# Patient Record
Sex: MALE | Race: WHITE | NOT HISPANIC OR LATINO | Employment: OTHER | URBAN - METROPOLITAN AREA
[De-identification: names, ages, dates, MRNs, and addresses within clinical notes are randomized per-mention and may not be internally consistent; named-entity substitution may affect disease eponyms.]

---

## 2017-03-31 ENCOUNTER — HOSPITAL ENCOUNTER (OUTPATIENT)
Dept: FAMILY MEDICINE CLINIC | Facility: CLINIC | Age: 62
Setting detail: SPECIMEN
Discharge: HOME | End: 2017-03-31
Attending: PHYSICIAN ASSISTANT | Admitting: PHYSICIAN ASSISTANT

## 2017-03-31 LAB
ALBUMIN SERPL-MCNC: 4.2 G/DL (ref 3.5–4.8)
ALBUMIN/GLOB SERPL: 1.5 {RATIO} (ref 1–1.7)
ALP SERPL-CCNC: 72 IU/L (ref 32–91)
ALT SERPL-CCNC: 47 IU/L (ref 17–63)
ANION GAP SERPL CALC-SCNC: 17.7 MMOL/L (ref 10–20)
AST SERPL-CCNC: 27 IU/L (ref 15–41)
BASOPHILS # BLD AUTO: 0.1 10*3/UL (ref 0–0.2)
BASOPHILS NFR BLD AUTO: 1 % (ref 0–2)
BILIRUB SERPL-MCNC: 0.7 MG/DL (ref 0.3–1.2)
BUN SERPL-MCNC: 13 MG/DL (ref 8–20)
BUN/CREAT SERPL: 13 (ref 6.2–20.3)
CALCIUM SERPL-MCNC: 9.3 MG/DL (ref 8.9–10.3)
CHLORIDE SERPL-SCNC: 104 MMOL/L (ref 101–111)
CHOLEST SERPL-MCNC: 197 MG/DL
CHOLEST/HDLC SERPL: 5.6 {RATIO}
CONV CO2: 26 MMOL/L (ref 22–32)
CONV LDL CHOLESTEROL DIRECT: 113 MG/DL (ref 0–100)
CONV TOTAL PROTEIN: 7 G/DL (ref 6.1–7.9)
CREAT UR-MCNC: 1 MG/DL (ref 0.7–1.2)
DIFFERENTIAL METHOD BLD: (no result)
EOSINOPHIL # BLD AUTO: 0.2 10*3/UL (ref 0–0.3)
EOSINOPHIL # BLD AUTO: 2 % (ref 0–3)
ERYTHROCYTE [DISTWIDTH] IN BLOOD BY AUTOMATED COUNT: 14.9 % (ref 11.5–14.5)
GLOBULIN UR ELPH-MCNC: 2.8 G/DL (ref 2.5–3.8)
GLUCOSE SERPL-MCNC: 70 MG/DL (ref 65–99)
HCT VFR BLD AUTO: 47.3 % (ref 40–54)
HDLC SERPL-MCNC: 35 MG/DL
HGB BLD-MCNC: 16.6 G/DL (ref 14–18)
LDLC/HDLC SERPL: 3.2 {RATIO}
LIPID INTERPRETATION: ABNORMAL
LYMPHOCYTES # BLD AUTO: 2.2 10*3/UL (ref 0.8–4.8)
LYMPHOCYTES NFR BLD AUTO: 25 % (ref 18–42)
MCH RBC QN AUTO: 29.8 PG (ref 26–32)
MCHC RBC AUTO-ENTMCNC: 35.2 G/DL (ref 32–36)
MCV RBC AUTO: 84.6 FL (ref 80–94)
MONOCYTES # BLD AUTO: 0.7 10*3/UL (ref 0.1–1.3)
MONOCYTES NFR BLD AUTO: 8 % (ref 2–11)
NEUTROPHILS # BLD AUTO: 5.5 10*3/UL (ref 2.3–8.6)
NEUTROPHILS NFR BLD AUTO: 64 % (ref 50–75)
NRBC BLD AUTO-RTO: 0 /100{WBCS}
NRBC/RBC NFR BLD MANUAL: 0 10*3/UL
PLATELET # BLD AUTO: 174 10*3/UL (ref 150–450)
PMV BLD AUTO: 7.8 FL (ref 7.4–10.4)
POTASSIUM SERPL-SCNC: 3.7 MMOL/L (ref 3.6–5.1)
PSA SERPL-MCNC: 0.59 NG/ML (ref 0–4)
RBC # BLD AUTO: 5.59 10*6/UL (ref 4.6–6)
SODIUM SERPL-SCNC: 144 MMOL/L (ref 136–144)
TRIGL SERPL-MCNC: 344 MG/DL
TSH SERPL-ACNC: 3.1 UIU/ML (ref 0.34–5.6)
VLDLC SERPL CALC-MCNC: 48.7 MG/DL
WBC # BLD AUTO: 8.6 10*3/UL (ref 4.5–11.5)

## 2018-01-17 ENCOUNTER — HOSPITAL ENCOUNTER (OUTPATIENT)
Dept: FAMILY MEDICINE CLINIC | Facility: CLINIC | Age: 63
Setting detail: SPECIMEN
Discharge: HOME | End: 2018-01-17
Attending: FAMILY MEDICINE | Admitting: FAMILY MEDICINE

## 2018-01-17 LAB
ANION GAP SERPL CALC-SCNC: 13.6 MMOL/L (ref 10–20)
BUN SERPL-MCNC: 17 MG/DL (ref 8–20)
BUN/CREAT SERPL: 14.2 (ref 6.2–20.3)
CALCIUM SERPL-MCNC: 9.6 MG/DL (ref 8.9–10.3)
CHLORIDE SERPL-SCNC: 100 MMOL/L (ref 101–111)
CHOLEST SERPL-MCNC: 184 MG/DL
CHOLEST/HDLC SERPL: 5.8 {RATIO}
CONV CO2: 28 MMOL/L (ref 22–32)
CONV LDL CHOLESTEROL DIRECT: 94 MG/DL (ref 0–100)
CREAT UR-MCNC: 1.2 MG/DL (ref 0.7–1.2)
GLUCOSE SERPL-MCNC: 91 MG/DL (ref 65–99)
HDLC SERPL-MCNC: 32 MG/DL
LDLC/HDLC SERPL: 2.9 {RATIO}
LIPID INTERPRETATION: ABNORMAL
POTASSIUM SERPL-SCNC: 3.6 MMOL/L (ref 3.6–5.1)
SODIUM SERPL-SCNC: 138 MMOL/L (ref 136–144)
TRIGL SERPL-MCNC: 362 MG/DL
VLDLC SERPL CALC-MCNC: 57.7 MG/DL

## 2019-03-21 ENCOUNTER — OFFICE (AMBULATORY)
Dept: URBAN - METROPOLITAN AREA PATHOLOGY 4 | Facility: PATHOLOGY | Age: 64
End: 2019-03-21
Payer: COMMERCIAL

## 2019-03-21 ENCOUNTER — ON CAMPUS - OUTPATIENT (AMBULATORY)
Dept: URBAN - METROPOLITAN AREA HOSPITAL 2 | Facility: HOSPITAL | Age: 64
End: 2019-03-21
Payer: COMMERCIAL

## 2019-03-21 VITALS
SYSTOLIC BLOOD PRESSURE: 164 MMHG | WEIGHT: 231.6 LBS | HEART RATE: 77 BPM | DIASTOLIC BLOOD PRESSURE: 88 MMHG | DIASTOLIC BLOOD PRESSURE: 100 MMHG | HEART RATE: 83 BPM | HEART RATE: 67 BPM | HEART RATE: 75 BPM | SYSTOLIC BLOOD PRESSURE: 141 MMHG | DIASTOLIC BLOOD PRESSURE: 97 MMHG | DIASTOLIC BLOOD PRESSURE: 95 MMHG | OXYGEN SATURATION: 99 % | SYSTOLIC BLOOD PRESSURE: 145 MMHG | RESPIRATION RATE: 16 BRPM | SYSTOLIC BLOOD PRESSURE: 154 MMHG | DIASTOLIC BLOOD PRESSURE: 93 MMHG | OXYGEN SATURATION: 94 % | DIASTOLIC BLOOD PRESSURE: 89 MMHG | HEART RATE: 74 BPM | OXYGEN SATURATION: 98 % | SYSTOLIC BLOOD PRESSURE: 143 MMHG | OXYGEN SATURATION: 96 % | RESPIRATION RATE: 18 BRPM | HEART RATE: 82 BPM | SYSTOLIC BLOOD PRESSURE: 153 MMHG | TEMPERATURE: 97.9 F | OXYGEN SATURATION: 97 % | SYSTOLIC BLOOD PRESSURE: 159 MMHG | HEART RATE: 79 BPM | HEIGHT: 71 IN | SYSTOLIC BLOOD PRESSURE: 140 MMHG | SYSTOLIC BLOOD PRESSURE: 139 MMHG | DIASTOLIC BLOOD PRESSURE: 80 MMHG | OXYGEN SATURATION: 100 %

## 2019-03-21 DIAGNOSIS — D12.2 BENIGN NEOPLASM OF ASCENDING COLON: ICD-10-CM

## 2019-03-21 DIAGNOSIS — D12.4 BENIGN NEOPLASM OF DESCENDING COLON: ICD-10-CM

## 2019-03-21 DIAGNOSIS — K57.30 DIVERTICULOSIS OF LARGE INTESTINE WITHOUT PERFORATION OR ABS: ICD-10-CM

## 2019-03-21 DIAGNOSIS — D12.5 BENIGN NEOPLASM OF SIGMOID COLON: ICD-10-CM

## 2019-03-21 DIAGNOSIS — R19.5 OTHER FECAL ABNORMALITIES: ICD-10-CM

## 2019-03-21 DIAGNOSIS — Z15.09 GENETIC SUSCEPTIBILITY TO OTHER MALIGNANT NEOPLASM: ICD-10-CM

## 2019-03-21 LAB
GI HISTOLOGY: A. UNSPECIFIED: (no result)
GI HISTOLOGY: B. UNSPECIFIED: (no result)
GI HISTOLOGY: C. UNSPECIFIED: (no result)
GI HISTOLOGY: PDF REPORT: (no result)

## 2019-03-21 PROCEDURE — 45380 COLONOSCOPY AND BIOPSY: CPT | Mod: 59 | Performed by: INTERNAL MEDICINE

## 2019-03-21 PROCEDURE — 45385 COLONOSCOPY W/LESION REMOVAL: CPT | Performed by: INTERNAL MEDICINE

## 2019-03-21 PROCEDURE — 88305 TISSUE EXAM BY PATHOLOGIST: CPT | Performed by: INTERNAL MEDICINE

## 2019-05-24 ENCOUNTER — CONVERSION ENCOUNTER (OUTPATIENT)
Dept: FAMILY MEDICINE CLINIC | Facility: CLINIC | Age: 64
End: 2019-05-24

## 2019-06-04 VITALS
HEIGHT: 72 IN | WEIGHT: 227.7 LBS | RESPIRATION RATE: 16 BRPM | OXYGEN SATURATION: 98 % | HEART RATE: 102 BPM | DIASTOLIC BLOOD PRESSURE: 96 MMHG | SYSTOLIC BLOOD PRESSURE: 137 MMHG | BODY MASS INDEX: 30.84 KG/M2

## 2019-06-06 NOTE — PROGRESS NOTES
Visit Type:  Acute Visit  Referring Provider:  Jarrell Alaniz MD  Primary Provider:  Corbin URBAN MD      History of Present Illness:  Patient is here by:         self.   Context/Complaints:      Patient has developed cough and sore throat since last monday. no fever.   Severity/Character:        while worse at night  Onset/Timing:               which   Duration:                       since Friday  Associated symptoms:  Patient Sore throat , hoarseness. productive cough,  Denies sinus congestion, drainage. Says only other symptom is mouth is sore.  modifying factors : taking nyquil         Past Medical History:     Reviewed history from 2018 and no changes required:        Hypertension        Hyperlipidemia    Past Surgical History:     Reviewed history from 2014 and no changes required:        none    Family History Summary:      Reviewed history Last on 2019 and no changes required:2019  Father - Has Family History of Heart Disease - Entered On: 2018    General Comments - FH:  Mother: alive & healthy  Father: PNA, AIDS-D from bypass surgery, CAD ( age 55)  Children: 2 - alive & well  Siblings: 1 sister, 1 brother - both alive & well; 1 brother  from choking    Social History:     Reviewed history from 2019 and no changes required:        Patient has never smoked.        Alcohol Use: N        Drug Use: N                Alcohol Use: N                 2 daughters        occupation: Metal sales-       Risk Factors:     Smoked Tobacco Use:  Never smoker  Smokeless Tobacco Use:  Never  Drug use:  no  HIV high-risk behavior:  no  Caffeine use:  4+ drinks per day  Alcohol use:  no  Seatbelt use:  100 %    Family History Risk Factors:     Family History of MI in females < 65 years old:  no     Family History of MI in males < 55 years old:  no    Previous Tobacco Use: Signed On - 2019  Smoked Tobacco Use:  Never smoker  Smokeless  Tobacco Use:  Never  Drug use:  no    Previous Alcohol Use: Signed On - 2019  Alcohol use:  no  Seatbelt use:  100 %    Colonoscopy History:     Date of Last Colonoscopy:  2019      Review of Systems     General       Denies fever, chills and sweats.    Resp       Complains of cough.      Vital Signs:    Patient Profile:    63 Years Old Male  Height:     72 inches  Weight:     227.7 pounds  BMI:        30.88     O2 Sat:     98 %  Temp:       99.4 degrees F oral  Pulse rate: 102 / minute  Resp:       16 per minute  BP Sittin / 96  (left arm)    Cuff size:  regular  Patient has a risk of falls? No  Patient in pain?    No    Problems: Active problems were reviewed with the patient during this visit.  Medications: Medications were reviewed with the patient during this visit.  Allergies: Allergies were reviewed with the patient during this visit.        Vitals Entered By: Kellen Camara (May 24, 2019 11:40 AM)      Vital Signs:    Patient Profile:    63 Years Old Male  Height:     72 inches  Weight:     227.7 pounds  (Measured Weight:   lbs.  oz.)  BMI:        30.88  Temp:       99.4 degrees F oral  Pulse rate: 102 / minute  Resp:       16 per minute  O2 Sat:     98 %    BP sittin / 96  Cuff size:    regular   Medications:  Medications were reviewed with the patient during this visit.    Allergies:   * LISINOPRIL (Critical)    Allergies were reviewed with the patient during this visit.      Physical Exam    General:      well developed, well nourished, in no acute distress.    Head:      normocephalic and atraumatic.    Eyes:      PERRL/EOM intact, conjunctiva and sclera clear with out nystagmus.    Ears:      TM's intact and clear with normal canals with grossly normal hearing.    Nose:      no deformity, discharge, inflammation, or lesions.    Neck:      no masses, thyromegaly, or abnormal cervical nodes.    Lungs:      clear bilaterally to auscultation.    Heart:      non-displaced PMI, chest  non-tender; regular rate and rhythm, S1, S2 without murmurs, rubs, or gallops  Abdomen:       normal bowel sounds; no hepatosplenomegaly no ventral,umbilical hernias or masses noted.    Msk:      no deformity or scoliosis noted of thoracic or lumbar spine.    Pulses:      pulses normal in all 4 extremities.    Extremities:      no pedal edema.    Neurologic:      no focal deficit  Skin:      intact without lesions or rashes.    Cervical Nodes:      no significant adenopathy.    Psych:      alert and cooperative; normal mood and affect; normal attention span and concentration.      Diabetes Management Exam:      Foot Exam (with socks and/or shoes not present):        Pulses:           pulses normal in all 4 extremities.        Blood Pressure:  Today's BP: 137/96 mm Hg    Labwork:   Most Recent Lab Results:   LDL: 94 mg/dL 01/17/2018          Impression & Recommendations:    Problem # 1:  Cough (ICD-786.2) ([id number])  prednisone and flonase prescribed. use otc allergy medication. He is to follow up if he worsens.     Medications Added to Medication List This Visit:  1)  Flonase Allergy Relief 50 Mcg/act Nasal Suspension (Fluticasone propionate) .... 2 sprays each nostril once day  2)  Prednisone 10 Mg Oral Tablet (Prednisone) .... Take 4 by mouth a day for 2 days then 3 a day for 2 days then2 a day for 2 days then 1 a day.      Patient Instructions:  1)  take the steroids in am with food.   2)  use the flonase.   3)  use mucinex  4)  drink fluids  5)  tyelnol or ibuprofen for aches and pains.           Prescriptions:  FLONASE ALLERGY RELIEF 50 MCG/ACT NASAL SUSPENSION (FLUTICASONE PROPIONATE) 2 sprays each nostril once day  #1[Milliliter] x 3      Entered and Authorized by:  Indu Burkett NP      Electronically signed by:   Indu Burkett NP on 05/24/2019      Method used:    Electronically to               [facility]* (retail)              [address] [place]  [address]               Ph: [phone]              Fax: [phone]      Note to Pharmacy: Route: NASAL;       RxID:   [id number]  PREDNISONE 10 MG ORAL TABLET (PREDNISONE) Take 4 by mouth a day for 2 days then 3 a day for 2 days then2 a day for 2 days then 1 a day.  #20[Tablet] x 0      Entered and Authorized by:  Indu Burkett NP      Electronically signed by:   Indu Burkett NP on 05/24/2019      Method used:    Electronically to               [facility]* (retail)              [address] [place] [address] [place]  [address]              Ph: [phone]              Fax: [phone]      Note to Pharmacy: Route: ORAL;       RxID:   [id number]                Medication Administration    Orders Added:  1)  Ofc Vst, Est Level III [64169]        Electronically signed by Indu Burkett NP on 05/24/2019 at 12:38 PM  ________________________________________________________________________       Disclaimer: Converted Note message may not contain all data elements that existed in the legacy source system. Please see [employer] Legacy System for the original note details.

## 2019-07-15 ENCOUNTER — OFFICE VISIT (OUTPATIENT)
Dept: FAMILY MEDICINE CLINIC | Facility: CLINIC | Age: 64
End: 2019-07-15

## 2019-07-15 VITALS
SYSTOLIC BLOOD PRESSURE: 153 MMHG | RESPIRATION RATE: 16 BRPM | OXYGEN SATURATION: 99 % | WEIGHT: 231.2 LBS | HEIGHT: 72 IN | BODY MASS INDEX: 31.31 KG/M2 | HEART RATE: 83 BPM | TEMPERATURE: 98.3 F | DIASTOLIC BLOOD PRESSURE: 93 MMHG

## 2019-07-15 DIAGNOSIS — I10 ESSENTIAL HYPERTENSION: Primary | ICD-10-CM

## 2019-07-15 DIAGNOSIS — J30.2 SEASONAL ALLERGIC RHINITIS, UNSPECIFIED TRIGGER: ICD-10-CM

## 2019-07-15 DIAGNOSIS — E78.5 HYPERLIPIDEMIA, UNSPECIFIED HYPERLIPIDEMIA TYPE: ICD-10-CM

## 2019-07-15 PROBLEM — J30.9 ALLERGIC RHINITIS: Status: ACTIVE | Noted: 2019-07-15

## 2019-07-15 PROBLEM — E66.9 OBESITY: Status: ACTIVE | Noted: 2017-03-31

## 2019-07-15 PROBLEM — Z00.00 PREVENTATIVE HEALTH CARE: Status: ACTIVE | Noted: 2017-03-31

## 2019-07-15 PROCEDURE — 99213 OFFICE O/P EST LOW 20 MIN: CPT | Performed by: FAMILY MEDICINE

## 2019-07-15 RX ORDER — IRBESARTAN 150 MG/1
TABLET ORAL
COMMUNITY
Start: 2019-04-24 | End: 2019-07-15

## 2019-07-15 RX ORDER — HYDROCHLOROTHIAZIDE 25 MG/1
TABLET ORAL
COMMUNITY
Start: 2019-04-10 | End: 2019-10-18 | Stop reason: SDUPTHER

## 2019-07-15 RX ORDER — IRBESARTAN 150 MG/1
300 TABLET ORAL DAILY
Qty: 90 TABLET | Refills: 1 | Status: SHIPPED | OUTPATIENT
Start: 2019-07-15 | End: 2019-09-04 | Stop reason: SDUPTHER

## 2019-07-15 NOTE — PROGRESS NOTES
"Subjective   Lonnie Salinas is a 63 y.o. male.     Chief Complaint   Patient presents with   • Hypertension     6 MTH F/U   • Hyperlipidemia       HPI  Complaint: Hypertension hyperlipidemia    The patient is a 63-year-old white male comes in for follow-up and maintenance of his current problems which include    #1 hypertension-deteriorated-patient on hypothyroid 25 mg daily and Avapro 100 mg daily.  Blood pressures not controlled.  He is a symptom medic.  I recommend increasing his Avapro 300 mg daily    #2 hyperlipidemia-stable patient currently on a diet.    #3 allergic rhinitis-stable patient currently is on Flonase 2 sprays each nostril once a day.  Denies sinus congestion drainage cough shortness breath or wheezing        The following portions of the patient's history were reviewed and updated as appropriate: allergies, current medications, past family history, past medical history, past social history, past surgical history and problem list.    Review of Systems    Objective     /93   Pulse 83   Temp 98.3 °F (36.8 °C) (Oral)   Resp 16   Ht 182.9 cm (72\")   Wt 105 kg (231 lb 3.2 oz)   SpO2 99%   BMI 31.36 kg/m²     Physical Exam   Constitutional: He is oriented to person, place, and time. He appears well-developed and well-nourished.   HENT:   Head: Normocephalic and atraumatic.   Eyes: Conjunctivae and EOM are normal. Pupils are equal, round, and reactive to light.   Neck: Normal range of motion. Neck supple.   Cardiovascular: Normal rate, regular rhythm, normal heart sounds and intact distal pulses.   Pulmonary/Chest: Effort normal and breath sounds normal.   Abdominal: Soft. Bowel sounds are normal.   Musculoskeletal: Normal range of motion.   Neurological: He is alert and oriented to person, place, and time.   Skin: Skin is warm and dry.   Psychiatric: He has a normal mood and affect. His behavior is normal.   Nursing note and vitals reviewed.        Assessment/Plan   Lonnie was seen " today for hypertension and hyperlipidemia.    Diagnoses and all orders for this visit:    Essential hypertension  -     Comprehensive Metabolic Panel; Future  -     CBC & Differential; Future    Hyperlipidemia, unspecified hyperlipidemia type  -     Lipid Panel; Future    Seasonal allergic rhinitis, unspecified trigger    Other orders  -     irbesartan (AVAPRO) 150 MG tablet; 2 tablets Daily.      Patient Instructions   Increase the dose of the Irbesartan to 300 mgm a day.    Have the follow up labs done and call for results.    Up in the office in 3 months          Jarrell Alaniz Jr., MD    07/15/19

## 2019-07-15 NOTE — PATIENT INSTRUCTIONS
Increase the dose of the Irbesartan to 300 mgm a day.    Have the follow up labs done and call for results.    Up in the office in 3 months

## 2019-09-04 RX ORDER — IRBESARTAN 150 MG/1
TABLET ORAL
Qty: 90 TABLET | Refills: 0 | Status: SHIPPED | OUTPATIENT
Start: 2019-09-04 | End: 2019-09-09 | Stop reason: CLARIF

## 2019-09-09 ENCOUNTER — TELEPHONE (OUTPATIENT)
Dept: FAMILY MEDICINE CLINIC | Facility: CLINIC | Age: 64
End: 2019-09-09

## 2019-09-09 RX ORDER — IRBESARTAN 300 MG/1
300 TABLET ORAL NIGHTLY
Qty: 90 TABLET | Refills: 1 | Status: SHIPPED | OUTPATIENT
Start: 2019-09-09 | End: 2020-03-05

## 2019-09-09 NOTE — TELEPHONE ENCOUNTER
Pt called requesting a refill on irbesartan for bid, current Rx was for 1 daily but pt states you had him increase it

## 2019-10-18 RX ORDER — HYDROCHLOROTHIAZIDE 25 MG/1
TABLET ORAL
Qty: 90 TABLET | Refills: 0 | Status: SHIPPED | OUTPATIENT
Start: 2019-10-18 | End: 2020-01-19

## 2019-10-22 ENCOUNTER — OFFICE VISIT (OUTPATIENT)
Dept: FAMILY MEDICINE CLINIC | Facility: CLINIC | Age: 64
End: 2019-10-22

## 2019-10-22 VITALS
HEART RATE: 67 BPM | SYSTOLIC BLOOD PRESSURE: 137 MMHG | DIASTOLIC BLOOD PRESSURE: 83 MMHG | RESPIRATION RATE: 16 BRPM | OXYGEN SATURATION: 99 % | BODY MASS INDEX: 31.29 KG/M2 | HEIGHT: 72 IN | WEIGHT: 231 LBS | TEMPERATURE: 97.7 F

## 2019-10-22 DIAGNOSIS — I10 ESSENTIAL HYPERTENSION: Primary | ICD-10-CM

## 2019-10-22 DIAGNOSIS — E78.5 HYPERLIPIDEMIA, UNSPECIFIED HYPERLIPIDEMIA TYPE: ICD-10-CM

## 2019-10-22 PROCEDURE — 99213 OFFICE O/P EST LOW 20 MIN: CPT | Performed by: FAMILY MEDICINE

## 2019-10-22 RX ORDER — ATORVASTATIN CALCIUM 40 MG/1
40 TABLET, FILM COATED ORAL DAILY
Qty: 90 TABLET | Refills: 1 | Status: SHIPPED | OUTPATIENT
Start: 2019-10-22 | End: 2020-04-20

## 2019-10-22 NOTE — PROGRESS NOTES
"Subjective   Lonnie Salinas is a 64 y.o. male.     Chief Complaint   Patient presents with   • Hypertension     3 MTH F/U   • Hyperlipidemia       HPI  Complaint: Hypertension hyperlipidemia    Patient is a 64-year-old white male comes in for follow-up and maintenance of his current problems which include    1.  Hypertension-stable-he is on Hadeco thiazide 25 mg daily and Avapro 3 mg daily.  He denies any lightheadedness or chest pain.  The dosage of his Avapro was increased last time.  Blood pressure is much improved.      2. hyperlipidemia-stable-patient is a 15% 10-year cardiovascular risk.  Patient is not currently taking a statin drug.  Patient was advised that the current recommendation would be for him to take a statin drug.  He states he wants to start Lipitor        The following portions of the patient's history were reviewed and updated as appropriate: allergies, current medications, past family history, past medical history, past social history, past surgical history and problem list.    Review of Systems    Objective     /83   Pulse 67   Temp 97.7 °F (36.5 °C) (Oral)   Resp 16   Ht 182.9 cm (72\")   Wt 105 kg (231 lb)   SpO2 99%   BMI 31.33 kg/m²     Physical Exam   Constitutional: He is oriented to person, place, and time. He appears well-developed and well-nourished.   HENT:   Head: Normocephalic and atraumatic.   Eyes: Conjunctivae and EOM are normal. Pupils are equal, round, and reactive to light.   Neck: Normal range of motion. Neck supple.   Cardiovascular: Normal rate, regular rhythm, normal heart sounds and intact distal pulses.   Pulmonary/Chest: Effort normal and breath sounds normal.   Abdominal: Soft. Bowel sounds are normal.   Musculoskeletal: Normal range of motion.   Neurological: He is alert and oriented to person, place, and time.   Skin: Skin is warm and dry.   Psychiatric: He has a normal mood and affect. His behavior is normal.   Nursing note and vitals " reviewed.        Assessment/Plan   Lonnie was seen today for hypertension and hyperlipidemia.    Diagnoses and all orders for this visit:    Essential hypertension  -     CBC & Differential; Future  -     Comprehensive Metabolic Panel; Future    Hyperlipidemia, unspecified hyperlipidemia type  -     Lipid Panel; Future    Other orders  -     atorvastatin (LIPITOR) 40 MG tablet; Take 1 tablet by mouth Daily.      Patient Instructions   Continue your current medications and treatment.    Start taking Atorvastatin once a day.    Have follow up labs in 6-12 weeks.    Follow up in the office in 6 months.      Jarrell Alaniz Jr., MD    10/22/19  Answers for HPI/ROS submitted by the patient on 10/15/2019   Hypertension  Chronicity: recurrent  Onset: more than 1 year ago  Progression since onset: waxing and waning  Condition status: resistant  anxiety: No  headaches: No  malaise/fatigue: No  orthopnea: No  PND: No  Agents associated with hypertension: no associated agents  CAD risks: obesity  Compliance problems: no compliance problems

## 2019-10-22 NOTE — PATIENT INSTRUCTIONS
Continue your current medications and treatment.    Start taking Atorvastatin once a day.    Have follow up labs in 6-12 weeks.    Follow up in the office in 6 months.

## 2020-01-19 RX ORDER — HYDROCHLOROTHIAZIDE 25 MG/1
TABLET ORAL
Qty: 90 TABLET | Refills: 0 | Status: SHIPPED | OUTPATIENT
Start: 2020-01-19 | End: 2020-04-20

## 2020-02-17 ENCOUNTER — OFFICE VISIT (OUTPATIENT)
Dept: FAMILY MEDICINE CLINIC | Facility: CLINIC | Age: 65
End: 2020-02-17

## 2020-02-17 VITALS
SYSTOLIC BLOOD PRESSURE: 134 MMHG | BODY MASS INDEX: 31.15 KG/M2 | HEART RATE: 85 BPM | DIASTOLIC BLOOD PRESSURE: 84 MMHG | OXYGEN SATURATION: 96 % | RESPIRATION RATE: 18 BRPM | HEIGHT: 72 IN | WEIGHT: 230 LBS | TEMPERATURE: 97.3 F

## 2020-02-17 DIAGNOSIS — D69.6 ACQUIRED THROMBOCYTOPENIA (HCC): ICD-10-CM

## 2020-02-17 DIAGNOSIS — I10 ESSENTIAL HYPERTENSION: Primary | ICD-10-CM

## 2020-02-17 DIAGNOSIS — E78.5 HYPERLIPIDEMIA, UNSPECIFIED HYPERLIPIDEMIA TYPE: ICD-10-CM

## 2020-02-17 DIAGNOSIS — J30.2 SEASONAL ALLERGIC RHINITIS, UNSPECIFIED TRIGGER: ICD-10-CM

## 2020-02-17 PROCEDURE — 99213 OFFICE O/P EST LOW 20 MIN: CPT | Performed by: FAMILY MEDICINE

## 2020-02-17 NOTE — PROGRESS NOTES
"Subjective   Lonnie Salinas is a 64 y.o. male.     Chief Complaint   Patient presents with   • Hypertension     3 MTH F/U   • Hyperlipidemia       HPI  Chief complaint: Hypertension hyperlipidemia    The patient is a 64-year-old white male comes in for follow-up and maintenance of his current problems which include    1.  Pretension-stable-patient is on hydrochlorothiazide 25 mg daily and Avapro 300 mg daily.  He denied headache lightheadedness dizziness or chest pain.    2.  Hyperlipidemia-stable-patient on Lipitor 40 mg daily.  He denies myalgias no arthralgias.  Denied nausea or anorexia.    3.  Rhinitis-stable-patient uses antihistamines on an as-needed basis.    The following portions of the patient's history were reviewed and updated as appropriate: allergies, current medications, past family history, past medical history, past social history, past surgical history and problem list.    Review of Systems    Objective     /84 (BP Location: Right arm, Patient Position: Sitting, Cuff Size: Large Adult)   Pulse 85   Temp 97.3 °F (36.3 °C) (Oral)   Resp 18   Ht 182.9 cm (72\")   Wt 104 kg (230 lb)   SpO2 96%   BMI 31.19 kg/m²     Physical Exam   Constitutional: He is oriented to person, place, and time. He appears well-developed and well-nourished.   HENT:   Head: Normocephalic and atraumatic.   Eyes: Pupils are equal, round, and reactive to light. Conjunctivae and EOM are normal.   Neck: Normal range of motion. Neck supple.   Cardiovascular: Normal rate, regular rhythm, normal heart sounds and intact distal pulses.   Pulmonary/Chest: Effort normal and breath sounds normal.   Abdominal: Soft. Bowel sounds are normal.   Musculoskeletal: Normal range of motion.   Neurological: He is alert and oriented to person, place, and time.   Skin: Skin is warm and dry.   Psychiatric: He has a normal mood and affect. His behavior is normal.   Nursing note and vitals reviewed.        Assessment/Plan   Lonnie was " seen today for hypertension and hyperlipidemia.    Diagnoses and all orders for this visit:    Essential hypertension  -     CBC & Differential; Future  -     Comprehensive Metabolic Panel; Future    Hyperlipidemia, unspecified hyperlipidemia type  -     Lipid Panel; Future    Seasonal allergic rhinitis, unspecified trigger    Acquired thrombocytopenia (CMS/HCC)      Patient Instructions   Continue your current medications and treatment.    Have the follow up labs done and call for results.    Follow up in the office in 6 months.          Jarrell Alaniz Jr., MD    02/17/20

## 2020-02-17 NOTE — PATIENT INSTRUCTIONS
Continue your current medications and treatment.    Have the follow up labs done and call for results.    Follow up in the office in 6 months.

## 2020-02-22 ENCOUNTER — LAB (OUTPATIENT)
Dept: LAB | Facility: HOSPITAL | Age: 65
End: 2020-02-22

## 2020-02-22 DIAGNOSIS — I10 ESSENTIAL HYPERTENSION: ICD-10-CM

## 2020-02-22 DIAGNOSIS — E78.5 HYPERLIPIDEMIA, UNSPECIFIED HYPERLIPIDEMIA TYPE: ICD-10-CM

## 2020-02-22 LAB
ALBUMIN SERPL-MCNC: 4.2 G/DL (ref 3.5–5.2)
ALBUMIN/GLOB SERPL: 1.4 G/DL
ALP SERPL-CCNC: 77 U/L (ref 39–117)
ALT SERPL W P-5'-P-CCNC: 32 U/L (ref 1–41)
ANION GAP SERPL CALCULATED.3IONS-SCNC: 13.7 MMOL/L (ref 5–15)
AST SERPL-CCNC: 22 U/L (ref 1–40)
BASOPHILS # BLD AUTO: 0.06 10*3/MM3 (ref 0–0.2)
BASOPHILS NFR BLD AUTO: 0.8 % (ref 0–1.5)
BILIRUB SERPL-MCNC: 0.6 MG/DL (ref 0.2–1.2)
BUN BLD-MCNC: 17 MG/DL (ref 8–23)
BUN/CREAT SERPL: 14.5 (ref 7–25)
CALCIUM SPEC-SCNC: 10.1 MG/DL (ref 8.6–10.5)
CHLORIDE SERPL-SCNC: 99 MMOL/L (ref 98–107)
CHOLEST SERPL-MCNC: 116 MG/DL (ref 0–200)
CO2 SERPL-SCNC: 26.3 MMOL/L (ref 22–29)
CREAT BLD-MCNC: 1.17 MG/DL (ref 0.76–1.27)
DEPRECATED RDW RBC AUTO: 44.1 FL (ref 37–54)
EOSINOPHIL # BLD AUTO: 0.11 10*3/MM3 (ref 0–0.4)
EOSINOPHIL NFR BLD AUTO: 1.4 % (ref 0.3–6.2)
ERYTHROCYTE [DISTWIDTH] IN BLOOD BY AUTOMATED COUNT: 13.8 % (ref 12.3–15.4)
GFR SERPL CREATININE-BSD FRML MDRD: 63 ML/MIN/1.73
GLOBULIN UR ELPH-MCNC: 2.9 GM/DL
GLUCOSE BLD-MCNC: 108 MG/DL (ref 65–99)
HCT VFR BLD AUTO: 49 % (ref 37.5–51)
HDLC SERPL-MCNC: 30 MG/DL (ref 40–60)
HGB BLD-MCNC: 16.1 G/DL (ref 13–17.7)
IMM GRANULOCYTES # BLD AUTO: 0.03 10*3/MM3 (ref 0–0.05)
IMM GRANULOCYTES NFR BLD AUTO: 0.4 % (ref 0–0.5)
LDLC SERPL CALC-MCNC: 45 MG/DL (ref 0–100)
LDLC/HDLC SERPL: 1.49 {RATIO}
LYMPHOCYTES # BLD AUTO: 1.68 10*3/MM3 (ref 0.7–3.1)
LYMPHOCYTES NFR BLD AUTO: 22.1 % (ref 19.6–45.3)
MCH RBC QN AUTO: 28.8 PG (ref 26.6–33)
MCHC RBC AUTO-ENTMCNC: 32.9 G/DL (ref 31.5–35.7)
MCV RBC AUTO: 87.5 FL (ref 79–97)
MONOCYTES # BLD AUTO: 0.61 10*3/MM3 (ref 0.1–0.9)
MONOCYTES NFR BLD AUTO: 8 % (ref 5–12)
NEUTROPHILS # BLD AUTO: 5.12 10*3/MM3 (ref 1.7–7)
NEUTROPHILS NFR BLD AUTO: 67.3 % (ref 42.7–76)
NRBC BLD AUTO-RTO: 0 /100 WBC (ref 0–0.2)
PLATELET # BLD AUTO: 197 10*3/MM3 (ref 140–450)
PMV BLD AUTO: 9.8 FL (ref 6–12)
POTASSIUM BLD-SCNC: 4.5 MMOL/L (ref 3.5–5.2)
PROT SERPL-MCNC: 7.1 G/DL (ref 6–8.5)
RBC # BLD AUTO: 5.6 10*6/MM3 (ref 4.14–5.8)
SODIUM BLD-SCNC: 139 MMOL/L (ref 136–145)
TRIGL SERPL-MCNC: 206 MG/DL (ref 0–150)
VLDLC SERPL-MCNC: 41.2 MG/DL (ref 5–40)
WBC NRBC COR # BLD: 7.61 10*3/MM3 (ref 3.4–10.8)

## 2020-02-22 PROCEDURE — 85025 COMPLETE CBC W/AUTO DIFF WBC: CPT

## 2020-02-22 PROCEDURE — 80061 LIPID PANEL: CPT

## 2020-02-22 PROCEDURE — 80053 COMPREHEN METABOLIC PANEL: CPT

## 2020-02-22 PROCEDURE — 36415 COLL VENOUS BLD VENIPUNCTURE: CPT

## 2020-03-05 RX ORDER — IRBESARTAN 300 MG/1
TABLET ORAL
Qty: 90 TABLET | Refills: 0 | Status: SHIPPED | OUTPATIENT
Start: 2020-03-05 | End: 2020-06-04

## 2020-04-20 RX ORDER — ATORVASTATIN CALCIUM 40 MG/1
TABLET, FILM COATED ORAL
Qty: 90 TABLET | Refills: 0 | Status: SHIPPED | OUTPATIENT
Start: 2020-04-20 | End: 2020-07-22

## 2020-04-20 RX ORDER — HYDROCHLOROTHIAZIDE 25 MG/1
TABLET ORAL
Qty: 90 TABLET | Refills: 0 | Status: SHIPPED | OUTPATIENT
Start: 2020-04-20 | End: 2020-07-14 | Stop reason: HOSPADM

## 2020-06-04 RX ORDER — IRBESARTAN 300 MG/1
TABLET ORAL
Qty: 90 TABLET | Refills: 0 | Status: SHIPPED | OUTPATIENT
Start: 2020-06-04 | End: 2020-07-14 | Stop reason: HOSPADM

## 2020-07-08 ENCOUNTER — HOSPITAL ENCOUNTER (INPATIENT)
Facility: HOSPITAL | Age: 65
LOS: 6 days | Discharge: HOME | End: 2020-07-14
Attending: INTERNAL MEDICINE | Admitting: INTERNAL MEDICINE

## 2020-07-08 ENCOUNTER — APPOINTMENT (OUTPATIENT)
Dept: CARDIOLOGY | Facility: HOSPITAL | Age: 65
End: 2020-07-08

## 2020-07-08 ENCOUNTER — APPOINTMENT (OUTPATIENT)
Dept: CT IMAGING | Facility: HOSPITAL | Age: 65
End: 2020-07-08

## 2020-07-08 ENCOUNTER — APPOINTMENT (OUTPATIENT)
Dept: GENERAL RADIOLOGY | Facility: HOSPITAL | Age: 65
End: 2020-07-08

## 2020-07-08 ENCOUNTER — APPOINTMENT (OUTPATIENT)
Dept: ULTRASOUND IMAGING | Facility: HOSPITAL | Age: 65
End: 2020-07-08

## 2020-07-08 ENCOUNTER — TELEPHONE (OUTPATIENT)
Dept: FAMILY MEDICINE CLINIC | Facility: CLINIC | Age: 65
End: 2020-07-08

## 2020-07-08 DIAGNOSIS — R07.9 CHEST PAIN, UNSPECIFIED TYPE: ICD-10-CM

## 2020-07-08 DIAGNOSIS — R94.31 ABNORMAL EKG: ICD-10-CM

## 2020-07-08 DIAGNOSIS — R06.09 EXERTIONAL DYSPNEA: ICD-10-CM

## 2020-07-08 DIAGNOSIS — I21.4 NSTEMI, INITIAL EPISODE OF CARE (HCC): ICD-10-CM

## 2020-07-08 DIAGNOSIS — I21.4 NSTEMI (NON-ST ELEVATED MYOCARDIAL INFARCTION) (HCC): ICD-10-CM

## 2020-07-08 DIAGNOSIS — Z95.1 S/P CABG (CORONARY ARTERY BYPASS GRAFT): Primary | ICD-10-CM

## 2020-07-08 DIAGNOSIS — I25.110 CORONARY ARTERY DISEASE INVOLVING NATIVE CORONARY ARTERY OF NATIVE HEART WITH UNSTABLE ANGINA PECTORIS (HCC): ICD-10-CM

## 2020-07-08 LAB
ABO GROUP BLD: NORMAL
ACT BLD: 158 SECONDS (ref 89–137)
ALBUMIN SERPL-MCNC: 4.4 G/DL (ref 3.5–5.2)
ALBUMIN/GLOB SERPL: 1.6 G/DL
ALP SERPL-CCNC: 78 U/L (ref 39–117)
ALT SERPL W P-5'-P-CCNC: 59 U/L (ref 1–41)
ANION GAP SERPL CALCULATED.3IONS-SCNC: 13 MMOL/L (ref 5–15)
APTT PPP: 24.7 SECONDS (ref 24–31)
APTT PPP: 53.9 SECONDS (ref 61–76.5)
ARTERIAL PATENCY WRIST A: ABNORMAL
AST SERPL-CCNC: 125 U/L (ref 1–40)
ATMOSPHERIC PRESS: ABNORMAL MM[HG]
BASE EXCESS BLDA CALC-SCNC: -0.8 MMOL/L (ref 0–3)
BASOPHILS # BLD AUTO: 0.1 10*3/MM3 (ref 0–0.2)
BASOPHILS NFR BLD AUTO: 0.8 % (ref 0–1.5)
BDY SITE: ABNORMAL
BH CV ECHO MEAS - ACS: 2.3 CM
BH CV ECHO MEAS - AO MAX PG (FULL): -0.78 MMHG
BH CV ECHO MEAS - AO MAX PG: 3.5 MMHG
BH CV ECHO MEAS - AO MEAN PG (FULL): 0.06 MMHG
BH CV ECHO MEAS - AO MEAN PG: 2.1 MMHG
BH CV ECHO MEAS - AO ROOT AREA (BSA CORRECTED): 1.6
BH CV ECHO MEAS - AO ROOT AREA: 9.6 CM^2
BH CV ECHO MEAS - AO ROOT DIAM: 3.5 CM
BH CV ECHO MEAS - AO V2 MAX: 93 CM/SEC
BH CV ECHO MEAS - AO V2 MEAN: 69.8 CM/SEC
BH CV ECHO MEAS - AO V2 VTI: 12.2 CM
BH CV ECHO MEAS - ASC AORTA: 3 CM
BH CV ECHO MEAS - AVA(I,A): 4.9 CM^2
BH CV ECHO MEAS - AVA(I,D): 4.9 CM^2
BH CV ECHO MEAS - AVA(V,A): 5.2 CM^2
BH CV ECHO MEAS - AVA(V,D): 5.2 CM^2
BH CV ECHO MEAS - BSA(HAYCOCK): 2.2 M^2
BH CV ECHO MEAS - BSA: 2.2 M^2
BH CV ECHO MEAS - BZI_BMI: 32.5 KILOGRAMS/M^2
BH CV ECHO MEAS - BZI_METRIC_HEIGHT: 175.3 CM
BH CV ECHO MEAS - BZI_METRIC_WEIGHT: 99.8 KG
BH CV ECHO MEAS - EDV(CUBED): 146.3 ML
BH CV ECHO MEAS - EDV(MOD-SP4): 62.2 ML
BH CV ECHO MEAS - EDV(TEICH): 133.5 ML
BH CV ECHO MEAS - EF(CUBED): 36.5 %
BH CV ECHO MEAS - EF(MOD-BP): 50 %
BH CV ECHO MEAS - EF(MOD-SP4): 49.8 %
BH CV ECHO MEAS - EF(TEICH): 29.7 %
BH CV ECHO MEAS - ESV(CUBED): 92.9 ML
BH CV ECHO MEAS - ESV(MOD-SP4): 31.2 ML
BH CV ECHO MEAS - ESV(TEICH): 93.9 ML
BH CV ECHO MEAS - FS: 14 %
BH CV ECHO MEAS - IVS/LVPW: 0.79
BH CV ECHO MEAS - IVSD: 1 CM
BH CV ECHO MEAS - LA DIMENSION(2D): 4 CM
BH CV ECHO MEAS - LV DIASTOLIC VOL/BSA (35-75): 28.9 ML/M^2
BH CV ECHO MEAS - LV MASS(C)D: 234.4 GRAMS
BH CV ECHO MEAS - LV MASS(C)DI: 109 GRAMS/M^2
BH CV ECHO MEAS - LV MAX PG: 4.2 MMHG
BH CV ECHO MEAS - LV MEAN PG: 2.1 MMHG
BH CV ECHO MEAS - LV SYSTOLIC VOL/BSA (12-30): 14.5 ML/M^2
BH CV ECHO MEAS - LV V1 MAX: 102.9 CM/SEC
BH CV ECHO MEAS - LV V1 MEAN: 65.3 CM/SEC
BH CV ECHO MEAS - LV V1 VTI: 12.7 CM
BH CV ECHO MEAS - LVIDD: 5.3 CM
BH CV ECHO MEAS - LVIDS: 4.5 CM
BH CV ECHO MEAS - LVOT AREA: 4.7 CM^2
BH CV ECHO MEAS - LVOT DIAM: 2.5 CM
BH CV ECHO MEAS - LVPWD: 1.3 CM
BH CV ECHO MEAS - MV A MAX VEL: 77.3 CM/SEC
BH CV ECHO MEAS - MV DEC SLOPE: 256.1 CM/SEC^2
BH CV ECHO MEAS - MV DEC TIME: 0.14 SEC
BH CV ECHO MEAS - MV E MAX VEL: 35.1 CM/SEC
BH CV ECHO MEAS - MV E/A: 0.45
BH CV ECHO MEAS - MV MAX PG: 2.8 MMHG
BH CV ECHO MEAS - MV MEAN PG: 1.4 MMHG
BH CV ECHO MEAS - MV V2 MAX: 83 CM/SEC
BH CV ECHO MEAS - MV V2 MEAN: 56.8 CM/SEC
BH CV ECHO MEAS - MV V2 VTI: 13.9 CM
BH CV ECHO MEAS - MVA(VTI): 4.3 CM^2
BH CV ECHO MEAS - PA ACC TIME: 0.07 SEC
BH CV ECHO MEAS - PA MAX PG (FULL): 0.28 MMHG
BH CV ECHO MEAS - PA MAX PG: 1.9 MMHG
BH CV ECHO MEAS - PA MEAN PG (FULL): 0.4 MMHG
BH CV ECHO MEAS - PA MEAN PG: 1.2 MMHG
BH CV ECHO MEAS - PA PR(ACCEL): 45.8 MMHG
BH CV ECHO MEAS - PA V2 MAX: 69.7 CM/SEC
BH CV ECHO MEAS - PA V2 MEAN: 51.7 CM/SEC
BH CV ECHO MEAS - PA V2 VTI: 9.3 CM
BH CV ECHO MEAS - PVA(I,A): 5.7 CM^2
BH CV ECHO MEAS - PVA(I,D): 5.7 CM^2
BH CV ECHO MEAS - PVA(V,A): 4.5 CM^2
BH CV ECHO MEAS - PVA(V,D): 4.5 CM^2
BH CV ECHO MEAS - QP/QS: 0.88
BH CV ECHO MEAS - RAP SYSTOLE: 3 MMHG
BH CV ECHO MEAS - RV MAX PG: 1.7 MMHG
BH CV ECHO MEAS - RV MEAN PG: 0.79 MMHG
BH CV ECHO MEAS - RV V1 MAX: 64.6 CM/SEC
BH CV ECHO MEAS - RV V1 MEAN: 41.5 CM/SEC
BH CV ECHO MEAS - RV V1 VTI: 10.9 CM
BH CV ECHO MEAS - RVDD: 2 CM
BH CV ECHO MEAS - RVOT AREA: 4.8 CM^2
BH CV ECHO MEAS - RVOT DIAM: 2.5 CM
BH CV ECHO MEAS - RVSP: 17.5 MMHG
BH CV ECHO MEAS - SI(AO): 54.6 ML/M^2
BH CV ECHO MEAS - SI(CUBED): 24.8 ML/M^2
BH CV ECHO MEAS - SI(LVOT): 27.8 ML/M^2
BH CV ECHO MEAS - SI(MOD-SP4): 14.4 ML/M^2
BH CV ECHO MEAS - SI(TEICH): 18.4 ML/M^2
BH CV ECHO MEAS - SV(AO): 117.5 ML
BH CV ECHO MEAS - SV(CUBED): 53.4 ML
BH CV ECHO MEAS - SV(LVOT): 59.8 ML
BH CV ECHO MEAS - SV(MOD-SP4): 31 ML
BH CV ECHO MEAS - SV(RVOT): 52.8 ML
BH CV ECHO MEAS - SV(TEICH): 39.7 ML
BH CV ECHO MEAS - TR MAX VEL: 190.5 CM/SEC
BH CV XLRA MEAS - DIST GSV THIGH DIST LEFT: 0.34 CM
BH CV XLRA MEAS - DIST GSV THIGH DIST RIGHT: 0.37 CM
BH CV XLRA MEAS - GSV ANKLE DIST LEFT: 0.25 CM
BH CV XLRA MEAS - GSV ANKLE DIST RIGHT: 0.21 CM
BH CV XLRA MEAS - MID GSV CALF LEFT: 0.13 CM
BH CV XLRA MEAS - MID GSV CALF RIGHT: 0.26 CM
BH CV XLRA MEAS - MID GSV THIGH  LEFT: 0.34 CM
BH CV XLRA MEAS - MID GSV THIGH  RIGHT: 0.35 CM
BH CV XLRA MEAS - PROX GSV CALF DIST LEFT: 0.28 CM
BH CV XLRA MEAS - PROX GSV CALF DIST RIGHT: 0.23 CM
BH CV XLRA MEAS - PROX GSV THIGH  LEFT: 0.36 CM
BH CV XLRA MEAS - PROX GSV THIGH  RIGHT: 0.21 CM
BH CV XLRA MEAS LEFT CCA RATIO VEL: 91.9 CM/SEC
BH CV XLRA MEAS LEFT DIST CCA PSV: -87.8 CM/SEC
BH CV XLRA MEAS LEFT DIST ICA EDV: -14.8 CM/SEC
BH CV XLRA MEAS LEFT DIST ICA PSV: -62.2 CM/SEC
BH CV XLRA MEAS LEFT ICA RATIO VEL: -75.5 CM/SEC
BH CV XLRA MEAS LEFT ICA/CCA RATIO: -0.82
BH CV XLRA MEAS LEFT PROX CCA PSV: 91.9 CM/SEC
BH CV XLRA MEAS LEFT PROX ECA PSV: -80.9 CM/SEC
BH CV XLRA MEAS LEFT PROX ICA EDV: -22 CM/SEC
BH CV XLRA MEAS LEFT PROX ICA PSV: -75.5 CM/SEC
BH CV XLRA MEAS LEFT PROX SCLA PSV: 116 CM/SEC
BH CV XLRA MEAS LEFT VERTEBRAL A PSV: -60.4 CM/SEC
BH CV XLRA MEAS RIGHT CCA RATIO VEL: 75.5 CM/SEC
BH CV XLRA MEAS RIGHT DIST CCA PSV: 75.5 CM/SEC
BH CV XLRA MEAS RIGHT DIST ICA EDV: -12.5 CM/SEC
BH CV XLRA MEAS RIGHT DIST ICA PSV: -54.4 CM/SEC
BH CV XLRA MEAS RIGHT ICA RATIO VEL: -82.3 CM/SEC
BH CV XLRA MEAS RIGHT ICA/CCA RATIO: -1.1
BH CV XLRA MEAS RIGHT PROX CCA PSV: 72.6 CM/SEC
BH CV XLRA MEAS RIGHT PROX ECA PSV: -82.3 CM/SEC
BH CV XLRA MEAS RIGHT PROX ICA EDV: -29.1 CM/SEC
BH CV XLRA MEAS RIGHT PROX ICA PSV: -82.3 CM/SEC
BH CV XLRA MEAS RIGHT PROX SCLA PSV: 112 CM/SEC
BH CV XLRA MEAS RIGHT VERTEBRAL A PSV: -53.3 CM/SEC
BILIRUB SERPL-MCNC: 0.8 MG/DL (ref 0–1.2)
BILIRUB UR QL STRIP: NEGATIVE
BLD GP AB SCN SERPL QL: NEGATIVE
BUN SERPL-MCNC: 17 MG/DL (ref 8–23)
BUN SERPL-MCNC: ABNORMAL MG/DL
BUN/CREAT SERPL: ABNORMAL
CALCIUM SPEC-SCNC: 9.8 MG/DL (ref 8.6–10.5)
CHLORIDE SERPL-SCNC: 98 MMOL/L (ref 98–107)
CHOLEST SERPL-MCNC: 90 MG/DL (ref 0–200)
CK SERPL-CCNC: 839 U/L (ref 20–200)
CLARITY UR: CLEAR
CLOSE TME COLL+ADP + EPINEP PNL BLD: 95 % (ref 86–100)
CO2 BLDA-SCNC: 24 MMOL/L (ref 22–29)
CO2 SERPL-SCNC: 26 MMOL/L (ref 22–29)
COLOR UR: YELLOW
CREAT SERPL-MCNC: 1.04 MG/DL (ref 0.76–1.27)
D DIMER PPP FEU-MCNC: 0.68 MCGFEU/ML (ref 0.17–0.59)
DEPRECATED RDW RBC AUTO: 45.9 FL (ref 37–54)
EOSINOPHIL # BLD AUTO: 0 10*3/MM3 (ref 0–0.4)
EOSINOPHIL NFR BLD AUTO: 0.1 % (ref 0.3–6.2)
EOSINOPHIL SPEC QL MICRO: 0 % EOS/100 CELLS (ref 0–0)
ERYTHROCYTE [DISTWIDTH] IN BLOOD BY AUTOMATED COUNT: 15.2 % (ref 12.3–15.4)
GFR SERPL CREATININE-BSD FRML MDRD: 72 ML/MIN/1.73
GLOBULIN UR ELPH-MCNC: 2.7 GM/DL
GLUCOSE BLDC GLUCOMTR-MCNC: 161 MG/DL (ref 70–105)
GLUCOSE SERPL-MCNC: 172 MG/DL (ref 65–99)
GLUCOSE UR STRIP-MCNC: NEGATIVE MG/DL
HBA1C MFR BLD: 7 % (ref 3.5–5.6)
HCO3 BLDA-SCNC: 22.9 MMOL/L (ref 21–28)
HCT VFR BLD AUTO: 46.2 % (ref 37.5–51)
HDLC SERPL-MCNC: 35 MG/DL (ref 40–60)
HEMODILUTION: NO
HGB BLD-MCNC: 15.6 G/DL (ref 13–17.7)
HGB UR QL STRIP.AUTO: NEGATIVE
HOLD SPECIMEN: NORMAL
HOLD SPECIMEN: NORMAL
INHALED O2 CONCENTRATION: 21 %
INR PPP: 1 (ref 0.9–1.1)
KETONES UR QL STRIP: NEGATIVE
LDLC SERPL CALC-MCNC: 31 MG/DL (ref 0–100)
LDLC/HDLC SERPL: 0.9 {RATIO}
LEUKOCYTE ESTERASE UR QL STRIP.AUTO: NEGATIVE
LYMPHOCYTES # BLD AUTO: 1.3 10*3/MM3 (ref 0.7–3.1)
LYMPHOCYTES NFR BLD AUTO: 10.2 % (ref 19.6–45.3)
MAGNESIUM SERPL-MCNC: 2 MG/DL (ref 1.6–2.4)
MCH RBC QN AUTO: 29 PG (ref 26.6–33)
MCHC RBC AUTO-ENTMCNC: 33.7 G/DL (ref 31.5–35.7)
MCV RBC AUTO: 86 FL (ref 79–97)
MODALITY: ABNORMAL
MONOCYTES # BLD AUTO: 0.9 10*3/MM3 (ref 0.1–0.9)
MONOCYTES NFR BLD AUTO: 7.3 % (ref 5–12)
MRSA DNA SPEC QL NAA+PROBE: NORMAL
NEUTROPHILS NFR BLD AUTO: 10 10*3/MM3 (ref 1.7–7)
NEUTROPHILS NFR BLD AUTO: 81.6 % (ref 42.7–76)
NITRITE UR QL STRIP: NEGATIVE
NRBC BLD AUTO-RTO: 0 /100 WBC (ref 0–0.2)
NT-PROBNP SERPL-MCNC: 1236 PG/ML (ref 0–900)
PCO2 BLDA: 34.3 MM HG (ref 35–48)
PH BLDA: 7.43 PH UNITS (ref 7.35–7.45)
PH UR STRIP.AUTO: 5.5 [PH] (ref 5–8)
PLATELET # BLD AUTO: 201 10*3/MM3 (ref 140–450)
PMV BLD AUTO: 7.4 FL (ref 6–12)
PO2 BLDA: 77.6 MM HG (ref 83–108)
POTASSIUM SERPL-SCNC: 3.4 MMOL/L (ref 3.5–5.2)
POTASSIUM SERPL-SCNC: 4 MMOL/L (ref 3.5–5.2)
PROT SERPL-MCNC: 7.1 G/DL (ref 6–8.5)
PROT UR QL STRIP: NEGATIVE
PROTHROMBIN TIME: 10.9 SECONDS (ref 9.6–11.7)
RBC # BLD AUTO: 5.37 10*6/MM3 (ref 4.14–5.8)
RH BLD: POSITIVE
SAO2 % BLDCOA: 95.9 % (ref 94–98)
SARS-COV-2 RNA PNL SPEC NAA+PROBE: NOT DETECTED
SODIUM SERPL-SCNC: 137 MMOL/L (ref 136–145)
SODIUM UR-SCNC: 94 MMOL/L
SP GR UR STRIP: 1.06 (ref 1–1.03)
T&S EXPIRATION DATE: NORMAL
TRIGL SERPL-MCNC: 118 MG/DL (ref 0–150)
TROPONIN T SERPL-MCNC: 1.07 NG/ML (ref 0–0.03)
TROPONIN T SERPL-MCNC: 1.28 NG/ML (ref 0–0.03)
TROPONIN T SERPL-MCNC: 1.32 NG/ML (ref 0–0.03)
URATE SERPL-MCNC: 5.4 MG/DL (ref 3.4–7)
UROBILINOGEN UR QL STRIP: ABNORMAL
VLDLC SERPL-MCNC: 23.6 MG/DL
WBC # BLD AUTO: 12.3 10*3/MM3 (ref 3.4–10.8)
WHOLE BLOOD HOLD SPECIMEN: NORMAL

## 2020-07-08 PROCEDURE — 86901 BLOOD TYPING SEROLOGIC RH(D): CPT | Performed by: PHYSICIAN ASSISTANT

## 2020-07-08 PROCEDURE — 93306 TTE W/DOPPLER COMPLETE: CPT | Performed by: INTERNAL MEDICINE

## 2020-07-08 PROCEDURE — 83880 ASSAY OF NATRIURETIC PEPTIDE: CPT | Performed by: NURSE PRACTITIONER

## 2020-07-08 PROCEDURE — 93970 EXTREMITY STUDY: CPT

## 2020-07-08 PROCEDURE — 99254 IP/OBS CNSLTJ NEW/EST MOD 60: CPT | Performed by: NURSE PRACTITIONER

## 2020-07-08 PROCEDURE — 86923 COMPATIBILITY TEST ELECTRIC: CPT

## 2020-07-08 PROCEDURE — 93306 TTE W/DOPPLER COMPLETE: CPT

## 2020-07-08 PROCEDURE — 93005 ELECTROCARDIOGRAM TRACING: CPT

## 2020-07-08 PROCEDURE — B2151ZZ FLUOROSCOPY OF LEFT HEART USING LOW OSMOLAR CONTRAST: ICD-10-PCS | Performed by: INTERNAL MEDICINE

## 2020-07-08 PROCEDURE — 99153 MOD SED SAME PHYS/QHP EA: CPT | Performed by: INTERNAL MEDICINE

## 2020-07-08 PROCEDURE — 84484 ASSAY OF TROPONIN QUANT: CPT | Performed by: NURSE PRACTITIONER

## 2020-07-08 PROCEDURE — 83735 ASSAY OF MAGNESIUM: CPT | Performed by: NURSE PRACTITIONER

## 2020-07-08 PROCEDURE — 86900 BLOOD TYPING SEROLOGIC ABO: CPT | Performed by: PHYSICIAN ASSISTANT

## 2020-07-08 PROCEDURE — 36600 WITHDRAWAL OF ARTERIAL BLOOD: CPT

## 2020-07-08 PROCEDURE — 25010000002 HEPARIN (PORCINE) PER 1000 UNITS: Performed by: INTERNAL MEDICINE

## 2020-07-08 PROCEDURE — C1769 GUIDE WIRE: HCPCS | Performed by: INTERNAL MEDICINE

## 2020-07-08 PROCEDURE — 93880 EXTRACRANIAL BILAT STUDY: CPT

## 2020-07-08 PROCEDURE — 33967 INSERT I-AORT PERCUT DEVICE: CPT | Performed by: INTERNAL MEDICINE

## 2020-07-08 PROCEDURE — 93005 ELECTROCARDIOGRAM TRACING: CPT | Performed by: INTERNAL MEDICINE

## 2020-07-08 PROCEDURE — 81003 URINALYSIS AUTO W/O SCOPE: CPT | Performed by: PHYSICIAN ASSISTANT

## 2020-07-08 PROCEDURE — 93458 L HRT ARTERY/VENTRICLE ANGIO: CPT | Performed by: INTERNAL MEDICINE

## 2020-07-08 PROCEDURE — B2111ZZ FLUOROSCOPY OF MULTIPLE CORONARY ARTERIES USING LOW OSMOLAR CONTRAST: ICD-10-PCS | Performed by: INTERNAL MEDICINE

## 2020-07-08 PROCEDURE — 82803 BLOOD GASES ANY COMBINATION: CPT

## 2020-07-08 PROCEDURE — 86900 BLOOD TYPING SEROLOGIC ABO: CPT

## 2020-07-08 PROCEDURE — 76775 US EXAM ABDO BACK WALL LIM: CPT

## 2020-07-08 PROCEDURE — 85379 FIBRIN DEGRADATION QUANT: CPT | Performed by: NURSE PRACTITIONER

## 2020-07-08 PROCEDURE — 85576 BLOOD PLATELET AGGREGATION: CPT | Performed by: PHYSICIAN ASSISTANT

## 2020-07-08 PROCEDURE — 99284 EMERGENCY DEPT VISIT MOD MDM: CPT

## 2020-07-08 PROCEDURE — 82962 GLUCOSE BLOOD TEST: CPT

## 2020-07-08 PROCEDURE — 85730 THROMBOPLASTIN TIME PARTIAL: CPT | Performed by: INTERNAL MEDICINE

## 2020-07-08 PROCEDURE — 87641 MR-STAPH DNA AMP PROBE: CPT | Performed by: PHYSICIAN ASSISTANT

## 2020-07-08 PROCEDURE — 85610 PROTHROMBIN TIME: CPT | Performed by: NURSE PRACTITIONER

## 2020-07-08 PROCEDURE — L1830 KO IMMOB CANVAS LONG PRE OTS: HCPCS | Performed by: INTERNAL MEDICINE

## 2020-07-08 PROCEDURE — 25010000002 HEPARIN (PORCINE) PER 1000 UNITS: Performed by: NURSE PRACTITIONER

## 2020-07-08 PROCEDURE — 99223 1ST HOSP IP/OBS HIGH 75: CPT | Performed by: INTERNAL MEDICINE

## 2020-07-08 PROCEDURE — 87635 SARS-COV-2 COVID-19 AMP PRB: CPT | Performed by: NURSE PRACTITIONER

## 2020-07-08 PROCEDURE — 5A02210 ASSISTANCE WITH CARDIAC OUTPUT USING BALLOON PUMP, CONTINUOUS: ICD-10-PCS | Performed by: INTERNAL MEDICINE

## 2020-07-08 PROCEDURE — C1894 INTRO/SHEATH, NON-LASER: HCPCS | Performed by: INTERNAL MEDICINE

## 2020-07-08 PROCEDURE — 25010000002 SULFUR HEXAFLUORIDE MICROSPH 60.7-25 MG RECONSTITUTED SUSPENSION: Performed by: INTERNAL MEDICINE

## 2020-07-08 PROCEDURE — 86901 BLOOD TYPING SEROLOGIC RH(D): CPT

## 2020-07-08 PROCEDURE — 83036 HEMOGLOBIN GLYCOSYLATED A1C: CPT | Performed by: PHYSICIAN ASSISTANT

## 2020-07-08 PROCEDURE — 84132 ASSAY OF SERUM POTASSIUM: CPT | Performed by: INTERNAL MEDICINE

## 2020-07-08 PROCEDURE — 85025 COMPLETE CBC W/AUTO DIFF WBC: CPT | Performed by: NURSE PRACTITIONER

## 2020-07-08 PROCEDURE — 99152 MOD SED SAME PHYS/QHP 5/>YRS: CPT | Performed by: INTERNAL MEDICINE

## 2020-07-08 PROCEDURE — 84300 ASSAY OF URINE SODIUM: CPT | Performed by: INTERNAL MEDICINE

## 2020-07-08 PROCEDURE — 25010000002 MIDAZOLAM PER 1 MG: Performed by: INTERNAL MEDICINE

## 2020-07-08 PROCEDURE — 25010000002 FENTANYL CITRATE (PF) 100 MCG/2ML SOLUTION: Performed by: INTERNAL MEDICINE

## 2020-07-08 PROCEDURE — 82550 ASSAY OF CK (CPK): CPT | Performed by: INTERNAL MEDICINE

## 2020-07-08 PROCEDURE — 4A023N7 MEASUREMENT OF CARDIAC SAMPLING AND PRESSURE, LEFT HEART, PERCUTANEOUS APPROACH: ICD-10-PCS | Performed by: INTERNAL MEDICINE

## 2020-07-08 PROCEDURE — 85347 COAGULATION TIME ACTIVATED: CPT

## 2020-07-08 PROCEDURE — 0 IOPAMIDOL PER 1 ML: Performed by: INTERNAL MEDICINE

## 2020-07-08 PROCEDURE — 85730 THROMBOPLASTIN TIME PARTIAL: CPT | Performed by: NURSE PRACTITIONER

## 2020-07-08 PROCEDURE — 86850 RBC ANTIBODY SCREEN: CPT | Performed by: PHYSICIAN ASSISTANT

## 2020-07-08 PROCEDURE — 87205 SMEAR GRAM STAIN: CPT | Performed by: INTERNAL MEDICINE

## 2020-07-08 PROCEDURE — 80061 LIPID PANEL: CPT | Performed by: PHYSICIAN ASSISTANT

## 2020-07-08 PROCEDURE — 71045 X-RAY EXAM CHEST 1 VIEW: CPT

## 2020-07-08 PROCEDURE — 25010000002 PHENYLEPHRINE 10 MG/ML SOLUTION: Performed by: INTERNAL MEDICINE

## 2020-07-08 PROCEDURE — 25010000002 CEFAZOLIN PER 500 MG: Performed by: PHYSICIAN ASSISTANT

## 2020-07-08 PROCEDURE — 84550 ASSAY OF BLOOD/URIC ACID: CPT | Performed by: INTERNAL MEDICINE

## 2020-07-08 PROCEDURE — 80053 COMPREHEN METABOLIC PANEL: CPT | Performed by: NURSE PRACTITIONER

## 2020-07-08 RX ORDER — ASPIRIN 81 MG/1
81 TABLET ORAL DAILY
Status: DISCONTINUED | OUTPATIENT
Start: 2020-07-09 | End: 2020-07-09 | Stop reason: HOSPADM

## 2020-07-08 RX ORDER — SODIUM CHLORIDE 9 MG/ML
1 INJECTION, SOLUTION INTRAVENOUS CONTINUOUS
Status: DISCONTINUED | OUTPATIENT
Start: 2020-07-08 | End: 2020-07-09 | Stop reason: HOSPADM

## 2020-07-08 RX ORDER — SODIUM CHLORIDE 0.9 % (FLUSH) 0.9 %
10 SYRINGE (ML) INJECTION AS NEEDED
Status: DISCONTINUED | OUTPATIENT
Start: 2020-07-08 | End: 2020-07-09 | Stop reason: HOSPADM

## 2020-07-08 RX ORDER — ATORVASTATIN CALCIUM 40 MG/1
40 TABLET, FILM COATED ORAL NIGHTLY
Status: DISCONTINUED | OUTPATIENT
Start: 2020-07-08 | End: 2020-07-08

## 2020-07-08 RX ORDER — TEMAZEPAM 15 MG/1
15 CAPSULE ORAL NIGHTLY PRN
Status: DISCONTINUED | OUTPATIENT
Start: 2020-07-08 | End: 2020-07-09 | Stop reason: HOSPADM

## 2020-07-08 RX ORDER — SODIUM CHLORIDE 9 MG/ML
30 INJECTION, SOLUTION INTRAVENOUS CONTINUOUS PRN
Status: DISCONTINUED | OUTPATIENT
Start: 2020-07-08 | End: 2020-07-09 | Stop reason: HOSPADM

## 2020-07-08 RX ORDER — ACETAMINOPHEN 325 MG/1
650 TABLET ORAL EVERY 4 HOURS PRN
Status: DISCONTINUED | OUTPATIENT
Start: 2020-07-08 | End: 2020-07-09 | Stop reason: HOSPADM

## 2020-07-08 RX ORDER — SODIUM CHLORIDE 9 MG/ML
50 INJECTION, SOLUTION INTRAVENOUS CONTINUOUS
Status: DISCONTINUED | OUTPATIENT
Start: 2020-07-08 | End: 2020-07-09 | Stop reason: HOSPADM

## 2020-07-08 RX ORDER — SODIUM CHLORIDE 0.9 % (FLUSH) 0.9 %
10 SYRINGE (ML) INJECTION EVERY 12 HOURS SCHEDULED
Status: DISCONTINUED | OUTPATIENT
Start: 2020-07-08 | End: 2020-07-09 | Stop reason: HOSPADM

## 2020-07-08 RX ORDER — SODIUM CHLORIDE 0.9 % (FLUSH) 0.9 %
30 SYRINGE (ML) INJECTION ONCE AS NEEDED
Status: DISCONTINUED | OUTPATIENT
Start: 2020-07-08 | End: 2020-07-09 | Stop reason: HOSPADM

## 2020-07-08 RX ORDER — LIDOCAINE HYDROCHLORIDE 20 MG/ML
INJECTION, SOLUTION INFILTRATION; PERINEURAL AS NEEDED
Status: DISCONTINUED | OUTPATIENT
Start: 2020-07-08 | End: 2020-07-08 | Stop reason: HOSPADM

## 2020-07-08 RX ORDER — POTASSIUM CHLORIDE 20 MEQ/1
40 TABLET, EXTENDED RELEASE ORAL ONCE
Status: COMPLETED | OUTPATIENT
Start: 2020-07-08 | End: 2020-07-08

## 2020-07-08 RX ORDER — ALPRAZOLAM 0.25 MG/1
0.25 TABLET ORAL EVERY 8 HOURS PRN
Status: DISCONTINUED | OUTPATIENT
Start: 2020-07-08 | End: 2020-07-09 | Stop reason: HOSPADM

## 2020-07-08 RX ORDER — NITROGLYCERIN 0.4 MG/1
0.4 TABLET SUBLINGUAL
Status: DISCONTINUED | OUTPATIENT
Start: 2020-07-08 | End: 2020-07-09 | Stop reason: HOSPADM

## 2020-07-08 RX ORDER — ASPIRIN 81 MG/1
324 TABLET, CHEWABLE ORAL ONCE
Status: COMPLETED | OUTPATIENT
Start: 2020-07-08 | End: 2020-07-08

## 2020-07-08 RX ORDER — PHENYLEPHRINE HYDROCHLORIDE 10 MG/ML
INJECTION INTRAVENOUS AS NEEDED
Status: DISCONTINUED | OUTPATIENT
Start: 2020-07-08 | End: 2020-07-08 | Stop reason: HOSPADM

## 2020-07-08 RX ORDER — HEPARIN SODIUM 10000 [USP'U]/100ML
5 INJECTION, SOLUTION INTRAVENOUS
Status: DISCONTINUED | OUTPATIENT
Start: 2020-07-08 | End: 2020-07-09

## 2020-07-08 RX ORDER — CHLORHEXIDINE GLUCONATE 0.12 MG/ML
15 RINSE ORAL EVERY 12 HOURS SCHEDULED
Status: DISCONTINUED | OUTPATIENT
Start: 2020-07-08 | End: 2020-07-09 | Stop reason: HOSPADM

## 2020-07-08 RX ORDER — HEPARIN SODIUM 1000 [USP'U]/ML
INJECTION, SOLUTION INTRAVENOUS; SUBCUTANEOUS AS NEEDED
Status: DISCONTINUED | OUTPATIENT
Start: 2020-07-08 | End: 2020-07-08 | Stop reason: HOSPADM

## 2020-07-08 RX ORDER — MIDAZOLAM HYDROCHLORIDE 1 MG/ML
INJECTION INTRAMUSCULAR; INTRAVENOUS AS NEEDED
Status: DISCONTINUED | OUTPATIENT
Start: 2020-07-08 | End: 2020-07-08 | Stop reason: HOSPADM

## 2020-07-08 RX ORDER — FENTANYL CITRATE 50 UG/ML
INJECTION, SOLUTION INTRAMUSCULAR; INTRAVENOUS AS NEEDED
Status: DISCONTINUED | OUTPATIENT
Start: 2020-07-08 | End: 2020-07-08 | Stop reason: HOSPADM

## 2020-07-08 RX ORDER — NITROGLYCERIN 20 MG/100ML
5-200 INJECTION INTRAVENOUS
Status: DISCONTINUED | OUTPATIENT
Start: 2020-07-08 | End: 2020-07-09 | Stop reason: HOSPADM

## 2020-07-08 RX ADMIN — HEPARIN SODIUM AND DEXTROSE 9.71 UNITS/KG/HR: 10000; 5 INJECTION INTRAVENOUS at 11:15

## 2020-07-08 RX ADMIN — NITROGLYCERIN 5 MCG/MIN: 20 INJECTION INTRAVENOUS at 10:30

## 2020-07-08 RX ADMIN — SULFUR HEXAFLUORIDE 3 ML: KIT at 15:33

## 2020-07-08 RX ADMIN — SODIUM CHLORIDE 50 ML/HR: 900 INJECTION, SOLUTION INTRAVENOUS at 17:23

## 2020-07-08 RX ADMIN — ASPIRIN 81 MG 324 MG: 81 TABLET ORAL at 10:22

## 2020-07-08 RX ADMIN — Medication 10 ML: at 14:26

## 2020-07-08 RX ADMIN — CEFAZOLIN SODIUM 2 G: 10 INJECTION, POWDER, FOR SOLUTION INTRAVENOUS at 17:23

## 2020-07-08 RX ADMIN — SODIUM CHLORIDE 500 ML: 0.9 INJECTION, SOLUTION INTRAVENOUS at 10:53

## 2020-07-08 RX ADMIN — MUPIROCIN 1 APPLICATION: 20 OINTMENT TOPICAL at 17:26

## 2020-07-08 RX ADMIN — Medication 10 ML: at 21:00

## 2020-07-08 RX ADMIN — POTASSIUM CHLORIDE 40 MEQ: 1500 TABLET, EXTENDED RELEASE ORAL at 17:26

## 2020-07-08 NOTE — H&P
[facility] H&P  Jose Juan Fatima MD, PhD primary count of provider  Date of service 7-8-2020      Subjective:     Encounter Date:07/08/2020      Patient ID: Lonnie Salinas is a 64 y.o. male.      Chief Complaint: chest pain, NSTEMI    HPI:  Lonnie Salinas is a 64 y.o. male without significant cardiac history who presents with chest pain and elevated troponin.  Pmh includes HTN.  He sees Dr. Alaniz for PCP.  He does not follow with a cardiologist.  Patient reports he developed chest discomfort described as left sided aching yesterday while at work.  He reports some left arm discomfort as well.  He states pain was relatively constant but would ease up some throughout the day.  He reports more relief when he returned home from work and sat down.  He states pain returned this am when he woke up.  He denies SOA.  He does mention he has been nauseated for roughly 1 week.  He reports he is active, he generally does not experience chest pain or SOA.  He denies leg swelling or leg pain.  His troponin 1.07.  He has abnormal ECG with ST depression V2 V3 inferior Q waves.  He is currently on nitro gtt and heparin gtt.  He reports he has an allergy to lisinopril that consisted of cough and angioedema.  He has family history of sudden cardiac death with acute MI in his father at age 55.    Patient will be transferred to the Cath Lab for invasive evaluation urgently    Review of systems unremarkable x14 point review of systems done above    Historical data copied report from previous encounters and EMR is unchanged    Past Medical History:   Diagnosis Date   • Allergic    • Hyperlipemia    • Hypertension          History reviewed. No pertinent surgical history.      Social History     Socioeconomic History   • Marital status:      Spouse name: Not on file   • Number of children: Not on file   • Years of education: Not on file   • Highest education level: Not on file   Tobacco Use   • Smoking status:  "Never Smoker   • Smokeless tobacco: Never Used   Substance and Sexual Activity   • Alcohol use: No     Frequency: Never   • Drug use: No   • Sexual activity: Defer     Family History:  + CAD in father  Smoking  Otherwise non contributory    Allergies   Allergen Reactions   • Lisinopril Angioedema       Current Medications:   Scheduled Meds:  [START ON 7/9/2020] aspirin 81 mg Oral Daily   atorvastatin 40 mg Oral Nightly   sodium chloride 10 mL Intravenous Q12H     Continuous Infusions:  heparin 9.71 Units/kg/hr Last Rate: 9.71 Units/kg/hr (07/08/20 1115)   nitroglycerin 5-200 mcg/min Last Rate: 5 mcg/min (07/08/20 1030)       Review of Systems   Constitution: Negative.   Cardiovascular: Positive for chest pain. Negative for dyspnea on exertion and palpitations.   Respiratory: Negative for cough.    Musculoskeletal: Negative.    Gastrointestinal: Positive for nausea.   Genitourinary: Negative.    Neurological: Negative.      Reviewed       Objective:         /70   Pulse 84   Temp 99.9 °F (37.7 °C) (Rectal)   Resp 14   Ht 182.9 cm (72\")   Wt 102 kg (225 lb 15.5 oz)   SpO2 96%   BMI 30.65 kg/m²       Physical Exam:  General Appearance:    Alert, cooperative, in no acute distress stable               Neck:   supple,  no JVD no HJR no carotid bruits   Lungs:     Clear to auscultation,respirations regular, even and                  unlabored    Heart:    Regular rhythm and normal rate, normal S1 and S2, no            murmur, no gallop, no rub, no click unchanged   Abdomen:     Normal bowel sounds, no masses, no organomegaly, soft        non-tender, non-distended, no guarding, no rebound                tenderness unchanged   Extremities:   Moves all extremities well, no edema, no cyanosis, no             redness unchanged   Pulses:   Pulses palpable and equal bilaterally symmetrical   Skin:   No bleeding, bruising or rash warm and dry   Neurologic:   Awake, alert, oriented x3 no focal neurologic deficits " grossly             ASCVD RIsk Score::  The ASCVD Risk score (Karen LOZANO Jr., et al., 2013) failed to calculate for the following reasons:    The patient has a prior MI or stroke diagnosis      Lab Review:     Results from last 7 days   Lab Units 07/08/20  1014   SODIUM mmol/L 137   POTASSIUM mmol/L 3.4*   CHLORIDE mmol/L 98   CO2 mmol/L 26.0   BUN  17   CREATININE mg/dL 1.04   GLUCOSE mg/dL 172*   CALCIUM mg/dL 9.8   AST (SGOT) U/L 125*   ALT (SGPT) U/L 59*     Results from last 7 days   Lab Units 07/08/20  1014   TROPONIN T ng/mL 1.070*     Results from last 7 days   Lab Units 07/08/20  1014   WBC 10*3/mm3 12.30*   HEMOGLOBIN g/dL 15.6   HEMATOCRIT % 46.2   PLATELETS 10*3/mm3 201     Results from last 7 days   Lab Units 07/08/20  1014   INR  1.00   APTT seconds 24.7     Results from last 7 days   Lab Units 07/08/20  1014   MAGNESIUM mg/dL 2.0           Invalid input(s): LDLCALC  Results from last 7 days   Lab Units 07/08/20  1014   PROBNP pg/mL 1,236.0*           Recent Radiology:  Imaging Results (Most Recent)     Procedure Component Value Units Date/Time    XR Chest 1 View [[id number]] Collected:  07/08/20 1102     Updated:  07/08/20 1105    Narrative:       EXAMINATION: XR CHEST 1 VW-     DATE OF EXAM: 7/8/2020 10:43 AM     INDICATION: Chest pain for one day     COMPARISON: Chest radiograph dated 05/18/2014     TECHNIQUE: Portable AP view of the chest was obtained.     FINDINGS:  The cardiomediastinal silhouette is within normal limits. Pulmonary  vascularity is unremarkable. There is no focal airspace consolidation,  pleural effusion, or pneumothorax. There are no acute osseous findings.       Impression:       No acute cardiopulmonary abnormality.     Electronically Signed By-Brady Watson On:7/8/2020 11:03 AM  This report was finalized on [id number] by  Brady Watson, .          EKG:            Assessment:         Active Hospital Problems    Diagnosis POA   • **NSTEMI, initial episode of care (CMS/[facility])  [I21.4] Unknown     Priority: High     Added automatically from request for surgery [id number]     • NSTEMI (non-ST elevated myocardial infarction) (CMS/[facility]) [I21.4] Yes     1. NSTEMI  - troponin 1.0, trend Q6hr  - ECG with ST depression in V2 V3  - heparin, ASA, nitro gtt, statin therapy- monitor LFTs    2. H/o HTN  - currently b/p 100 systolic  - hold home b/p meds    3. Transaminitis  - statin therapy- monitor      4. Hypokalemia    Plan:    Plan for cardiac catheterization for ischemic evaluation  Further recommendations to follow  Heparin drip  Nitro drip  Aspirin      Jose Juan Fatima MD, PhD       07/08/20  11:37       Addendum  Left heart catheterization showed multivessel coronary artery disease with  of RCA, subtotal LAD disease, diffuse disease of the proximal ramus and proximal OM with better indication for coronary bypass grafting.  Status post intra-aortic balloon pump insertion diastolic augmentation given KHAI I flow in the LAD, urgent CV surgery consult for evaluation for bypass grafting.  Preserved LV systolic function 50 to 55% with inferior wall basal akinesis and region of RCA  with left-to-right collateralization of the PLV and distal PDA..   yes

## 2020-07-08 NOTE — TELEPHONE ENCOUNTER
Patient's wife called stating the patient has been having chest pains since yesterday and worsened through the night, radiating into his left arm. Advised wife to take patient to the ED to be evaluated. She agreed.

## 2020-07-08 NOTE — ED PROVIDER NOTES
Subjective   Chief complaint: Chest pain      Context: Patient is a 64-year-old male who comes in today complaining of left-sided dull chest pain radiating into his left arm beginning yesterday.  Reports he awoke with the pain yesterday. Denies history of cardiac issues.  Patient reports throughout the day yesterday he did have exertional shortness of breath and increased pain.  Patient denies fever cough rash recent illness or exposure.  Patient did have some nausea yesterday but denies at this time.  Patient no recent trauma surgery or immobilization prior history of DVT or PE hormone use. No swelling in his legs or feet. No hx of IVDA.     Duration: 1 day    Timing: Constant    Severity: Moderate    Associated symptoms: worse with exertion          PCP: [place]              Review of Systems   Constitutional: Negative for fever.   HENT: Negative.    Respiratory: Negative for cough.    Cardiovascular: Positive for chest pain. Negative for leg swelling.   Gastrointestinal: Positive for nausea. Negative for abdominal pain.   Genitourinary: Negative.    Skin: Negative.    Allergic/Immunologic: Negative for immunocompromised state.   Hematological: Does not bruise/bleed easily.       Past Medical History:   Diagnosis Date   • Allergic    • Hyperlipemia    • Hypertension        Allergies   Allergen Reactions   • Lisinopril Angioedema       History reviewed. No pertinent surgical history.    Family History   Problem Relation Age of Onset   • Heart disease Father        Social History     Socioeconomic History   • Marital status:      Spouse name: Not on file   • Number of children: Not on file   • Years of education: Not on file   • Highest education level: Not on file   Tobacco Use   • Smoking status: Never Smoker   • Smokeless tobacco: Never Used   Substance and Sexual Activity   • Alcohol use: No     Frequency: Never   • Drug use: No   • Sexual activity: Defer           Objective   Physical Exam     Vital signs  and triage nurse note reviewed.   Constitutional: Awake, alert; well-developed and well-nourished. No acute distress is noted.   HEENT: Normocephalic, atraumatic; pupils are PERRL with intact EOM; oropharynx is pink and moist without exudate or erythema.   Neck: Supple, full range of motion without pain; no cervical lymphadenopathy.   Cardiovascular: Regular rate and rhythm, normal S1-S2.  No murmur rub   Pulmonary: Respiratory effort regular nonlabored, breath sounds clear to auscultation all fields.   Abdomen: Soft, nontender nondistended with normoactive bowel sounds; no rebound or guarding.   Musculoskeletal: Independent range of motion of all extremities with no palpable tenderness or edema.   Neuro: Alert oriented x3, speech is clear and appropriate, GCS 15   Skin:  Fleshtone warm, dry, intact; no erythematous or petechial rash or lesion       ECG 12 Lead    Date/Time: 7/8/2020 10:08 AM  Performed by: Gauri Gibson APRN  Authorized by: Gauri Gibson APRN   Interpreted by physician ([email or web address])  Comments: q waves, depression                 ED Course  ED Course as of Jul 08 1114   Wed Jul 08, 2020   1049 D-dimer was noted to be elevated and CT PE protocol and small fluid bolus was ordered.    [JW]   1111 I discussed with cardiology NP Gwendolyn-she spoke with Dr. Fatima and will come see patient and take him to the Cath Lab.  CT PE was discontinued as patient will be going to cath.  She asked that heparin drip be ordered, order was placed and heparin was started.    [JW]   1112 Discussed with the patient who is agreeable to plan of care.    [JW]      ED Course User Index  [JW] Gauri Gibson APRN           Labs Reviewed   COMPREHENSIVE METABOLIC PANEL - Abnormal; Notable for the following components:       Result Value    Glucose 172 (*)     Potassium 3.4 (*)     ALT (SGPT) 59 (*)     AST (SGOT) 125 (*)     All other components within normal limits    Narrative:     GFR Normal >60  Chronic Kidney Disease  <60  Kidney Failure <15     BNP (IN-HOUSE) - Abnormal; Notable for the following components:    proBNP 1,236.0 (*)     All other components within normal limits    Narrative:     Among patients with dyspnea, NT-proBNP is highly sensitive for the detection of acute congestive heart failure. In addition NT-proBNP of <300 pg/ml effectively rules out acute congestive heart failure with 99% negative predictive value.    Results may be falsely decreased if patient taking Biotin.     TROPONIN (IN-HOUSE) - Abnormal; Notable for the following components:    Troponin T 1.070 (*)     All other components within normal limits    Narrative:     Troponin T Reference Range:  <= 0.03 ng/mL-   Negative for AMI  >0.03 ng/mL-     Abnormal for myocardial necrosis.  Clinicians would have to utilize clinical acumen, EKG, Troponin and serial changes to determine if it is an Acute Myocardial Infarction or myocardial injury due to an underlying chronic condition.       Results may be falsely decreased if patient taking Biotin.     CBC WITH AUTO DIFFERENTIAL - Abnormal; Notable for the following components:    WBC 12.30 (*)     Neutrophil % 81.6 (*)     Lymphocyte % 10.2 (*)     Eosinophil % 0.1 (*)     Neutrophils, Absolute 10.00 (*)     All other components within normal limits   D-DIMER, QUANTITATIVE - Abnormal; Notable for the following components:    D-Dimer, Quantitative 0.68 (*)     All other components within normal limits    Narrative:     Reference Range  --------------------------------------------------------------------     < 0.50   Negative Predictive Value  0.50-0.59   Indeterminate    >= 0.60   Probable VTE             A very low percentage of patients with DVT may yield D-Dimer results   below the cut-off of 0.50 MCGFEU/mL.  This is known to be more   prevalent in patients with distal DVT.             Results of this test should always be interpreted in conjunction with   the patient's medical history, clinical presentation  and other   findings.  Clinical diagnosis should not be based on the result of   [place]OV[place] D-Dimer alone.   COVID-19 [facility] BIO IN-HOUSE, NASAL SWAB NO TRANSPORT MEDIA - Normal    Narrative:     Fact sheet for providers: https://www.fda.gov/media/126407/download     Fact sheet for patients: https://www.fda.gov/media/802042/download   PROTIME-INR - Normal   APTT - Normal   MAGNESIUM - Normal   BUN - Normal   RAINBOW DRAW    Narrative:     The following orders were created for panel order [place] Draw.  Procedure                               Abnormality         Status                     ---------                               -----------         ------                     Light Blue Top[[id number]]                                   In process                 Green Top (Gel)[[id number]]                                  Final result               Lavender Top[[id number]]                                     In process                 Gold Top - SST[[id number]]                                   In process                   Please view results for these tests on the individual orders.   CBC AND DIFFERENTIAL    Narrative:     The following orders were created for panel order CBC & Differential.  Procedure                               Abnormality         Status                     ---------                               -----------         ------                     CBC Auto Differential[[id number]]        Abnormal            Final result                 Please view results for these tests on the individual orders.   GREEN TOP   LIGHT BLUE TOP   LAVENDER TOP   GOLD TOP - SST     Medications   sodium chloride 0.9 % flush 10 mL (has no administration in time range)   nitroglycerin (TRIDIL) 200 mcg/ml infusion (5 mcg/min Intravenous New Bag 7/8/20 1030)   sodium chloride 0.9 % bolus 500 mL (500 mL Intravenous New Bag 7/8/20 1053)   heparin bolus from bag 5,000 Units (has no administration in time range)   heparin 50266 units/250 ml  (100 units/ml) in D5W (has no administration in time range)   heparin bolus from bag 2,500 Units (has no administration in time range)   heparin bolus from bag 5,000 Units (has no administration in time range)   aspirin chewable tablet 324 mg (324 mg Oral Given 7/8/20 1022)     Xr Chest 1 View    Result Date: 7/8/2020  No acute cardiopulmonary abnormality.  Electronically Signed By-Brady Watson On:7/8/2020 11:03 AM This report was finalized on [id number] by  Brady Watson .                                    MDM  Number of Diagnoses or Management Options  Abnormal EKG:   Chest pain, unspecified type:   Exertional dyspnea:   NSTEMI (non-ST elevated myocardial infarction) (CMS/HCC):   Diagnosis management comments: Chart review: No prior stress or heart cath to review      Comorbidities:  has a past medical history of Allergic, Hyperlipemia, and Hypertension.  Differentials: Immediate non-STEMI electrode abnormalities CHF PE not all inclusive of differentials considered  Discussion with provider: Gwendolyn Fatima  Radiology interpretation:  X-rays reviewed by me and interpreted by radiologist,   Xr Chest 1 View    Result Date: 7/8/2020  No acute cardiopulmonary abnormality.  Electronically Signed BySasha Watson On:7/8/2020 11:03 AM This report was finalized on [id number] by  Brady Watson, .    Lab interpretation:  Labs viewed by me significant for, troponin I 0.07, BNP 1236, dimer 0.68.  COVID negative    Appropriate PPE worn during exam.  Given aspirin and nitroglycerin.  On reexam pain has subsided and is well controlled.  Discussed with cardiology and patient was started on a heparin drip.  They will evaluate patient and take him to the Cath Lab.    i discussed findings with patient who voices understanding of admission    Patient Progress  Patient progress: stable      Final diagnoses:   NSTEMI (non-ST elevated myocardial infarction) (CMS/HCC)   Abnormal EKG   Chest pain, unspecified type    Exertional dyspnea            Gauri Gibson, APRN  07/08/20 1114

## 2020-07-08 NOTE — ED NOTES
Patient reports chest tightness last night that radiated down his left arm. Had some nausea but no vomiting.Worse with activity. Today he is feeling much better. Reports chest tightness but rates it as 1 out of 10     Vicky Don RN  07/08/20 1030

## 2020-07-08 NOTE — CONSULTS
Patient Care Team:  Jarrell Alaniz Jr., MD as PCP - General  Lauren Singh APRN as Nurse Practitioner (Nurse Practitioner)  Referring Provider:  Dr. Fatima  Reason for consultation:  MV CAD/NSTEMI    Chief complaint:  Chest pain discomfort    Subjective     History of Present Illness:  63 y/o  gentleman presented with left sided chest pain that radiated to his left arm.  Associated symptoms included:  Fatigue, SOA, and nausea.  He describes the pain as mostly constant and he stayed at work.  Exacerbating factor was climbing stairs.  He did find some relief once he got home and sat down.  He decided to come to ED this morning and ruled in for NSTEMI--troponin 1.  PMHx significant for HTN and dyslipidemia.  His father developed CAD in his 40s and  in his 50s from CAD.  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  Dr. Lake was asked to see pt for surgical revascularization.    Review of Systems   Constitutional: Positive for activity change and fatigue. Negative for appetite change, chills, diaphoresis, fever and unexpected weight change.   HENT: Negative for congestion, dental problem, hearing loss, mouth sores, nosebleeds, postnasal drip, rhinorrhea, sinus pressure, sinus pain, sneezing, sore throat, tinnitus, trouble swallowing and voice change.    Eyes: Negative for visual disturbance.   Respiratory: Positive for chest tightness and shortness of breath. Negative for apnea, cough, choking, wheezing and stridor.    Cardiovascular: Positive for chest pain. Negative for palpitations and leg swelling.   Gastrointestinal: Negative for abdominal pain, constipation, diarrhea, nausea and vomiting.   Endocrine: Negative for cold intolerance and heat intolerance.   Genitourinary: Negative for dysuria, flank pain, frequency and hematuria.   Musculoskeletal: Negative for arthralgias, back pain, gait problem, joint swelling, myalgias, neck pain and neck stiffness.    "  Skin: Negative for color change, pallor, rash and wound.   Allergic/Immunologic: Negative for environmental allergies, food allergies and immunocompromised state.   Neurological: Negative for dizziness, tremors, seizures, syncope, speech difficulty, weakness, light-headedness, numbness and headaches.   Hematological: Negative for adenopathy. Does not bruise/bleed easily.   Psychiatric/Behavioral: Negative for agitation, behavioral problems, confusion, decreased concentration, dysphoric mood, hallucinations, self-injury, sleep disturbance and suicidal ideas. The patient is not nervous/anxious and is not hyperactive.    All other systems reviewed and are negative.       Past Medical History:   Diagnosis Date   • Allergic    • Hyperlipemia    • Hypertension      History reviewed. No pertinent surgical history.  Family History   Problem Relation Age of Onset   • Heart disease Father      Social History     Tobacco Use   • Smoking status: Never Smoker   • Smokeless tobacco: Never Used   Substance Use Topics   • Alcohol use: No     Frequency: Never   • Drug use: No     Medications Prior to Admission   Medication Sig Dispense Refill Last Dose   • aspirin 81 MG tablet Take 81 mg by mouth Daily.      • atorvastatin (LIPITOR) 40 MG tablet TAKE ONE TABLET BY MOUTH DAILY 90 tablet 0    • hydroCHLOROthiazide (HYDRODIURIL) 25 MG tablet TAKE ONE TABLET BY MOUTH DAILY 90 tablet 0    • irbesartan (AVAPRO) 300 MG tablet TAKE ONE TABLET BY MOUTH EVERY NIGHT AT BEDTIME 90 tablet 0        [START ON 7/9/2020] aspirin 81 mg Oral Daily   atorvastatin 40 mg Oral Nightly   sodium chloride 10 mL Intravenous Q12H     Allergies:  Lisinopril    Objective      Vital Signs  Temp:  [99.9 °F (37.7 °C)-100.2 °F (37.9 °C)] 99.9 °F (37.7 °C)  Heart Rate:  [] 84  Resp:  [14-16] 14  BP: (101-129)/(64-73) 101/70    Flowsheet Rows      First Filed Value   Admission Height  182.9 cm (72\") Documented at 07/08/2020 0944   Admission Weight  102 kg (225 " "lb 15.5 oz) Documented at 07/08/2020 0944        182.9 cm (72\")    Physical Exam   Constitutional: He is oriented to person, place, and time. Vital signs are normal. He appears well-developed and well-nourished. He is active and cooperative.   Pt seen in [facility]   HENT:   Head: Normocephalic and atraumatic.   Nose: Nose normal.   Mouth/Throat: Uvula is midline, oropharynx is clear and moist and mucous membranes are normal.   Eyes: Pupils are equal, round, and reactive to light. Conjunctivae, EOM and lids are normal. No scleral icterus.   Neck: Trachea normal and normal range of motion. Neck supple. Normal carotid pulses, no hepatojugular reflux and no JVD present. Carotid bruit is not present. No tracheal deviation present. No thyroid mass and no thyromegaly present.   Cardiovascular: Regular rhythm and intact distal pulses.  No extrasystoles are present. Tachycardia present. PMI is not displaced. Exam reveals no gallop and no friction rub.   Murmur heard.   Systolic murmur is present with a grade of 2/6.  Pulses:       Carotid pulses are 2+ on the right side, and 2+ on the left side.       Radial pulses are 2+ on the right side, and 2+ on the left side.        Femoral pulses are 2+ on the right side, and 2+ on the left side.       Popliteal pulses are 2+ on the right side, and 2+ on the left side.        Dorsalis pedis pulses are 2+ on the right side, and 2+ on the left side.        Posterior tibial pulses are 2+ on the right side, and 2+ on the left side.   IABP in place   Pulmonary/Chest: Effort normal and breath sounds normal. No stridor. No respiratory distress. He has no wheezes. He has no rales. He exhibits no tenderness, no bony tenderness, no edema, no deformity and no swelling.   On room air   Abdominal: Soft. Normal appearance, normal aorta and bowel sounds are normal. He exhibits no distension and no abdominal bruit. There is no hepatosplenomegaly. There is no tenderness.   Obese   Musculoskeletal: Normal " range of motion. He exhibits no edema, tenderness or deformity.   Reports no use of assistive devices   Lymphadenopathy:     He has no cervical adenopathy.        Right: No supraclavicular adenopathy present.        Left: No supraclavicular adenopathy present.   Neurological: He is alert and oriented to person, place, and time. He has normal strength. He displays normal reflexes. No cranial nerve deficit or sensory deficit. He exhibits normal muscle tone. He displays a negative Romberg sign. Coordination normal. GCS eye subscore is 4. GCS verbal subscore is 5. GCS motor subscore is 6.   Skin: Skin is warm, dry and intact. Capillary refill takes less than 2 seconds. No rash noted. No cyanosis or erythema. Nails show no clubbing.   Sternotomy healing well.  SVHS well healed.   Psychiatric: He has a normal mood and affect. His speech is normal and behavior is normal. Judgment and thought content normal. His mood appears not anxious. His affect is not angry, not blunt, not labile and not inappropriate. Cognition and memory are normal. Cognition and memory are not impaired. He does not express impulsivity or inappropriate judgment. He exhibits normal recent memory and normal remote memory.   Nursing note and vitals reviewed.      Results Review:   Lab Results (last 24 hours)     Procedure Component Value Units Date/Time    [facility] Draw [[id number]] Collected:  07/08/20 1014    Specimen:  Blood Updated:  07/08/20 1115    Narrative:       The following orders were created for panel order [facility] Draw.  Procedure                               Abnormality         Status                     ---------                               -----------         ------                     Light Blue Top[[id number]]                                   Final result               Green Top (Gel)[[id number]]                                  Final result               Lavender Top[[id number]]                                     Final result                Gold Top - SST[[id number]]                                   Final result                 Please view results for these tests on the individual orders.    Light Blue Top [[id number]] Collected:  07/08/20 1014    Specimen:  Blood Updated:  07/08/20 1115     Extra Tube hold for add-on     Comment: Auto resulted       Lavender Top [[id number]] Collected:  07/08/20 1014    Specimen:  Blood Updated:  07/08/20 1115     Extra Tube hold for add-on     Comment: Auto resulted       Gold Top - SST [[id number]] Collected:  07/08/20 1014    Specimen:  Blood Updated:  07/08/20 1115     Extra Tube Hold for add-ons.     Comment: Auto resulted.       Green Top (Gel) [[id number]] Collected:  07/08/20 1014    Specimen:  Blood Updated:  07/08/20 1057     Extra Tube --    BUN [[id number]]  (Normal) Collected:  07/08/20 1014    Specimen:  Blood Updated:  07/08/20 1057     BUN 17 mg/dL     Troponin [[id number]]  (Abnormal) Collected:  07/08/20 1014    Specimen:  Blood Updated:  07/08/20 1056     Troponin T 1.070 ng/mL     Narrative:       Troponin T Reference Range:  <= 0.03 ng/mL-   Negative for AMI  >0.03 ng/mL-     Abnormal for myocardial necrosis.  Clinicians would have to utilize clinical acumen, EKG, Troponin and serial changes to determine if it is an Acute Myocardial Infarction or myocardial injury due to an underlying chronic condition.       Results may be falsely decreased if patient taking Biotin.      COVID-19 [facility]-HOUSE, Nasal Swab No Transport Media - Swab, Nasal Cavity [[id number]]  (Normal) Collected:  07/08/20 1014    Specimen:  Swab from Nasal Cavity Updated:  07/08/20 1056     COVID19 Not Detected    Narrative:       Fact sheet for providers: https://www.fda.gov/media/226765/download     Fact sheet for patients: https://www.fda.gov/media/457053/download    Comprehensive Metabolic Panel [[id number]]  (Abnormal) Collected:  07/08/20 1014    Specimen:  Blood Updated:  07/08/20 1053     Glucose 172 mg/dL      BUN --      Comment: Testing performed by alternate method        Creatinine 1.04 mg/dL      Sodium 137 mmol/L      Potassium 3.4 mmol/L      Chloride 98 mmol/L      CO2 26.0 mmol/L      Calcium 9.8 mg/dL      Total Protein 7.1 g/dL      Albumin 4.40 g/dL      ALT (SGPT) 59 U/L      AST (SGOT) 125 U/L      Alkaline Phosphatase 78 U/L      Total Bilirubin 0.8 mg/dL      eGFR Non African Amer 72 mL/min/1.73      Globulin 2.7 gm/dL      A/G Ratio 1.6 g/dL      BUN/Creatinine Ratio --     Comment: Testing not performed        Anion Gap 13.0 mmol/L     Narrative:       GFR Normal >60  Chronic Kidney Disease <60  Kidney Failure <15      Magnesium [[id number]]  (Normal) Collected:  07/08/20 1014    Specimen:  Blood Updated:  07/08/20 1053     Magnesium 2.0 mg/dL     CBC & Differential [[id number]] Collected:  07/08/20 1014    Specimen:  Blood Updated:  07/08/20 1052    Narrative:       The following orders were created for panel order CBC & Differential.  Procedure                               Abnormality         Status                     ---------                               -----------         ------                     CBC Auto Differential[[id number]]        Abnormal            Final result                 Please view results for these tests on the individual orders.    CBC Auto Differential [[id number]]  (Abnormal) Collected:  07/08/20 1014    Specimen:  Blood Updated:  07/08/20 1052     WBC 12.30 10*3/mm3      RBC 5.37 10*6/mm3      Hemoglobin 15.6 g/dL      Hematocrit 46.2 %      MCV 86.0 fL      MCH 29.0 pg      MCHC 33.7 g/dL      RDW 15.2 %      RDW-SD 45.9 fl      MPV 7.4 fL      Platelets 201 10*3/mm3      Neutrophil % 81.6 %      Lymphocyte % 10.2 %      Monocyte % 7.3 %      Eosinophil % 0.1 %      Basophil % 0.8 %      Neutrophils, Absolute 10.00 10*3/mm3      Lymphocytes, Absolute 1.30 10*3/mm3      Monocytes, Absolute 0.90 10*3/mm3      Eosinophils, Absolute 0.00 10*3/mm3      Basophils, Absolute 0.10 10*3/mm3       nRBC 0.0 /100 WBC     BNP [[id number]]  (Abnormal) Collected:  07/08/20 1014    Specimen:  Blood Updated:  07/08/20 1050     proBNP 1,236.0 pg/mL     Narrative:       Among patients with dyspnea, NT-proBNP is highly sensitive for the detection of acute congestive heart failure. In addition NT-proBNP of <300 pg/ml effectively rules out acute congestive heart failure with 99% negative predictive value.    Results may be falsely decreased if patient taking Biotin.      Protime-INR [[id number]]  (Normal) Collected:  07/08/20 1014    Specimen:  Blood Updated:  07/08/20 1046     Protime 10.9 Seconds      INR 1.00    aPTT [[id number]]  (Normal) Collected:  07/08/20 1014    Specimen:  Blood Updated:  07/08/20 1046     PTT 24.7 seconds     D-dimer, Quantitative [[id number]]  (Abnormal) Collected:  07/08/20 1014    Specimen:  Blood Updated:  07/08/20 1046     D-Dimer, Quantitative 0.68 MCGFEU/mL     Narrative:       Reference Range  --------------------------------------------------------------------     < 0.50   Negative Predictive Value  0.50-0.59   Indeterminate    >= 0.60   Probable VTE             A very low percentage of patients with DVT may yield D-Dimer results   below the cut-off of 0.50 MCGFEU/mL.  This is known to be more   prevalent in patients with distal DVT.             Results of this test should always be interpreted in conjunction with   the patient's medical history, clinical presentation and other   findings.  Clinical diagnosis should not be based on the result of   IN[place]OVANCE D-Dimer alone.              Assessment/Plan       NSTEMI, initial episode of care (CMS/[facility])    NSTEMI (non-ST elevated myocardial infarction) (CMS/[facility])    Coronary artery disease involving native coronary artery of native heart with unstable angina pectoris (CMS/[facility])      Assessment & Plan    MV CAD, EF 50-55%--IABP placed, surgical workup in progress  NSTEMI--trop 1 on admisson  HTN--stable on ARB and HCTZ at home  Dyslipidemia--statin    Hx tick-borne disease requiring hospitalization--resolved  Family hx premature CAD--father    65 y/o ruled in for NSTEMI and found to have MV CAD.  COVID screen negative.  Dr. Lake has reviewed films and d/w Dr. Fatima.  Recs for IABP placement.  Plans for CABG tomorrow.  Preop orders initiated.      Thank you for allowing us to participate in the care of this patient.          Evelyne Gonzalez, SAADIA  07/08/20  12:33    All studies and images reviewed independently by me. Case discussed in detail with Dr. Fatima.    64 year old man with NSTEMI, severe three vessel CAD. Unstable angina all of Tuesday. IABP placed following LHC. Workup in progress for surgical revascularization soon.     Thank you for this kind consultation.

## 2020-07-08 NOTE — CONSULTS
NEPHROLOGY CONSULTATION-----[facility]    [facility]  [phone]  Xavier Kruse MD    Patient Care Team:  Jarrell Alaniz Jr., MD as PCP - Lauren Han APRN as Nurse Practitioner (Nurse Practitioner)  Christelle Kruse MD as Consulting Physician (Nephrology)    CC/REASON FOR CONSULTATION: ELEVATED SERUM CREATININE/HYPOKALEMIA  PHYSICIAN REQUESTING CONSULTATION:     History of Present Illness     HPI    Patient is a 64 y.o. WM whom I was asked to see in consultation for evaluation and management of renal failure/elevated serum creatinine and hypokalemia. Patient was admitted after presenting with c/o chest pain that started yesterday and worsened. Patient is s/p emergent cardiac cath with IABP placement. Patient denies prior knowledge of functional kidney disease, proteinuria, or hematuria. No recent IV dye exposure outside of cardiac cath. Reports occasional NSAID use in the form of OTC Ibuprofen prior to admission. No known h/o hepatitis, TB, rheumatic fever, jaundice, SLE, bleeding/bruising disorders.  No urinary sx. No edema or fluid retention.  +Compliance with home meds. Was on diuretics in the form of HCTZ  prior to admission. Was on ACE-I/ARB in the form of Ibersartan prior to admission. No herbal med use.      Review of Systems   Constitutional: Positive for activity change and fatigue. Negative for appetite change, chills, diaphoresis, fever and unexpected weight change.   HENT: Negative for congestion, dental problem, drooling, ear discharge, ear pain, facial swelling, hearing loss, mouth sores, nosebleeds, postnasal drip, rhinorrhea, sinus pressure, sinus pain, sneezing, sore throat, tinnitus, trouble swallowing and voice change.    Eyes: Negative for photophobia, pain, discharge, redness, itching and visual disturbance.   Respiratory: Positive for chest tightness. Negative for apnea, cough, choking, shortness of breath,  wheezing and stridor.    Cardiovascular: Negative for chest pain, palpitations and leg swelling.   Gastrointestinal: Negative for abdominal distention, abdominal pain, anal bleeding, blood in stool, constipation, diarrhea, nausea, rectal pain and vomiting.   Endocrine: Negative for cold intolerance, heat intolerance, polydipsia, polyphagia and polyuria.   Genitourinary: Negative for decreased urine volume, difficulty urinating, dysuria, enuresis, flank pain, frequency, genital sores, hematuria and urgency.   Musculoskeletal: Positive for arthralgias. Negative for back pain, gait problem, joint swelling, myalgias, neck pain and neck stiffness.   Skin: Negative for color change, pallor, rash and wound.   Allergic/Immunologic: Negative for environmental allergies, food allergies and immunocompromised state.   Neurological: Negative for dizziness, tremors, seizures, syncope, facial asymmetry, speech difficulty, weakness, light-headedness, numbness and headaches.   Hematological: Negative for adenopathy. Does not bruise/bleed easily.   Psychiatric/Behavioral: Negative for agitation, behavioral problems, confusion, decreased concentration, dysphoric mood, hallucinations, self-injury, sleep disturbance and suicidal ideas. The patient is not nervous/anxious and is not hyperactive.           Past Medical History:   Diagnosis Date   • Allergic    • Dyslipidemia    • History of tick-borne disease    • Hyperlipemia    • Hypertension        History reviewed. No pertinent surgical history.    Family History   Problem Relation Age of Onset   • Heart disease Father        Social History     Tobacco Use   • Smoking status: Never Smoker   • Smokeless tobacco: Never Used   Substance Use Topics   • Alcohol use: No     Frequency: Never   • Drug use: No       Home Meds:   Medications Prior to Admission   Medication Sig Dispense Refill Last Dose   • aspirin 81 MG tablet Take 81 mg by mouth Daily.      • atorvastatin (LIPITOR) 40 MG tablet  TAKE ONE TABLET BY MOUTH DAILY 90 tablet 0    • hydroCHLOROthiazide (HYDRODIURIL) 25 MG tablet TAKE ONE TABLET BY MOUTH DAILY 90 tablet 0    • irbesartan (AVAPRO) 300 MG tablet TAKE ONE TABLET BY MOUTH EVERY NIGHT AT BEDTIME 90 tablet 0        Scheduled Meds:    [START ON 7/9/2020] aspirin 81 mg Oral Daily   atorvastatin 40 mg Oral Nightly   ceFAZolin 2 g Intravenous Once   chlorhexidine 15 mL Mouth/Throat Q12H   [START ON 7/9/2020] metoprolol tartrate 12.5 mg Oral Once   mupirocin 1 application Each Nare Q12H   sodium chloride 10 mL Intravenous Q12H       Continuous Infusions:    heparin 9.71 Units/kg/hr Last Rate: 9.71 Units/kg/hr (07/08/20 1115)   insulin 0-50 Units/hr    nitroglycerin 5-200 mcg/min Last Rate: 5 mcg/min (07/08/20 1030)   sodium chloride 1 mL/kg/hr    sodium chloride 30 mL/hr        PRN Meds:  •  acetaminophen  •  ALPRAZolam  •  heparin  •  heparin  •  insulin  •  nitroglycerin  •  [COMPLETED] Insert peripheral IV **AND** sodium chloride  •  sodium chloride  •  sodium chloride  •  sodium chloride  •  temazepam    Allergies:  Lisinopril    OBJECTIVE    Vital Signs  Temp:  [99.6 °F (37.6 °C)-100.2 °F (37.9 °C)] 99.6 °F (37.6 °C)  Heart Rate:  [] 103  Resp:  [14-16] 16  BP: ()/(44-77) 100/77    I/O this shift:  In: 740 [P.O.:240; IV Piggyback:500]  Out: -   No intake/output data recorded.    Physical Exam: ON INTRAORTIC BALLON PUMP  General Appearance: alert, appears stated age and cooperative  Head: normocephalic, without obvious abnormality and atraumatic  Eyes: conjunctivae and sclerae normal and no icterus  Neck: supple and no JVD  Lungs: clear to auscultation and respirations regular  Heart: regular rhythm & normal rate and normal S1, S2 +JOSE EDUARDO  Chest Wall: no abnormalities observed  Abdomen: normal bowel sounds and soft non-tender +OBESITY  Extremities: moves extremities well, no edema, no cyanosis and no redness  Skin: no bleeding, bruising or rash  Neurologic: Alert, and oriented.  No focal deficits    Results Review:    I reviewed the patient's new clinical results.    WBC WBC   Date Value Ref Range Status   07/08/2020 12.30 (H) 3.40 - 10.80 10*3/mm3 Final      HGB Hemoglobin   Date Value Ref Range Status   07/08/2020 15.6 13.0 - 17.7 g/dL Final      HCT Hematocrit   Date Value Ref Range Status   07/08/2020 46.2 37.5 - 51.0 % Final      Platlets No results found for: LABPLAT   MCV MCV   Date Value Ref Range Status   07/08/2020 86.0 79.0 - 97.0 fL Final          Sodium Sodium   Date Value Ref Range Status   07/08/2020 137 136 - 145 mmol/L Final      Potassium Potassium   Date Value Ref Range Status   07/08/2020 3.4 (L) 3.5 - 5.2 mmol/L Final      Chloride Chloride   Date Value Ref Range Status   07/08/2020 98 98 - 107 mmol/L Final      CO2 CO2   Date Value Ref Range Status   07/08/2020 26.0 22.0 - 29.0 mmol/L Final      BUN BUN   Date Value Ref Range Status   07/08/2020   Final     Comment:     Testing performed by alternate method   07/08/2020 17 8 - 23 mg/dL Final      Creatinine Creatinine   Date Value Ref Range Status   07/08/2020 1.04 0.76 - 1.27 mg/dL Final      Calcium Calcium   Date Value Ref Range Status   07/08/2020 9.8 8.6 - 10.5 mg/dL Final      PO4 No results found for: CAPO4   Albumin Albumin   Date Value Ref Range Status   07/08/2020 4.40 3.50 - 5.20 g/dL Final      Magnesium Magnesium   Date Value Ref Range Status   07/08/2020 2.0 1.6 - 2.4 mg/dL Final      Uric Acid No results found for: URICACID       Imaging Results (Last 72 Hours)     Procedure Component Value Units Date/Time    XR Chest 1 View [[id number]] Collected:  07/08/20 1102     Updated:  07/08/20 1105    Narrative:       EXAMINATION: XR CHEST 1 VW-     DATE OF EXAM: 7/8/2020 10:43 AM     INDICATION: Chest pain for one day     COMPARISON: Chest radiograph dated 05/18/2014     TECHNIQUE: Portable AP view of the chest was obtained.     FINDINGS:  The cardiomediastinal silhouette is within normal limits.  Pulmonary  vascularity is unremarkable. There is no focal airspace consolidation,  pleural effusion, or pneumothorax. There are no acute osseous findings.       Impression:       No acute cardiopulmonary abnormality.     Electronically Signed By-Brady Watson On:7/8/2020 11:03 AM  This report was finalized on [id number] by  Brady Watson, .            Results for orders placed during the hospital encounter of 07/08/20   XR Chest 1 View    Narrative EXAMINATION: XR CHEST 1 VW-     DATE OF EXAM: 7/8/2020 10:43 AM     INDICATION: Chest pain for one day     COMPARISON: Chest radiograph dated 05/18/2014     TECHNIQUE: Portable AP view of the chest was obtained.     FINDINGS:  The cardiomediastinal silhouette is within normal limits. Pulmonary  vascularity is unremarkable. There is no focal airspace consolidation,  pleural effusion, or pneumothorax. There are no acute osseous findings.       Impression No acute cardiopulmonary abnormality.     Electronically Signed By-Brady Watson On:7/8/2020 11:03 AM  This report was finalized on [id number] by  Brady Watson, .              ASSESSMENT / PLAN      NSTEMI, initial episode of care (CMS/[facility])    NSTEMI (non-ST elevated myocardial infarction) (CMS/[facility])    Coronary artery disease involving native coronary artery of native heart with unstable angina pectoris (CMS/[facility])    1. ELEVATED SERUM CREATININE/DECREASED eGFr-------Nonoliguric. Appears to have CRF/CKD STG 2 with current serum creatinine near baseline. Give some relative hypotension, recent IV dye exposure, and hypokalemia will hold ARB and HCTZ. Hydrate very gently with NS. Avoid hypotension. No further NSAIDs. Check few urine and serum studies and renal US. Follow closely post CABG tomorrow. Dose meds for CrCl 60-90 cc/min    2. HYPOKALEMIA------Secondary to HCTZ use. Replace po. Mg okay.     3. ANGINA/CAD S/P NSTEMI S/P CARDIAC CATH------On IABP per , Cardiology. CABG tomorrow per , CT  surgery    4. HYPERLIPIDEMIA------On Statin. Check CK, TSH    5. OA-------No further NSAIDs. Check uric acid level    6. OBESITY/ELEVATED BMI    I discussed the patients findings and my recommendations with patient, nursing staff and primary care team    Will follow along closely. Thank you for allowing us to see this patient in renal consultation.    [facility]  [phone]  MD Xavier Dash MD  07/08/20  16:54

## 2020-07-09 ENCOUNTER — APPOINTMENT (OUTPATIENT)
Dept: GENERAL RADIOLOGY | Facility: HOSPITAL | Age: 65
End: 2020-07-09

## 2020-07-09 ENCOUNTER — ANESTHESIA (OUTPATIENT)
Dept: PERIOP | Facility: HOSPITAL | Age: 65
End: 2020-07-09

## 2020-07-09 ENCOUNTER — ANESTHESIA EVENT (OUTPATIENT)
Dept: PERIOP | Facility: HOSPITAL | Age: 65
End: 2020-07-09

## 2020-07-09 ENCOUNTER — APPOINTMENT (OUTPATIENT)
Dept: CARDIOLOGY | Facility: HOSPITAL | Age: 65
End: 2020-07-09

## 2020-07-09 LAB
ACT BLD: 109 SECONDS (ref 89–137)
ACT BLD: 125 SECONDS (ref 89–137)
ACT BLD: 389 SECONDS (ref 89–137)
ACT BLD: 400 SECONDS (ref 89–137)
ACT BLD: 417 SECONDS (ref 89–137)
ACT BLD: 444 SECONDS (ref 89–137)
ACT BLD: 483 SECONDS (ref 89–137)
ALBUMIN SERPL-MCNC: 3.7 G/DL (ref 3.5–5.2)
ALBUMIN SERPL-MCNC: 4.3 G/DL (ref 3.5–5.2)
ALBUMIN/GLOB SERPL: 1.5 G/DL
ALP SERPL-CCNC: 60 U/L (ref 39–117)
ALT SERPL W P-5'-P-CCNC: 39 U/L (ref 1–41)
ANION GAP SERPL CALCULATED.3IONS-SCNC: 11 MMOL/L (ref 5–15)
ANION GAP SERPL CALCULATED.3IONS-SCNC: 25 MMOL/L (ref 5–15)
APTT PPP: 22.9 SECONDS (ref 24–31)
APTT PPP: 26.2 SECONDS (ref 61–76.5)
ARTERIAL PATENCY WRIST A: ABNORMAL
AST SERPL-CCNC: 76 U/L (ref 1–40)
ATMOSPHERIC PRESS: ABNORMAL MM[HG]
BACTERIA UR QL AUTO: ABNORMAL /HPF
BASE EXCESS BLDA CALC-SCNC: -11.3 MMOL/L (ref 0–3)
BASE EXCESS BLDA CALC-SCNC: -11.9 MMOL/L (ref 0–3)
BASE EXCESS BLDA CALC-SCNC: -8.9 MMOL/L (ref 0–3)
BASOPHILS # BLD AUTO: 0.1 10*3/MM3 (ref 0–0.2)
BASOPHILS NFR BLD AUTO: 0.8 % (ref 0–1.5)
BDY SITE: ABNORMAL
BH BB BLOOD EXPIRATION DATE: NORMAL
BH BB BLOOD EXPIRATION DATE: NORMAL
BH BB BLOOD TYPE BARCODE: 5100
BH BB BLOOD TYPE BARCODE: 6200
BH BB DISPENSE STATUS: NORMAL
BH BB DISPENSE STATUS: NORMAL
BH BB PRODUCT CODE: NORMAL
BH BB PRODUCT CODE: NORMAL
BH BB UNIT NUMBER: NORMAL
BH BB UNIT NUMBER: NORMAL
BH CV ECHO MEAS - AO MAX PG (FULL): 1.6 MMHG
BH CV ECHO MEAS - AO MAX PG: 5 MMHG
BH CV ECHO MEAS - AO MEAN PG (FULL): 0.87 MMHG
BH CV ECHO MEAS - AO MEAN PG: 2.5 MMHG
BH CV ECHO MEAS - AO V2 MAX: 112 CM/SEC
BH CV ECHO MEAS - AO V2 MEAN: 74.6 CM/SEC
BH CV ECHO MEAS - AO V2 VTI: 20.7 CM
BH CV ECHO MEAS - BSA(HAYCOCK): 2.3 M^2
BH CV ECHO MEAS - BSA: 2.2 M^2
BH CV ECHO MEAS - BZI_BMI: 33.5 KILOGRAMS/M^2
BH CV ECHO MEAS - BZI_METRIC_HEIGHT: 175.3 CM
BH CV ECHO MEAS - BZI_METRIC_WEIGHT: 103 KG
BH CV ECHO MEAS - EDV(MOD-SP4): 74.9 ML
BH CV ECHO MEAS - EF(MOD-BP): 50 %
BH CV ECHO MEAS - EF(MOD-SP4): 59 %
BH CV ECHO MEAS - ESV(MOD-SP4): 30.7 ML
BH CV ECHO MEAS - LV DIASTOLIC VOL/BSA (35-75): 34.3 ML/M^2
BH CV ECHO MEAS - LV MAX PG: 3.4 MMHG
BH CV ECHO MEAS - LV MEAN PG: 1.7 MMHG
BH CV ECHO MEAS - LV SYSTOLIC VOL/BSA (12-30): 14.1 ML/M^2
BH CV ECHO MEAS - LV V1 MAX: 92.8 CM/SEC
BH CV ECHO MEAS - LV V1 MEAN: 60.2 CM/SEC
BH CV ECHO MEAS - LV V1 VTI: 15.8 CM
BH CV ECHO MEAS - MV A MAX VEL: 59.1 CM/SEC
BH CV ECHO MEAS - MV DEC SLOPE: 465.7 CM/SEC^2
BH CV ECHO MEAS - MV DEC TIME: 0.2 SEC
BH CV ECHO MEAS - MV E MAX VEL: 92.9 CM/SEC
BH CV ECHO MEAS - MV E/A: 1.6
BH CV ECHO MEAS - MV MAX PG: 4.9 MMHG
BH CV ECHO MEAS - MV MEAN PG: 1.5 MMHG
BH CV ECHO MEAS - MV V2 MAX: 110.6 CM/SEC
BH CV ECHO MEAS - MV V2 MEAN: 54 CM/SEC
BH CV ECHO MEAS - MV V2 VTI: 27.9 CM
BH CV ECHO MEAS - SI(MOD-SP4): 20.3 ML/M^2
BH CV ECHO MEAS - SV(MOD-SP4): 44.2 ML
BH CV ECHO MEAS - TR MAX VEL: 172.4 CM/SEC
BILIRUB SERPL-MCNC: 0.8 MG/DL (ref 0–1.2)
BILIRUB UR QL STRIP: ABNORMAL
BODY TEMPERATURE: 97.4 C
BUN SERPL-MCNC: 22 MG/DL (ref 8–23)
BUN SERPL-MCNC: 29 MG/DL (ref 8–23)
BUN SERPL-MCNC: ABNORMAL MG/DL
BUN SERPL-MCNC: ABNORMAL MG/DL
BUN/CREAT SERPL: ABNORMAL
BUN/CREAT SERPL: ABNORMAL
CA-I BLDA-SCNC: 0.85 MMOL/L (ref 1.15–1.33)
CA-I BLDA-SCNC: 1.05 MMOL/L (ref 1.15–1.33)
CA-I BLDA-SCNC: 1.07 MMOL/L (ref 1.15–1.33)
CA-I BLDA-SCNC: 1.08 MMOL/L (ref 1.15–1.33)
CA-I SERPL ISE-MCNC: 1.14 MMOL/L (ref 1.2–1.3)
CA-I SERPL ISE-MCNC: 1.19 MMOL/L (ref 1.2–1.3)
CALCIUM SPEC-SCNC: 8.6 MG/DL (ref 8.6–10.5)
CALCIUM SPEC-SCNC: 8.7 MG/DL (ref 8.6–10.5)
CHLORIDE SERPL-SCNC: 100 MMOL/L (ref 98–107)
CHLORIDE SERPL-SCNC: 103 MMOL/L (ref 98–107)
CK SERPL-CCNC: 517 U/L (ref 20–200)
CLARITY UR: ABNORMAL
CO2 BLDA-SCNC: 16.9 MMOL/L (ref 22–29)
CO2 BLDA-SCNC: 18 MMOL/L (ref 22–29)
CO2 BLDA-SCNC: 18.6 MMOL/L (ref 22–29)
CO2 SERPL-SCNC: 17 MMOL/L (ref 22–29)
CO2 SERPL-SCNC: 23 MMOL/L (ref 22–29)
COLOR UR: ABNORMAL
CREAT SERPL-MCNC: 1.12 MG/DL (ref 0.76–1.27)
CREAT SERPL-MCNC: 1.82 MG/DL (ref 0.76–1.27)
CROSSMATCH INTERPRETATION: NORMAL
CROSSMATCH INTERPRETATION: NORMAL
DEPRECATED RDW RBC AUTO: 45.5 FL (ref 37–54)
DEPRECATED RDW RBC AUTO: 49.9 FL (ref 37–54)
EOSINOPHIL # BLD AUTO: 0 10*3/MM3 (ref 0–0.4)
EOSINOPHIL NFR BLD AUTO: 0 % (ref 0.3–6.2)
ERYTHROCYTE [DISTWIDTH] IN BLOOD BY AUTOMATED COUNT: 15.1 % (ref 12.3–15.4)
ERYTHROCYTE [DISTWIDTH] IN BLOOD BY AUTOMATED COUNT: 15.9 % (ref 12.3–15.4)
GFR SERPL CREATININE-BSD FRML MDRD: 38 ML/MIN/1.73
GFR SERPL CREATININE-BSD FRML MDRD: 66 ML/MIN/1.73
GLOBULIN UR ELPH-MCNC: 2.4 GM/DL
GLUCOSE BLDC GLUCOMTR-MCNC: 115 MG/DL (ref 70–105)
GLUCOSE BLDC GLUCOMTR-MCNC: 149 MG/DL (ref 70–105)
GLUCOSE BLDC GLUCOMTR-MCNC: 210 MG/DL (ref 74–100)
GLUCOSE BLDC GLUCOMTR-MCNC: 210 MG/DL (ref 74–100)
GLUCOSE BLDC GLUCOMTR-MCNC: 224 MG/DL (ref 70–105)
GLUCOSE BLDC GLUCOMTR-MCNC: 241 MG/DL (ref 74–100)
GLUCOSE BLDC GLUCOMTR-MCNC: 241 MG/DL (ref 74–100)
GLUCOSE BLDC GLUCOMTR-MCNC: 250 MG/DL (ref 74–100)
GLUCOSE BLDC GLUCOMTR-MCNC: 250 MG/DL (ref 74–100)
GLUCOSE BLDC GLUCOMTR-MCNC: 251 MG/DL (ref 70–105)
GLUCOSE BLDC GLUCOMTR-MCNC: 294 MG/DL (ref 74–100)
GLUCOSE BLDC GLUCOMTR-MCNC: 294 MG/DL (ref 74–100)
GLUCOSE SERPL-MCNC: 133 MG/DL (ref 65–99)
GLUCOSE SERPL-MCNC: 218 MG/DL (ref 65–99)
GLUCOSE UR STRIP-MCNC: NEGATIVE MG/DL
HCO3 BLDA-SCNC: 15.7 MMOL/L (ref 21–28)
HCO3 BLDA-SCNC: 16.9 MMOL/L (ref 21–28)
HCO3 BLDA-SCNC: 17 MMOL/L (ref 21–28)
HCT VFR BLD AUTO: 40.6 % (ref 37.5–51)
HCT VFR BLD AUTO: 41.4 % (ref 37.5–51)
HCT VFR BLDA CALC: 36 % (ref 38–51)
HCT VFR BLDA CALC: 39 % (ref 38–51)
HCT VFR BLDA CALC: 40 % (ref 38–51)
HCT VFR BLDA CALC: 43 % (ref 38–51)
HEMODILUTION: NO
HEMODILUTION: NO
HEMODILUTION: YES
HGB BLD-MCNC: 13.4 G/DL (ref 13–17.7)
HGB BLD-MCNC: 13.8 G/DL (ref 13–17.7)
HGB BLDA-MCNC: 12.1 G/DL (ref 12–17)
HGB BLDA-MCNC: 13.2 G/DL (ref 12–17)
HGB BLDA-MCNC: 13.6 G/DL (ref 12–17)
HGB BLDA-MCNC: 14.5 G/DL (ref 12–17)
HGB UR QL STRIP.AUTO: ABNORMAL
HYALINE CASTS UR QL AUTO: ABNORMAL /LPF
INHALED O2 CONCENTRATION: 100 %
INR PPP: 1.36 (ref 0.9–1.1)
KETONES UR QL STRIP: NEGATIVE
LEUKOCYTE ESTERASE UR QL STRIP.AUTO: ABNORMAL
LYMPHOCYTES # BLD AUTO: 1.2 10*3/MM3 (ref 0.7–3.1)
LYMPHOCYTES # BLD MANUAL: 5.04 10*3/MM3 (ref 0.7–3.1)
LYMPHOCYTES NFR BLD AUTO: 11.4 % (ref 19.6–45.3)
LYMPHOCYTES NFR BLD MANUAL: 15 % (ref 19.6–45.3)
LYMPHOCYTES NFR BLD MANUAL: 6 % (ref 5–12)
MAGNESIUM SERPL-MCNC: 2.3 MG/DL (ref 1.6–2.4)
MCH RBC QN AUTO: 28.9 PG (ref 26.6–33)
MCH RBC QN AUTO: 29.5 PG (ref 26.6–33)
MCHC RBC AUTO-ENTMCNC: 32.3 G/DL (ref 31.5–35.7)
MCHC RBC AUTO-ENTMCNC: 34 G/DL (ref 31.5–35.7)
MCV RBC AUTO: 86.6 FL (ref 79–97)
MCV RBC AUTO: 89.3 FL (ref 79–97)
METAMYELOCYTES NFR BLD MANUAL: 2 % (ref 0–0)
MODALITY: ABNORMAL
MONOCYTES # BLD AUTO: 0.9 10*3/MM3 (ref 0.1–0.9)
MONOCYTES # BLD AUTO: 2.02 10*3/MM3 (ref 0.1–0.9)
MONOCYTES NFR BLD AUTO: 9.1 % (ref 5–12)
MYELOCYTES NFR BLD MANUAL: 1 % (ref 0–0)
NEUTROPHILS # BLD AUTO: 25.54 10*3/MM3 (ref 1.7–7)
NEUTROPHILS NFR BLD AUTO: 78.7 % (ref 42.7–76)
NEUTROPHILS NFR BLD AUTO: 8.1 10*3/MM3 (ref 1.7–7)
NEUTROPHILS NFR BLD MANUAL: 60 % (ref 42.7–76)
NEUTS BAND NFR BLD MANUAL: 16 % (ref 0–5)
NITRITE UR QL STRIP: NEGATIVE
NT-PROBNP SERPL-MCNC: 1066 PG/ML (ref 0–900)
PCO2 BLDA: 35.4 MM HG (ref 35–48)
PCO2 BLDA: 38.2 MM HG (ref 35–48)
PCO2 BLDA: 49.1 MM HG (ref 35–48)
PCO2 TEMP ADJ BLD: 47.7 MM HG (ref 35–48)
PEEP RESPIRATORY: 10 CM[H2O]
PEEP RESPIRATORY: 10 CM[H2O]
PEEP RESPIRATORY: 8 CM[H2O]
PH BLDA: 7.15 PH UNITS (ref 7.35–7.45)
PH BLDA: 7.22 PH UNITS (ref 7.35–7.45)
PH BLDA: 7.29 PH UNITS (ref 7.35–7.45)
PH UR STRIP.AUTO: 5.5 [PH] (ref 5–8)
PH, TEMP CORRECTED: 7.16 PH UNITS (ref 7.35–7.45)
PHOSPHATE SERPL-MCNC: 2.9 MG/DL (ref 2.5–4.5)
PHOSPHATE SERPL-MCNC: 7 MG/DL (ref 2.5–4.5)
PLAT MORPH BLD: NORMAL
PLATELET # BLD AUTO: 158 10*3/MM3 (ref 140–450)
PLATELET # BLD AUTO: 215 10*3/MM3 (ref 140–450)
PMV BLD AUTO: 7.4 FL (ref 6–12)
PMV BLD AUTO: 7.5 FL (ref 6–12)
PO2 BLDA: 111.2 MM HG (ref 83–108)
PO2 BLDA: 194.7 MM HG (ref 83–108)
PO2 BLDA: 68.6 MM HG (ref 83–108)
PO2 TEMP ADJ BLD: 65.6 MM HG (ref 83–108)
POTASSIUM BLDA-SCNC: 2.4 MMOL/L (ref 3.5–4.5)
POTASSIUM BLDA-SCNC: 3.1 MMOL/L (ref 3.5–4.5)
POTASSIUM BLDA-SCNC: 3.2 MMOL/L (ref 3.5–4.5)
POTASSIUM BLDA-SCNC: 3.2 MMOL/L (ref 3.5–4.5)
POTASSIUM SERPL-SCNC: 3.5 MMOL/L (ref 3.5–5.2)
POTASSIUM SERPL-SCNC: 3.9 MMOL/L (ref 3.5–5.2)
PROT SERPL-MCNC: 6.1 G/DL (ref 6–8.5)
PROT UR QL STRIP: ABNORMAL
PROTHROMBIN TIME: 13.6 SECONDS (ref 9.6–11.7)
RBC # BLD AUTO: 4.64 10*6/MM3 (ref 4.14–5.8)
RBC # BLD AUTO: 4.69 10*6/MM3 (ref 4.14–5.8)
RBC # UR: ABNORMAL /HPF
RBC MORPH BLD: NORMAL
REF LAB TEST METHOD: ABNORMAL
RESPIRATORY RATE: 22
SAO2 % BLDCOA: 87.1 % (ref 94–98)
SAO2 % BLDCOA: 97.3 % (ref 94–98)
SAO2 % BLDCOA: 99.6 % (ref 94–98)
SCAN SLIDE: NORMAL
SODIUM BLD-SCNC: 139 MMOL/L (ref 138–146)
SODIUM BLD-SCNC: 140 MMOL/L (ref 138–146)
SODIUM BLD-SCNC: 141 MMOL/L (ref 138–146)
SODIUM BLD-SCNC: 144 MMOL/L (ref 138–146)
SODIUM SERPL-SCNC: 137 MMOL/L (ref 136–145)
SODIUM SERPL-SCNC: 142 MMOL/L (ref 136–145)
SP GR UR STRIP: >=1.03 (ref 1–1.03)
SQUAMOUS #/AREA URNS HPF: ABNORMAL /HPF
TSH SERPL DL<=0.05 MIU/L-ACNC: 1.63 UIU/ML (ref 0.27–4.2)
UNIT  ABO: NORMAL
UNIT  ABO: NORMAL
UNIT  RH: NORMAL
UNIT  RH: NORMAL
UROBILINOGEN UR QL STRIP: ABNORMAL
VENTILATOR MODE: ABNORMAL
VT ON VENT VENT: 700 ML
VT ON VENT VENT: 750 ML
VT ON VENT VENT: 750 ML
WBC # BLD AUTO: 10.3 10*3/MM3 (ref 3.4–10.8)
WBC # BLD AUTO: 33.6 10*3/MM3 (ref 3.4–10.8)
WBC MORPH BLD: NORMAL
WBC UR QL AUTO: ABNORMAL /HPF

## 2020-07-09 PROCEDURE — 82803 BLOOD GASES ANY COMBINATION: CPT | Performed by: INTERNAL MEDICINE

## 2020-07-09 PROCEDURE — C1751 CATH, INF, PER/CENT/MIDLINE: HCPCS | Performed by: ANESTHESIOLOGY

## 2020-07-09 PROCEDURE — 82803 BLOOD GASES ANY COMBINATION: CPT

## 2020-07-09 PROCEDURE — 84295 ASSAY OF SERUM SODIUM: CPT

## 2020-07-09 PROCEDURE — 25010000002 HEPARIN (PORCINE) PER 1000 UNITS: Performed by: THORACIC SURGERY (CARDIOTHORACIC VASCULAR SURGERY)

## 2020-07-09 PROCEDURE — 03HY32Z INSERTION OF MONITORING DEVICE INTO UPPER ARTERY, PERCUTANEOUS APPROACH: ICD-10-PCS | Performed by: ANESTHESIOLOGY

## 2020-07-09 PROCEDURE — 25010000002 ALBUMIN HUMAN 5% PER 50 ML: Performed by: NURSE PRACTITIONER

## 2020-07-09 PROCEDURE — 84443 ASSAY THYROID STIM HORMONE: CPT | Performed by: INTERNAL MEDICINE

## 2020-07-09 PROCEDURE — 80051 ELECTROLYTE PANEL: CPT

## 2020-07-09 PROCEDURE — 93308 TTE F-UP OR LMTD: CPT

## 2020-07-09 PROCEDURE — 94799 UNLISTED PULMONARY SVC/PX: CPT

## 2020-07-09 PROCEDURE — 93321 DOPPLER ECHO F-UP/LMTD STD: CPT | Performed by: INTERNAL MEDICINE

## 2020-07-09 PROCEDURE — 4A1239Z MONITORING OF CARDIAC OUTPUT, PERCUTANEOUS APPROACH: ICD-10-PCS | Performed by: ANESTHESIOLOGY

## 2020-07-09 PROCEDURE — 06BQ4ZZ EXCISION OF LEFT SAPHENOUS VEIN, PERCUTANEOUS ENDOSCOPIC APPROACH: ICD-10-PCS | Performed by: THORACIC SURGERY (CARDIOTHORACIC VASCULAR SURGERY)

## 2020-07-09 PROCEDURE — 63710000001 INSULIN REGULAR HUMAN PER 5 UNITS: Performed by: ANESTHESIOLOGY

## 2020-07-09 PROCEDURE — 93321 DOPPLER ECHO F-UP/LMTD STD: CPT

## 2020-07-09 PROCEDURE — 82330 ASSAY OF CALCIUM: CPT | Performed by: INTERNAL MEDICINE

## 2020-07-09 PROCEDURE — 87040 BLOOD CULTURE FOR BACTERIA: CPT | Performed by: THORACIC SURGERY (CARDIOTHORACIC VASCULAR SURGERY)

## 2020-07-09 PROCEDURE — 87086 URINE CULTURE/COLONY COUNT: CPT | Performed by: THORACIC SURGERY (CARDIOTHORACIC VASCULAR SURGERY)

## 2020-07-09 PROCEDURE — 84132 ASSAY OF SERUM POTASSIUM: CPT

## 2020-07-09 PROCEDURE — 85018 HEMOGLOBIN: CPT

## 2020-07-09 PROCEDURE — 25010000003 CEFAZOLIN PER 500 MG: Performed by: ANESTHESIOLOGY

## 2020-07-09 PROCEDURE — 71045 X-RAY EXAM CHEST 1 VIEW: CPT

## 2020-07-09 PROCEDURE — 85730 THROMBOPLASTIN TIME PARTIAL: CPT | Performed by: NURSE PRACTITIONER

## 2020-07-09 PROCEDURE — 02HP32Z INSERTION OF MONITORING DEVICE INTO PULMONARY TRUNK, PERCUTANEOUS APPROACH: ICD-10-PCS | Performed by: ANESTHESIOLOGY

## 2020-07-09 PROCEDURE — 93325 DOPPLER ECHO COLOR FLOW MAPG: CPT | Performed by: INTERNAL MEDICINE

## 2020-07-09 PROCEDURE — 33521 CABG ARTERY-VEIN FOUR: CPT | Performed by: PHYSICIAN ASSISTANT

## 2020-07-09 PROCEDURE — 82962 GLUCOSE BLOOD TEST: CPT

## 2020-07-09 PROCEDURE — 85025 COMPLETE CBC W/AUTO DIFF WBC: CPT | Performed by: NURSE PRACTITIONER

## 2020-07-09 PROCEDURE — 94002 VENT MGMT INPAT INIT DAY: CPT

## 2020-07-09 PROCEDURE — 84100 ASSAY OF PHOSPHORUS: CPT | Performed by: INTERNAL MEDICINE

## 2020-07-09 PROCEDURE — 25010000002 MAGNESIUM SULFATE IN D5W 1G/100ML (PREMIX) 1-5 GM/100ML-% SOLUTION: Performed by: NURSE PRACTITIONER

## 2020-07-09 PROCEDURE — 25010000002 AMIODARONE PER 30 MG: Performed by: ANESTHESIOLOGY

## 2020-07-09 PROCEDURE — 93318 ECHO TRANSESOPHAGEAL INTRAOP: CPT | Performed by: ANESTHESIOLOGY

## 2020-07-09 PROCEDURE — 82330 ASSAY OF CALCIUM: CPT | Performed by: NURSE PRACTITIONER

## 2020-07-09 PROCEDURE — C1713 ANCHOR/SCREW BN/BN,TIS/BN: HCPCS | Performed by: THORACIC SURGERY (CARDIOTHORACIC VASCULAR SURGERY)

## 2020-07-09 PROCEDURE — 85027 COMPLETE CBC AUTOMATED: CPT | Performed by: INTERNAL MEDICINE

## 2020-07-09 PROCEDURE — 99024 POSTOP FOLLOW-UP VISIT: CPT | Performed by: THORACIC SURGERY (CARDIOTHORACIC VASCULAR SURGERY)

## 2020-07-09 PROCEDURE — 85007 BL SMEAR W/DIFF WBC COUNT: CPT | Performed by: NURSE PRACTITIONER

## 2020-07-09 PROCEDURE — P9041 ALBUMIN (HUMAN),5%, 50ML: HCPCS

## 2020-07-09 PROCEDURE — 93308 TTE F-UP OR LMTD: CPT | Performed by: INTERNAL MEDICINE

## 2020-07-09 PROCEDURE — 25010000002 PROTAMINE SULFATE PER 10 MG: Performed by: ANESTHESIOLOGY

## 2020-07-09 PROCEDURE — 93005 ELECTROCARDIOGRAM TRACING: CPT | Performed by: INTERNAL MEDICINE

## 2020-07-09 PROCEDURE — 82330 ASSAY OF CALCIUM: CPT

## 2020-07-09 PROCEDURE — 25010000002 MAGNESIUM SULFATE IN D5W 1G/100ML (PREMIX) 1-5 GM/100ML-% SOLUTION: Performed by: THORACIC SURGERY (CARDIOTHORACIC VASCULAR SURGERY)

## 2020-07-09 PROCEDURE — 93005 ELECTROCARDIOGRAM TRACING: CPT | Performed by: NURSE PRACTITIONER

## 2020-07-09 PROCEDURE — 85610 PROTHROMBIN TIME: CPT | Performed by: NURSE PRACTITIONER

## 2020-07-09 PROCEDURE — 85014 HEMATOCRIT: CPT

## 2020-07-09 PROCEDURE — 33508 ENDOSCOPIC VEIN HARVEST: CPT | Performed by: THORACIC SURGERY (CARDIOTHORACIC VASCULAR SURGERY)

## 2020-07-09 PROCEDURE — 25010000003 MILRINONE LACTATE IN DEXTROSE 20-5 MG/100ML-% SOLUTION: Performed by: ANESTHESIOLOGY

## 2020-07-09 PROCEDURE — 25010000002 EPINEPHRINE 5 MG/250 ML: Performed by: ANESTHESIOLOGY

## 2020-07-09 PROCEDURE — 85730 THROMBOPLASTIN TIME PARTIAL: CPT | Performed by: INTERNAL MEDICINE

## 2020-07-09 PROCEDURE — 25010000002 MORPHINE PER 10 MG: Performed by: NURSE PRACTITIONER

## 2020-07-09 PROCEDURE — P9041 ALBUMIN (HUMAN),5%, 50ML: HCPCS | Performed by: NURSE PRACTITIONER

## 2020-07-09 PROCEDURE — 25010000002 CALCIUM GLUCONATE-NACL 1-0.675 GM/50ML-% SOLUTION: Performed by: THORACIC SURGERY (CARDIOTHORACIC VASCULAR SURGERY)

## 2020-07-09 PROCEDURE — 25010000002 ALBUMIN HUMAN 5% PER 50 ML

## 2020-07-09 PROCEDURE — 25010000002 SULFUR HEXAFLUORIDE MICROSPH 60.7-25 MG RECONSTITUTED SUSPENSION: Performed by: INTERNAL MEDICINE

## 2020-07-09 PROCEDURE — 33533 CABG ARTERIAL SINGLE: CPT | Performed by: PHYSICIAN ASSISTANT

## 2020-07-09 PROCEDURE — 80069 RENAL FUNCTION PANEL: CPT | Performed by: NURSE PRACTITIONER

## 2020-07-09 PROCEDURE — 25010000002 CALCIUM GLUCONATE 2-0.675 GM/100ML-% SOLUTION: Performed by: THORACIC SURGERY (CARDIOTHORACIC VASCULAR SURGERY)

## 2020-07-09 PROCEDURE — 4A133B1 MONITORING OF ARTERIAL PRESSURE, PERIPHERAL, PERCUTANEOUS APPROACH: ICD-10-PCS | Performed by: ANESTHESIOLOGY

## 2020-07-09 PROCEDURE — 33521 CABG ARTERY-VEIN FOUR: CPT | Performed by: THORACIC SURGERY (CARDIOTHORACIC VASCULAR SURGERY)

## 2020-07-09 PROCEDURE — 25010000002 HYDROMORPHONE PER 4 MG: Performed by: ANESTHESIOLOGY

## 2020-07-09 PROCEDURE — 25010000002 MIDAZOLAM PER 1 MG: Performed by: ANESTHESIOLOGY

## 2020-07-09 PROCEDURE — C1729 CATH, DRAINAGE: HCPCS | Performed by: THORACIC SURGERY (CARDIOTHORACIC VASCULAR SURGERY)

## 2020-07-09 PROCEDURE — 25010000002 DEXAMETHASONE PER 1 MG: Performed by: ANESTHESIOLOGY

## 2020-07-09 PROCEDURE — 82550 ASSAY OF CK (CPK): CPT | Performed by: INTERNAL MEDICINE

## 2020-07-09 PROCEDURE — B24BZZ4 ULTRASONOGRAPHY OF HEART WITH AORTA, TRANSESOPHAGEAL: ICD-10-PCS | Performed by: ANESTHESIOLOGY

## 2020-07-09 PROCEDURE — 83880 ASSAY OF NATRIURETIC PEPTIDE: CPT | Performed by: INTERNAL MEDICINE

## 2020-07-09 PROCEDURE — 02100Z9 BYPASS CORONARY ARTERY, ONE ARTERY FROM LEFT INTERNAL MAMMARY, OPEN APPROACH: ICD-10-PCS | Performed by: THORACIC SURGERY (CARDIOTHORACIC VASCULAR SURGERY)

## 2020-07-09 PROCEDURE — 83735 ASSAY OF MAGNESIUM: CPT | Performed by: INTERNAL MEDICINE

## 2020-07-09 PROCEDURE — 4A133B3 MONITORING OF ARTERIAL PRESSURE, PULMONARY, PERCUTANEOUS APPROACH: ICD-10-PCS | Performed by: ANESTHESIOLOGY

## 2020-07-09 PROCEDURE — 25010000003 POTASSIUM CHLORIDE 10 MEQ/100ML SOLUTION

## 2020-07-09 PROCEDURE — 021309W BYPASS CORONARY ARTERY, FOUR OR MORE ARTERIES FROM AORTA WITH AUTOLOGOUS VENOUS TISSUE, OPEN APPROACH: ICD-10-PCS | Performed by: THORACIC SURGERY (CARDIOTHORACIC VASCULAR SURGERY)

## 2020-07-09 PROCEDURE — 81001 URINALYSIS AUTO W/SCOPE: CPT | Performed by: THORACIC SURGERY (CARDIOTHORACIC VASCULAR SURGERY)

## 2020-07-09 PROCEDURE — 85347 COAGULATION TIME ACTIVATED: CPT

## 2020-07-09 PROCEDURE — 4A133J1 MONITORING OF ARTERIAL PULSE, PERIPHERAL, PERCUTANEOUS APPROACH: ICD-10-PCS | Performed by: ANESTHESIOLOGY

## 2020-07-09 PROCEDURE — 25010000003 POTASSIUM CHLORIDE 10 MEQ/100ML SOLUTION: Performed by: NURSE PRACTITIONER

## 2020-07-09 PROCEDURE — 25010000002 ONDANSETRON PER 1 MG: Performed by: ANESTHESIOLOGY

## 2020-07-09 PROCEDURE — 80053 COMPREHEN METABOLIC PANEL: CPT | Performed by: INTERNAL MEDICINE

## 2020-07-09 PROCEDURE — 25010000002 FENTANYL CITRATE (PF) 250 MCG/5ML SOLUTION: Performed by: ANESTHESIOLOGY

## 2020-07-09 PROCEDURE — 33508 ENDOSCOPIC VEIN HARVEST: CPT | Performed by: PHYSICIAN ASSISTANT

## 2020-07-09 PROCEDURE — 33533 CABG ARTERIAL SINGLE: CPT | Performed by: THORACIC SURGERY (CARDIOTHORACIC VASCULAR SURGERY)

## 2020-07-09 PROCEDURE — A4648 IMPLANTABLE TISSUE MARKER: HCPCS | Performed by: THORACIC SURGERY (CARDIOTHORACIC VASCULAR SURGERY)

## 2020-07-09 PROCEDURE — 93325 DOPPLER ECHO COLOR FLOW MAPG: CPT

## 2020-07-09 PROCEDURE — 25010000002 MAGNESIUM SULFATE PER 500 MG OF MAGNESIUM: Performed by: ANESTHESIOLOGY

## 2020-07-09 PROCEDURE — 5A1221Z PERFORMANCE OF CARDIAC OUTPUT, CONTINUOUS: ICD-10-PCS | Performed by: THORACIC SURGERY (CARDIOTHORACIC VASCULAR SURGERY)

## 2020-07-09 PROCEDURE — 82947 ASSAY GLUCOSE BLOOD QUANT: CPT

## 2020-07-09 DEVICE — SS SUTURE, 6 PER SLEEVE
Type: IMPLANTABLE DEVICE | Site: STERNUM | Status: FUNCTIONAL
Brand: MYO/WIRE II

## 2020-07-09 DEVICE — ABSORBABLE HEMOSTAT (OXIDIZED REGENERATED CELLULOSE, U.S.P.)
Type: IMPLANTABLE DEVICE | Site: CHEST | Status: FUNCTIONAL
Brand: SURGICEL

## 2020-07-09 DEVICE — DEV CONTRL TISS STRATAFIX SPIRAL MNCRYL UD 3/0 PLS 30CM: Type: IMPLANTABLE DEVICE | Site: CHEST | Status: FUNCTIONAL

## 2020-07-09 DEVICE — DEV CONTRL TISS STRATAFIXSPIRALMNCRYL PLSPS2 REV3/0 15CM: Type: IMPLANTABLE DEVICE | Site: LEG | Status: FUNCTIONAL

## 2020-07-09 RX ORDER — FENTANYL CITRATE 50 UG/ML
INJECTION, SOLUTION INTRAMUSCULAR; INTRAVENOUS AS NEEDED
Status: DISCONTINUED | OUTPATIENT
Start: 2020-07-09 | End: 2020-07-09 | Stop reason: SURG

## 2020-07-09 RX ORDER — PHENYLEPHRINE HCL IN 0.9% NACL 0.5 MG/5ML
SYRINGE (ML) INTRAVENOUS AS NEEDED
Status: DISCONTINUED | OUTPATIENT
Start: 2020-07-09 | End: 2020-07-09 | Stop reason: SURG

## 2020-07-09 RX ORDER — PANTOPRAZOLE SODIUM 40 MG/1
40 TABLET, DELAYED RELEASE ORAL
Status: DISCONTINUED | OUTPATIENT
Start: 2020-07-10 | End: 2020-07-10

## 2020-07-09 RX ORDER — LIDOCAINE HYDROCHLORIDE 20 MG/ML
INJECTION, SOLUTION EPIDURAL; INFILTRATION; INTRACAUDAL; PERINEURAL AS NEEDED
Status: DISCONTINUED | OUTPATIENT
Start: 2020-07-09 | End: 2020-07-09 | Stop reason: SURG

## 2020-07-09 RX ORDER — CHLORHEXIDINE GLUCONATE 0.12 MG/ML
15 RINSE ORAL EVERY 12 HOURS
Status: DISCONTINUED | OUTPATIENT
Start: 2020-07-09 | End: 2020-07-14 | Stop reason: HOSPADM

## 2020-07-09 RX ORDER — ETOMIDATE 2 MG/ML
INJECTION INTRAVENOUS AS NEEDED
Status: DISCONTINUED | OUTPATIENT
Start: 2020-07-09 | End: 2020-07-09 | Stop reason: SURG

## 2020-07-09 RX ORDER — POTASSIUM CHLORIDE 20 MEQ/1
20 TABLET, EXTENDED RELEASE ORAL AS NEEDED
Status: DISCONTINUED | OUTPATIENT
Start: 2020-07-10 | End: 2020-07-14 | Stop reason: HOSPADM

## 2020-07-09 RX ORDER — SENNA PLUS 8.6 MG/1
1 TABLET ORAL 2 TIMES DAILY
Status: DISCONTINUED | OUTPATIENT
Start: 2020-07-10 | End: 2020-07-13

## 2020-07-09 RX ORDER — XYLITOL/YERBA SANTA
2 AEROSOL, SPRAY WITH PUMP (ML) MUCOUS MEMBRANE EVERY 4 HOURS PRN
Status: DISCONTINUED | OUTPATIENT
Start: 2020-07-09 | End: 2020-07-11

## 2020-07-09 RX ORDER — HYDROCODONE BITARTRATE AND ACETAMINOPHEN 5; 325 MG/1; MG/1
1 TABLET ORAL EVERY 4 HOURS PRN
Status: DISCONTINUED | OUTPATIENT
Start: 2020-07-09 | End: 2020-07-14 | Stop reason: HOSPADM

## 2020-07-09 RX ORDER — AMIODARONE HYDROCHLORIDE 50 MG/ML
INJECTION, SOLUTION INTRAVENOUS AS NEEDED
Status: DISCONTINUED | OUTPATIENT
Start: 2020-07-09 | End: 2020-07-09 | Stop reason: SURG

## 2020-07-09 RX ORDER — DEXAMETHASONE SODIUM PHOSPHATE 4 MG/ML
INJECTION, SOLUTION INTRA-ARTICULAR; INTRALESIONAL; INTRAMUSCULAR; INTRAVENOUS; SOFT TISSUE AS NEEDED
Status: DISCONTINUED | OUTPATIENT
Start: 2020-07-09 | End: 2020-07-09 | Stop reason: SURG

## 2020-07-09 RX ORDER — ALBUMIN, HUMAN INJ 5% 5 %
12.5 SOLUTION INTRAVENOUS ONCE
Status: COMPLETED | OUTPATIENT
Start: 2020-07-10 | End: 2020-07-09

## 2020-07-09 RX ORDER — PANTOPRAZOLE SODIUM 40 MG/10ML
40 INJECTION, POWDER, LYOPHILIZED, FOR SOLUTION INTRAVENOUS
Status: DISCONTINUED | OUTPATIENT
Start: 2020-07-10 | End: 2020-07-12

## 2020-07-09 RX ORDER — MILRINONE LACTATE 0.2 MG/ML
.25-.75 INJECTION, SOLUTION INTRAVENOUS
Status: DISCONTINUED | OUTPATIENT
Start: 2020-07-09 | End: 2020-07-09 | Stop reason: HOSPADM

## 2020-07-09 RX ORDER — HYDROMORPHONE HCL 110MG/55ML
PATIENT CONTROLLED ANALGESIA SYRINGE INTRAVENOUS AS NEEDED
Status: DISCONTINUED | OUTPATIENT
Start: 2020-07-09 | End: 2020-07-09 | Stop reason: SURG

## 2020-07-09 RX ORDER — ACETAMINOPHEN 650 MG/1
650 SUPPOSITORY RECTAL EVERY 6 HOURS
Status: COMPLETED | OUTPATIENT
Start: 2020-07-09 | End: 2020-07-10

## 2020-07-09 RX ORDER — MAGNESIUM SULFATE 1 G/100ML
1 INJECTION INTRAVENOUS ONCE
Status: COMPLETED | OUTPATIENT
Start: 2020-07-09 | End: 2020-07-09

## 2020-07-09 RX ORDER — ROCURONIUM BROMIDE 10 MG/ML
INJECTION, SOLUTION INTRAVENOUS AS NEEDED
Status: DISCONTINUED | OUTPATIENT
Start: 2020-07-09 | End: 2020-07-09 | Stop reason: SURG

## 2020-07-09 RX ORDER — CEFAZOLIN SODIUM 1 G/3ML
INJECTION, POWDER, FOR SOLUTION INTRAMUSCULAR; INTRAVENOUS AS NEEDED
Status: DISCONTINUED | OUTPATIENT
Start: 2020-07-09 | End: 2020-07-09 | Stop reason: SURG

## 2020-07-09 RX ORDER — ONDANSETRON 2 MG/ML
INJECTION INTRAMUSCULAR; INTRAVENOUS AS NEEDED
Status: DISCONTINUED | OUTPATIENT
Start: 2020-07-09 | End: 2020-07-09 | Stop reason: SURG

## 2020-07-09 RX ORDER — MAGNESIUM SULFATE 1 G/100ML
1 INJECTION INTRAVENOUS EVERY 8 HOURS
Status: DISCONTINUED | OUTPATIENT
Start: 2020-07-09 | End: 2020-07-10

## 2020-07-09 RX ORDER — ALBUMIN, HUMAN INJ 5% 5 %
SOLUTION INTRAVENOUS
Status: COMPLETED
Start: 2020-07-09 | End: 2020-07-09

## 2020-07-09 RX ORDER — MILRINONE LACTATE 0.2 MG/ML
.25-.75 INJECTION, SOLUTION INTRAVENOUS
Status: DISCONTINUED | OUTPATIENT
Start: 2020-07-09 | End: 2020-07-09 | Stop reason: SDUPTHER

## 2020-07-09 RX ORDER — NITROGLYCERIN 10 MG/100ML
5-50 INJECTION INTRAVENOUS CONTINUOUS PRN
Status: DISCONTINUED | OUTPATIENT
Start: 2020-07-09 | End: 2020-07-11

## 2020-07-09 RX ORDER — MORPHINE SULFATE 4 MG/ML
2 INJECTION, SOLUTION INTRAMUSCULAR; INTRAVENOUS
Status: DISCONTINUED | OUTPATIENT
Start: 2020-07-09 | End: 2020-07-11

## 2020-07-09 RX ORDER — ATORVASTATIN CALCIUM 40 MG/1
40 TABLET, FILM COATED ORAL NIGHTLY
Status: DISCONTINUED | OUTPATIENT
Start: 2020-07-10 | End: 2020-07-10

## 2020-07-09 RX ORDER — AMINOCAPROIC ACID 250 MG/ML
INJECTION, SOLUTION INTRAVENOUS AS NEEDED
Status: DISCONTINUED | OUTPATIENT
Start: 2020-07-09 | End: 2020-07-09 | Stop reason: SURG

## 2020-07-09 RX ORDER — CALCIUM GLUCONATE 20 MG/ML
1 INJECTION, SOLUTION INTRAVENOUS ONCE
Status: COMPLETED | OUTPATIENT
Start: 2020-07-09 | End: 2020-07-09

## 2020-07-09 RX ORDER — POTASSIUM CHLORIDE 7.45 MG/ML
INJECTION INTRAVENOUS
Status: COMPLETED
Start: 2020-07-09 | End: 2020-07-09

## 2020-07-09 RX ORDER — ASPIRIN 81 MG/1
81 TABLET ORAL DAILY
Status: DISCONTINUED | OUTPATIENT
Start: 2020-07-10 | End: 2020-07-14 | Stop reason: HOSPADM

## 2020-07-09 RX ORDER — NALOXONE HCL 0.4 MG/ML
0.4 VIAL (ML) INJECTION
Status: DISCONTINUED | OUTPATIENT
Start: 2020-07-09 | End: 2020-07-11

## 2020-07-09 RX ORDER — NOREPINEPHRINE BITARTRATE/D5W 8 MG/250ML
PLASTIC BAG, INJECTION (ML) INTRAVENOUS CONTINUOUS PRN
Status: DISCONTINUED | OUTPATIENT
Start: 2020-07-09 | End: 2020-07-09 | Stop reason: SURG

## 2020-07-09 RX ORDER — ALBUMIN, HUMAN INJ 5% 5 %
12.5 SOLUTION INTRAVENOUS AS NEEDED
Status: DISCONTINUED | OUTPATIENT
Start: 2020-07-09 | End: 2020-07-11

## 2020-07-09 RX ORDER — POTASSIUM CHLORIDE 7.45 MG/ML
10 INJECTION INTRAVENOUS
Status: DISCONTINUED | OUTPATIENT
Start: 2020-07-09 | End: 2020-07-14 | Stop reason: HOSPADM

## 2020-07-09 RX ORDER — POLYETHYLENE GLYCOL 3350 17 G/17G
17 POWDER, FOR SOLUTION ORAL 2 TIMES DAILY
Status: DISCONTINUED | OUTPATIENT
Start: 2020-07-10 | End: 2020-07-13

## 2020-07-09 RX ORDER — CALCIUM GLUCONATE 20 MG/ML
2 INJECTION, SOLUTION INTRAVENOUS ONCE
Status: COMPLETED | OUTPATIENT
Start: 2020-07-09 | End: 2020-07-09

## 2020-07-09 RX ORDER — MAGNESIUM HYDROXIDE 1200 MG/15ML
LIQUID ORAL AS NEEDED
Status: DISCONTINUED | OUTPATIENT
Start: 2020-07-09 | End: 2020-07-09 | Stop reason: HOSPADM

## 2020-07-09 RX ORDER — POTASSIUM CHLORIDE 1.5 G/1.77G
20 POWDER, FOR SOLUTION ORAL AS NEEDED
Status: DISCONTINUED | OUTPATIENT
Start: 2020-07-10 | End: 2020-07-14 | Stop reason: HOSPADM

## 2020-07-09 RX ORDER — AMIODARONE HCL/D5W 450 MG/250
0.5 PLASTIC BAG, INJECTION (ML) INTRAVENOUS CONTINUOUS
Status: DISCONTINUED | OUTPATIENT
Start: 2020-07-09 | End: 2020-07-09 | Stop reason: HOSPADM

## 2020-07-09 RX ORDER — DEXMEDETOMIDINE HYDROCHLORIDE 4 UG/ML
.2-1.5 INJECTION, SOLUTION INTRAVENOUS
Status: DISCONTINUED | OUTPATIENT
Start: 2020-07-09 | End: 2020-07-11

## 2020-07-09 RX ORDER — ONDANSETRON 2 MG/ML
4 INJECTION INTRAMUSCULAR; INTRAVENOUS EVERY 6 HOURS PRN
Status: DISCONTINUED | OUTPATIENT
Start: 2020-07-09 | End: 2020-07-14 | Stop reason: HOSPADM

## 2020-07-09 RX ORDER — MIDAZOLAM HYDROCHLORIDE 1 MG/ML
INJECTION INTRAMUSCULAR; INTRAVENOUS AS NEEDED
Status: DISCONTINUED | OUTPATIENT
Start: 2020-07-09 | End: 2020-07-09 | Stop reason: SURG

## 2020-07-09 RX ORDER — ASPIRIN 325 MG
325 TABLET ORAL ONCE
Status: COMPLETED | OUTPATIENT
Start: 2020-07-09 | End: 2020-07-09

## 2020-07-09 RX ORDER — ACETAMINOPHEN 500 MG
500 TABLET ORAL EVERY 6 HOURS
Status: COMPLETED | OUTPATIENT
Start: 2020-07-09 | End: 2020-07-10

## 2020-07-09 RX ADMIN — ROCURONIUM BROMIDE 50 MG: 10 SOLUTION INTRAVENOUS at 13:07

## 2020-07-09 RX ADMIN — EPINEPHRINE 0.03 MCG/KG/MIN: 1 INJECTION PARENTERAL at 17:06

## 2020-07-09 RX ADMIN — FENTANYL CITRATE 250 MCG: 50 INJECTION, SOLUTION INTRAMUSCULAR; INTRAVENOUS at 14:11

## 2020-07-09 RX ADMIN — LIDOCAINE HYDROCHLORIDE 100 MG: 20 INJECTION, SOLUTION EPIDURAL; INFILTRATION; INTRACAUDAL; PERINEURAL at 13:09

## 2020-07-09 RX ADMIN — ACETAMINOPHEN 500 MG: 500 TABLET, FILM COATED ORAL at 21:22

## 2020-07-09 RX ADMIN — ALBUMIN HUMAN 12.5 G: 0.05 INJECTION, SOLUTION INTRAVENOUS at 20:38

## 2020-07-09 RX ADMIN — SODIUM CHLORIDE: 0.9 INJECTION, SOLUTION INTRAVENOUS at 15:30

## 2020-07-09 RX ADMIN — MIDAZOLAM HYDROCHLORIDE 2 MG: 1 INJECTION, SOLUTION INTRAMUSCULAR; INTRAVENOUS at 12:55

## 2020-07-09 RX ADMIN — SODIUM CHLORIDE: 0.9 INJECTION, SOLUTION INTRAVENOUS at 13:08

## 2020-07-09 RX ADMIN — ALBUMIN HUMAN 12.5 G: 0.05 INJECTION, SOLUTION INTRAVENOUS at 23:17

## 2020-07-09 RX ADMIN — AMIODARONE HYDROCHLORIDE 150 MG: 50 INJECTION, SOLUTION INTRAVENOUS at 17:27

## 2020-07-09 RX ADMIN — Medication 10 ML: at 07:51

## 2020-07-09 RX ADMIN — ROCURONIUM BROMIDE 10 MG: 10 SOLUTION INTRAVENOUS at 14:30

## 2020-07-09 RX ADMIN — SODIUM CHLORIDE 1 MCG/KG/HR: 9 INJECTION, SOLUTION INTRAVENOUS at 21:08

## 2020-07-09 RX ADMIN — ALBUMIN HUMAN 12.5 G: 0.05 INJECTION, SOLUTION INTRAVENOUS at 21:02

## 2020-07-09 RX ADMIN — FENTANYL CITRATE 250 MCG: 50 INJECTION, SOLUTION INTRAMUSCULAR; INTRAVENOUS at 13:50

## 2020-07-09 RX ADMIN — POTASSIUM CHLORIDE 10 MEQ: 7.46 INJECTION, SOLUTION INTRAVENOUS at 23:41

## 2020-07-09 RX ADMIN — MORPHINE SULFATE 2 MG: 4 INJECTION INTRAVENOUS at 19:50

## 2020-07-09 RX ADMIN — ROCURONIUM BROMIDE 10 MG: 10 SOLUTION INTRAVENOUS at 16:35

## 2020-07-09 RX ADMIN — POTASSIUM CHLORIDE 10 MEQ: 7.46 INJECTION, SOLUTION INTRAVENOUS at 20:31

## 2020-07-09 RX ADMIN — CHLORHEXIDINE GLUCONATE 0.12% ORAL RINSE 15 ML: 1.2 LIQUID ORAL at 23:15

## 2020-07-09 RX ADMIN — ETOMIDATE 12 MG: 2 INJECTION, SOLUTION INTRAVENOUS at 13:09

## 2020-07-09 RX ADMIN — MIDAZOLAM HYDROCHLORIDE 2 MG: 1 INJECTION, SOLUTION INTRAMUSCULAR; INTRAVENOUS at 12:52

## 2020-07-09 RX ADMIN — SODIUM BICARBONATE 50 MEQ: 84 INJECTION, SOLUTION INTRAVENOUS at 20:40

## 2020-07-09 RX ADMIN — MUPIROCIN 1 APPLICATION: 20 OINTMENT TOPICAL at 07:50

## 2020-07-09 RX ADMIN — DEXAMETHASONE SODIUM PHOSPHATE 4 MG: 4 INJECTION, SOLUTION INTRAMUSCULAR; INTRAVENOUS at 14:59

## 2020-07-09 RX ADMIN — PROTAMINE SULFATE 350 MG: 10 INJECTION, SOLUTION INTRAVENOUS at 17:32

## 2020-07-09 RX ADMIN — ROCURONIUM BROMIDE 20 MG: 10 SOLUTION INTRAVENOUS at 15:21

## 2020-07-09 RX ADMIN — PHENYLEPHRINE HYDROCHLORIDE 100 MCG: 10 INJECTION INTRAVENOUS at 13:18

## 2020-07-09 RX ADMIN — SODIUM CHLORIDE 0.2 MCG/HR: 900 INJECTION INTRAVENOUS at 13:22

## 2020-07-09 RX ADMIN — HYDROMORPHONE HYDROCHLORIDE 1 MG: 2 INJECTION INTRAMUSCULAR; INTRAVENOUS; SUBCUTANEOUS at 17:02

## 2020-07-09 RX ADMIN — PHENYLEPHRINE HYDROCHLORIDE 100 MCG: 10 INJECTION INTRAVENOUS at 13:26

## 2020-07-09 RX ADMIN — FENTANYL CITRATE 250 MCG: 50 INJECTION, SOLUTION INTRAMUSCULAR; INTRAVENOUS at 13:45

## 2020-07-09 RX ADMIN — SODIUM BICARBONATE 100 MEQ: 84 INJECTION, SOLUTION INTRAVENOUS at 22:18

## 2020-07-09 RX ADMIN — Medication 0.5 MG/MIN: at 17:06

## 2020-07-09 RX ADMIN — ONDANSETRON 4 MG: 2 INJECTION INTRAMUSCULAR; INTRAVENOUS at 18:36

## 2020-07-09 RX ADMIN — HYDROMORPHONE HYDROCHLORIDE 0.5 MG: 2 INJECTION INTRAMUSCULAR; INTRAVENOUS; SUBCUTANEOUS at 14:16

## 2020-07-09 RX ADMIN — CEFAZOLIN 2 G: 1 INJECTION, POWDER, FOR SOLUTION INTRAMUSCULAR; INTRAVENOUS at 13:08

## 2020-07-09 RX ADMIN — PHENYLEPHRINE HYDROCHLORIDE 100 MCG: 10 INJECTION INTRAVENOUS at 14:48

## 2020-07-09 RX ADMIN — SODIUM CHLORIDE 1 UNITS/HR: 900 INJECTION INTRAVENOUS at 14:29

## 2020-07-09 RX ADMIN — PHENYLEPHRINE HYDROCHLORIDE 100 MCG: 10 INJECTION INTRAVENOUS at 14:50

## 2020-07-09 RX ADMIN — ASPIRIN 81 MG: 81 TABLET, COATED ORAL at 07:50

## 2020-07-09 RX ADMIN — SODIUM BICARBONATE 50 MEQ: 84 INJECTION, SOLUTION INTRAVENOUS at 20:41

## 2020-07-09 RX ADMIN — SODIUM CHLORIDE 0.03 UNITS/MIN: 9 INJECTION, SOLUTION INTRAVENOUS at 23:22

## 2020-07-09 RX ADMIN — ASPIRIN 325 MG ORAL TABLET 325 MG: 325 PILL ORAL at 21:22

## 2020-07-09 RX ADMIN — PHENYLEPHRINE HYDROCHLORIDE 100 MCG: 10 INJECTION INTRAVENOUS at 15:08

## 2020-07-09 RX ADMIN — SODIUM BICARBONATE 100 MEQ: 84 INJECTION, SOLUTION INTRAVENOUS at 23:18

## 2020-07-09 RX ADMIN — NOREPINEPHRINE BITARTRATE 0.02 MCG/KG/MIN: 8 SOLUTION at 13:25

## 2020-07-09 RX ADMIN — HYDROMORPHONE HYDROCHLORIDE 0.5 MG: 2 INJECTION INTRAMUSCULAR; INTRAVENOUS; SUBCUTANEOUS at 14:12

## 2020-07-09 RX ADMIN — AMINOCAPROIC ACID 10 G: 250 INJECTION, SOLUTION INTRAVENOUS at 13:46

## 2020-07-09 RX ADMIN — SULFUR HEXAFLUORIDE 2 ML: KIT at 09:45

## 2020-07-09 RX ADMIN — MIDAZOLAM HYDROCHLORIDE 2 MG: 1 INJECTION, SOLUTION INTRAMUSCULAR; INTRAVENOUS at 12:58

## 2020-07-09 RX ADMIN — MAGNESIUM SULFATE HEPTAHYDRATE 1 G: 1 INJECTION, SOLUTION INTRAVENOUS at 08:48

## 2020-07-09 RX ADMIN — ALBUMIN HUMAN 12.5 G: 0.05 INJECTION, SOLUTION INTRAVENOUS at 22:17

## 2020-07-09 RX ADMIN — CALCIUM GLUCONATE 1 G: 20 INJECTION, SOLUTION INTRAVENOUS at 07:40

## 2020-07-09 RX ADMIN — ROCURONIUM BROMIDE 20 MG: 10 SOLUTION INTRAVENOUS at 15:56

## 2020-07-09 RX ADMIN — FENTANYL CITRATE 250 MCG: 50 INJECTION, SOLUTION INTRAMUSCULAR; INTRAVENOUS at 13:09

## 2020-07-09 RX ADMIN — AMINOCAPROIC ACID 10 G: 250 INJECTION, SOLUTION INTRAVENOUS at 18:01

## 2020-07-09 RX ADMIN — CHLORHEXIDINE GLUCONATE 0.12% ORAL RINSE 15 ML: 1.2 LIQUID ORAL at 08:48

## 2020-07-09 RX ADMIN — MILRINONE LACTATE IN DEXTROSE 0.25 MCG/KG/MIN: 200 INJECTION, SOLUTION INTRAVENOUS at 17:04

## 2020-07-09 RX ADMIN — ROCURONIUM BROMIDE 20 MG: 10 SOLUTION INTRAVENOUS at 17:05

## 2020-07-09 RX ADMIN — PHENYLEPHRINE HYDROCHLORIDE 100 MCG: 10 INJECTION INTRAVENOUS at 13:15

## 2020-07-09 RX ADMIN — CEFAZOLIN SODIUM 2 G: 1 INJECTION, POWDER, FOR SOLUTION INTRAMUSCULAR; INTRAVENOUS at 17:44

## 2020-07-09 RX ADMIN — CALCIUM GLUCONATE 2 G: 20 INJECTION, SOLUTION INTRAVENOUS at 22:17

## 2020-07-09 RX ADMIN — MUPIROCIN 1 APPLICATION: 20 OINTMENT TOPICAL at 21:22

## 2020-07-09 RX ADMIN — SODIUM CHLORIDE 1 MCG/KG/HR: 9 INJECTION, SOLUTION INTRAVENOUS at 22:55

## 2020-07-09 RX ADMIN — MAGNESIUM SULFATE HEPTAHYDRATE 2 G: 500 INJECTION, SOLUTION INTRAMUSCULAR; INTRAVENOUS at 17:02

## 2020-07-09 RX ADMIN — POTASSIUM CHLORIDE 10 MEQ: 7.46 INJECTION, SOLUTION INTRAVENOUS at 21:06

## 2020-07-09 RX ADMIN — MAGNESIUM SULFATE HEPTAHYDRATE 1 G: 1 INJECTION, SOLUTION INTRAVENOUS at 21:23

## 2020-07-09 NOTE — ANESTHESIA PREPROCEDURE EVALUATION
Anesthesia Evaluation     Patient summary reviewed   NPO Solid Status: > 8 hours  NPO Liquid Status: > 8 hours           Airway   Mallampati: II  TM distance: >3 FB  Neck ROM: full  No difficulty expected  Dental - normal exam   (+) edentulous    Pulmonary - normal exam   Cardiovascular - normal exam    ECG reviewed    (+) hypertension, past MI  1-7 days, CAD, angina with exertion, hyperlipidemia,       Neuro/Psych  GI/Hepatic/Renal/Endo    (+) obesity,       Musculoskeletal     Abdominal  - normal exam    Bowel sounds: normal.   Substance History      OB/GYN          Other        ROS/Med Hx Other: Cath yest:  Findings  1.  Opening aortic pressure of 84/57  2.  Closing pressure 100/71  3.  LV function 50 to 55% with basal inferior wall akinesis preservation of other segments  4.  Normal LVEDP at 7-10 next #5 no significant transaortic valve gradient     Angiography  1.  Left main large-caliber vessel giving rise to LAD and nondominant circumflex, no left main disease  2.  LAD is a large caliber vessel coursing to and around the apex giving off large caliber diagonal branch along its course with full complement of septal perforators.  The LAD is diffusely diseased in its proximal and mid segment proximally there is 70 to 80% diffuse disease involving the bifurcation of the diagonal branch.  After the takeoff the diagonal branch there is a subtotal occlusion with KHAI I flow in the distal apical LAD.  Diagonal proximally has 80% disease but distally appears adequate caliber for bypass as does the distal LAD.  3.  Circumflex nondominant giving off 2 obtuse marginal branches, proximal circumflex 80% disease as well as proximal OM1 and OM 2 but obtuse marginals appear adequate caliber for bypass with minimal distal disease  4.  Ramus intermedius large caliber vessel with proximal 80% disease and good target for bypass distally at least 2.5 mm in diameter  5.  RCA proximally  with collateralization left to right with  visualization of small to medium caliber PDA as well as medium caliber PLV branch distally.     Interventions  Successful intra-aortic balloon pump insertion via right groin with one-to-one LV support and diastolic augmentation     Continuation of heparin drip  Discontinuation of nitroglycerin drip given hypotension      Phys Exam Other: Alert and oriented, pleasant in NAD.  IABP in place, no drips running.              Anesthesia Plan    ASA 4 - emergent     general     intravenous induction   Postoperative Plan: Expected vent after surgery  Anesthetic plan, all risks, benefits, and alternatives have been provided, discussed and informed consent has been obtained with: patient.  Use of blood products discussed with patient  Consented to blood products.

## 2020-07-09 NOTE — PLAN OF CARE
Problem: Patient Care Overview  Goal: Plan of Care Review  Outcome: Ongoing (interventions implemented as appropriate)  Note:   Pt had a heart rhythm change from sinus tach to 2nd Degree AV block type II this am. Notified Dr. Lake. SBP mid 80's MAP of 59. HR now in 70's and low 80's. IABP Augmentation 93. N.O. 1gm of Mag, 1gm of Calcium gluconate, and make sure we get an echo on patient this am. Make sure we continue to get troponins every hours. Notified Dr. Lake of last troponin of 1.32. MD acknowledged. O2 91-92% on RA. Placed pt on 2LNC. Now sats are 98%. Pt also spiked a temp of 100.9. Notified Dr. Lake. N.O. Blood cultures and UA. Pt denies feeling bad and denies chest pain. No acute distress noted.

## 2020-07-09 NOTE — ANESTHESIA PROCEDURE NOTES
Central Line      Patient reassessed immediately prior to procedure    Patient location during procedure: OR  Start time: 7/9/2020 1:10 PM  Stop Time:7/9/2020 1:15 PM  Indications: central pressure monitoring and MD/Surgeon request  Staff  Anesthesiologist: Vikash Flannery MD  Preanesthetic Checklist  Completed: patient identified, site marked, surgical consent, pre-op evaluation, timeout performed, IV checked, risks and benefits discussed and monitors and equipment checked  Central Line Prep  Sterile Tech:cap, gloves, gown, mask and sterile barriers  Prep: chloraprep  no medical exclusion documented for following all elements of maximal sterile barrier technique  Patient monitoring: blood pressure monitoring, continuous pulse oximetry and EKG  Central Line Procedure  Laterality:right  Location:internal jugular  Catheter Type:Emporium-Felipe  Assessment  Complications:no  Patient Tolerance:patient tolerated the procedure well with no apparent complications

## 2020-07-09 NOTE — ANESTHESIA PROCEDURE NOTES
Airway  Urgency: elective    Date/Time: 7/9/2020 1:09 PM  Airway not difficult    General Information and Staff    Patient location during procedure: OR  Anesthesiologist: Vikash Flannery MD    Indications and Patient Condition  Indications for airway management: airway protection    Preoxygenated: yes  MILS maintained throughout  Mask difficulty assessment: 2 - vent by mask + OA or adjuvant +/- NMBA    Final Airway Details  Final airway type: endotracheal airway      Successful airway: ETT  Cuffed: yes   Successful intubation technique: direct laryngoscopy  Facilitating devices/methods: intubating stylet  Endotracheal tube insertion site: oral  Blade: Maryuri  Blade size: 4  ETT size (mm): 8.0  Cormack-Lehane Classification: grade IIa - partial view of glottis  Placement verified by: chest auscultation and capnometry   Measured from: gums  Number of attempts at approach: 1  Assessment: lips, teeth, and gum same as pre-op and atraumatic intubation

## 2020-07-09 NOTE — PROGRESS NOTES
"NEPHROLOGY PROGRESS NOTE------[facility]    [facility]  [phone]  Xavier Kruse MD      Patient Care Team:  Jarrell Alaniz Jr., MD as PCP - General  Lauren Singh APRN as Nurse Practitioner (Nurse Practitioner)  Christelle Kruse MD as Consulting Physician (Nephrology)      Provider:  Xavier Kruse MD  Patient Name: Lonnie Salinas  :  1955    SUBJECTIVE:    F/U CRF/CKD STG 2    Comfortable. Awaiting surgery. On IABP. No angina or SOB    Medication:    aspirin 81 mg Oral Daily   chlorhexidine 15 mL Mouth/Throat Q12H   magnesium sulfate 1 g Intravenous Once   metoprolol tartrate 12.5 mg Oral Once   sodium chloride 10 mL Intravenous Q12H       heparin 5 Units/kg/hr Last Rate: 5 Units/kg/hr (20 1700)   insulin 0-50 Units/hr    nitroglycerin 5-200 mcg/min Last Rate: 5 mcg/min (20 1030)   sodium chloride 30 mL/hr    sodium chloride 50 mL/hr Last Rate: 50 mL/hr (20 1723)       OBJECTIVE    Vital Sign Min/Max for last 24 hours  Temp  Min: 99.6 °F (37.6 °C)  Max: 100.2 °F (37.9 °C)   BP  Min: 88/44  Max: 129/73   Pulse  Min: 84  Max: 110   Resp  Min: 14  Max: 16   SpO2  Min: 91 %  Max: 100 %   No data recorded   Weight  Min: 99.8 kg (220 lb)  Max: 103 kg (227 lb 15.3 oz)     Flowsheet Rows      First Filed Value   Admission Height  182.9 cm (72\") Documented at 2020 0944   Admission Weight  102 kg (225 lb 15.5 oz) Documented at 2020 0944          No intake/output data recorded.  I/O last 3 completed shifts:  In: 1723 [P.O.:240; I.V.:983; IV Piggyback:500]  Out: 761 [Urine:761]    Physical Exam:  General Appearance: alert, appears stated age and cooperative  Head: normocephalic, without obvious abnormality and atraumatic  Eyes: conjunctivae and sclerae normal and no icterus  Neck: supple and no JVD  Lungs: clear to auscultation and respirations regular  Heart: regular rhythm & normal rate and normal S1, S2 " +JOSE EDUARDO  Chest: Wall no abnormalities observed  Abdomen: normal bowel sounds and soft non-tender +OBESITY  Extremities: moves extremities well, no edema, no cyanosis and no redness  Skin: no bleeding, bruising or rash, turgor normal, color normal and no lesions noted  Neurologic: Alert, and oriented. No focal deficits    Labs:    WBC WBC   Date Value Ref Range Status   07/09/2020 10.30 3.40 - 10.80 10*3/mm3 Final   07/08/2020 12.30 (H) 3.40 - 10.80 10*3/mm3 Final      HGB Hemoglobin   Date Value Ref Range Status   07/09/2020 13.8 13.0 - 17.7 g/dL Final   07/08/2020 15.6 13.0 - 17.7 g/dL Final      HCT Hematocrit   Date Value Ref Range Status   07/09/2020 40.6 37.5 - 51.0 % Final   07/08/2020 46.2 37.5 - 51.0 % Final      Platlets No results found for: LABPLAT   MCV MCV   Date Value Ref Range Status   07/09/2020 86.6 79.0 - 97.0 fL Final   07/08/2020 86.0 79.0 - 97.0 fL Final          Sodium Sodium   Date Value Ref Range Status   07/09/2020 137 136 - 145 mmol/L Final   07/08/2020 137 136 - 145 mmol/L Final      Potassium Potassium   Date Value Ref Range Status   07/09/2020 3.9 3.5 - 5.2 mmol/L Final   07/08/2020 4.0 3.5 - 5.2 mmol/L Final   07/08/2020 3.4 (L) 3.5 - 5.2 mmol/L Final      Chloride Chloride   Date Value Ref Range Status   07/09/2020 103 98 - 107 mmol/L Final   07/08/2020 98 98 - 107 mmol/L Final      CO2 CO2   Date Value Ref Range Status   07/09/2020 23.0 22.0 - 29.0 mmol/L Final   07/08/2020 26.0 22.0 - 29.0 mmol/L Final      BUN BUN   Date Value Ref Range Status   07/09/2020   Final     Comment:     Testing performed by alternate method   07/09/2020 22 8 - 23 mg/dL Final   07/08/2020   Final     Comment:     Testing performed by alternate method   07/08/2020 17 8 - 23 mg/dL Final      Creatinine Creatinine   Date Value Ref Range Status   07/09/2020 1.12 0.76 - 1.27 mg/dL Final   07/08/2020 1.04 0.76 - 1.27 mg/dL Final      Calcium Calcium   Date Value Ref Range Status   07/09/2020 8.6 8.6 - 10.5 mg/dL  Final   07/08/2020 9.8 8.6 - 10.5 mg/dL Final      PO4 No components found for: PO4   Albumin Albumin   Date Value Ref Range Status   07/09/2020 3.70 3.50 - 5.20 g/dL Final   07/08/2020 4.40 3.50 - 5.20 g/dL Final      Magnesium Magnesium   Date Value Ref Range Status   07/09/2020 2.3 1.6 - 2.4 mg/dL Final   07/08/2020 2.0 1.6 - 2.4 mg/dL Final      Uric Acid No components found for: URIC ACID     Imaging Results (Last 72 Hours)     Procedure Component Value Units Date/Time    US Renal Bilateral [[id number]] Collected:  07/08/20 2038     Updated:  07/08/20 2041    Narrative:       DATE OF EXAM:  7/8/2020 6:17 PM     PROCEDURE:  US RENAL BILATERAL-     INDICATIONS:  Chronic kidney disease     COMPARISON:  No Comparisons Available     TECHNIQUE:   Grayscale and color Doppler ultrasound evaluation of the kidneys and  urinary bladder was performed.        FINDINGS:  Both kidneys appear normal in size and echogenicity, with no stone,  mass, or hydronephrosis identified. The right kidney measures 11.5 cm,  while the left kidney measures 11.9 cm.     The urinary bladder is not well-seen secondary to Aguirre catheterization.          Impression:       Both kidneys appear within normal limits.     Electronically Signed By-DR. Eliecer Santiago MD On:7/8/2020 8:39 PM  This report was finalized on [id number] by DR. Eliecer Santiago MD.    XR Chest 1 View [[id number]] Collected:  07/08/20 1102     Updated:  07/08/20 1105    Narrative:       EXAMINATION: XR CHEST 1 VW-     DATE OF EXAM: 7/8/2020 10:43 AM     INDICATION: Chest pain for one day     COMPARISON: Chest radiograph dated 05/18/2014     TECHNIQUE: Portable AP view of the chest was obtained.     FINDINGS:  The cardiomediastinal silhouette is within normal limits. Pulmonary  vascularity is unremarkable. There is no focal airspace consolidation,  pleural effusion, or pneumothorax. There are no acute osseous findings.       Impression:       No acute cardiopulmonary  abnormality.     Electronically Signed By-Brady Watson On:7/8/2020 11:03 AM  This report was finalized on [id number] by  Brady Watson, .          Results for orders placed during the hospital encounter of 07/08/20   XR Chest 1 View    Narrative EXAMINATION: XR CHEST 1 VW-     DATE OF EXAM: 7/8/2020 10:43 AM     INDICATION: Chest pain for one day     COMPARISON: Chest radiograph dated 05/18/2014     TECHNIQUE: Portable AP view of the chest was obtained.     FINDINGS:  The cardiomediastinal silhouette is within normal limits. Pulmonary  vascularity is unremarkable. There is no focal airspace consolidation,  pleural effusion, or pneumothorax. There are no acute osseous findings.       Impression No acute cardiopulmonary abnormality.     Electronically Signed By-Brady Watson On:7/8/2020 11:03 AM  This report was finalized on [id number] by  Brady Watson, .       Results for orders placed during the hospital encounter of 07/08/20   Duplex Vein Mapping Lower Extremity - Bilateral CAR    Narrative · The right greater saphenous vein is patent and of adequate size in the   thigh.  · The right greater saphenous vein is patent and of adequate size in the   calf.  · The left greater saphenous vein is patent and of adequate size in the   thigh.  · The left greater saphenous vein has an adequate segment in the mid calf   but is adequate at the ankle.            ASSESSMENT / PLAN      NSTEMI, initial episode of care (CMS/[facility])    NSTEMI (non-ST elevated myocardial infarction) (CMS/[facility])    Coronary artery disease involving native coronary artery of native heart with unstable angina pectoris (CMS/[facility])    1. CRF/CKD STG 2-----Nonoliguric. Appears to have CRF/CKD STG 2 with cur-rent serum creatinine near baseline. Give some relative hypotension, recent IV dye exposure, and hypokalemia I have held ARB and HCTZ. Continue to hydrate very gently with NS and follow post op. Avoid hypotension. No further NSAIDs.   Follow  closely post CABG tomorrow. Dose meds for CrCl 60-90 cc/min     2. HYPOKALEMIA------Secondary to HCTZ use. Replaced     3. ANGINA/CAD S/P NSTEMI S/P CARDIAC CATH------On IABP per , Cardiology. CABG today per , CT surgery     4. HYPERLIPIDEMIA/ELEVATED CK------Statin on hold. Follow CK     5. OA-------No further NSAIDs.       6. OBESITY/ELEVATED BMI    7. HYPOCALCEMIA------Replace IV. Follow ionized levels    8. HYPOMAGNESEMIA------replace IV        Xavier Kruse MD  [facility]  [phone]  07/09/20  08:02

## 2020-07-09 NOTE — ANESTHESIA PROCEDURE NOTES
Procedure Performed: Emergent/Open-Heart Anesthesia MINERVA       Start Time:  7/9/2020 3:45 PM       End Time:   7/9/2020 2:00 PM    Preanesthesia Checklist:  Patient identified, IV assessed, risks and benefits discussed, monitors and equipment assessed, procedure being performed at surgeon's request and anesthesia consent obtained.    General Procedure Information  MINERVA Placed for monitoring purposes only -- This is not a diagnostic MINERVA  Diagnostic Indications for Echo:  hemodynamic monitoring  Physician Requesting Echo: Lui Lake MD  Intubated  Bite block not placed  Heart visualized  Probe Insertion:  Easy  Probe Type:  Biplane  Modalities:  2D only    Echocardiographic and Doppler Measurements    Ventricles    Right Ventricle:  Cavity size dilated.  Global function moderately impaired.  Ejection Fraction 40%.    Left Ventricle:  Cavity size normal.  Global Function mildly impaired.  Ejection Fraction 50%.          Valves    Aortic Valve:  Annulus normal.  Regurgitation absent.  Leaflets normal.      Mitral Valve:  Annulus normal.  Regurgitation trace.  Leaflet motions normal.      Tricuspid Valve:  Annulus normal.  Leaflets normal.  Leaflet motions normal.          Aorta    Ascending Aorta:  Size normal.    Descending Aorta:  Size normal.                      Other Findings  Pericardium:  normal      Anesthesia Information  Performed Personally  Anesthesiologist:  Vikash Flannery MD      Echocardiogram Comments:       EF 50 % no change in valves.

## 2020-07-09 NOTE — ANESTHESIA PROCEDURE NOTES
Arterial Line      Start time: 7/9/2020 12:55 PM  Stop Time:7/9/2020 1:00 PM       Performed By   Anesthesiologist: Vikash Flannery MD  Preanesthetic Checklist  Completed: patient identified, site marked, surgical consent, pre-op evaluation, timeout performed, IV checked, risks and benefits discussed and monitors and equipment checked  Arterial Line Prep   Sterile Tech: cap, gloves and mask  Arterial Line Procedure   Laterality:left  Location:  radial artery  Catheter size: 20 G   Guidance: ultrasound guided  PROCEDURE NOTE/ULTRASOUND INTERPRETATION.  Using ultrasound guidance the potential vascular sites for insertion of the catheter were visualized to determine the patency of the vessel to be used for vascular access.  After selecting the appropriate site for insertion, the needle was visualized under ultrasound being inserted into the radial artery, followed by ultrasound confirmation of wire and catheter placement. There were no abnormalities seen on ultrasound; an image was taken; and the patient tolerated the procedure with no complications.   Number of attempts: 2  Successful placement: yes  Post Assessment   Dressing Type: occlusive dressing applied, secured with tape and wrist guard applied.   Complications no  Circ/Move/Sens Assessment: normal.   Patient Tolerance: patient tolerated the procedure well with no apparent complications

## 2020-07-09 NOTE — PROGRESS NOTES
[facility]        LOS:  LOS: 1 day   Patient Name: Lonnie Salinas  Age/Sex: 64 y.o. male  : 1955  MRN: [id number]    Day of Service: 20   Length of Stay: 1  Encounter Provider: SAADIA Guy  Place of Service: [facility] CARDIOLOGY  Patient Care Team:  Jarrell Alaniz Jr., MD as PCP - General  Lauren Singh APRN as Nurse Practitioner (Nurse Practitioner)  Christelle Kruse MD as Consulting Physician (Nephrology)    Subjective:     Chief Complaint: f/u NSTEMI    Subjective: patient in OR for CABG, reportedly had arrhythmias overnight, borderline b/p, IABP in place, repeat 2D ECHO today showed compromised RV, patient went for emergent CABG with Dr. Lake    Current Medications:   Scheduled Meds:  [MAR Hold] aspirin 81 mg Oral Daily   chlorhexidine 15 mL Mouth/Throat Q12H   metoprolol tartrate 12.5 mg Oral Once   [MAR Hold] sodium chloride 10 mL Intravenous Q12H     Continuous Infusions:  amiodarone 0.5 mg/min    EPINEPHrine 0.02-0.3 mcg/kg/min    insulin 0-50 Units/hr    milrinone 0.25-0.75 mcg/kg/min    nitroglycerin 5-200 mcg/min Last Rate: 5 mcg/min (20 1030)   sodium chloride 30 mL/hr    sodium chloride 50 mL/hr Last Rate: 50 mL/hr (20 1723)       Allergies:  Allergies   Allergen Reactions   • Lisinopril Angioedema       ROS  Unable to assess, pt off floor    Objective:     Temp:  [98.7 °F (37.1 °C)-99.9 °F (37.7 °C)] 98.7 °F (37.1 °C)  Heart Rate:  [] 56  Resp:  [18] 18  BP: ()/(44-77) 100/56     Intake/Output Summary (Last 24 hours) at 2020 1404  Last data filed at 2020 1200  Gross per 24 hour   Intake 983 ml   Output 916 ml   Net 67 ml     Body mass index is 33.52 kg/m².      20  1512 20  0430 20  0812   Weight: 99.8 kg (220 lb) 103 kg (227 lb 15.3 oz) 103 kg (227 lb)         Physical Exam:  General Appearance:                 Neck:    Lungs:       Heart:     Abdomen:         Extremities:    Pulses:    Skin:    Neurologic:          Lab Review:   Results from last 7 days   Lab Units 07/09/20 0412 07/08/20  2303 07/08/20  1014   SODIUM mmol/L 137  --  137   POTASSIUM mmol/L 3.9 4.0 3.4*   CHLORIDE mmol/L 103  --  98   CO2 mmol/L 23.0  --  26.0   BUN  22  --  17   CREATININE mg/dL 1.12  --  1.04   GLUCOSE mg/dL 133*  --  172*   CALCIUM mg/dL 8.6  --  9.8   AST (SGOT) U/L 76*  --  125*   ALT (SGPT) U/L 39  --  59*     Results from last 7 days   Lab Units 07/09/20  0412 07/08/20  2303 07/08/20  1601 07/08/20  1014   CK TOTAL U/L 517*  --  839*  --    TROPONIN T ng/mL  --  1.320* 1.280* 1.070*     Results from last 7 days   Lab Units 07/09/20  0412 07/08/20  1014   WBC 10*3/mm3 10.30 12.30*   HEMOGLOBIN g/dL 13.8 15.6   HEMATOCRIT % 40.6 46.2   PLATELETS 10*3/mm3 158 201     Results from last 7 days   Lab Units 07/09/20  0412 07/08/20  1601 07/08/20  1014   INR   --   --  1.00   APTT seconds 26.2* 53.9* 24.7     Results from last 7 days   Lab Units 07/09/20  0412 07/08/20  1014   MAGNESIUM mg/dL 2.3 2.0     Results from last 7 days   Lab Units 07/08/20  1014   CHOLESTEROL mg/dL 90   TRIGLYCERIDES mg/dL 118   HDL CHOL mg/dL 35*     Results from last 7 days   Lab Units 07/09/20  0412 07/08/20  1014   PROBNP pg/mL 1,066.0* 1,236.0*     Results from last 7 days   Lab Units 07/09/20  0412   TSH uIU/mL 1.630       Recent Radiology:  Imaging Results (Most Recent)     Procedure Component Value Units Date/Time    XR Chest 1 View [[id number]] Collected:  07/09/20 1013     Updated:  07/09/20 1024    Narrative:       EXAMINATION: XR CHEST 1 VW-     DATE OF EXAM: 7/9/2020 9:49 AM     INDICATION: post IABP placement; I25.110-Atherosclerotic heart disease  of native coronary artery with unstable angina pectoris; I21.4-Non-ST  elevation (NSTEMI) myocardial infarction; I21.4-Non-ST elevation  (NSTEMI) myocardial infarction; R94.31-Abnormal electrocardiogram (ECG)  (EKG); R07.9-Chest pain, unspecified;  R06.00-Dyspnea, unspecified.     COMPARISON: Chest radiograph dated 07/08/2020     TECHNIQUE: Portable AP view of the chest was obtained.     FINDINGS:  The intra-aortic balloon pump marker tip is slightly high at the  superior aspect of the aortic arch. The cardiomediastinal silhouette is  at the upper limit of normal. Pulmonary vascularity appears within  normal limits. There are low lung volumes. There is no focal airspace  consolidation, pleural effusion, or pneumothorax. There are no acute  osseous findings.       Impression:       1. IABP marker tip slightly high with tip at the superior aspect of the  aortic knob.     Electronically Signed By-Brady Watson On:7/9/2020 10:22 AM  This report was finalized on [id number] by  Brady Watson, .    US Renal Bilateral [[id number]] Collected:  07/08/20 2038     Updated:  07/08/20 2041    Narrative:       DATE OF EXAM:  7/8/2020 6:17 PM     PROCEDURE:  US RENAL BILATERAL-     INDICATIONS:  Chronic kidney disease     COMPARISON:  No Comparisons Available     TECHNIQUE:   Grayscale and color Doppler ultrasound evaluation of the kidneys and  urinary bladder was performed.        FINDINGS:  Both kidneys appear normal in size and echogenicity, with no stone,  mass, or hydronephrosis identified. The right kidney measures 11.5 cm,  while the left kidney measures 11.9 cm.     The urinary bladder is not well-seen secondary to Aguirre catheterization.          Impression:       Both kidneys appear within normal limits.     Electronically Signed By-DR. Eliecer Santiago MD On:7/8/2020 8:39 PM  This report was finalized on [id number] by DR. Eliecer Santiago MD.    XR Chest 1 View [[id number]] Collected:  07/08/20 1102     Updated:  07/08/20 1105    Narrative:       EXAMINATION: XR CHEST 1 VW-     DATE OF EXAM: 7/8/2020 10:43 AM     INDICATION: Chest pain for one day     COMPARISON: Chest radiograph dated 05/18/2014     TECHNIQUE: Portable AP view of the chest was obtained.      FINDINGS:  The cardiomediastinal silhouette is within normal limits. Pulmonary  vascularity is unremarkable. There is no focal airspace consolidation,  pleural effusion, or pneumothorax. There are no acute osseous findings.       Impression:       No acute cardiopulmonary abnormality.     Electronically Signed By-Brady Watson On:7/8/2020 11:03 AM  This report was finalized on [id number] by  Brady Watson, .          ECHOCARDIOGRAM:    Results for orders placed during the hospital encounter of 07/08/20   Adult Transthoracic Echo Limited W/ Cont if Necessary Per Protocol    Narrative · Left ventricular systolic function is normal.  · Mild tricuspid valve regurgitation is present.            I reviewed the patient's new clinical results.    EKG:      Findings  1.  Opening aortic pressure of 84/55  2.  Closing pressure 111/45  #3, high normal LVEDP 15 to 18 mmHg  4.  Overall preserved LV systolic function with basal inferior wall akinesis EF 50 to 55% estimated  No significant transaortic valve gradient     Angiography  1.  Left main is medium to large caliber with no angiographic disease of the left main  2.  The LAD has diffuse disease most significantly with subtotally occluded mid to distal portion after the takeoff of the diagonal branch with KHAI I flow, proximal portion with diffuse disease but not greater than 50%, diagonals patent  3.  Ramus intermedius proximal 80% disease  4.  Circumflex proximally patent with obtuse marginals with proximal 80% disease  5.  RCA  just after the ostial takeoff with reconstitution of left to right collateralization via septal collaterals and epicardial lateral collaterals to the PLV branch and PDA branches distally.     Recommendations and conclusions  1.  Urgent CABG evaluation for multivessel bypass, critical three-vessel CAD  -If patient decompensates would do emergent Impella supported revascularization of the LAD  2.  Balloon pump one-to-one diastolic  augmentation of coronary flow will be continued  3.  Aspirin statin will be utilized, no beta-blocker with marginal blood pressures and RV dysfunction by 2D echo  4.  Case discussed with CV surgery after angiography and the case was concluded  5.  Other recommendations to follow bypass evaluation       Assessment:       NSTEMI, initial episode of care (CMS/[facility])    NSTEMI (non-ST elevated myocardial infarction) (CMS/[facility])    Coronary artery disease involving native coronary artery of native heart with unstable angina pectoris (CMS/[facility])    1. NSTEMI / CAD  - troponin 1.0, 1.2, 1.3  - ECG with ST depression in V2 V3  - [facility] 7/8/2020 revealed MV CAD  - IABP in place     2. Borderline hypotension    3. Second degree AV block    4. H/o HTN   - currently b/p 100 systolic  - hold home b/p meds     5. Transaminitis  - statin therapy- monitor     6. Hypokalemia    7. SUZANNE  - renal on board     Plan:   Patient going for emergent CABG  We will follow post operatively        SAADIA Guy  07/09/20  14:04

## 2020-07-09 NOTE — ANESTHESIA POSTPROCEDURE EVALUATION
Patient: Lonnie Salinas    Procedure Summary     Date:  07/09/20 Room / Location:  [facility] CVOR 02 / [facility] CVOR    Anesthesia Start:  1247 Anesthesia Stop:  1856    Procedure:  CORONARY ARTERY BYPASS GRAFTING X 5 USING LEFT EDWARD  AND LEFT ENDOSCOPICALLY HARVESTED SAPHENOUS VEIN (N/A Chest) Diagnosis:       Coronary artery disease involving native coronary artery of native heart with unstable angina pectoris (CMS/H[facility])      (Coronary artery disease involving native coronary artery of native heart with unstable angina pectoris (CMS/HCC) [I25.110])    Surgeon:  Lui Lake MD Provider:  Vikash Flannery MD    Anesthesia Type:  general ASA Status:  4 - Emergent          Anesthesia Type: general    Vitals  Vitals Value Taken Time   /61 7/9/2020  7:45 PM   Temp     Pulse 91 7/9/2020  7:46 PM   Resp     SpO2 90 % 7/9/2020  7:46 PM   Vitals shown include unvalidated device data.        Post Anesthesia Care and Evaluation    Patient location during evaluation: ICU  Patient participation: complete - patient cannot participate  Level of consciousness: obtunded/minimal responses  Pain scale: See nurse's notes for pain score.  Pain management: adequate  Airway patency: patent  Anesthetic complications: No anesthetic complications  PONV Status: none  Cardiovascular status: acceptable  Respiratory status: acceptable  Hydration status: acceptable    Comments: Patient seen and examined postoperatively; vital signs stable; SpO2 greater than or equal to 90%; cardiopulmonary status stable; nausea/vomiting adequately controlled; pain adequately controlled; no apparent anesthesia complications; patient discharged from anesthesia care when discharge criteria were met

## 2020-07-09 NOTE — OP NOTE
BCS OPERATIVE NOTE    Date of procedure:7/9/2020    Pre-op Diagnosis: NSTEMI, cardiogenic shock requiring IABP and epinephrine gtt, congestive heart failure, HTN, HLD, obesity.    Post-op Diagnosis: Same.    Procedure: Emergency CABG x5 (LIMA to distal LAD, SV to PDA, SV seq to OM1 and OM2, SV to OM3, LLE EVH, intraoperative transesophageal echocardiogram.    Surgeon: Lui Lake MD    Assistant: JULY Albert/ Georgette BHAKTA    Cardiologist: RAJNI Fatima MD    Anesthesia: GET    Findings: Hemodynamic instability requiring epinephrine gtt prior to sternotomy. MINERVA showed dilated RV and LVEF 30%, no valvular abnormality. Diffusely diseased coronary arteries, especially PDA and OM3. PDA 1.5 mm, OM3 1.5 mm, OM1 2.5 mm, OM2 2.5 mm, distal LAD 1.75 mm.     Aortic cross-clamp time:  99 min    Cardiopulmonary bypass time: 130 min    Cellsaver/EBL: 530 ml    Antegrade and retrograde cardioplegia.    Arterial cannula: 20F to Aorta    Venous cannula: 29/37F to right atrium    History of present illness: This is a pleasant 64-year-old  gentleman admitted with severe non-ST elevation myocardial infarction.  The patient's baseline left ventricular ejection fraction appears to be about 30% and his right ventricle was severely dilated.  He was found to have very diffuse severe coronary disease that was really not amenable to PCI.  Despite the risks it was felt that surgical vascularization might be his best option.  The patient required hemodynamic support and intraglobal was also placed in the catheterization lab.  Our initial plan was to temporize him for 48 hours so as to allow him to recover somewhat from his non-ST elevation myocardial infarction.  Unfortunately, he continued to have chest pain and showed evidence of hemodynamic instability.  We therefore decided to take him emergently for coronary bypass surgery.  His STS morbidity and mortality risks were calculated and discussed prior to surgery.    Details:  The patient was taken directly from the CVICU to the OR.  He was placed supine on the OR table.  Following uneventful induction he received a PA catheter, radial arterial line, and Aguirre catheter.  The patient's blood pressure was slightly low at this time and he was started on epinephrine drip for hemodynamic support.  His intra-balloon pump remained at full augmentation on one-to-one mode.  He was prepped and draped.  A midline sternal incision was made.  The sternum was split with electric saw.  The left and sternum was retracted.  The left internal mammary artery was harvested as a pedicle.  The patient was heparinized and the pedicle was transected distally.  The distal stump was controlled.  The pedicle was fashioned.  A left pleural chest tube was placed.  During LIMA harvest the patient underwent endoscopic aspiration of his left lower extremity, revealing adequate saphenous vein conduit which was harvested and prepared.  Transesophageal echocardiogram confirmed that the patient's right ventricle was significantly dilated and at the left ventricle also had an ejection fraction approximately 30% at best.  There were no valvular abnormalities on transesophageal echocardiogram.  The pericardium was opened and a well was created.  Aorta was palpated and found to be free of any worrisome atherosclerotic plaque.  Descending aorta and right atrium were cannulated.  Antegrade and retrograde cardioplegia lines were inserted.    Cardiopulmonary bypass instituted and the patient was cooled to 34 °C.  The intraglobal was deactivated.  An aortic cross-clamp was applied and the patient received 800 mL of antegrade and 200 mL of retrograde cardioplegia, resulting diastolic arrest.  At regular intervals the patient received additional retrograde cardioplegia doses.  The heart was retracted.  The PDA was identified.  An arteriotomy was created.  The saphenous vein was anastomosed to this.  The proximal end of this was  anastomosed to an aortotomy and marked with a metallic ring.    The OM 3 was identified.  This artery was quite small but we knew that it was completely occluded based on the patient's angiogram.  A saphenous vein was anastomosed to an OM 3 arteriotomy.  The proximal end of this was anastomosed to a separate aortotomy and marked with a metallic ring.    The OM1 and OM 2 branch were identified.  An arteriotomy was created in each artery.  A saphenous vein segment was anastomosed to these 2 targets in sequential fashion with the proximal side to side to the OM1 and the distal end to side to the OM 2.  The proximal end of the saphenous vein was anastomosed to a third aortotomy and marked with a metallic ring.    A pericardial window was used to deliver the LIMA pedicle into the well.  A distal LAD arteriotomy was created and anastomosed to the LIMA pedicle.  The pedicle was anchored to the epicardial surface.    A hotshot antegrade cardioplegia dose was given and aortic cross-clamp was removed.  The bulldog clamp was removed from the LIMA pedicle.  The patient recovered into a slow sinus rhythm.  The saphenous veins were de-aired and their bulldog clamps were then removed.  The retrograde cannula was removed.  The proximal and distal anastomoses were found to be hemostatic.  Pacing wires were applied to the right atrium and right ventricle.  After sufficient de-airing the antegrade cardioplegia line/vent was also removed.  The intra-aortic balloon pump was reactivated.  The patient was weaned off cardiopulmonary bypass.    The venous line was removed.  After a protamine dose the arterial line was removed.  Cannulation sites were reinforced.  The pericardium was approximated loosely.  A substernal chest was placed.  A total of 8 steel wires were used to close the sternum.  Sponge counts, instrument counts, and needle counts were correct.  The fascia layer, subdermal layer, and subcuticular layer were closed.  The leg  incisions were closed as well.  The patient was transferred to the CVICU.    Lui Lake MD  7/9/2020  18:12

## 2020-07-09 NOTE — ANESTHESIA PROCEDURE NOTES
Central Line      Patient reassessed immediately prior to procedure    Start time: 7/9/2020 1:00 PM  Stop Time:7/9/2020 1:10 PM  Indications: central pressure monitoring, MD/Surgeon request and vascular access  Staff  Anesthesiologist: Vikash Flannery MD  Preanesthetic Checklist  Completed: patient identified, site marked, surgical consent, pre-op evaluation, timeout performed, IV checked, risks and benefits discussed and monitors and equipment checked  Central Line Prep  Sterile Tech:cap, gloves, gown, mask and sterile barriers  Prep: chloraprep  no medical exclusion documented for following all elements of maximal sterile barrier technique  Patient monitoring: blood pressure monitoring, continuous pulse oximetry and EKG  Central Line Procedure  Laterality:right  Location:internal jugular  Catheter Type:Cordis and double lumen  Catheter Size:9 Fr  Guidance:ultrasound guidedImages: still images obtained, printed/placed on chart  Assessment  Post procedure:biopatch applied, line sutured, occlusive dressing applied and secured with tape  Assessement:blood return through all ports, free fluid flow, Leandro Test and chest x-ray ordered  Complications:no  Patient Tolerance:patient tolerated the procedure well with no apparent complications  Additional Notes  U/S VISUALIZED and marked.  Initial angiocath placement kinked and unable to thread guidewire.  Wire through syringe/needle technique successful with live u/s guidance.  Leandro negative.  No complications

## 2020-07-09 NOTE — PROGRESS NOTES
Ideally would like to wait for patient to for fully recover from NSTEMI.     Unfortunately, patient now having arrythmmias, borderline BP even with IABP. Echocardiogram shows compromised RV (hypocontractile and dilated).     Will proceed with emergency surgery cabg today.

## 2020-07-10 ENCOUNTER — APPOINTMENT (OUTPATIENT)
Dept: GENERAL RADIOLOGY | Facility: HOSPITAL | Age: 65
End: 2020-07-10

## 2020-07-10 ENCOUNTER — APPOINTMENT (OUTPATIENT)
Dept: CARDIOLOGY | Facility: HOSPITAL | Age: 65
End: 2020-07-10

## 2020-07-10 LAB
ALBUMIN SERPL-MCNC: 4.6 G/DL (ref 3.5–5.2)
ALBUMIN SERPL-MCNC: 4.7 G/DL (ref 3.5–5.2)
ALBUMIN/GLOB SERPL: 2.9 G/DL
ALP SERPL-CCNC: 46 U/L (ref 39–117)
ALT SERPL W P-5'-P-CCNC: 28 U/L (ref 1–41)
ANION GAP SERPL CALCULATED.3IONS-SCNC: 18 MMOL/L (ref 5–15)
ANION GAP SERPL CALCULATED.3IONS-SCNC: 29 MMOL/L (ref 5–15)
ARTERIAL PATENCY WRIST A: ABNORMAL
ARTERIAL PATENCY WRIST A: NORMAL
AST SERPL-CCNC: 75 U/L (ref 1–40)
ATMOSPHERIC PRESS: ABNORMAL MM[HG]
ATMOSPHERIC PRESS: NORMAL MM[HG]
BACTERIA SPEC AEROBE CULT: NO GROWTH
BACTERIA UR QL AUTO: ABNORMAL /HPF
BASE EXCESS BLDA CALC-SCNC: -10.4 MMOL/L (ref 0–3)
BASE EXCESS BLDA CALC-SCNC: -2.7 MMOL/L (ref 0–3)
BASE EXCESS BLDA CALC-SCNC: -5.9 MMOL/L (ref 0–3)
BASE EXCESS BLDA CALC-SCNC: -6.7 MMOL/L (ref 0–3)
BASE EXCESS BLDA CALC-SCNC: -7.1 MMOL/L (ref 0–3)
BASE EXCESS BLDA CALC-SCNC: -8.4 MMOL/L (ref 0–3)
BASE EXCESS BLDA CALC-SCNC: -9.5 MMOL/L (ref 0–3)
BASE EXCESS BLDA CALC-SCNC: 0.9 MMOL/L (ref 0–3)
BASE EXCESS BLDA CALC-SCNC: 1 MMOL/L (ref 0–3)
BASE EXCESS BLDA CALC-SCNC: 1.2 MMOL/L (ref 0–3)
BASE EXCESS BLDA CALC-SCNC: 2.2 MMOL/L (ref 0–3)
BASOPHILS # BLD AUTO: 0.1 10*3/MM3 (ref 0–0.2)
BASOPHILS NFR BLD AUTO: 0.2 % (ref 0–1.5)
BDY SITE: ABNORMAL
BDY SITE: NORMAL
BH CV ECHO MEAS - AO ROOT AREA (BSA CORRECTED): 1.6
BH CV ECHO MEAS - AO ROOT AREA: 9.5 CM^2
BH CV ECHO MEAS - AO ROOT DIAM: 3.5 CM
BH CV ECHO MEAS - BSA(HAYCOCK): 2.3 M^2
BH CV ECHO MEAS - BSA: 2.2 M^2
BH CV ECHO MEAS - BZI_BMI: 33.8 KILOGRAMS/M^2
BH CV ECHO MEAS - BZI_METRIC_HEIGHT: 175.3 CM
BH CV ECHO MEAS - BZI_METRIC_WEIGHT: 103.9 KG
BH CV ECHO MEAS - EDV(CUBED): 122.3 ML
BH CV ECHO MEAS - EDV(TEICH): 116.3 ML
BH CV ECHO MEAS - EF(CUBED): 31.6 %
BH CV ECHO MEAS - EF(TEICH): 25.6 %
BH CV ECHO MEAS - ESV(CUBED): 83.7 ML
BH CV ECHO MEAS - ESV(TEICH): 86.5 ML
BH CV ECHO MEAS - FS: 11.9 %
BH CV ECHO MEAS - IVS/LVPW: 0.74
BH CV ECHO MEAS - IVSD: 0.96 CM
BH CV ECHO MEAS - LV MASS(C)D: 210.6 GRAMS
BH CV ECHO MEAS - LV MASS(C)DI: 96.3 GRAMS/M^2
BH CV ECHO MEAS - LVIDD: 5 CM
BH CV ECHO MEAS - LVIDS: 4.4 CM
BH CV ECHO MEAS - LVOT AREA: 3.9 CM^2
BH CV ECHO MEAS - LVOT DIAM: 2.2 CM
BH CV ECHO MEAS - LVPWD: 1.3 CM
BH CV ECHO MEAS - RAP SYSTOLE: 3 MMHG
BH CV ECHO MEAS - RVDD: 2.7 CM
BH CV ECHO MEAS - RVSP: 11.6 MMHG
BH CV ECHO MEAS - SI(CUBED): 17.6 ML/M^2
BH CV ECHO MEAS - SI(TEICH): 13.6 ML/M^2
BH CV ECHO MEAS - SV(CUBED): 38.6 ML
BH CV ECHO MEAS - SV(TEICH): 29.8 ML
BH CV ECHO MEAS - TR MAX VEL: 146.4 CM/SEC
BILIRUB SERPL-MCNC: 0.8 MG/DL (ref 0–1.2)
BILIRUB UR QL STRIP: ABNORMAL
BODY TEMPERATURE: 101 C
BODY TEMPERATURE: 101.2 C
BUN SERPL-MCNC: 36 MG/DL (ref 8–23)
BUN SERPL-MCNC: 39 MG/DL (ref 8–23)
BUN SERPL-MCNC: ABNORMAL MG/DL
BUN SERPL-MCNC: ABNORMAL MG/DL
BUN/CREAT SERPL: ABNORMAL
BUN/CREAT SERPL: ABNORMAL
CA-I BLDA-SCNC: 0.73 MMOL/L (ref 1.15–1.33)
CA-I BLDA-SCNC: 0.75 MMOL/L (ref 1.15–1.33)
CA-I BLDA-SCNC: 1.05 MMOL/L (ref 1.15–1.33)
CA-I BLDA-SCNC: 1.05 MMOL/L (ref 1.15–1.33)
CA-I BLDA-SCNC: 1.08 MMOL/L (ref 1.15–1.33)
CA-I BLDA-SCNC: 1.1 MMOL/L (ref 1.15–1.33)
CA-I BLDA-SCNC: 1.14 MMOL/L (ref 1.15–1.33)
CA-I BLDA-SCNC: 1.17 MMOL/L (ref 1.15–1.33)
CA-I SERPL ISE-MCNC: 1.18 MMOL/L (ref 1.2–1.3)
CALCIUM SPEC-SCNC: 9.3 MG/DL (ref 8.6–10.5)
CALCIUM SPEC-SCNC: 9.5 MG/DL (ref 8.6–10.5)
CHLORIDE SERPL-SCNC: 100 MMOL/L (ref 98–107)
CHLORIDE SERPL-SCNC: 105 MMOL/L (ref 98–107)
CLARITY UR: ABNORMAL
CO2 BLDA-SCNC: 16.5 MMOL/L (ref 22–29)
CO2 BLDA-SCNC: 16.8 MMOL/L (ref 22–29)
CO2 BLDA-SCNC: 17.7 MMOL/L (ref 22–29)
CO2 BLDA-SCNC: 18 MMOL/L (ref 22–29)
CO2 BLDA-SCNC: 18.6 MMOL/L (ref 22–29)
CO2 BLDA-SCNC: 18.9 MMOL/L (ref 22–29)
CO2 BLDA-SCNC: 21.8 MMOL/L (ref 22–29)
CO2 BLDA-SCNC: 25.8 MMOL/L (ref 22–29)
CO2 BLDA-SCNC: 26 MMOL/L (ref 22–29)
CO2 BLDA-SCNC: 26 MMOL/L (ref 22–29)
CO2 BLDA-SCNC: 26.2 MMOL/L (ref 22–29)
CO2 SERPL-SCNC: 17 MMOL/L (ref 22–29)
CO2 SERPL-SCNC: 24 MMOL/L (ref 22–29)
COLOR UR: YELLOW
CREAT SERPL-MCNC: 2.23 MG/DL (ref 0.76–1.27)
CREAT SERPL-MCNC: 2.8 MG/DL (ref 0.76–1.27)
D-LACTATE SERPL-SCNC: 1.9 MMOL/L (ref 0.5–2)
D-LACTATE SERPL-SCNC: 2.4 MMOL/L (ref 0.5–2)
DEPRECATED RDW RBC AUTO: 44.2 FL (ref 37–54)
EOSINOPHIL # BLD AUTO: 0 10*3/MM3 (ref 0–0.4)
EOSINOPHIL NFR BLD AUTO: 0 % (ref 0.3–6.2)
ERYTHROCYTE [DISTWIDTH] IN BLOOD BY AUTOMATED COUNT: 14.6 % (ref 12.3–15.4)
GFR SERPL CREATININE-BSD FRML MDRD: 23 ML/MIN/1.73
GFR SERPL CREATININE-BSD FRML MDRD: 30 ML/MIN/1.73
GLOBULIN UR ELPH-MCNC: 1.6 GM/DL
GLUCOSE BLDC GLUCOMTR-MCNC: 111 MG/DL (ref 70–105)
GLUCOSE BLDC GLUCOMTR-MCNC: 119 MG/DL (ref 70–105)
GLUCOSE BLDC GLUCOMTR-MCNC: 121 MG/DL (ref 70–105)
GLUCOSE BLDC GLUCOMTR-MCNC: 125 MG/DL (ref 70–105)
GLUCOSE BLDC GLUCOMTR-MCNC: 129 MG/DL (ref 70–105)
GLUCOSE BLDC GLUCOMTR-MCNC: 131 MG/DL (ref 74–100)
GLUCOSE BLDC GLUCOMTR-MCNC: 131 MG/DL (ref 74–100)
GLUCOSE BLDC GLUCOMTR-MCNC: 140 MG/DL (ref 70–105)
GLUCOSE BLDC GLUCOMTR-MCNC: 140 MG/DL (ref 74–100)
GLUCOSE BLDC GLUCOMTR-MCNC: 140 MG/DL (ref 74–100)
GLUCOSE BLDC GLUCOMTR-MCNC: 155 MG/DL (ref 70–105)
GLUCOSE BLDC GLUCOMTR-MCNC: 157 MG/DL (ref 70–105)
GLUCOSE BLDC GLUCOMTR-MCNC: 158 MG/DL (ref 70–105)
GLUCOSE BLDC GLUCOMTR-MCNC: 229 MG/DL (ref 70–105)
GLUCOSE BLDC GLUCOMTR-MCNC: 233 MG/DL (ref 70–105)
GLUCOSE BLDC GLUCOMTR-MCNC: 262 MG/DL (ref 70–105)
GLUCOSE BLDC GLUCOMTR-MCNC: 275 MG/DL (ref 74–100)
GLUCOSE BLDC GLUCOMTR-MCNC: 285 MG/DL (ref 74–100)
GLUCOSE BLDC GLUCOMTR-MCNC: 285 MG/DL (ref 74–100)
GLUCOSE BLDC GLUCOMTR-MCNC: 290 MG/DL (ref 74–100)
GLUCOSE BLDC GLUCOMTR-MCNC: 296 MG/DL (ref 74–100)
GLUCOSE BLDC GLUCOMTR-MCNC: 296 MG/DL (ref 74–100)
GLUCOSE BLDC GLUCOMTR-MCNC: 298 MG/DL (ref 74–100)
GLUCOSE BLDC GLUCOMTR-MCNC: 303 MG/DL (ref 74–100)
GLUCOSE BLDC GLUCOMTR-MCNC: 303 MG/DL (ref 74–100)
GLUCOSE SERPL-MCNC: 152 MG/DL (ref 65–99)
GLUCOSE SERPL-MCNC: 300 MG/DL (ref 65–99)
GLUCOSE UR STRIP-MCNC: NEGATIVE MG/DL
HCO3 BLDA-SCNC: 15.5 MMOL/L (ref 21–28)
HCO3 BLDA-SCNC: 15.8 MMOL/L (ref 21–28)
HCO3 BLDA-SCNC: 16.7 MMOL/L (ref 21–28)
HCO3 BLDA-SCNC: 17.1 MMOL/L (ref 21–28)
HCO3 BLDA-SCNC: 17.6 MMOL/L (ref 21–28)
HCO3 BLDA-SCNC: 18 MMOL/L (ref 21–28)
HCO3 BLDA-SCNC: 20.8 MMOL/L (ref 21–28)
HCO3 BLDA-SCNC: 24.8 MMOL/L (ref 21–28)
HCO3 BLDA-SCNC: 24.9 MMOL/L (ref 21–28)
HCO3 BLDA-SCNC: 25 MMOL/L (ref 21–28)
HCO3 BLDA-SCNC: 25.1 MMOL/L (ref 21–28)
HCT VFR BLD AUTO: 36.7 % (ref 37.5–51)
HCT VFR BLDA CALC: 33 % (ref 38–51)
HCT VFR BLDA CALC: 35 % (ref 38–51)
HCT VFR BLDA CALC: 36 % (ref 38–51)
HCT VFR BLDA CALC: 37 % (ref 38–51)
HEMODILUTION: NO
HEMODILUTION: YES
HEMODILUTION: YES
HGB BLD-MCNC: 12.1 G/DL (ref 13–17.7)
HGB BLDA-MCNC: 11.1 G/DL (ref 12–17)
HGB BLDA-MCNC: 11.3 G/DL (ref 12–17)
HGB BLDA-MCNC: 11.4 G/DL (ref 12–17)
HGB BLDA-MCNC: 12 G/DL (ref 12–17)
HGB BLDA-MCNC: 12.1 G/DL (ref 12–17)
HGB BLDA-MCNC: 12.3 G/DL (ref 12–17)
HGB BLDA-MCNC: 12.3 G/DL (ref 12–17)
HGB BLDA-MCNC: 12.5 G/DL (ref 12–17)
HGB UR QL STRIP.AUTO: ABNORMAL
HYALINE CASTS UR QL AUTO: ABNORMAL /LPF
INHALED O2 CONCENTRATION: 40 %
INHALED O2 CONCENTRATION: 60 %
INHALED O2 CONCENTRATION: 60 %
INHALED O2 CONCENTRATION: 80 %
INHALED O2 CONCENTRATION: 80 %
INHALED O2 CONCENTRATION: <21 %
INR PPP: 1.26 (ref 0.9–1.1)
KETONES UR QL STRIP: ABNORMAL
LACTATE HOLD SPECIMEN: NORMAL
LEUKOCYTE ESTERASE UR QL STRIP.AUTO: NEGATIVE
LYMPHOCYTES # BLD AUTO: 1 10*3/MM3 (ref 0.7–3.1)
LYMPHOCYTES NFR BLD AUTO: 4.2 % (ref 19.6–45.3)
MAGNESIUM SERPL-MCNC: 3.3 MG/DL (ref 1.6–2.4)
MCH RBC QN AUTO: 28.6 PG (ref 26.6–33)
MCHC RBC AUTO-ENTMCNC: 33 G/DL (ref 31.5–35.7)
MCV RBC AUTO: 86.7 FL (ref 79–97)
MODALITY: ABNORMAL
MODALITY: NORMAL
MONOCYTES # BLD AUTO: 2 10*3/MM3 (ref 0.1–0.9)
MONOCYTES NFR BLD AUTO: 8.3 % (ref 5–12)
MUCOUS THREADS URNS QL MICRO: ABNORMAL /HPF
NEUTROPHILS NFR BLD AUTO: 21.1 10*3/MM3 (ref 1.7–7)
NEUTROPHILS NFR BLD AUTO: 87.3 % (ref 42.7–76)
NITRITE UR QL STRIP: NEGATIVE
NRBC BLD AUTO-RTO: 0 /100 WBC (ref 0–0.2)
PCO2 BLDA: 30 MM HG (ref 35–48)
PCO2 BLDA: 30 MM HG (ref 35–48)
PCO2 BLDA: 30.9 MM HG (ref 35–48)
PCO2 BLDA: 31.5 MM HG (ref 35–48)
PCO2 BLDA: 32 MM HG (ref 35–48)
PCO2 BLDA: 32.2 MM HG (ref 35–48)
PCO2 BLDA: 32.2 MM HG (ref 35–48)
PCO2 BLDA: 33.6 MM HG (ref 35–48)
PCO2 BLDA: 35.4 MM HG (ref 35–48)
PCO2 BLDA: 35.4 MM HG (ref 35–48)
PCO2 BLDA: 37.3 MM HG (ref 35–48)
PCO2 TEMP ADJ BLD: 33.5 MM HG (ref 35–48)
PCO2 TEMP ADJ BLD: 39.5 MM HG (ref 35–48)
PEEP RESPIRATORY: 10 CM[H2O]
PEEP RESPIRATORY: 5 CM[H2O]
PEEP RESPIRATORY: 8 CM[H2O]
PH BLDA: 7.27 PH UNITS (ref 7.35–7.45)
PH BLDA: 7.3 PH UNITS (ref 7.35–7.45)
PH BLDA: 7.32 PH UNITS (ref 7.35–7.45)
PH BLDA: 7.37 PH UNITS (ref 7.35–7.45)
PH BLDA: 7.37 PH UNITS (ref 7.35–7.45)
PH BLDA: 7.38 PH UNITS (ref 7.35–7.45)
PH BLDA: 7.43 PH UNITS (ref 7.35–7.45)
PH BLDA: 7.43 PH UNITS (ref 7.35–7.45)
PH BLDA: 7.45 PH UNITS (ref 7.35–7.45)
PH BLDA: 7.46 PH UNITS (ref 7.35–7.45)
PH BLDA: 7.5 PH UNITS (ref 7.35–7.45)
PH UR STRIP.AUTO: <=5 [PH] (ref 5–8)
PH, TEMP CORRECTED: 7.41 PH UNITS (ref 7.35–7.45)
PH, TEMP CORRECTED: 7.42 PH UNITS (ref 7.35–7.45)
PHOSPHATE SERPL-MCNC: 1.4 MG/DL (ref 2.5–4.5)
PLATELET # BLD AUTO: 225 10*3/MM3 (ref 140–450)
PMV BLD AUTO: 7.8 FL (ref 6–12)
PO2 BLDA: 100.8 MM HG (ref 83–108)
PO2 BLDA: 113 MM HG (ref 83–108)
PO2 BLDA: 162.3 MM HG (ref 83–108)
PO2 BLDA: 162.4 MM HG (ref 83–108)
PO2 BLDA: 184.4 MM HG (ref 83–108)
PO2 BLDA: 242.1 MM HG (ref 83–108)
PO2 BLDA: 34.2 MM HG (ref 83–108)
PO2 BLDA: 75.1 MM HG (ref 83–108)
PO2 BLDA: 83.2 MM HG (ref 83–108)
PO2 BLDA: 87.2 MM HG (ref 83–108)
PO2 BLDA: 92.9 MM HG (ref 83–108)
PO2 TEMP ADJ BLD: 90.7 MM HG (ref 83–108)
PO2 TEMP ADJ BLD: 95.6 MM HG (ref 83–108)
POTASSIUM BLDA-SCNC: 2.3 MMOL/L (ref 3.5–4.5)
POTASSIUM BLDA-SCNC: 2.4 MMOL/L (ref 3.5–4.5)
POTASSIUM BLDA-SCNC: 2.5 MMOL/L (ref 3.5–4.5)
POTASSIUM BLDA-SCNC: 3.4 MMOL/L (ref 3.5–4.5)
POTASSIUM BLDA-SCNC: 3.5 MMOL/L (ref 3.5–4.5)
POTASSIUM BLDA-SCNC: 3.9 MMOL/L (ref 3.5–4.5)
POTASSIUM SERPL-SCNC: 2.7 MMOL/L (ref 3.5–5.2)
POTASSIUM SERPL-SCNC: 3.5 MMOL/L (ref 3.5–5.2)
POTASSIUM SERPL-SCNC: 3.6 MMOL/L (ref 3.5–5.2)
PROT SERPL-MCNC: 6.2 G/DL (ref 6–8.5)
PROT UR QL STRIP: ABNORMAL
PROTHROMBIN TIME: 12.7 SECONDS (ref 9.6–11.7)
PSV: 10 CMH2O
RBC # BLD AUTO: 4.23 10*6/MM3 (ref 4.14–5.8)
RBC # UR: ABNORMAL /HPF
REF LAB TEST METHOD: ABNORMAL
RESPIRATORY RATE: 20
RESPIRATORY RATE: 22
RESPIRATORY RATE: 22
SAO2 % BLDCOA: 72.3 % (ref 94–98)
SAO2 % BLDCOA: 95.7 % (ref 94–98)
SAO2 % BLDCOA: 96.6 % (ref 94–98)
SAO2 % BLDCOA: 97 % (ref 94–98)
SAO2 % BLDCOA: 97.2 % (ref 94–98)
SAO2 % BLDCOA: 97.7 % (ref 94–98)
SAO2 % BLDCOA: 98.7 % (ref 94–98)
SAO2 % BLDCOA: 99.2 % (ref 94–98)
SAO2 % BLDCOA: 99.4 % (ref 94–98)
SAO2 % BLDCOA: 99.6 % (ref 94–98)
SAO2 % BLDCOA: 99.8 % (ref 94–98)
SARS-COV-2 RNA PNL SPEC NAA+PROBE: NOT DETECTED
SODIUM BLD-SCNC: 143 MMOL/L (ref 138–146)
SODIUM BLD-SCNC: 144 MMOL/L (ref 138–146)
SODIUM BLD-SCNC: 144 MMOL/L (ref 138–146)
SODIUM BLD-SCNC: 146 MMOL/L (ref 138–146)
SODIUM SERPL-SCNC: 146 MMOL/L (ref 136–145)
SODIUM SERPL-SCNC: 147 MMOL/L (ref 136–145)
SP GR UR STRIP: 1.03 (ref 1–1.03)
SQUAMOUS #/AREA URNS HPF: ABNORMAL /HPF
UROBILINOGEN UR QL STRIP: ABNORMAL
VENTILATOR MODE: ABNORMAL
VENTILATOR MODE: NORMAL
VT ON VENT VENT: 750 ML
VT ON VENT VENT: 750 ML
WBC # BLD AUTO: 24.2 10*3/MM3 (ref 3.4–10.8)
WBC UR QL AUTO: ABNORMAL /HPF

## 2020-07-10 PROCEDURE — 83735 ASSAY OF MAGNESIUM: CPT | Performed by: NURSE PRACTITIONER

## 2020-07-10 PROCEDURE — 93321 DOPPLER ECHO F-UP/LMTD STD: CPT | Performed by: INTERNAL MEDICINE

## 2020-07-10 PROCEDURE — 87635 SARS-COV-2 COVID-19 AMP PRB: CPT | Performed by: INTERNAL MEDICINE

## 2020-07-10 PROCEDURE — 94799 UNLISTED PULMONARY SVC/PX: CPT

## 2020-07-10 PROCEDURE — 85610 PROTHROMBIN TIME: CPT | Performed by: NURSE PRACTITIONER

## 2020-07-10 PROCEDURE — 63710000001 INSULIN REGULAR HUMAN PER 5 UNITS: Performed by: NURSE PRACTITIONER

## 2020-07-10 PROCEDURE — 94003 VENT MGMT INPAT SUBQ DAY: CPT

## 2020-07-10 PROCEDURE — 93010 ELECTROCARDIOGRAM REPORT: CPT | Performed by: INTERNAL MEDICINE

## 2020-07-10 PROCEDURE — 99024 POSTOP FOLLOW-UP VISIT: CPT | Performed by: NURSE PRACTITIONER

## 2020-07-10 PROCEDURE — 83605 ASSAY OF LACTIC ACID: CPT

## 2020-07-10 PROCEDURE — 25010000002 CALCIUM GLUCONATE-NACL 1-0.675 GM/50ML-% SOLUTION: Performed by: THORACIC SURGERY (CARDIOTHORACIC VASCULAR SURGERY)

## 2020-07-10 PROCEDURE — 93005 ELECTROCARDIOGRAM TRACING: CPT | Performed by: NURSE PRACTITIONER

## 2020-07-10 PROCEDURE — 81001 URINALYSIS AUTO W/SCOPE: CPT | Performed by: THORACIC SURGERY (CARDIOTHORACIC VASCULAR SURGERY)

## 2020-07-10 PROCEDURE — 25010000002 CALCIUM GLUCONATE 2-0.675 GM/100ML-% SOLUTION: Performed by: NURSE PRACTITIONER

## 2020-07-10 PROCEDURE — 82330 ASSAY OF CALCIUM: CPT

## 2020-07-10 PROCEDURE — 80053 COMPREHEN METABOLIC PANEL: CPT | Performed by: NURSE PRACTITIONER

## 2020-07-10 PROCEDURE — 25010000002 SULFUR HEXAFLUORIDE MICROSPH 60.7-25 MG RECONSTITUTED SUSPENSION: Performed by: INTERNAL MEDICINE

## 2020-07-10 PROCEDURE — 25010000003 MILRINONE LACTATE IN DEXTROSE 20-5 MG/100ML-% SOLUTION: Performed by: THORACIC SURGERY (CARDIOTHORACIC VASCULAR SURGERY)

## 2020-07-10 PROCEDURE — 85018 HEMOGLOBIN: CPT

## 2020-07-10 PROCEDURE — 99291 CRITICAL CARE FIRST HOUR: CPT | Performed by: INTERNAL MEDICINE

## 2020-07-10 PROCEDURE — 25010000002 ALBUMIN HUMAN 5% PER 50 ML: Performed by: THORACIC SURGERY (CARDIOTHORACIC VASCULAR SURGERY)

## 2020-07-10 PROCEDURE — 93321 DOPPLER ECHO F-UP/LMTD STD: CPT

## 2020-07-10 PROCEDURE — 93325 DOPPLER ECHO COLOR FLOW MAPG: CPT | Performed by: INTERNAL MEDICINE

## 2020-07-10 PROCEDURE — 80069 RENAL FUNCTION PANEL: CPT | Performed by: NURSE PRACTITIONER

## 2020-07-10 PROCEDURE — 87086 URINE CULTURE/COLONY COUNT: CPT | Performed by: THORACIC SURGERY (CARDIOTHORACIC VASCULAR SURGERY)

## 2020-07-10 PROCEDURE — 25010000002 MORPHINE PER 10 MG: Performed by: NURSE PRACTITIONER

## 2020-07-10 PROCEDURE — 36600 WITHDRAWAL OF ARTERIAL BLOOD: CPT

## 2020-07-10 PROCEDURE — 25010000003 POTASSIUM CHLORIDE 10 MEQ/100ML SOLUTION: Performed by: NURSE PRACTITIONER

## 2020-07-10 PROCEDURE — 82803 BLOOD GASES ANY COMBINATION: CPT

## 2020-07-10 PROCEDURE — 82330 ASSAY OF CALCIUM: CPT | Performed by: NURSE PRACTITIONER

## 2020-07-10 PROCEDURE — 84132 ASSAY OF SERUM POTASSIUM: CPT | Performed by: NURSE PRACTITIONER

## 2020-07-10 PROCEDURE — 25010000002 CEFAZOLIN PER 500 MG: Performed by: NURSE PRACTITIONER

## 2020-07-10 PROCEDURE — 80051 ELECTROLYTE PANEL: CPT

## 2020-07-10 PROCEDURE — 93325 DOPPLER ECHO COLOR FLOW MAPG: CPT

## 2020-07-10 PROCEDURE — P9041 ALBUMIN (HUMAN),5%, 50ML: HCPCS | Performed by: THORACIC SURGERY (CARDIOTHORACIC VASCULAR SURGERY)

## 2020-07-10 PROCEDURE — 25010000002 MAGNESIUM SULFATE IN D5W 1G/100ML (PREMIX) 1-5 GM/100ML-% SOLUTION: Performed by: NURSE PRACTITIONER

## 2020-07-10 PROCEDURE — 87040 BLOOD CULTURE FOR BACTERIA: CPT | Performed by: THORACIC SURGERY (CARDIOTHORACIC VASCULAR SURGERY)

## 2020-07-10 PROCEDURE — 25010000002 EPINEPHRINE 30 MG/30ML SOLUTION 30 ML VIAL: Performed by: THORACIC SURGERY (CARDIOTHORACIC VASCULAR SURGERY)

## 2020-07-10 PROCEDURE — 71045 X-RAY EXAM CHEST 1 VIEW: CPT

## 2020-07-10 PROCEDURE — 25010000002 ONDANSETRON PER 1 MG: Performed by: NURSE PRACTITIONER

## 2020-07-10 PROCEDURE — 93308 TTE F-UP OR LMTD: CPT

## 2020-07-10 PROCEDURE — 85025 COMPLETE CBC W/AUTO DIFF WBC: CPT | Performed by: NURSE PRACTITIONER

## 2020-07-10 PROCEDURE — 93308 TTE F-UP OR LMTD: CPT | Performed by: INTERNAL MEDICINE

## 2020-07-10 PROCEDURE — 82962 GLUCOSE BLOOD TEST: CPT

## 2020-07-10 RX ORDER — DEXTROSE MONOHYDRATE 25 G/50ML
25 INJECTION, SOLUTION INTRAVENOUS
Status: DISCONTINUED | OUTPATIENT
Start: 2020-07-10 | End: 2020-07-14 | Stop reason: HOSPADM

## 2020-07-10 RX ORDER — NOREPINEPHRINE BIT/0.9 % NACL 8 MG/250ML
.02-.3 INFUSION BOTTLE (ML) INTRAVENOUS
Status: DISCONTINUED | OUTPATIENT
Start: 2020-07-10 | End: 2020-07-11

## 2020-07-10 RX ORDER — ALBUMIN, HUMAN INJ 5% 5 %
500 SOLUTION INTRAVENOUS ONCE
Status: COMPLETED | OUTPATIENT
Start: 2020-07-10 | End: 2020-07-10

## 2020-07-10 RX ORDER — NICOTINE POLACRILEX 4 MG
15 LOZENGE BUCCAL
Status: DISCONTINUED | OUTPATIENT
Start: 2020-07-10 | End: 2020-07-14 | Stop reason: HOSPADM

## 2020-07-10 RX ORDER — MILRINONE LACTATE 0.2 MG/ML
0.12 INJECTION, SOLUTION INTRAVENOUS
Status: DISCONTINUED | OUTPATIENT
Start: 2020-07-10 | End: 2020-07-13

## 2020-07-10 RX ORDER — CALCIUM GLUCONATE 20 MG/ML
2 INJECTION, SOLUTION INTRAVENOUS ONCE
Status: COMPLETED | OUTPATIENT
Start: 2020-07-10 | End: 2020-07-10

## 2020-07-10 RX ORDER — CALCIUM GLUCONATE 20 MG/ML
1 INJECTION, SOLUTION INTRAVENOUS ONCE
Status: COMPLETED | OUTPATIENT
Start: 2020-07-10 | End: 2020-07-10

## 2020-07-10 RX ADMIN — POTASSIUM CHLORIDE 20 MEQ: 1.5 POWDER, FOR SOLUTION ORAL at 06:37

## 2020-07-10 RX ADMIN — POTASSIUM CHLORIDE 10 MEQ: 7.46 INJECTION, SOLUTION INTRAVENOUS at 01:16

## 2020-07-10 RX ADMIN — STANDARDIZED SENNA CONCENTRATE 1 TABLET: 8.6 TABLET ORAL at 08:25

## 2020-07-10 RX ADMIN — CEFAZOLIN SODIUM 2 G: 10 INJECTION, POWDER, FOR SOLUTION INTRAVENOUS at 01:04

## 2020-07-10 RX ADMIN — SODIUM CHLORIDE 0.06 MCG/KG/MIN: 9 INJECTION, SOLUTION INTRAVENOUS at 08:28

## 2020-07-10 RX ADMIN — SODIUM CHLORIDE 0.03 UNITS/MIN: 9 INJECTION, SOLUTION INTRAVENOUS at 09:01

## 2020-07-10 RX ADMIN — POTASSIUM CHLORIDE 10 MEQ: 7.46 INJECTION, SOLUTION INTRAVENOUS at 03:48

## 2020-07-10 RX ADMIN — ASPIRIN 81 MG: 81 TABLET, COATED ORAL at 08:25

## 2020-07-10 RX ADMIN — SODIUM CHLORIDE 13.6 UNITS/HR: 9 INJECTION, SOLUTION INTRAVENOUS at 08:41

## 2020-07-10 RX ADMIN — POLYETHYLENE GLYCOL 3350 17 G: 17 POWDER, FOR SOLUTION ORAL at 08:25

## 2020-07-10 RX ADMIN — POTASSIUM CHLORIDE 20 MEQ: 1.5 POWDER, FOR SOLUTION ORAL at 06:46

## 2020-07-10 RX ADMIN — SODIUM CHLORIDE 1 MCG/KG/HR: 9 INJECTION, SOLUTION INTRAVENOUS at 06:46

## 2020-07-10 RX ADMIN — SODIUM CHLORIDE 1 MCG/KG/HR: 9 INJECTION, SOLUTION INTRAVENOUS at 14:14

## 2020-07-10 RX ADMIN — PANTOPRAZOLE SODIUM 40 MG: 40 INJECTION, POWDER, FOR SOLUTION INTRAVENOUS at 06:37

## 2020-07-10 RX ADMIN — SODIUM CHLORIDE 6.4 UNITS/HR: 9 INJECTION, SOLUTION INTRAVENOUS at 23:08

## 2020-07-10 RX ADMIN — POTASSIUM PHOSPHATE, MONOBASIC POTASSIUM PHOSPHATE, DIBASIC 30 MMOL: 224; 236 INJECTION, SOLUTION, CONCENTRATE INTRAVENOUS at 08:26

## 2020-07-10 RX ADMIN — SODIUM CHLORIDE 1 MCG/KG/HR: 9 INJECTION, SOLUTION INTRAVENOUS at 01:43

## 2020-07-10 RX ADMIN — SODIUM CHLORIDE 1 MCG/KG/HR: 9 INJECTION, SOLUTION INTRAVENOUS at 10:21

## 2020-07-10 RX ADMIN — SODIUM CHLORIDE 10.5 UNITS/HR: 9 INJECTION, SOLUTION INTRAVENOUS at 01:42

## 2020-07-10 RX ADMIN — MAGNESIUM SULFATE HEPTAHYDRATE 1 G: 1 INJECTION, SOLUTION INTRAVENOUS at 04:08

## 2020-07-10 RX ADMIN — ONDANSETRON 4 MG: 2 INJECTION, SOLUTION INTRAMUSCULAR; INTRAVENOUS at 21:09

## 2020-07-10 RX ADMIN — CHLORHEXIDINE GLUCONATE 0.12% ORAL RINSE 15 ML: 1.2 LIQUID ORAL at 08:24

## 2020-07-10 RX ADMIN — ACETAMINOPHEN 500 MG: 500 TABLET, FILM COATED ORAL at 01:04

## 2020-07-10 RX ADMIN — POLYETHYLENE GLYCOL 3350 17 G: 17 POWDER, FOR SOLUTION ORAL at 21:08

## 2020-07-10 RX ADMIN — ACETAMINOPHEN 650 MG: 650 SUPPOSITORY RECTAL at 14:15

## 2020-07-10 RX ADMIN — CHLORHEXIDINE GLUCONATE 0.12% ORAL RINSE 15 ML: 1.2 LIQUID ORAL at 21:09

## 2020-07-10 RX ADMIN — MILRINONE LACTATE IN DEXTROSE 0.38 MCG/KG/MIN: 200 INJECTION, SOLUTION INTRAVENOUS at 15:10

## 2020-07-10 RX ADMIN — ALBUMIN HUMAN 500 ML: 0.05 INJECTION, SOLUTION INTRAVENOUS at 08:28

## 2020-07-10 RX ADMIN — MUPIROCIN 1 APPLICATION: 20 OINTMENT TOPICAL at 21:09

## 2020-07-10 RX ADMIN — SULFUR HEXAFLUORIDE 2 ML: KIT at 10:05

## 2020-07-10 RX ADMIN — CEFAZOLIN SODIUM 2 G: 10 INJECTION, POWDER, FOR SOLUTION INTRAVENOUS at 18:17

## 2020-07-10 RX ADMIN — POTASSIUM CHLORIDE 10 MEQ: 7.46 INJECTION, SOLUTION INTRAVENOUS at 00:44

## 2020-07-10 RX ADMIN — STANDARDIZED SENNA CONCENTRATE 1 TABLET: 8.6 TABLET ORAL at 21:08

## 2020-07-10 RX ADMIN — MORPHINE SULFATE 2 MG: 4 INJECTION INTRAVENOUS at 16:29

## 2020-07-10 RX ADMIN — POTASSIUM CHLORIDE 20 MEQ: 1.5 POWDER, FOR SOLUTION ORAL at 12:43

## 2020-07-10 RX ADMIN — MILRINONE LACTATE IN DEXTROSE 0.38 MCG/KG/MIN: 200 INJECTION, SOLUTION INTRAVENOUS at 23:08

## 2020-07-10 RX ADMIN — CALCIUM GLUCONATE 2 G: 20 INJECTION, SOLUTION INTRAVENOUS at 11:27

## 2020-07-10 RX ADMIN — POTASSIUM CHLORIDE 20 MEQ: 1.5 POWDER, FOR SOLUTION ORAL at 03:47

## 2020-07-10 RX ADMIN — SODIUM CHLORIDE 0.07 MCG/KG/MIN: 9 INJECTION, SOLUTION INTRAVENOUS at 05:23

## 2020-07-10 RX ADMIN — CALCIUM GLUCONATE 1 G: 20 INJECTION, SOLUTION INTRAVENOUS at 01:03

## 2020-07-10 RX ADMIN — HYDROCODONE BITARTRATE AND ACETAMINOPHEN 1 TABLET: 5; 325 TABLET ORAL at 21:08

## 2020-07-10 RX ADMIN — MILRINONE LACTATE IN DEXTROSE 0.25 MCG/KG/MIN: 200 INJECTION, SOLUTION INTRAVENOUS at 04:08

## 2020-07-10 RX ADMIN — MUPIROCIN 1 APPLICATION: 20 OINTMENT TOPICAL at 08:30

## 2020-07-10 RX ADMIN — CEFAZOLIN SODIUM 2 G: 10 INJECTION, POWDER, FOR SOLUTION INTRAVENOUS at 09:11

## 2020-07-10 RX ADMIN — SODIUM CHLORIDE 0.07 MCG/KG/MIN: 9 INJECTION, SOLUTION INTRAVENOUS at 16:02

## 2020-07-10 RX ADMIN — ACETAMINOPHEN 500 MG: 500 TABLET, FILM COATED ORAL at 08:24

## 2020-07-10 NOTE — PLAN OF CARE
Patient remains on vent, with IABP, and vasoactive drip support.  Follows commands.  IABP to be removed today

## 2020-07-10 NOTE — PLAN OF CARE
Problem: Patient Care Overview  Goal: Plan of Care Review  Flowsheets (Taken 7/10/2020 1233)  Outcome Summary: Pt still on vent s/p cabg x 5 on 7/9/2020. Being checked for possible covid. Holding PT until results known. PT did not enter room.

## 2020-07-10 NOTE — PROGRESS NOTES
S/P POD# 1 CABG--Jaya  EF 50% (echo)    Subjective:  Patient resting in bed. Remains intubated with IABP in place. Awakens to to voice and follows commands. Tmax overnight 102.    Drips:  Levo 0.06, Epi 0.07, Mil 0.25, Vaso 0.03, Precedex    CI 1,71, CVP 19      Intake/Output Summary (Last 24 hours) at 7/10/2020 1022  Last data filed at 7/10/2020 0600  Gross per 24 hour   Intake 1641 ml   Output 2588 ml   Net -947 ml     Temp:  [97.5 °F (36.4 °C)-101.5 °F (38.6 °C)] 101.4 °F (38.6 °C)  Heart Rate:  [] 90  Resp:  [22] 22  BP: (100-124)/(45-64) 120/60  Arterial Line BP: ()/(46-64) 115/64  FiO2 (%):  [40 %-100 %] 40 %  CT 71/8    Results from last 7 days   Lab Units 07/10/20  0525 07/10/20  0340  07/09/20  1903  07/08/20  1014   WBC 10*3/mm3 24.20*  --   --  33.60*   < > 12.30*   HEMOGLOBIN g/dL 12.1*  --   --  13.4   < > 15.6   HEMOGLOBIN, POC g/dL  --  12.5   < >  --    < >  --    HEMATOCRIT % 36.7*  --   --  41.4   < > 46.2   HEMATOCRIT POC %  --  37*   < >  --    < >  --    PLATELETS 10*3/mm3 225  --   --  215   < > 201   INR  1.26*  --   --  1.36*  --  1.00    < > = values in this interval not displayed.     Results from last 7 days   Lab Units 07/10/20  0525   CREATININE mg/dL 2.80*   POTASSIUM mmol/L 2.7*   SODIUM mmol/L 146*   MAGNESIUM mg/dL 3.3*   PHOSPHORUS mg/dL 1.4*     ica 1.18    Physical Exam:  Intubated, responds to voice  Tele:  Paced at 90, SR 70's underlying  Diminished bases, intubated, 98% FiO2 40%  dsg c/d/i, No drainage  Benign abd, No BM  No edema    Assessment/Plan:  Principal Problem:    NSTEMI, initial episode of care (CMS/[facility])  Active Problems:    NSTEMI (non-ST elevated myocardial infarction) (CMS/[facility])    Coronary artery disease involving native coronary artery of native heart with unstable angina pectoris (CMS/[facility])    MV CAD s/p CABG--IABP in place  NSTEMI--trop 1 on admisson  HTN-- on pressors  Dyslipidemia--statin   Hx tick-borne disease requiring  hospitalization--resolved  Family hx premature CAD--father      Routine care--  Did NOT de-escalate d/t pressors/ vent  Replete calcium  Plans to wean and hopefully remove IABP this afternoon  Covid screen and initiate isolation    RACHEL BINGHAM, APRN  7/10/2020  10:22

## 2020-07-10 NOTE — PROGRESS NOTES
[facility]  Jose Juan Fatima MD, PhD        LOS:  LOS: 2 days   Patient Name: Lonine Salinas  Age/Sex: 64 y.o. male  : 1955  MRN: [id number]    Day of Service: 07/10/20   Length of Stay: 2  Encounter Provider: SAADIA Branch  Place of Service: [facility] CARDIOLOGY  Patient Care Team:  Jarrell Alaniz Jr., MD as PCP - General  Lauren Singh APRN as Nurse Practitioner (Nurse Practitioner)  Christelle Kruse MD as Consulting Physician (Nephrology)    Subjective:     Chief Complaint:  F/U CAD, right heart failure    Subjective:   Seen and examined, patient awake and alert, intubated without sedation, able to give thumbs up yes no.  Nurse at bedside which I discussed care extensively with.  Remains on inotropes and vasopressors presently for significant RV failure in the setting of non-STEMI with urgent 5 vessel bypass revascularization.  Remains with complete heart block with AV pacing rate of 80.  Blood pressures have improved.  2D echo reviewed by me today as well with severe RV dysfunction, EF low normal 50% with regional abnormalities in the basal inferior wall unchanged.  Septal motion consistent with pacing as well as RV pressure volume overload.  Reviewed balloon pump settings with titration for weaning    Care discussed with nursing at bedside as well as titration of inotropes and vaso-tropes  Titration of goal-directed medical therapy as indicated  Review of echo today  Balloon pump down titrated to 3-1 diastolic augmentation, try to remove today    Current Medications:   Scheduled Meds:  acetaminophen 650 mg Rectal Q6H   Or      acetaminophen 500 mg Oral Q6H   amiodarone 150 mg Intravenous Once   aspirin 81 mg Oral Daily   ceFAZolin 2 g Intravenous Q8H   chlorhexidine 15 mL Mouth/Throat Q12H   [START ON 2020] enoxaparin 30 mg Subcutaneous Daily   mupirocin 1 application Each Nare BID   pantoprazole 40 mg Oral Q AM    pantoprazole 40 mg Intravenous Q AM   polyethylene glycol 17 g Oral BID   potassium phosphate 30 mmol Intravenous Once   senna 1 tablet Oral BID     Continuous Infusions:  dexmedetomidine 0.2-1.5 mcg/kg/hr Last Rate: 1 mcg/kg/hr (07/10/20 0646)   EPINEPHrine 0.02-0.3 mcg/kg/min Last Rate: 0.07 mcg/kg/min (07/10/20 0523)   insulin 0-50 Units/hr Last Rate: 10.5 Units/hr (07/10/20 0142)   milrinone 0.25 mcg/kg/min Last Rate: 0.25 mcg/kg/min (07/10/20 0408)   nitroglycerin 5-50 mcg/min    vasopressin 0.03 Units/min Last Rate: 0.03 Units/min (07/09/20 2322)       Allergies:  Allergies   Allergen Reactions   • Lisinopril Angioedema       ROS  Negative x14 were review of systems except as mentioned above  Objective:     Temp:  [97.5 °F (36.4 °C)-99.5 °F (37.5 °C)] 97.6 °F (36.4 °C)  Heart Rate:  [] 90  Resp:  [18-22] 22  BP: ()/(45-71) 100/56  Arterial Line BP: ()/(46-63) 115/61  FiO2 (%):  [40 %-100 %] 40 %     Intake/Output Summary (Last 24 hours) at 7/10/2020 0727  Last data filed at 7/10/2020 0200  Gross per 24 hour   Intake 1641 ml   Output 2332 ml   Net -691 ml     Body mass index is 33.52 kg/m².      07/08/20  1512 07/09/20  0430 07/09/20  0812   Weight: 99.8 kg (220 lb) 103 kg (227 lb 15.3 oz) 103 kg (227 lb)         Physical Exam:  Neuro:  CV:  Resp:  GI:  Ext:  Tele:  Intubated awake alert response to questions appropriately  S1S2 RRR, +cardiac rub, mild RV lift no heave, no gallops  On vent, anterior CTA/dim bases ventilatory sounds  BS+hypo, abd soft nontender nondistended  Pedal pulses palp, no edema, right groin sheath in place without bleeding or hematoma  AV Pacing, complete heart block on EKG  Intubated  Awake alert  Normocephalic atraumatic pupils equal round extraocular is intact bilaterally  Moves all extremities neurologically grossly intact bilaterally  No bony abnormalities grossly  All incisions clean and dry                                                   Lab Review:   Results  from last 7 days   Lab Units 07/10/20  0525 07/09/20  1903 07/09/20  0412  07/08/20  1014   SODIUM mmol/L 146* 142 137  --  137   POTASSIUM mmol/L 2.7* 3.5 3.9   < > 3.4*   CHLORIDE mmol/L 100 100 103  --  98   CO2 mmol/L 17.0* 17.0* 23.0  --  26.0   BUN  36* 29* 22  --  17   CREATININE mg/dL 2.80* 1.82* 1.12  --  1.04   GLUCOSE mg/dL 300* 218* 133*  --  172*   CALCIUM mg/dL 9.3 8.7 8.6  --  9.8   AST (SGOT) U/L  --   --  76*  --  125*   ALT (SGPT) U/L  --   --  39  --  59*    < > = values in this interval not displayed.     Results from last 7 days   Lab Units 07/09/20  0412 07/08/20  2303 07/08/20  1601 07/08/20  1014   CK TOTAL U/L 517*  --  839*  --    TROPONIN T ng/mL  --  1.320* 1.280* 1.070*     Results from last 7 days   Lab Units 07/10/20  0525 07/10/20  0340 07/09/20  1903   WBC 10*3/mm3 24.20*  --   --  33.60*   HEMOGLOBIN g/dL 12.1*  --   --  13.4   HEMOGLOBIN, POC g/dL  --  12.5   < >  --    HEMATOCRIT % 36.7*  --   --  41.4   HEMATOCRIT POC %  --  37*   < >  --    PLATELETS 10*3/mm3 225  --   --  215    < > = values in this interval not displayed.     Results from last 7 days   Lab Units 07/10/20  0525 07/09/20  1903 07/09/20  0412   INR  1.26* 1.36*  --    APTT seconds  --  22.9* 26.2*     Results from last 7 days   Lab Units 07/10/20  0525 07/09/20  0412   MAGNESIUM mg/dL 3.3* 2.3     Results from last 7 days   Lab Units 07/08/20  1014   CHOLESTEROL mg/dL 90   TRIGLYCERIDES mg/dL 118   HDL CHOL mg/dL 35*     Results from last 7 days   Lab Units 07/09/20  0412 07/08/20  1014   PROBNP pg/mL 1,066.0* 1,236.0*     Results from last 7 days   Lab Units 07/09/20  0412   TSH uIU/mL 1.630       Recent Radiology:  Imaging Results (Most Recent)     Procedure Component Value Units Date/Time    XR Chest 1 View [[id number]] Resulted:  07/10/20 0455     Updated:  07/10/20 0456    XR Chest 1 View [[id number]] Collected:  07/09/20 2012     Updated:  07/09/20 2018    Narrative:       XR CHEST 1 VW-     Date of Exam:  7/9/2020 7:30 PM     Indication: Status post cardiac surgery.     Comparison Exams: Chest radiograph from earlier today     Technique: Single AP chest radiograph     FINDINGS:  Median sternotomy wires appear intact. An endotracheal tube has its tip  in the midtrachea. A right internal jugular Pierson-Felipe catheter has tip  in the main pulmonary artery. Mediastinal and left chest tubes are in  place. No pneumothorax is identified. An intra-aortic balloon pump is  present, with its marker tip 3.1 cm below the aortic knob. There is  scattered bilateral atelectasis. The heart and mediastinal contours  appear stable. The pulmonary vasculature appears normal.       Impression:       1.Endotracheal tube in good position.  2.Mediastinal and left chest tubes in place. No pneumothorax identified.  3.Intra-aortic balloon pump with marker tip 3.1 cm below aortic knob.  4.Scattered bilateral atelectasis.     Electronically Signed By-DR. Eliecer Santiago MD On:7/9/2020 8:16 PM  This report was finalized on [id number] by DR. Eliecer Santiago MD.    XR Chest 1 View [[id number]] Collected:  07/09/20 1013     Updated:  07/09/20 1024    Narrative:       EXAMINATION: XR CHEST 1 VW-     DATE OF EXAM: 7/9/2020 9:49 AM     INDICATION: post IABP placement; I25.110-Atherosclerotic heart disease  of native coronary artery with unstable angina pectoris; I21.4-Non-ST  elevation (NSTEMI) myocardial infarction; I21.4-Non-ST elevation  (NSTEMI) myocardial infarction; R94.31-Abnormal electrocardiogram (ECG)  (EKG); R07.9-Chest pain, unspecified; R06.00-Dyspnea, unspecified.     COMPARISON: Chest radiograph dated 07/08/2020     TECHNIQUE: Portable AP view of the chest was obtained.     FINDINGS:  The intra-aortic balloon pump marker tip is slightly high at the  superior aspect of the aortic arch. The cardiomediastinal silhouette is  at the upper limit of normal. Pulmonary vascularity appears within  normal limits. There are low lung volumes. There  is no focal airspace  consolidation, pleural effusion, or pneumothorax. There are no acute  osseous findings.       Impression:       1. IABP marker tip slightly high with tip at the superior aspect of the  aortic knob.     Electronically Signed By-Brady Watson On:7/9/2020 10:22 AM  This report was finalized on [id number] by  Brady Watson, .    US Renal Bilateral [[id number]] Collected:  07/08/20 2038     Updated:  07/08/20 2041    Narrative:       DATE OF EXAM:  7/8/2020 6:17 PM     PROCEDURE:  US RENAL BILATERAL-     INDICATIONS:  Chronic kidney disease     COMPARISON:  No Comparisons Available     TECHNIQUE:   Grayscale and color Doppler ultrasound evaluation of the kidneys and  urinary bladder was performed.        FINDINGS:  Both kidneys appear normal in size and echogenicity, with no stone,  mass, or hydronephrosis identified. The right kidney measures 11.5 cm,  while the left kidney measures 11.9 cm.     The urinary bladder is not well-seen secondary to Aguirre catheterization.          Impression:       Both kidneys appear within normal limits.     Electronically Signed By-DR. Eliecer Santiago MD On:7/8/2020 8:39 PM  This report was finalized on [id number] by DR. Eliecer Santiago MD.    XR Chest 1 View [[id number]] Collected:  07/08/20 1102     Updated:  07/08/20 1105    Narrative:       EXAMINATION: XR CHEST 1 VW-     DATE OF EXAM: 7/8/2020 10:43 AM     INDICATION: Chest pain for one day     COMPARISON: Chest radiograph dated 05/18/2014     TECHNIQUE: Portable AP view of the chest was obtained.     FINDINGS:  The cardiomediastinal silhouette is within normal limits. Pulmonary  vascularity is unremarkable. There is no focal airspace consolidation,  pleural effusion, or pneumothorax. There are no acute osseous findings.       Impression:       No acute cardiopulmonary abnormality.     Electronically Signed By-Brady Watson On:7/8/2020 11:03 AM  This report was finalized on [id number] by  Brady  Shirley, .          ECHOCARDIOGRAM:    Results for orders placed during the hospital encounter of 07/08/20   Adult Transthoracic Echo Limited W/ Cont if Necessary Per Protocol    Narrative · Left ventricular systolic function is normal.  · Mild tricuspid valve regurgitation is present.            I reviewed the patient's new clinical results.    EKG:      Assessment:       NSTEMI, initial episode of care (CMS/[facility])    NSTEMI (non-ST elevated myocardial infarction) (CMS/[facility])    Coronary artery disease involving native coronary artery of native heart with unstable angina pectoris (CMS/[facility])    1. NSTEMI / MV CAD s/p 5vCABG 7/9/20  - troponin 1.0, 1.2, 1.3  - ECG with ST depression in V2 V3  - [facility] 7/8/2020 revealed MV CAD  - IABP in place  - post op EKG 7/9 with ST elevation     2. Hypotension  - on multiple pressors, milrinone, IABP     3. Post op Second degree AV block / CHB  - AV pacing     4. H/o HTN   - currently b/p hypotensive     5. Transaminitis / elevated CK  - holding statin     6. Hypokalemia  - K 2.7 --> replaced     7. SUZANNE on CKD stage 2  - renal on board     Plan:   Continue pacing with complete heart block  Continue vasopressor and inotropic support  2D echo reviewed and discussed with CV surgery today  Balloon pump decreased 3-1, wean and try to remove today  Plan extubation today for surgery  Advance medical therapy as indicated tolerated by hemodynamics  Aspirin for life  Replace electrolytes today  Continue RV support  Critical care measures discussed with staff today.          Critical care time 40 minutes spent on hemodynamic review, ABG reviewed, arterial line pressure review, calculating cardiac outputs, review of 2D echo for RV mediated vasopressor and inotrope titration, review of the intra-aortic balloon pump with titration to 3-1 unloading for weaning.

## 2020-07-10 NOTE — NURSING NOTE
Pt remains on vent and IABP support. IABP currently at 1:2. Vitals are stable at this time. Although on 3 pressors. HR stable underlying rhythm sinus at this time. Was 40's underneath his pacer and in complete heart block earlier in the shift. Stopped Amio drip and notified Dr. Lake. Pt is now on minimal vent settings. Was at 100% FiO2 and 10 of peep and  per Dr. Lake. Now down to 40% and 5 of peep. Although pt has been acidotic throughout the night due to low bicarb level. Pt has received a total of 6 amps of bicarb and now is trending back up closer to normal levels. UOP was around 30cc/hr and now has increased to atleast 75cc/hr. Pt denies pain and is alert and follows commands. No acute distress noted. Will continue to monitor.

## 2020-07-10 NOTE — PROGRESS NOTES
Continued Stay Note  TAWANA Murguia     Patient Name: Lonnie Salinas  MRN: [id number]  Today's Date: 7/10/2020    Admit Date: 7/8/2020    Discharge Plan     Row Name 07/10/20 1237       Plan    Plan  needs pending PT/OT jami, on vent 7/10    Plan Comments  barriers to DC: CABG on 7/9  PT/OT pending            Lynn Elise RN

## 2020-07-10 NOTE — PROGRESS NOTES
"NEPHROLOGY PROGRESS NOTE------[facility]    [facility]  [phone]  Xavier Kruse MD      Patient Care Team:  Jarrell Alaniz Jr., MD as PCP - General  Lauren Singh APRN as Nurse Practitioner (Nurse Practitioner)  Christelle Kruse MD as Consulting Physician (Nephrology)      Provider:  Xavier Kruse MD  Patient Name: Lonnie Salinas  :  1955    SUBJECTIVE:    F/U ARF/SUZANNE/CRF/CKD STG 2    Sedated and intubated on vent. On multiple pressors and IABP. Wife present in room    Medication:    acetaminophen 650 mg Rectal Q6H   Or      acetaminophen 500 mg Oral Q6H   amiodarone 150 mg Intravenous Once   aspirin 81 mg Oral Daily   atorvastatin 40 mg Oral Nightly   ceFAZolin 2 g Intravenous Q8H   chlorhexidine 15 mL Mouth/Throat Q12H   [START ON 2020] enoxaparin 40 mg Subcutaneous Daily   magnesium sulfate 1 g Intravenous Q8H   mupirocin 1 application Each Nare BID   pantoprazole 40 mg Oral Q AM   pantoprazole 40 mg Intravenous Q AM   polyethylene glycol 17 g Oral BID   senna 1 tablet Oral BID       dexmedetomidine 0.2-1.5 mcg/kg/hr Last Rate: 1 mcg/kg/hr (07/10/20 0646)   EPINEPHrine 0.02-0.3 mcg/kg/min Last Rate: 0.07 mcg/kg/min (07/10/20 0523)   insulin 0-50 Units/hr Last Rate: 10.5 Units/hr (07/10/20 014)   milrinone 0.25 mcg/kg/min Last Rate: 0.25 mcg/kg/min (07/10/20 7068)   nitroglycerin 5-50 mcg/min    vasopressin 0.03 Units/min Last Rate: 0.03 Units/min (20 269)       OBJECTIVE    Vital Sign Min/Max for last 24 hours  Temp  Min: 97.5 °F (36.4 °C)  Max: 99.5 °F (37.5 °C)   BP  Min: 85/55  Max: 113/59   Pulse  Min: 56  Max: 112   Resp  Min: 18  Max: 22   SpO2  Min: 85 %  Max: 100 %   No data recorded   Weight  Min: 103 kg (227 lb)  Max: 103 kg (227 lb)     Flowsheet Rows      First Filed Value   Admission Height  182.9 cm (72\") Documented at 2020 0944   Admission Weight  102 kg (225 lb 15.5 oz) Documented at " 07/08/2020 0944          No intake/output data recorded.  I/O last 3 completed shifts:  In: 2624 [I.V.:2094; Blood:530]  Out: 2788 [Urine:1113; Blood:1590; Chest Tube:85]    Physical Exam:  General Appearance: SEDATED AND INTUBATED ON VENT  Head: ETT INTACT  Eyes: conjunctivae and sclerae normal and no icterus  Neck: supple +MINIMAL JVD  Lungs: DECREASED BS BIBASILAR  Heart: regular rhythm & normal rate and normal S1, S2 +JOSE EDUARDO  Chest: Wall no abnormalities observed  Abdomen: normal bowel sounds and soft non-tender +OBESITY  Extremities:  no edema, no cyanosis and no redness  Skin: no bleeding, bruising or rash, turgor normal, color normal and no lesions noted  Neurologic: SEDATED    Labs:    WBC WBC   Date Value Ref Range Status   07/10/2020 24.20 (H) 3.40 - 10.80 10*3/mm3 Final   07/09/2020 33.60 (C) 3.40 - 10.80 10*3/mm3 Final   07/09/2020 10.30 3.40 - 10.80 10*3/mm3 Final   07/08/2020 12.30 (H) 3.40 - 10.80 10*3/mm3 Final      HGB Hemoglobin   Date Value Ref Range Status   07/10/2020 12.1 (L) 13.0 - 17.7 g/dL Final   07/10/2020 12.5 12.0 - 17.0 g/dL Final   07/10/2020 12.3 12.0 - 17.0 g/dL Final   07/10/2020 12.0 12.0 - 17.0 g/dL Final   07/10/2020 12.1 12.0 - 17.0 g/dL Final   07/09/2020 12.1 12.0 - 17.0 g/dL Final   07/09/2020 13.2 12.0 - 17.0 g/dL Final   07/09/2020 14.5 12.0 - 17.0 g/dL Final   07/09/2020 13.4 13.0 - 17.7 g/dL Final   07/09/2020 13.6 12.0 - 17.0 g/dL Final   07/09/2020 13.8 13.0 - 17.7 g/dL Final   07/08/2020 15.6 13.0 - 17.7 g/dL Final      HCT Hematocrit   Date Value Ref Range Status   07/10/2020 36.7 (L) 37.5 - 51.0 % Final   07/10/2020 37 (L) 38 - 51 % Final   07/10/2020 36 (L) 38 - 51 % Final   07/10/2020 35 (L) 38 - 51 % Final   07/10/2020 36 (L) 38 - 51 % Final   07/09/2020 36 (L) 38 - 51 % Final   07/09/2020 39 38 - 51 % Final   07/09/2020 43 38 - 51 % Final   07/09/2020 41.4 37.5 - 51.0 % Final   07/09/2020 40 38 - 51 % Final   07/09/2020 40.6 37.5 - 51.0 % Final   07/08/2020 46.2  37.5 - 51.0 % Final      Platlets No results found for: LABPLAT   MCV MCV   Date Value Ref Range Status   07/10/2020 86.7 79.0 - 97.0 fL Final   07/09/2020 89.3 79.0 - 97.0 fL Final   07/09/2020 86.6 79.0 - 97.0 fL Final   07/08/2020 86.0 79.0 - 97.0 fL Final          Sodium Sodium   Date Value Ref Range Status   07/10/2020 146 (H) 136 - 145 mmol/L Final   07/09/2020 142 136 - 145 mmol/L Final   07/09/2020 137 136 - 145 mmol/L Final   07/08/2020 137 136 - 145 mmol/L Final      Potassium Potassium   Date Value Ref Range Status   07/10/2020 2.7 (L) 3.5 - 5.2 mmol/L Final   07/09/2020 3.5 3.5 - 5.2 mmol/L Final     Comment:     Specimen hemolyzed.  Results may be affected.   07/09/2020 3.9 3.5 - 5.2 mmol/L Final   07/08/2020 4.0 3.5 - 5.2 mmol/L Final   07/08/2020 3.4 (L) 3.5 - 5.2 mmol/L Final      Chloride Chloride   Date Value Ref Range Status   07/10/2020 100 98 - 107 mmol/L Final   07/09/2020 100 98 - 107 mmol/L Final   07/09/2020 103 98 - 107 mmol/L Final   07/08/2020 98 98 - 107 mmol/L Final      CO2 CO2   Date Value Ref Range Status   07/10/2020 17.0 (L) 22.0 - 29.0 mmol/L Final   07/09/2020 17.0 (L) 22.0 - 29.0 mmol/L Final   07/09/2020 23.0 22.0 - 29.0 mmol/L Final   07/08/2020 26.0 22.0 - 29.0 mmol/L Final      BUN BUN   Date Value Ref Range Status   07/10/2020   Final     Comment:     Testing performed by alternate method   07/10/2020 36 (H) 8 - 23 mg/dL Final   07/09/2020   Final     Comment:     Testing performed by alternate method   07/09/2020 29 (H) 8 - 23 mg/dL Final   07/09/2020   Final     Comment:     Testing performed by alternate method   07/09/2020 22 8 - 23 mg/dL Final   07/08/2020   Final     Comment:     Testing performed by alternate method   07/08/2020 17 8 - 23 mg/dL Final      Creatinine Creatinine   Date Value Ref Range Status   07/10/2020 2.80 (H) 0.76 - 1.27 mg/dL Final   07/09/2020 1.82 (H) 0.76 - 1.27 mg/dL Final   07/09/2020 1.12 0.76 - 1.27 mg/dL Final   07/08/2020 1.04 0.76 - 1.27  mg/dL Final      Calcium Calcium   Date Value Ref Range Status   07/10/2020 9.3 8.6 - 10.5 mg/dL Final   07/09/2020 8.7 8.6 - 10.5 mg/dL Final   07/09/2020 8.6 8.6 - 10.5 mg/dL Final   07/08/2020 9.8 8.6 - 10.5 mg/dL Final      PO4 No components found for: PO4   Albumin Albumin   Date Value Ref Range Status   07/10/2020 4.70 3.50 - 5.20 g/dL Final   07/09/2020 4.30 3.50 - 5.20 g/dL Final   07/09/2020 3.70 3.50 - 5.20 g/dL Final   07/08/2020 4.40 3.50 - 5.20 g/dL Final      Magnesium Magnesium   Date Value Ref Range Status   07/10/2020 3.3 (H) 1.6 - 2.4 mg/dL Final   07/09/2020 2.3 1.6 - 2.4 mg/dL Final   07/08/2020 2.0 1.6 - 2.4 mg/dL Final      Uric Acid No components found for: URIC ACID     Imaging Results (Last 72 Hours)     Procedure Component Value Units Date/Time    XR Chest 1 View [[id number]] Resulted:  07/10/20 0455     Updated:  07/10/20 0456    XR Chest 1 View [[id number]] Collected:  07/09/20 2012     Updated:  07/09/20 2018    Narrative:       XR CHEST 1 VW-     Date of Exam: 7/9/2020 7:30 PM     Indication: Status post cardiac surgery.     Comparison Exams: Chest radiograph from earlier today     Technique: Single AP chest radiograph     FINDINGS:  Median sternotomy wires appear intact. An endotracheal tube has its tip  in the midtrachea. A right internal jugular [place]-Felipe catheter has tip  in the main pulmonary artery. Mediastinal and left chest tubes are in  place. No pneumothorax is identified. An intra-aortic balloon pump is  present, with its marker tip 3.1 cm below the aortic knob. There is  scattered bilateral atelectasis. The heart and mediastinal contours  appear stable. The pulmonary vasculature appears normal.       Impression:       1.Endotracheal tube in good position.  2.Mediastinal and left chest tubes in place. No pneumothorax identified.  3.Intra-aortic balloon pump with marker tip 3.1 cm below aortic knob.  4.Scattered bilateral atelectasis.     Electronically Signed By-DR. Gonzáles  MD Jack On:7/9/2020 8:16 PM  This report was finalized on [id number] by DR. Eliecer Santiago MD.    XR Chest 1 View [[id number]] Collected:  07/09/20 1013     Updated:  07/09/20 1024    Narrative:       EXAMINATION: XR CHEST 1 VW-     DATE OF EXAM: 7/9/2020 9:49 AM     INDICATION: post IABP placement; I25.110-Atherosclerotic heart disease  of native coronary artery with unstable angina pectoris; I21.4-Non-ST  elevation (NSTEMI) myocardial infarction; I21.4-Non-ST elevation  (NSTEMI) myocardial infarction; R94.31-Abnormal electrocardiogram (ECG)  (EKG); R07.9-Chest pain, unspecified; R06.00-Dyspnea, unspecified.     COMPARISON: Chest radiograph dated 07/08/2020     TECHNIQUE: Portable AP view of the chest was obtained.     FINDINGS:  The intra-aortic balloon pump marker tip is slightly high at the  superior aspect of the aortic arch. The cardiomediastinal silhouette is  at the upper limit of normal. Pulmonary vascularity appears within  normal limits. There are low lung volumes. There is no focal airspace  consolidation, pleural effusion, or pneumothorax. There are no acute  osseous findings.       Impression:       1. IABP marker tip slightly high with tip at the superior aspect of the  aortic knob.     Electronically Signed By-Brady Watson On:7/9/2020 10:22 AM  This report was finalized on [id number] by  Brady Watson, .    US Renal Bilateral [[id number]] Collected:  07/08/20 2038     Updated:  07/08/20 2041    Narrative:       DATE OF EXAM:  7/8/2020 6:17 PM     PROCEDURE:  US RENAL BILATERAL-     INDICATIONS:  Chronic kidney disease     COMPARISON:  No Comparisons Available     TECHNIQUE:   Grayscale and color Doppler ultrasound evaluation of the kidneys and  urinary bladder was performed.        FINDINGS:  Both kidneys appear normal in size and echogenicity, with no stone,  mass, or hydronephrosis identified. The right kidney measures 11.5 cm,  while the left kidney measures 11.9 cm.     The  urinary bladder is not well-seen secondary to Aguirre catheterization.          Impression:       Both kidneys appear within normal limits.     Electronically Signed By-DR. Eliecer Santiago MD On:7/8/2020 8:39 PM  This report was finalized on [id number] by DR. Eliecer Santiago MD.    XR Chest 1 View [[id number]] Collected:  07/08/20 1102     Updated:  07/08/20 1105    Narrative:       EXAMINATION: XR CHEST 1 VW-     DATE OF EXAM: 7/8/2020 10:43 AM     INDICATION: Chest pain for one day     COMPARISON: Chest radiograph dated 05/18/2014     TECHNIQUE: Portable AP view of the chest was obtained.     FINDINGS:  The cardiomediastinal silhouette is within normal limits. Pulmonary  vascularity is unremarkable. There is no focal airspace consolidation,  pleural effusion, or pneumothorax. There are no acute osseous findings.       Impression:       No acute cardiopulmonary abnormality.     Electronically Signed By-Brady Watson On:7/8/2020 11:03 AM  This report was finalized on [id number] by  Brady Watson, .          Results for orders placed during the hospital encounter of 07/08/20   XR Chest 1 View    Narrative XR CHEST 1 VW-     Date of Exam: 7/9/2020 7:30 PM     Indication: Status post cardiac surgery.     Comparison Exams: Chest radiograph from earlier today     Technique: Single AP chest radiograph     FINDINGS:  Median sternotomy wires appear intact. An endotracheal tube has its tip  in the midtrachea. A right internal jugular [place]-Felipe catheter has tip  in the main pulmonary artery. Mediastinal and left chest tubes are in  place. No pneumothorax is identified. An intra-aortic balloon pump is  present, with its marker tip 3.1 cm below the aortic knob. There is  scattered bilateral atelectasis. The heart and mediastinal contours  appear stable. The pulmonary vasculature appears normal.       Impression 1.Endotracheal tube in good position.  2.Mediastinal and left chest tubes in place. No pneumothorax  identified.  3.Intra-aortic balloon pump with marker tip 3.1 cm below aortic knob.  4.Scattered bilateral atelectasis.     Electronically Signed By-DR. Eliecer Santiago MD On:7/9/2020 8:16 PM  This report was finalized on [id number] by DR. Eliecer Santiago MD.   XR Chest 1 View    Narrative EXAMINATION: XR CHEST 1 VW-     DATE OF EXAM: 7/9/2020 9:49 AM     INDICATION: post IABP placement; I25.110-Atherosclerotic heart disease  of native coronary artery with unstable angina pectoris; I21.4-Non-ST  elevation (NSTEMI) myocardial infarction; I21.4-Non-ST elevation  (NSTEMI) myocardial infarction; R94.31-Abnormal electrocardiogram (ECG)  (EKG); R07.9-Chest pain, unspecified; R06.00-Dyspnea, unspecified.     COMPARISON: Chest radiograph dated 07/08/2020     TECHNIQUE: Portable AP view of the chest was obtained.     FINDINGS:  The intra-aortic balloon pump marker tip is slightly high at the  superior aspect of the aortic arch. The cardiomediastinal silhouette is  at the upper limit of normal. Pulmonary vascularity appears within  normal limits. There are low lung volumes. There is no focal airspace  consolidation, pleural effusion, or pneumothorax. There are no acute  osseous findings.       Impression 1. IABP marker tip slightly high with tip at the superior aspect of the  aortic knob.     Electronically Signed By-Brady Watson On:7/9/2020 10:22 AM  This report was finalized on [id number] by  Brady Watson, .   XR Chest 1 View    Narrative EXAMINATION: XR CHEST 1 VW-     DATE OF EXAM: 7/8/2020 10:43 AM     INDICATION: Chest pain for one day     COMPARISON: Chest radiograph dated 05/18/2014     TECHNIQUE: Portable AP view of the chest was obtained.     FINDINGS:  The cardiomediastinal silhouette is within normal limits. Pulmonary  vascularity is unremarkable. There is no focal airspace consolidation,  pleural effusion, or pneumothorax. There are no acute osseous findings.       Impression No acute  cardiopulmonary abnormality.     Electronically Signed By-Brady Watson On:7/8/2020 11:03 AM  This report was finalized on [id number] by  Brady Watson, .       Results for orders placed during the hospital encounter of 07/08/20   Duplex Vein Mapping Lower Extremity - Bilateral CAR    Narrative · The right greater saphenous vein is patent and of adequate size in the   thigh.  · The right greater saphenous vein is patent and of adequate size in the   calf.  · The left greater saphenous vein is patent and of adequate size in the   thigh.  · The left greater saphenous vein has an adequate segment in the mid calf   but is adequate at the ankle.            ASSESSMENT / PLAN      NSTEMI, initial episode of care (CMS/[facility])    NSTEMI (non-ST elevated myocardial infarction) (CMS/HCC)    Coronary artery disease involving native coronary artery of native heart with unstable angina pectoris (CMS/[facility])    1. ARF/SUZANNE/CRF/CKD STG 2-----Nonoliguric. ARF/SUZANNE secondary to ATN from hypotension and contrast exposure and renal perfusion disturbance from IABP. Support hemodynamics as much as possible. Spoke to patient's wife at length. Follow for CRRT need.  Dose meds for CrCl less than 10 cc/min. No NSAIDs or further IV dye.     2. HYPOKALEMIA------Replace IV with KPHos and follow levels closely     3. ANGINA/CAD S/P NSTEMI S/P CARDIAC CATH S/P CABG------On IABP per , Cardiology. Pressors/Inotropes per , CT surgery. On Milrinone.     4. HYPERLIPIDEMIA/ELEVATED CK------Statin on hold. Follow CK     5. OA-------No further NSAIDs.       6. OBESITY/ELEVATED BMI    7. HYPOCALCEMIA------Replaced    8. HYPOMAGNESEMIA------replaced    9. HYPOPHOSPHATEMIA-----Replace IV        Xavier Kruse MD  [facility]  [phone]  07/10/20  07:17

## 2020-07-11 ENCOUNTER — APPOINTMENT (OUTPATIENT)
Dept: GENERAL RADIOLOGY | Facility: HOSPITAL | Age: 65
End: 2020-07-11

## 2020-07-11 LAB
ALBUMIN SERPL-MCNC: 3.8 G/DL (ref 3.5–5.2)
ALBUMIN/GLOB SERPL: 2.2 G/DL
ALP SERPL-CCNC: 36 U/L (ref 39–117)
ALT SERPL W P-5'-P-CCNC: 19 U/L (ref 1–41)
ANION GAP SERPL CALCULATED.3IONS-SCNC: 11 MMOL/L (ref 5–15)
ARTERIAL PATENCY WRIST A: ABNORMAL
AST SERPL-CCNC: 58 U/L (ref 1–40)
ATMOSPHERIC PRESS: ABNORMAL MM[HG]
BACTERIA SPEC AEROBE CULT: NO GROWTH
BASE EXCESS BLDA CALC-SCNC: 0.9 MMOL/L (ref 0–3)
BDY SITE: ABNORMAL
BILIRUB SERPL-MCNC: 0.4 MG/DL (ref 0–1.2)
BUN SERPL-MCNC: 46 MG/DL (ref 8–23)
BUN SERPL-MCNC: ABNORMAL MG/DL
BUN/CREAT SERPL: ABNORMAL
CA-I SERPL ISE-MCNC: 1.16 MMOL/L (ref 1.2–1.3)
CALCIUM SPEC-SCNC: 8.8 MG/DL (ref 8.6–10.5)
CHLORIDE SERPL-SCNC: 107 MMOL/L (ref 98–107)
CK SERPL-CCNC: 1243 U/L (ref 20–200)
CO2 BLDA-SCNC: 25.9 MMOL/L (ref 22–29)
CO2 SERPL-SCNC: 28 MMOL/L (ref 22–29)
CREAT SERPL-MCNC: 1.79 MG/DL (ref 0.76–1.27)
D-LACTATE SERPL-SCNC: 1.4 MMOL/L (ref 0.5–2)
DEPRECATED RDW RBC AUTO: 47.3 FL (ref 37–54)
ERYTHROCYTE [DISTWIDTH] IN BLOOD BY AUTOMATED COUNT: 15.5 % (ref 12.3–15.4)
GFR SERPL CREATININE-BSD FRML MDRD: 39 ML/MIN/1.73
GLOBULIN UR ELPH-MCNC: 1.7 GM/DL
GLUCOSE BLDC GLUCOMTR-MCNC: 100 MG/DL (ref 70–105)
GLUCOSE BLDC GLUCOMTR-MCNC: 100 MG/DL (ref 70–105)
GLUCOSE BLDC GLUCOMTR-MCNC: 103 MG/DL (ref 70–105)
GLUCOSE BLDC GLUCOMTR-MCNC: 106 MG/DL (ref 70–105)
GLUCOSE BLDC GLUCOMTR-MCNC: 113 MG/DL (ref 70–105)
GLUCOSE BLDC GLUCOMTR-MCNC: 120 MG/DL (ref 70–105)
GLUCOSE BLDC GLUCOMTR-MCNC: 130 MG/DL (ref 70–105)
GLUCOSE BLDC GLUCOMTR-MCNC: 131 MG/DL (ref 70–105)
GLUCOSE BLDC GLUCOMTR-MCNC: 133 MG/DL (ref 70–105)
GLUCOSE BLDC GLUCOMTR-MCNC: 136 MG/DL (ref 70–105)
GLUCOSE BLDC GLUCOMTR-MCNC: 76 MG/DL (ref 70–105)
GLUCOSE SERPL-MCNC: 112 MG/DL (ref 65–99)
HCO3 BLDA-SCNC: 24.8 MMOL/L (ref 21–28)
HCT VFR BLD AUTO: 30.8 % (ref 37.5–51)
HEMODILUTION: NO
HGB BLD-MCNC: 10.5 G/DL (ref 13–17.7)
INHALED O2 CONCENTRATION: 32 %
MAGNESIUM SERPL-MCNC: 3 MG/DL (ref 1.6–2.4)
MCH RBC QN AUTO: 29.3 PG (ref 26.6–33)
MCHC RBC AUTO-ENTMCNC: 34 G/DL (ref 31.5–35.7)
MCV RBC AUTO: 86.1 FL (ref 79–97)
MODALITY: ABNORMAL
PCO2 BLDA: 36.1 MM HG (ref 35–48)
PH BLDA: 7.45 PH UNITS (ref 7.35–7.45)
PHOSPHATE SERPL-MCNC: 4.3 MG/DL (ref 2.5–4.5)
PLATELET # BLD AUTO: 94 10*3/MM3 (ref 140–450)
PMV BLD AUTO: 8.2 FL (ref 6–12)
PO2 BLDA: 73.6 MM HG (ref 83–108)
POTASSIUM SERPL-SCNC: 3.8 MMOL/L (ref 3.5–5.2)
PROT SERPL-MCNC: 5.5 G/DL (ref 6–8.5)
RBC # BLD AUTO: 3.58 10*6/MM3 (ref 4.14–5.8)
SAO2 % BLDCOA: 95.3 % (ref 94–98)
SODIUM SERPL-SCNC: 146 MMOL/L (ref 136–145)
WBC # BLD AUTO: 12.6 10*3/MM3 (ref 3.4–10.8)

## 2020-07-11 PROCEDURE — 71045 X-RAY EXAM CHEST 1 VIEW: CPT

## 2020-07-11 PROCEDURE — 82962 GLUCOSE BLOOD TEST: CPT

## 2020-07-11 PROCEDURE — 25010000003 POTASSIUM CHLORIDE 10 MEQ/100ML SOLUTION: Performed by: INTERNAL MEDICINE

## 2020-07-11 PROCEDURE — 97166 OT EVAL MOD COMPLEX 45 MIN: CPT

## 2020-07-11 PROCEDURE — 84100 ASSAY OF PHOSPHORUS: CPT | Performed by: INTERNAL MEDICINE

## 2020-07-11 PROCEDURE — 25010000002 FUROSEMIDE PER 20 MG: Performed by: THORACIC SURGERY (CARDIOTHORACIC VASCULAR SURGERY)

## 2020-07-11 PROCEDURE — 25010000003 MILRINONE LACTATE IN DEXTROSE 20-5 MG/100ML-% SOLUTION: Performed by: THORACIC SURGERY (CARDIOTHORACIC VASCULAR SURGERY)

## 2020-07-11 PROCEDURE — 80053 COMPREHEN METABOLIC PANEL: CPT | Performed by: INTERNAL MEDICINE

## 2020-07-11 PROCEDURE — 82330 ASSAY OF CALCIUM: CPT | Performed by: INTERNAL MEDICINE

## 2020-07-11 PROCEDURE — 25010000002 CALCIUM GLUCONATE-NACL 1-0.675 GM/50ML-% SOLUTION: Performed by: INTERNAL MEDICINE

## 2020-07-11 PROCEDURE — 82550 ASSAY OF CK (CPK): CPT | Performed by: INTERNAL MEDICINE

## 2020-07-11 PROCEDURE — 82803 BLOOD GASES ANY COMBINATION: CPT

## 2020-07-11 PROCEDURE — 25010000002 EPINEPHRINE 30 MG/30ML SOLUTION 30 ML VIAL: Performed by: THORACIC SURGERY (CARDIOTHORACIC VASCULAR SURGERY)

## 2020-07-11 PROCEDURE — 83735 ASSAY OF MAGNESIUM: CPT | Performed by: INTERNAL MEDICINE

## 2020-07-11 PROCEDURE — 85027 COMPLETE CBC AUTOMATED: CPT | Performed by: NURSE PRACTITIONER

## 2020-07-11 PROCEDURE — 25010000002 CEFAZOLIN PER 500 MG: Performed by: NURSE PRACTITIONER

## 2020-07-11 PROCEDURE — 93005 ELECTROCARDIOGRAM TRACING: CPT | Performed by: NURSE PRACTITIONER

## 2020-07-11 PROCEDURE — 83605 ASSAY OF LACTIC ACID: CPT | Performed by: INTERNAL MEDICINE

## 2020-07-11 PROCEDURE — 99233 SBSQ HOSP IP/OBS HIGH 50: CPT | Performed by: INTERNAL MEDICINE

## 2020-07-11 RX ORDER — FUROSEMIDE 10 MG/ML
40 INJECTION INTRAMUSCULAR; INTRAVENOUS
Status: DISCONTINUED | OUTPATIENT
Start: 2020-07-11 | End: 2020-07-13

## 2020-07-11 RX ORDER — POTASSIUM CHLORIDE 7.45 MG/ML
10 INJECTION INTRAVENOUS
Status: COMPLETED | OUTPATIENT
Start: 2020-07-11 | End: 2020-07-11

## 2020-07-11 RX ORDER — CALCIUM GLUCONATE 20 MG/ML
1 INJECTION, SOLUTION INTRAVENOUS EVERY 12 HOURS
Status: COMPLETED | OUTPATIENT
Start: 2020-07-11 | End: 2020-07-11

## 2020-07-11 RX ORDER — AMIODARONE HYDROCHLORIDE 200 MG/1
400 TABLET ORAL ONCE
Status: COMPLETED | OUTPATIENT
Start: 2020-07-11 | End: 2020-07-11

## 2020-07-11 RX ORDER — POTASSIUM CHLORIDE 20 MEQ/1
20 TABLET, EXTENDED RELEASE ORAL 2 TIMES DAILY WITH MEALS
Status: DISCONTINUED | OUTPATIENT
Start: 2020-07-11 | End: 2020-07-14 | Stop reason: HOSPADM

## 2020-07-11 RX ORDER — AMIODARONE HYDROCHLORIDE 200 MG/1
400 TABLET ORAL 2 TIMES DAILY WITH MEALS
Status: DISCONTINUED | OUTPATIENT
Start: 2020-07-11 | End: 2020-07-14 | Stop reason: HOSPADM

## 2020-07-11 RX ADMIN — SODIUM BICARBONATE 50 MEQ: 84 INJECTION, SOLUTION INTRAVENOUS at 17:20

## 2020-07-11 RX ADMIN — MUPIROCIN 1 APPLICATION: 20 OINTMENT TOPICAL at 08:37

## 2020-07-11 RX ADMIN — POTASSIUM CHLORIDE 10 MEQ: 7.46 INJECTION, SOLUTION INTRAVENOUS at 08:39

## 2020-07-11 RX ADMIN — CALCIUM GLUCONATE 1 G: 20 INJECTION, SOLUTION INTRAVENOUS at 20:43

## 2020-07-11 RX ADMIN — PANTOPRAZOLE SODIUM 40 MG: 40 INJECTION, POWDER, FOR SOLUTION INTRAVENOUS at 06:37

## 2020-07-11 RX ADMIN — SODIUM BICARBONATE 50 MEQ: 84 INJECTION, SOLUTION INTRAVENOUS at 08:37

## 2020-07-11 RX ADMIN — POTASSIUM CHLORIDE 20 MEQ: 1500 TABLET, EXTENDED RELEASE ORAL at 17:21

## 2020-07-11 RX ADMIN — CALCIUM GLUCONATE 1 G: 20 INJECTION, SOLUTION INTRAVENOUS at 08:38

## 2020-07-11 RX ADMIN — AMIODARONE HYDROCHLORIDE 400 MG: 200 TABLET ORAL at 11:26

## 2020-07-11 RX ADMIN — AMIODARONE HYDROCHLORIDE 400 MG: 200 TABLET ORAL at 20:43

## 2020-07-11 RX ADMIN — HYDROCODONE BITARTRATE AND ACETAMINOPHEN 1 TABLET: 5; 325 TABLET ORAL at 20:43

## 2020-07-11 RX ADMIN — ASPIRIN 81 MG: 81 TABLET, COATED ORAL at 08:37

## 2020-07-11 RX ADMIN — MILRINONE LACTATE IN DEXTROSE 0.38 MCG/KG/MIN: 200 INJECTION, SOLUTION INTRAVENOUS at 08:28

## 2020-07-11 RX ADMIN — MILRINONE LACTATE IN DEXTROSE 0.25 MCG/KG/MIN: 200 INJECTION, SOLUTION INTRAVENOUS at 20:57

## 2020-07-11 RX ADMIN — POTASSIUM CHLORIDE 20 MEQ: 1500 TABLET, EXTENDED RELEASE ORAL at 11:26

## 2020-07-11 RX ADMIN — CHLORHEXIDINE GLUCONATE 0.12% ORAL RINSE 15 ML: 1.2 LIQUID ORAL at 20:43

## 2020-07-11 RX ADMIN — POTASSIUM CHLORIDE 10 MEQ: 7.46 INJECTION, SOLUTION INTRAVENOUS at 09:37

## 2020-07-11 RX ADMIN — CHLORHEXIDINE GLUCONATE 0.12% ORAL RINSE 15 ML: 1.2 LIQUID ORAL at 08:37

## 2020-07-11 RX ADMIN — CEFAZOLIN SODIUM 2 G: 10 INJECTION, POWDER, FOR SOLUTION INTRAVENOUS at 09:37

## 2020-07-11 RX ADMIN — HYDROCODONE BITARTRATE AND ACETAMINOPHEN 1 TABLET: 5; 325 TABLET ORAL at 15:39

## 2020-07-11 RX ADMIN — POTASSIUM CHLORIDE 10 MEQ: 7.46 INJECTION, SOLUTION INTRAVENOUS at 10:41

## 2020-07-11 RX ADMIN — MUPIROCIN 1 APPLICATION: 20 OINTMENT TOPICAL at 20:43

## 2020-07-11 RX ADMIN — HYDROCODONE BITARTRATE AND ACETAMINOPHEN 1 TABLET: 5; 325 TABLET ORAL at 07:26

## 2020-07-11 RX ADMIN — HYDROCODONE BITARTRATE AND ACETAMINOPHEN 1 TABLET: 5; 325 TABLET ORAL at 01:20

## 2020-07-11 RX ADMIN — FUROSEMIDE 40 MG: 10 INJECTION, SOLUTION INTRAMUSCULAR; INTRAVENOUS at 17:20

## 2020-07-11 RX ADMIN — HYDROCODONE BITARTRATE AND ACETAMINOPHEN 1 TABLET: 5; 325 TABLET ORAL at 11:26

## 2020-07-11 RX ADMIN — CEFAZOLIN SODIUM 2 G: 10 INJECTION, POWDER, FOR SOLUTION INTRAVENOUS at 01:12

## 2020-07-11 RX ADMIN — FUROSEMIDE 40 MG: 10 INJECTION, SOLUTION INTRAMUSCULAR; INTRAVENOUS at 11:26

## 2020-07-11 NOTE — PLAN OF CARE
Problem: Patient Care Overview  Goal: Plan of Care Review  Flowsheets (Taken 7/11/2020 5868)  Plan of Care Reviewed With: patient  Outcome Summary: 63 yo M s/p CABG x5 7/9. At baseline, pt resides with spouse, works full time, is an active , and enjoys grilling out. The initial eval was limited 2/2 ICU environment with multiple lines/monitors, but anticipate good progression and pt will be able to safely d/c home with spouse support. VSS with upright activity. PPE mask w/ shield, gloves.

## 2020-07-11 NOTE — NURSING NOTE
Dr. Lake updated on patient status.  Pacer turned off, Vaso gtt turned off, ; CO 5.59, CI 2.56, 's/60's (70's) and Urine Output 40-75 ml/hr over last 4 hours.  Per Dr. Lake, titrate Epi for CI greater than 2.0.  Will continue to monitor

## 2020-07-11 NOTE — THERAPY EVALUATION
Acute Care - Occupational Therapy Initial Evaluation  [facility]     Patient Name: Lonnie Salinas  : 1955  MRN: [id number]  Today's Date: 2020             Admit Date: 2020       ICD-10-CM ICD-9-CM   1. Coronary artery disease involving native coronary artery of native heart with unstable angina pectoris (CMS/HCC) I25.110 414.01     411.1   2. NSTEMI, initial episode of care (CMS/HCC) I21.4 410.71   3. NSTEMI (non-ST elevated myocardial infarction) (CMS/HCC) I21.4 410.70   4. Abnormal EKG R94.31 794.31   5. Chest pain, unspecified type R07.9 786.50   6. Exertional dyspnea R06.00 786.09     Patient Active Problem List   Diagnosis   • Acquired thrombocytopenia (CMS/HCC)   • Hyperlipidemia   • Hypertension   • Obesity   • Preventative health care   • Allergic rhinitis   • NSTEMI, initial episode of care (CMS/HCC)   • NSTEMI (non-ST elevated myocardial infarction) (CMS/HCC)   • Coronary artery disease involving native coronary artery of native heart with unstable angina pectoris (CMS/HCC)     Past Medical History:   Diagnosis Date   • Allergic    • Dyslipidemia    • History of tick-borne disease    • Hyperlipemia    • Hypertension      Past Surgical History:   Procedure Laterality Date   • CARDIAC CATHETERIZATION N/A 2020    Procedure: Left Heart Cath with possible PCI;  Surgeon: Jose Juan Fatima MD;  Location: [facility] CATH INVASIVE LOCATION;  Service: Cardiology;  Laterality: N/A;   • CARDIAC CATHETERIZATION N/A 2020    Procedure: Intra-Aortic Baloon Pump Insertion;  Surgeon: Jose Juan Fatima MD;  Location: [facility] CATH INVASIVE LOCATION;  Service: Cardiology;  Laterality: N/A;          OT ASSESSMENT FLOWSHEET (last 12 hours)      Occupational Therapy Evaluation     Row Name 20 1426                   OT Evaluation Time/Intention    Document Type  evaluation  -TANJA        Mode of Treatment  individual therapy  -TANJA           General Information    Patient Profile Reviewed?  yes   -TANJA        Prior Level of Function  independent:  -TANJA        Equipment Currently Used at Home  none  -TANJA        Existing Precautions/Restrictions  sternal  -TANJA        Barriers to Rehab  none identified  -TANJA           Relationship/Environment    Lives With  spouse  -TANJA           Resource/Environmental Concerns    Current Living Arrangements  home/apartment/condo  -TANJA        Resource/Environmental Concerns  none  -TANJA           Cognitive Assessment/Intervention- PT/OT    Orientation Status (Cognition)  oriented x 4  -TANJA        Follows Commands (Cognition)  WNL  -TANJA        Safety Deficit (Cognitive)  mild deficit;safety precautions follow-through/compliance  -TANJA           Safety Issues, Functional Mobility    Safety Issues Affecting Function (Mobility)  safety precautions follow-through/compliance  -TANJA        Impairments Affecting Function (Mobility)  endurance/activity tolerance;balance;pain;shortness of breath;range of motion (ROM);strength  -TANJA           Bed Mobility Assessment/Treatment    Bed Mobility Assessment/Treatment  bed mobility (all) activities;sit-supine  -TANJA        Sit-Supine War (Bed Mobility)  moderate assist (50% patient effort);verbal cues  -TANJA        Bed Mobility, Safety Issues  decreased use of arms for pushing/pulling  -TANJA        Assistive Device (Bed Mobility)  head of bed elevated;draw sheet  -TANJA           Transfer Assessment/Treatment    Transfer Assessment/Treatment  chair-bed transfer;sit-stand transfer;stand-sit transfer  -TANJA           Chair-Bed Transfer    Chair-Bed War (Transfers)  minimum assist (75% patient effort);2 person assist  -TANJA           Sit-Stand Transfer    Sit-Stand War (Transfers)  minimum assist (75% patient effort);2 person assist;verbal cues  -TANJA           Stand-Sit Transfer    Stand-Sit War (Transfers)  minimum assist (75% patient effort);verbal cues  -TANJA           ADL Assessment/Intervention    BADL Assessment/Intervention  upper body  dressing;feeding  -TANJA           Upper Body Dressing Assessment/Training    Upper Body Dressing [place] Level  upper body dressing skills;moderate assist (50% patient effort);verbal cues  -TANJA        Upper Body Dressing Position  supported sitting  -TANJA        Comment (Upper Body Dressing)  VC's to maintain sternal precautions   -TANJA           Self-Feeding Assessment/Training    [place] Level (Feeding)  feeding skills;set up  -TANJA        Self-Feeding Assess/Train, Spillage Amount  minimal  -TANJA        Position (Self-Feeding)  supported sitting  -TANJA           BADL Safety/Performance    Impairments, BADL Safety/Performance  endurance/activity tolerance;pain;shortness of breath;range of motion;strength  -TANJA           General ROM    GENERAL ROM COMMENTS  No AROM s/p CABG w/ sternal precautions, heart hugger in place, edu provided  -TANJA           MMT (Manual Muscle Testing)    General MMT Comments  No MMT s/p CABG w/ sternal precautions  -TANJA           Positioning and Restraints    Pre-Treatment Position  sitting in chair/recliner  -TANJA        Post Treatment Position  bed  -TANJA        In Bed  fowlers;call light within reach;with nsg  -TANJA           Pain Assessment    Additional Documentation  Pain Scale: FACES Pre/Post-Treatment (Group)  -TANJA           Pain Scale: FACES Pre/Post-Treatment    Pain: FACES Scale, Pretreatment  4-->hurts little more  -TANJA        Pain: FACES Scale, Post-Treatment  6-->hurts even more  -TANJA        Pre/Post Treatment Pain Comment  chest; incisional  -TANJA           Wound 07/09/20 1408 sternal Incision    Wound - Properties Group Date first assessed: 07/09/20 -AL Time first assessed: 1408 -AL Present on Hospital Admission: N  -AL Location: sternal  -AL Primary Wound Type: Incision  -AL       Wound 07/09/20 1408 Left leg Incision    Wound - Properties Group Date first assessed: 07/09/20 -AL Time first assessed: 1408 -AL Present on Hospital Admission: N  -AL Side: Left  -AL Location: leg  -AL  Primary Wound Type: Incision  -AL       Wound 07/09/20 1408 Left ankle Incision    Wound - Properties Group Date first assessed: 07/09/20  -AL Time first assessed: 1408  -AL Present on Hospital Admission: N  -AL Side: Left  -AL Location: ankle  -AL Primary Wound Type: Incision  -AL       Plan of Care Review    Plan of Care Reviewed With  patient  -TANJA           Clinical Impression (OT)    Criteria for Skilled Therapeutic Interventions Met (OT Eval)  yes  -TANJA        Rehab Potential (OT Eval)  good, to achieve stated therapy goals  -TANJA        Therapy Frequency (OT Eval)  5 times/wk  -TANJA        Care Plan Review (OT)  evaluation/treatment results reviewed  -TANJA        Anticipated Discharge Disposition (OT)  home with assist;other (see comments) OP Cardiac Rehab   -TANJA           OT Goals    Transfer Goal Selection (OT)  transfer, OT goal 1  -TANJA        Dressing Goal Selection (OT)  dressing, OT goal 1  -TANJA        Toileting Goal Selection (OT)  toileting, OT goal 1  -TANJA           Transfer Goal 1 (OT)    Activity/Assistive Device (Transfer Goal 1, OT)  transfers, all  -TANJA        [place] Level/Cues Needed (Transfer Goal 1, OT)  conditional independence  -TANJA        Time Frame (Transfer Goal 1, OT)  2 weeks  -TANJA           Dressing Goal 1 (OT)    Activity/Assistive Device (Dressing Goal 1, OT)  dressing skills, all  -TANJA        [place]/Cues Needed (Dressing Goal 1, OT)  conditional independence  -TANJA        Time Frame (Dressing Goal 1, OT)  2 weeks  -TANJA        Barriers (Dressing Goal 1, OT)  sternal precautions  -TANJA           Toileting Goal 1 (OT)    Activity/Device (Toileting Goal 1, OT)  toileting skills, all  -TANJA        [place] Level/Cues Needed (Toileting Goal 1, OT)  independent  -TANJA        Time Frame (Toileting Goal 1, OT)  2 weeks  -TANJA        Barriers (Toileting Goal 1, OT)  sternal precautions  -TANJA           Living Environment    Home Accessibility  tub/shower is not walk in  -TANJA          User Key  (r) =  Recorded By, (t) = Taken By, (c) = Cosigned By    Initials Name Effective Dates    Deisy Chavarria RN 03/01/19 -     Viviana Guerin OT 07/25/19 -          Occupational Therapy Education                 Title: PT OT SLP Therapies (Done)     Topic: Occupational Therapy (Done)     Point: ADL training (Done)     Description:   Instruct learner(s) on proper safety adaptation and remediation techniques during self care or transfers.   Instruct in proper use of assistive devices.              Learning Progress Summary           Patient Acceptance, E,TB, VU by  at 7/11/2020 1433                   Point: Home exercise program (Done)     Description:   Instruct learner(s) on appropriate technique for monitoring, assisting and/or progressing therapeutic exercises/activities.              Learning Progress Summary           Patient Acceptance, E,TB, VU by  at 7/11/2020 1433                   Point: Precautions (Done)     Description:   Instruct learner(s) on prescribed precautions during self-care and functional transfers.              Learning Progress Summary           Patient Acceptance, E,TB, VU by  at 7/11/2020 1433                   Point: Body mechanics (Done)     Description:   Instruct learner(s) on proper positioning and spine alignment during self-care, functional mobility activities and/or exercises.              Learning Progress Summary           Patient Acceptance, E,TB, VU by  at 7/11/2020 1433                               User Key     Initials Effective Dates Name Provider Type Discipline     07/25/19 -  Viviana Rai, [employer] Occupational Therapist OT                  OT Recommendation and Plan  Outcome Summary/Treatment Plan (OT)  Anticipated Discharge Disposition (OT): home with assist, other (see comments)(OP Cardiac Rehab )  Therapy Frequency (OT Eval): 5 times/wk  Plan of Care Review  Plan of Care Reviewed With: patient  Plan of Care Reviewed With: patient  Outcome Summary: 63 yo M  s/p CABG x5 7/9. At baseline, pt resides with spouse, works full time, is an active , and enjoys grilling out. The initial eval was limited 2/2 ICU environment with multiple lines/monitors, but anticipate good progression and pt will be able to safely d/c home with spouse support. VSS with upright activity. PPE mask w/ shield, gloves.        Time Calculation:   Time Calculation- OT     Row Name 07/11/20 1437             Time Calculation- OT    OT Start Time  1045  -      OT Stop Time  1101  -      OT Time Calculation (min)  16 min  -      Total Timed Code Minutes- OT  0 minute(s)  -TANJA      OT Received On  07/11/20  -TANJA      OT - Next Appointment  07/21/20  -TANJA      OT Goal Re-Cert Due Date  07/25/20  -TANJA        User Key  (r) = Recorded By, (t) = Taken By, (c) = Cosigned By    Initials Name Provider Type    Viviana Guerin OT Occupational Therapist        Therapy Charges for Today     Code Description Service Date Service Provider Modifiers Qty    [id number]  OT EVAL MOD COMPLEXITY 3 7/11/2020 Viviana Rai OT GO 1               Viviana Rai OT  7/11/2020

## 2020-07-11 NOTE — PROGRESS NOTES
"NEPHROLOGY PROGRESS NOTE------[facility]    [facility]  [phone]  Xavier Kruse MD      Patient Care Team:  Jarrell Alaniz Jr., MD as PCP - General  Lauren Singh APRN as Nurse Practitioner (Nurse Practitioner)  Christelle Kruse MD as Consulting Physician (Nephrology)      Provider:  Xavier Kruse MD  Patient Name: Lonnie Salinas  :  1955    SUBJECTIVE:    F/U ARF/SUZANNE/CRF/CKD STG 2/RHABDOMYOLYSIS    S/P extubation. Up in chair. Breathing okay. No dysuria. No palpitations.        Medication:    amiodarone 150 mg Intravenous Once   aspirin 81 mg Oral Daily   ceFAZolin 2 g Intravenous Q8H   chlorhexidine 15 mL Mouth/Throat Q12H   enoxaparin 30 mg Subcutaneous Daily   [START ON 2020] insulin lispro 0-7 Units Subcutaneous 4x Daily With Meals & Nightly   mupirocin 1 application Each Nare BID   pantoprazole 40 mg Intravenous Q AM   polyethylene glycol 17 g Oral BID   senna 1 tablet Oral BID       dexmedetomidine 0.2-1.5 mcg/kg/hr Last Rate: Stopped (07/10/20 1600)   EPINEPHrine 0.02-0.3 mcg/kg/min Last Rate: 0.01 mcg/kg/min (20 0627)   insulin 0-50 Units/hr Last Rate: 2.4 Units/hr (20 0755)   milrinone 0.25 mcg/kg/min Last Rate: 0.375 mcg/kg/min (07/10/20 2308)   nitroglycerin 5-50 mcg/min    norepinephrine 0.02-0.3 mcg/kg/min Last Rate: Stopped (07/10/20 1509)   vasopressin 0.03 Units/min Last Rate: Stopped (07/10/20 225)       OBJECTIVE    Vital Sign Min/Max for last 24 hours  Temp  Min: 98.9 °F (37.2 °C)  Max: 101.4 °F (38.6 °C)   BP  Min: 80/52  Max: 145/63   Pulse  Min: 79  Max: 115   Resp  Min: 18  Max: 18   SpO2  Min: 91 %  Max: 100 %   No data recorded   Weight  Min: 104 kg (229 lb)  Max: 105 kg (231 lb 7.7 oz)     Flowsheet Rows      First Filed Value   Admission Height  182.9 cm (72\") Documented at 2020   Admission Weight  102 kg (225 lb 15.5 oz) Documented at 2020          No " intake/output data recorded.  I/O last 3 completed shifts:  In: 4493.2 [P.O.:290; I.V.:3453.2; IV Piggyback:750]  Out: 2402 [Urine:1902; Chest Tube:500]    Physical Exam:  General Appearance: NAD  Head: OP Clear  Eyes: conjunctivae and sclerae normal and no icterus  Neck: supple. NO GROSS JVD  Lungs: DECREASED BS BIBASILAR  Heart: regular rhythm & normal rate and normal S1, S2 +JOSE EDUARDO  Chest: Wall no abnormalities observed  Abdomen: normal bowel sounds and soft non-tender +OBESITY +FOLETY  Extremities:  no edema, no cyanosis and no redness  Skin: no bleeding, bruising or rash, turgor normal, color normal and no lesions noted  Neurologic: A and O x 4    Labs:    WBC WBC   Date Value Ref Range Status   07/11/2020 12.60 (H) 3.40 - 10.80 10*3/mm3 Final   07/10/2020 24.20 (H) 3.40 - 10.80 10*3/mm3 Final   07/09/2020 33.60 (C) 3.40 - 10.80 10*3/mm3 Final   07/09/2020 10.30 3.40 - 10.80 10*3/mm3 Final   07/08/2020 12.30 (H) 3.40 - 10.80 10*3/mm3 Final      HGB Hemoglobin   Date Value Ref Range Status   07/11/2020 10.5 (L) 13.0 - 17.7 g/dL Final   07/10/2020 11.4 (L) 12.0 - 17.0 g/dL Final   07/10/2020 11.1 (L) 12.0 - 17.0 g/dL Final   07/10/2020 11.3 (L) 12.0 - 17.0 g/dL Final   07/10/2020 12.3 12.0 - 17.0 g/dL Final   07/10/2020 12.1 (L) 13.0 - 17.7 g/dL Final   07/10/2020 12.5 12.0 - 17.0 g/dL Final   07/10/2020 12.3 12.0 - 17.0 g/dL Final   07/10/2020 12.0 12.0 - 17.0 g/dL Final   07/10/2020 12.1 12.0 - 17.0 g/dL Final   07/09/2020 12.1 12.0 - 17.0 g/dL Final   07/09/2020 13.2 12.0 - 17.0 g/dL Final   07/09/2020 14.5 12.0 - 17.0 g/dL Final   07/09/2020 13.4 13.0 - 17.7 g/dL Final   07/09/2020 13.6 12.0 - 17.0 g/dL Final   07/09/2020 13.8 13.0 - 17.7 g/dL Final   07/08/2020 15.6 13.0 - 17.7 g/dL Final      HCT Hematocrit   Date Value Ref Range Status   07/11/2020 30.8 (L) 37.5 - 51.0 % Final   07/10/2020 33 (L) 38 - 51 % Final   07/10/2020 33 (L) 38 - 51 % Final   07/10/2020 33 (L) 38 - 51 % Final   07/10/2020 36 (L) 38 -  51 % Final   07/10/2020 36.7 (L) 37.5 - 51.0 % Final   07/10/2020 37 (L) 38 - 51 % Final   07/10/2020 36 (L) 38 - 51 % Final   07/10/2020 35 (L) 38 - 51 % Final   07/10/2020 36 (L) 38 - 51 % Final   07/09/2020 36 (L) 38 - 51 % Final   07/09/2020 39 38 - 51 % Final   07/09/2020 43 38 - 51 % Final   07/09/2020 41.4 37.5 - 51.0 % Final   07/09/2020 40 38 - 51 % Final   07/09/2020 40.6 37.5 - 51.0 % Final   07/08/2020 46.2 37.5 - 51.0 % Final      Platlets No results found for: LABPLAT   MCV MCV   Date Value Ref Range Status   07/11/2020 86.1 79.0 - 97.0 fL Final   07/10/2020 86.7 79.0 - 97.0 fL Final   07/09/2020 89.3 79.0 - 97.0 fL Final   07/09/2020 86.6 79.0 - 97.0 fL Final   07/08/2020 86.0 79.0 - 97.0 fL Final          Sodium Sodium   Date Value Ref Range Status   07/11/2020 146 (H) 136 - 145 mmol/L Final   07/10/2020 147 (H) 136 - 145 mmol/L Final   07/10/2020 146 (H) 136 - 145 mmol/L Final   07/09/2020 142 136 - 145 mmol/L Final   07/09/2020 137 136 - 145 mmol/L Final   07/08/2020 137 136 - 145 mmol/L Final      Potassium Potassium   Date Value Ref Range Status   07/11/2020 3.8 3.5 - 5.2 mmol/L Final   07/10/2020 3.6 3.5 - 5.2 mmol/L Final   07/10/2020 3.5 3.5 - 5.2 mmol/L Final   07/10/2020 2.7 (L) 3.5 - 5.2 mmol/L Final   07/09/2020 3.5 3.5 - 5.2 mmol/L Final     Comment:     Specimen hemolyzed.  Results may be affected.   07/09/2020 3.9 3.5 - 5.2 mmol/L Final   07/08/2020 4.0 3.5 - 5.2 mmol/L Final   07/08/2020 3.4 (L) 3.5 - 5.2 mmol/L Final      Chloride Chloride   Date Value Ref Range Status   07/11/2020 107 98 - 107 mmol/L Final   07/10/2020 105 98 - 107 mmol/L Final   07/10/2020 100 98 - 107 mmol/L Final   07/09/2020 100 98 - 107 mmol/L Final   07/09/2020 103 98 - 107 mmol/L Final   07/08/2020 98 98 - 107 mmol/L Final      CO2 CO2   Date Value Ref Range Status   07/11/2020 28.0 22.0 - 29.0 mmol/L Final   07/10/2020 24.0 22.0 - 29.0 mmol/L Final   07/10/2020 17.0 (L) 22.0 - 29.0 mmol/L Final   07/09/2020  17.0 (L) 22.0 - 29.0 mmol/L Final   07/09/2020 23.0 22.0 - 29.0 mmol/L Final   07/08/2020 26.0 22.0 - 29.0 mmol/L Final      BUN BUN   Date Value Ref Range Status   07/11/2020   Final     Comment:     Testing performed by alternate method   07/11/2020 46 (H) 8 - 23 mg/dL Final   07/10/2020   Final     Comment:     Testing performed by alternate method   07/10/2020 39 (H) 8 - 23 mg/dL Final   07/10/2020   Final     Comment:     Testing performed by alternate method   07/10/2020 36 (H) 8 - 23 mg/dL Final   07/09/2020   Final     Comment:     Testing performed by alternate method   07/09/2020 29 (H) 8 - 23 mg/dL Final   07/09/2020   Final     Comment:     Testing performed by alternate method   07/09/2020 22 8 - 23 mg/dL Final   07/08/2020   Final     Comment:     Testing performed by alternate method   07/08/2020 17 8 - 23 mg/dL Final      Creatinine Creatinine   Date Value Ref Range Status   07/11/2020 1.79 (H) 0.76 - 1.27 mg/dL Final   07/10/2020 2.23 (H) 0.76 - 1.27 mg/dL Final   07/10/2020 2.80 (H) 0.76 - 1.27 mg/dL Final   07/09/2020 1.82 (H) 0.76 - 1.27 mg/dL Final   07/09/2020 1.12 0.76 - 1.27 mg/dL Final   07/08/2020 1.04 0.76 - 1.27 mg/dL Final      Calcium Calcium   Date Value Ref Range Status   07/11/2020 8.8 8.6 - 10.5 mg/dL Final   07/10/2020 9.5 8.6 - 10.5 mg/dL Final   07/10/2020 9.3 8.6 - 10.5 mg/dL Final   07/09/2020 8.7 8.6 - 10.5 mg/dL Final   07/09/2020 8.6 8.6 - 10.5 mg/dL Final   07/08/2020 9.8 8.6 - 10.5 mg/dL Final      PO4 No components found for: PO4   Albumin Albumin   Date Value Ref Range Status   07/11/2020 3.80 3.50 - 5.20 g/dL Final   07/10/2020 4.60 3.50 - 5.20 g/dL Final   07/10/2020 4.70 3.50 - 5.20 g/dL Final   07/09/2020 4.30 3.50 - 5.20 g/dL Final   07/09/2020 3.70 3.50 - 5.20 g/dL Final   07/08/2020 4.40 3.50 - 5.20 g/dL Final      Magnesium Magnesium   Date Value Ref Range Status   07/11/2020 3.0 (H) 1.6 - 2.4 mg/dL Final   07/10/2020 3.3 (H) 1.6 - 2.4 mg/dL Final   07/09/2020  2.3 1.6 - 2.4 mg/dL Final   07/08/2020 2.0 1.6 - 2.4 mg/dL Final      Uric Acid No components found for: URIC ACID     Imaging Results (Last 72 Hours)     Procedure Component Value Units Date/Time    XR Chest 1 View [[id number]] Resulted:  07/11/20 0616     Updated:  07/11/20 0617    XR Chest 1 View [[id number]] Collected:  07/10/20 0729     Updated:  07/10/20 0733    Narrative:          DATE OF EXAM:   7/10/2020 4:22 AM     PROCEDURE:   XR CHEST 1 VW-     INDICATIONS:   Post-Op Heart Surgery; I25.110-Atherosclerotic heart disease of native  coronary artery with unstable angina pectoris; I21.4-Non-ST elevation  (NSTEMI) myocardial infarction; I21.4-Non-ST elevation (NSTEMI)  myocardial infarction; R94.31-Abnormal electrocardiogram (ECG) (EKG);  R07.9-Chest pain, unspecified; R06.00-Dyspnea, unspecified     COMPARISON:  7/9/2020     TECHNIQUE:   Portable chest radiograph.     FINDINGS:   Endotracheal tube tip 4.9 cm above the shyla. Interval placement of an  esophagogastric tube with the tip below the diaphragm. Stable right IJ  [place]-Felipe catheter with the tip overlying the main pulmonary arterial  outflow. Mediastinal drainage tube and left chest tube are unchanged. No  pneumothorax. Mild left basilar airspace disease likely atelectasis.  Stable positioning of intra-atrial balloon pump with a radiodense marker  2.3 cm caudal to the superior aspect of the aortic arch       Impression:       1. Placement of an esophagogastric tube with the tip below the  diaphragm. Remaining lines and support tubes stable.  2. No pneumothorax.  3. Mild left basilar airspace disease likely atelectasis with improved  aeration from the prior exam.     Electronically Signed By-Avel Khoury On:7/10/2020 7:31 AM  This report was finalized on [id number] by  Avel Khoury, .    XR Chest 1 View [[id number]] Collected:  07/09/20 2012     Updated:  07/09/20 2018    Narrative:       XR CHEST 1 VW-     Date of Exam: 7/9/2020 7:30 PM     Indication:  Status post cardiac surgery.     Comparison Exams: Chest radiograph from earlier today     Technique: Single AP chest radiograph     FINDINGS:  Median sternotomy wires appear intact. An endotracheal tube has its tip  in the midtrachea. A right internal jugular [place]-Felipe catheter has tip  in the main pulmonary artery. Mediastinal and left chest tubes are in  place. No pneumothorax is identified. An intra-aortic balloon pump is  present, with its marker tip 3.1 cm below the aortic knob. There is  scattered bilateral atelectasis. The heart and mediastinal contours  appear stable. The pulmonary vasculature appears normal.       Impression:       1.Endotracheal tube in good position.  2.Mediastinal and left chest tubes in place. No pneumothorax identified.  3.Intra-aortic balloon pump with marker tip 3.1 cm below aortic knob.  4.Scattered bilateral atelectasis.     Electronically Signed By-DR. Eliecer Santiago MD On:7/9/2020 8:16 PM  This report was finalized on [id number] by DR. Eliecer Santiago MD.    XR Chest 1 View [[id number]] Collected:  07/09/20 1013     Updated:  07/09/20 1024    Narrative:       EXAMINATION: XR CHEST 1 VW-     DATE OF EXAM: 7/9/2020 9:49 AM     INDICATION: post IABP placement; I25.110-Atherosclerotic heart disease  of native coronary artery with unstable angina pectoris; I21.4-Non-ST  elevation (NSTEMI) myocardial infarction; I21.4-Non-ST elevation  (NSTEMI) myocardial infarction; R94.31-Abnormal electrocardiogram (ECG)  (EKG); R07.9-Chest pain, unspecified; R06.00-Dyspnea, unspecified.     COMPARISON: Chest radiograph dated 07/08/2020     TECHNIQUE: Portable AP view of the chest was obtained.     FINDINGS:  The intra-aortic balloon pump marker tip is slightly high at the  superior aspect of the aortic arch. The cardiomediastinal silhouette is  at the upper limit of normal. Pulmonary vascularity appears within  normal limits. There are low lung volumes. There is no focal  airspace  consolidation, pleural effusion, or pneumothorax. There are no acute  osseous findings.       Impression:       1. IABP marker tip slightly high with tip at the superior aspect of the  aortic knob.     Electronically Signed By-Brady Watson On:7/9/2020 10:22 AM  This report was finalized on [id number] by  Brady Watson, .    US Renal Bilateral [[id number]] Collected:  07/08/20 2038     Updated:  07/08/20 2041    Narrative:       DATE OF EXAM:  7/8/2020 6:17 PM     PROCEDURE:  US RENAL BILATERAL-     INDICATIONS:  Chronic kidney disease     COMPARISON:  No Comparisons Available     TECHNIQUE:   Grayscale and color Doppler ultrasound evaluation of the kidneys and  urinary bladder was performed.        FINDINGS:  Both kidneys appear normal in size and echogenicity, with no stone,  mass, or hydronephrosis identified. The right kidney measures 11.5 cm,  while the left kidney measures 11.9 cm.     The urinary bladder is not well-seen secondary to Aguirre catheterization.          Impression:       Both kidneys appear within normal limits.     Electronically Signed By-DR. Eliecer Santiago MD On:7/8/2020 8:39 PM  This report was finalized on [id number] by DR. Eliecer Santiago MD.    XR Chest 1 View [[id number]] Collected:  07/08/20 1102     Updated:  07/08/20 1105    Narrative:       EXAMINATION: XR CHEST 1 VW-     DATE OF EXAM: 7/8/2020 10:43 AM     INDICATION: Chest pain for one day     COMPARISON: Chest radiograph dated 05/18/2014     TECHNIQUE: Portable AP view of the chest was obtained.     FINDINGS:  The cardiomediastinal silhouette is within normal limits. Pulmonary  vascularity is unremarkable. There is no focal airspace consolidation,  pleural effusion, or pneumothorax. There are no acute osseous findings.       Impression:       No acute cardiopulmonary abnormality.     Electronically Signed By-Brady Watson On:7/8/2020 11:03 AM  This report was finalized on [id number] by  Brady Watson  .          Results for orders placed during the hospital encounter of 07/08/20   XR Chest 1 View    Narrative    DATE OF EXAM:   7/10/2020 4:22 AM     PROCEDURE:   XR CHEST 1 VW-     INDICATIONS:   Post-Op Heart Surgery; I25.110-Atherosclerotic heart disease of native  coronary artery with unstable angina pectoris; I21.4-Non-ST elevation  (NSTEMI) myocardial infarction; I21.4-Non-ST elevation (NSTEMI)  myocardial infarction; R94.31-Abnormal electrocardiogram (ECG) (EKG);  R07.9-Chest pain, unspecified; R06.00-Dyspnea, unspecified     COMPARISON:  7/9/2020     TECHNIQUE:   Portable chest radiograph.     FINDINGS:   Endotracheal tube tip 4.9 cm above the shyla. Interval placement of an  esophagogastric tube with the tip below the diaphragm. Stable right IJ  [place]-Felipe catheter with the tip overlying the main pulmonary arterial  outflow. Mediastinal drainage tube and left chest tube are unchanged. No  pneumothorax. Mild left basilar airspace disease likely atelectasis.  Stable positioning of intra-atrial balloon pump with a radiodense marker  2.3 cm caudal to the superior aspect of the aortic arch       Impression 1. Placement of an esophagogastric tube with the tip below the  diaphragm. Remaining lines and support tubes stable.  2. No pneumothorax.  3. Mild left basilar airspace disease likely atelectasis with improved  aeration from the prior exam.     Electronically Signed By-Avel Khoury On:7/10/2020 7:31 AM  This report was finalized on [id number] by  Avel Khoury, .   XR Chest 1 View    Narrative XR CHEST 1 VW-     Date of Exam: 7/9/2020 7:30 PM     Indication: Status post cardiac surgery.     Comparison Exams: Chest radiograph from earlier today     Technique: Single AP chest radiograph     FINDINGS:  Median sternotomy wires appear intact. An endotracheal tube has its tip  in the midtrachea. A right internal jugular [place]-Felipe catheter has tip  in the main pulmonary artery. Mediastinal and left chest tubes are  in  place. No pneumothorax is identified. An intra-aortic balloon pump is  present, with its marker tip 3.1 cm below the aortic knob. There is  scattered bilateral atelectasis. The heart and mediastinal contours  appear stable. The pulmonary vasculature appears normal.       Impression 1.Endotracheal tube in good position.  2.Mediastinal and left chest tubes in place. No pneumothorax identified.  3.Intra-aortic balloon pump with marker tip 3.1 cm below aortic knob.  4.Scattered bilateral atelectasis.     Electronically Signed By-DR. Eliecer Santiago MD On:7/9/2020 8:16 PM  This report was finalized on [id number] by DR. Eliecer Santiago MD.   XR Chest 1 View    Narrative EXAMINATION: XR CHEST 1 VW-     DATE OF EXAM: 7/9/2020 9:49 AM     INDICATION: post IABP placement; I25.110-Atherosclerotic heart disease  of native coronary artery with unstable angina pectoris; I21.4-Non-ST  elevation (NSTEMI) myocardial infarction; I21.4-Non-ST elevation  (NSTEMI) myocardial infarction; R94.31-Abnormal electrocardiogram (ECG)  (EKG); R07.9-Chest pain, unspecified; R06.00-Dyspnea, unspecified.     COMPARISON: Chest radiograph dated 07/08/2020     TECHNIQUE: Portable AP view of the chest was obtained.     FINDINGS:  The intra-aortic balloon pump marker tip is slightly high at the  superior aspect of the aortic arch. The cardiomediastinal silhouette is  at the upper limit of normal. Pulmonary vascularity appears within  normal limits. There are low lung volumes. There is no focal airspace  consolidation, pleural effusion, or pneumothorax. There are no acute  osseous findings.       Impression 1. IABP marker tip slightly high with tip at the superior aspect of the  aortic knob.     Electronically Signed By-Brady Watson On:7/9/2020 10:22 AM  This report was finalized on [id number] by  Brady Watson, .       Results for orders placed during the hospital encounter of 07/08/20   Duplex Vein Mapping Lower Extremity - Bilateral  CAR    Narrative · The right greater saphenous vein is patent and of adequate size in the   thigh.  · The right greater saphenous vein is patent and of adequate size in the   calf.  · The left greater saphenous vein is patent and of adequate size in the   thigh.  · The left greater saphenous vein has an adequate segment in the mid calf   but is adequate at the ankle.            ASSESSMENT / PLAN      NSTEMI, initial episode of care (CMS/[facility])    NSTEMI (non-ST elevated myocardial infarction) (CMS/HCC)    Coronary artery disease involving native coronary artery of native heart with unstable angina pectoris (CMS/[facility])    1. ARF/SUZANNE/CRF/CKD STG 2-----Nonoliguric. BUN/Cr down a little. +ARF/SUZANNE secondary to ATN from hypotension and contrast exposure and renal perfusion disturbance from IABP and Rhabdomyolysis. Support hemodynamics as much as possible. Spoke to patient and his wife. Dose meds for CrCl less than 10 cc/min. No NSAIDs or further IV dye.     2. HYPOKALEMIA------Replaced     3. ANGINA/CAD S/P NSTEMI S/P CARDIAC CATH S/P CABG------Weaning off of pressors/inotropes per ,Cardiology and per , CT surgery. On Milrinone.     4. HYPERLIPIDEMIA/ELEVATED CK------Statin on hold. Follow CK. Alkalinize gently     5. OA-------No further NSAIDs.       6. OBESITY/ELEVATED BMI    7. HYPOCALCEMIA------Replace IV. Follow ionized levels    8. HYPOMAGNESEMIA------replaced    9. HYPOPHOSPHATEMIA-----Replaced    10. MILD HYPERNATREMIA        Xavier Kruse MD  [facility]  [phone]  07/11/20  08:08

## 2020-07-11 NOTE — PLAN OF CARE
Problem: Patient Care Overview  Goal: Plan of Care Review  Outcome: Ongoing (interventions implemented as appropriate)  Flowsheets (Taken 7/11/2020 4651)  Plan of Care Reviewed With: patient; spouse  Note:   POD#2, doing very well. Up to chair twice. CT's, Tru [place] all d/c'd. AV wires isolated. SR. VSS.

## 2020-07-11 NOTE — PROGRESS NOTES
" LOS: 3 days   Patient Care Team:  Jarrell Alaniz Jr., MD as PCP - General  Lauren Singh APRN as Nurse Practitioner (Nurse Practitioner)  Christelle Kruse MD as Consulting Physician (Nephrology)    Chief Complaint: Feels better.  Sitting up in chair.        Subjective he is sitting up in the chair and remains on Primacor and epinephrine.      Objective Much improved.  Vital Signs  Temp:  [98.9 °F (37.2 °C)-100.7 °F (38.2 °C)] 99 °F (37.2 °C)  Heart Rate:  [] 101  Resp:  [18] 18  BP: ()/(51-73) 113/65  Arterial Line BP: ()/(53-72) 146/58  FiO2 (%):  [40 %] 40 %  Body mass index is 34.18 kg/m².    Intake/Output Summary (Last 24 hours) at 7/11/2020 1036  Last data filed at 7/11/2020 0600  Gross per 24 hour   Intake 2632.22 ml   Output 1305 ml   Net 1327.22 ml     No intake/output data recorded.    Chest tube drainage last 8 hours 350    Wt Readings from Last 3 Encounters:   07/11/20 105 kg (231 lb 7.7 oz)   02/17/20 104 kg (230 lb)   10/22/19 105 kg (231 lb)       Flowsheet Rows      First Filed Value   Admission Height  182.9 cm (72\") Documented at 07/08/2020 0944   Admission Weight  102 kg (225 lb 15.5 oz) Documented at 07/08/2020 0944          Objective    Results Review:        WBC WBC   Date Value Ref Range Status   07/11/2020 12.60 (H) 3.40 - 10.80 10*3/mm3 Final   07/10/2020 24.20 (H) 3.40 - 10.80 10*3/mm3 Final   07/09/2020 33.60 (C) 3.40 - 10.80 10*3/mm3 Final   07/09/2020 10.30 3.40 - 10.80 10*3/mm3 Final      HGB Hemoglobin   Date Value Ref Range Status   07/11/2020 10.5 (L) 13.0 - 17.7 g/dL Final   07/10/2020 11.4 (L) 12.0 - 17.0 g/dL Final   07/10/2020 11.1 (L) 12.0 - 17.0 g/dL Final   07/10/2020 11.3 (L) 12.0 - 17.0 g/dL Final   07/10/2020 12.3 12.0 - 17.0 g/dL Final   07/10/2020 12.1 (L) 13.0 - 17.7 g/dL Final   07/10/2020 12.5 12.0 - 17.0 g/dL Final   07/10/2020 12.3 12.0 - 17.0 g/dL Final   07/10/2020 12.0 12.0 - 17.0 g/dL Final   07/10/2020 12.1 12.0 - 17.0 g/dL " Final   07/09/2020 12.1 12.0 - 17.0 g/dL Final   07/09/2020 13.2 12.0 - 17.0 g/dL Final   07/09/2020 14.5 12.0 - 17.0 g/dL Final   07/09/2020 13.4 13.0 - 17.7 g/dL Final   07/09/2020 13.6 12.0 - 17.0 g/dL Final   07/09/2020 13.8 13.0 - 17.7 g/dL Final      HCT Hematocrit   Date Value Ref Range Status   07/11/2020 30.8 (L) 37.5 - 51.0 % Final   07/10/2020 33 (L) 38 - 51 % Final   07/10/2020 33 (L) 38 - 51 % Final   07/10/2020 33 (L) 38 - 51 % Final   07/10/2020 36 (L) 38 - 51 % Final   07/10/2020 36.7 (L) 37.5 - 51.0 % Final   07/10/2020 37 (L) 38 - 51 % Final   07/10/2020 36 (L) 38 - 51 % Final   07/10/2020 35 (L) 38 - 51 % Final   07/10/2020 36 (L) 38 - 51 % Final   07/09/2020 36 (L) 38 - 51 % Final   07/09/2020 39 38 - 51 % Final   07/09/2020 43 38 - 51 % Final   07/09/2020 41.4 37.5 - 51.0 % Final   07/09/2020 40 38 - 51 % Final   07/09/2020 40.6 37.5 - 51.0 % Final      Platelets Platelets   Date Value Ref Range Status   07/11/2020 94 (L) 140 - 450 10*3/mm3 Final     Comment:     Result checked    07/10/2020 225 140 - 450 10*3/mm3 Final   07/09/2020 215 140 - 450 10*3/mm3 Final   07/09/2020 158 140 - 450 10*3/mm3 Final        PT/INR:    Protime   Date Value Ref Range Status   07/10/2020 12.7 (H) 9.6 - 11.7 Seconds Final   07/09/2020 13.6 (H) 9.6 - 11.7 Seconds Final   /  INR   Date Value Ref Range Status   07/10/2020 1.26 (H) 0.90 - 1.10 Final   07/09/2020 1.36 (H) 0.90 - 1.10 Final       Sodium Sodium   Date Value Ref Range Status   07/11/2020 146 (H) 136 - 145 mmol/L Final   07/10/2020 147 (H) 136 - 145 mmol/L Final   07/10/2020 146 (H) 136 - 145 mmol/L Final   07/09/2020 142 136 - 145 mmol/L Final   07/09/2020 137 136 - 145 mmol/L Final      Potassium Potassium   Date Value Ref Range Status   07/11/2020 3.8 3.5 - 5.2 mmol/L Final   07/10/2020 3.6 3.5 - 5.2 mmol/L Final   07/10/2020 3.5 3.5 - 5.2 mmol/L Final   07/10/2020 2.7 (L) 3.5 - 5.2 mmol/L Final   07/09/2020 3.5 3.5 - 5.2 mmol/L Final     Comment:      Specimen hemolyzed.  Results may be affected.   07/09/2020 3.9 3.5 - 5.2 mmol/L Final   07/08/2020 4.0 3.5 - 5.2 mmol/L Final      Chloride Chloride   Date Value Ref Range Status   07/11/2020 107 98 - 107 mmol/L Final   07/10/2020 105 98 - 107 mmol/L Final   07/10/2020 100 98 - 107 mmol/L Final   07/09/2020 100 98 - 107 mmol/L Final   07/09/2020 103 98 - 107 mmol/L Final      Bicarbonate CO2   Date Value Ref Range Status   07/11/2020 28.0 22.0 - 29.0 mmol/L Final   07/10/2020 24.0 22.0 - 29.0 mmol/L Final   07/10/2020 17.0 (L) 22.0 - 29.0 mmol/L Final   07/09/2020 17.0 (L) 22.0 - 29.0 mmol/L Final   07/09/2020 23.0 22.0 - 29.0 mmol/L Final      BUN BUN   Date Value Ref Range Status   07/11/2020   Final     Comment:     Testing performed by alternate method   07/11/2020 46 (H) 8 - 23 mg/dL Final   07/10/2020   Final     Comment:     Testing performed by alternate method   07/10/2020 39 (H) 8 - 23 mg/dL Final   07/10/2020   Final     Comment:     Testing performed by alternate method   07/10/2020 36 (H) 8 - 23 mg/dL Final   07/09/2020   Final     Comment:     Testing performed by alternate method   07/09/2020 29 (H) 8 - 23 mg/dL Final   07/09/2020   Final     Comment:     Testing performed by alternate method   07/09/2020 22 8 - 23 mg/dL Final      Creatinine Creatinine   Date Value Ref Range Status   07/11/2020 1.79 (H) 0.76 - 1.27 mg/dL Final   07/10/2020 2.23 (H) 0.76 - 1.27 mg/dL Final   07/10/2020 2.80 (H) 0.76 - 1.27 mg/dL Final   07/09/2020 1.82 (H) 0.76 - 1.27 mg/dL Final   07/09/2020 1.12 0.76 - 1.27 mg/dL Final      Calcium Calcium   Date Value Ref Range Status   07/11/2020 8.8 8.6 - 10.5 mg/dL Final   07/10/2020 9.5 8.6 - 10.5 mg/dL Final   07/10/2020 9.3 8.6 - 10.5 mg/dL Final   07/09/2020 8.7 8.6 - 10.5 mg/dL Final   07/09/2020 8.6 8.6 - 10.5 mg/dL Final      Magnesium Magnesium   Date Value Ref Range Status   07/11/2020 3.0 (H) 1.6 - 2.4 mg/dL Final   07/10/2020 3.3 (H) 1.6 - 2.4 mg/dL Final   07/09/2020  2.3 1.6 - 2.4 mg/dL Final            amiodarone 150 mg Intravenous Once   aspirin 81 mg Oral Daily   calcium gluconate 1 g Intravenous Q12H   chlorhexidine 15 mL Mouth/Throat Q12H   [START ON 7/12/2020] enoxaparin 30 mg Subcutaneous Daily   [START ON 7/12/2020] insulin lispro 0-7 Units Subcutaneous 4x Daily With Meals & Nightly   mupirocin 1 application Each Nare BID   pantoprazole 40 mg Intravenous Q AM   polyethylene glycol 17 g Oral BID   potassium chloride 10 mEq Intravenous Q1H   senna 1 tablet Oral BID   sodium bicarbonate 50 mEq Intravenous Q8H       dexmedetomidine 0.2-1.5 mcg/kg/hr Last Rate: Stopped (07/10/20 1600)   EPINEPHrine 0.02-0.3 mcg/kg/min Last Rate: 0.01 mcg/kg/min (07/11/20 0837)   insulin 0-50 Units/hr Last Rate: 2.4 Units/hr (07/11/20 0755)   milrinone 0.25 mcg/kg/min Last Rate: 0.375 mcg/kg/min (07/11/20 0828)   nitroglycerin 5-50 mcg/min    norepinephrine 0.02-0.3 mcg/kg/min Last Rate: Stopped (07/10/20 1509)   vasopressin 0.03 Units/min Last Rate: Stopped (07/10/20 2251)       Medication Review: Done    Assessment/Plan     POD #2    Patient Active Problem List   Diagnosis Code   • Acquired thrombocytopenia (CMS/[facility]) D69.6   • Hyperlipidemia E78.5   • Hypertension I10   • Obesity E66.9   • Preventative health care Z00.00   • Allergic rhinitis J30.9   • NSTEMI, initial episode of care (CMS/[facility]) I21.4   • NSTEMI (non-ST elevated myocardial infarction) (CMS/[facility]) I21.4   • Coronary artery disease involving native coronary artery of native heart with unstable angina pectoris (CMS/[facility]) I25.110       Assessment & Plan   Will cut his Primacor back to 0.25.  Work on weaning off the epinephrine.  If we are able to get off epinephrine then we will DC the [place] later today.  His PA pressures are low now.  Yesterday's echo still showed some RV dysfunction.  We will have to go slow with him.    Salomón Griffiths MD  07/11/20  10:36

## 2020-07-11 NOTE — PLAN OF CARE
Problem: Patient Care Overview  Goal: Plan of Care Review  Outcome: Ongoing (interventions implemented as appropriate)  Note:   Pt stable overnight; Pt remains on Insulin, Epi and Primacor gtt; Vaso was weaned off and Epi was weaned off but needed to be turned back on d/t CI below 2.0; Pt remains on 3L NC; Pt up to chair this morning; Will Continue to monitor.

## 2020-07-12 ENCOUNTER — APPOINTMENT (OUTPATIENT)
Dept: GENERAL RADIOLOGY | Facility: HOSPITAL | Age: 65
End: 2020-07-12

## 2020-07-12 LAB
ALBUMIN SERPL-MCNC: 3.8 G/DL (ref 3.5–5.2)
ALBUMIN/GLOB SERPL: 1.7 G/DL
ALP SERPL-CCNC: 57 U/L (ref 39–117)
ALT SERPL W P-5'-P-CCNC: 15 U/L (ref 1–41)
ANION GAP SERPL CALCULATED.3IONS-SCNC: 11 MMOL/L (ref 5–15)
AST SERPL-CCNC: 47 U/L (ref 1–40)
BILIRUB SERPL-MCNC: 0.5 MG/DL (ref 0–1.2)
BUN SERPL-MCNC: 54 MG/DL (ref 8–23)
BUN SERPL-MCNC: ABNORMAL MG/DL
BUN/CREAT SERPL: ABNORMAL
CA-I SERPL ISE-MCNC: 1.16 MMOL/L (ref 1.2–1.3)
CALCIUM SPEC-SCNC: 8.8 MG/DL (ref 8.6–10.5)
CHLORIDE SERPL-SCNC: 103 MMOL/L (ref 98–107)
CK SERPL-CCNC: 729 U/L (ref 20–200)
CO2 SERPL-SCNC: 29 MMOL/L (ref 22–29)
CREAT SERPL-MCNC: 1.51 MG/DL (ref 0.76–1.27)
DEPRECATED RDW RBC AUTO: 48.1 FL (ref 37–54)
ERYTHROCYTE [DISTWIDTH] IN BLOOD BY AUTOMATED COUNT: 15.5 % (ref 12.3–15.4)
GFR SERPL CREATININE-BSD FRML MDRD: 47 ML/MIN/1.73
GLOBULIN UR ELPH-MCNC: 2.3 GM/DL
GLUCOSE BLDC GLUCOMTR-MCNC: 105 MG/DL (ref 70–105)
GLUCOSE BLDC GLUCOMTR-MCNC: 105 MG/DL (ref 70–105)
GLUCOSE BLDC GLUCOMTR-MCNC: 131 MG/DL (ref 70–105)
GLUCOSE BLDC GLUCOMTR-MCNC: 137 MG/DL (ref 70–105)
GLUCOSE BLDC GLUCOMTR-MCNC: 97 MG/DL (ref 70–105)
GLUCOSE BLDC GLUCOMTR-MCNC: 98 MG/DL (ref 70–105)
GLUCOSE SERPL-MCNC: 102 MG/DL (ref 65–99)
HCT VFR BLD AUTO: 33.6 % (ref 37.5–51)
HGB BLD-MCNC: 11.3 G/DL (ref 13–17.7)
MAGNESIUM SERPL-MCNC: 2.9 MG/DL (ref 1.6–2.4)
MCH RBC QN AUTO: 29.4 PG (ref 26.6–33)
MCHC RBC AUTO-ENTMCNC: 33.8 G/DL (ref 31.5–35.7)
MCV RBC AUTO: 87.2 FL (ref 79–97)
PHOSPHATE SERPL-MCNC: 3.6 MG/DL (ref 2.5–4.5)
PLATELET # BLD AUTO: 113 10*3/MM3 (ref 140–450)
PMV BLD AUTO: 8.3 FL (ref 6–12)
POTASSIUM SERPL-SCNC: 4.5 MMOL/L (ref 3.5–5.2)
PROT SERPL-MCNC: 6.1 G/DL (ref 6–8.5)
RBC # BLD AUTO: 3.85 10*6/MM3 (ref 4.14–5.8)
SODIUM SERPL-SCNC: 143 MMOL/L (ref 136–145)
WBC # BLD AUTO: 13 10*3/MM3 (ref 3.4–10.8)

## 2020-07-12 PROCEDURE — 99233 SBSQ HOSP IP/OBS HIGH 50: CPT | Performed by: INTERNAL MEDICINE

## 2020-07-12 PROCEDURE — 97163 PT EVAL HIGH COMPLEX 45 MIN: CPT

## 2020-07-12 PROCEDURE — 82962 GLUCOSE BLOOD TEST: CPT

## 2020-07-12 PROCEDURE — 80053 COMPREHEN METABOLIC PANEL: CPT | Performed by: INTERNAL MEDICINE

## 2020-07-12 PROCEDURE — 63710000001 INSULIN GLARGINE PER 5 UNITS: Performed by: NURSE PRACTITIONER

## 2020-07-12 PROCEDURE — 25010000002 ENOXAPARIN PER 10 MG: Performed by: THORACIC SURGERY (CARDIOTHORACIC VASCULAR SURGERY)

## 2020-07-12 PROCEDURE — 71045 X-RAY EXAM CHEST 1 VIEW: CPT

## 2020-07-12 PROCEDURE — 25010000003 MILRINONE LACTATE IN DEXTROSE 20-5 MG/100ML-% SOLUTION: Performed by: NURSE PRACTITIONER

## 2020-07-12 PROCEDURE — 83735 ASSAY OF MAGNESIUM: CPT | Performed by: INTERNAL MEDICINE

## 2020-07-12 PROCEDURE — 82550 ASSAY OF CK (CPK): CPT | Performed by: INTERNAL MEDICINE

## 2020-07-12 PROCEDURE — 25010000002 CALCIUM GLUCONATE 2-0.675 GM/100ML-% SOLUTION: Performed by: NURSE PRACTITIONER

## 2020-07-12 PROCEDURE — 25010000002 FUROSEMIDE PER 20 MG: Performed by: THORACIC SURGERY (CARDIOTHORACIC VASCULAR SURGERY)

## 2020-07-12 PROCEDURE — 82330 ASSAY OF CALCIUM: CPT | Performed by: INTERNAL MEDICINE

## 2020-07-12 PROCEDURE — 84100 ASSAY OF PHOSPHORUS: CPT | Performed by: INTERNAL MEDICINE

## 2020-07-12 PROCEDURE — 85027 COMPLETE CBC AUTOMATED: CPT | Performed by: NURSE PRACTITIONER

## 2020-07-12 PROCEDURE — 97116 GAIT TRAINING THERAPY: CPT

## 2020-07-12 RX ORDER — INSULIN GLARGINE 100 [IU]/ML
10 INJECTION, SOLUTION SUBCUTANEOUS EVERY MORNING
Status: DISCONTINUED | OUTPATIENT
Start: 2020-07-12 | End: 2020-07-14 | Stop reason: HOSPADM

## 2020-07-12 RX ORDER — PANTOPRAZOLE SODIUM 40 MG/1
40 TABLET, DELAYED RELEASE ORAL
Status: DISCONTINUED | OUTPATIENT
Start: 2020-07-12 | End: 2020-07-14 | Stop reason: HOSPADM

## 2020-07-12 RX ORDER — CALCIUM GLUCONATE 20 MG/ML
2 INJECTION, SOLUTION INTRAVENOUS ONCE
Status: COMPLETED | OUTPATIENT
Start: 2020-07-12 | End: 2020-07-12

## 2020-07-12 RX ADMIN — SODIUM BICARBONATE 50 MEQ: 84 INJECTION, SOLUTION INTRAVENOUS at 01:29

## 2020-07-12 RX ADMIN — HYDROCODONE BITARTRATE AND ACETAMINOPHEN 1 TABLET: 5; 325 TABLET ORAL at 08:27

## 2020-07-12 RX ADMIN — ASPIRIN 81 MG: 81 TABLET, COATED ORAL at 08:25

## 2020-07-12 RX ADMIN — FUROSEMIDE 40 MG: 10 INJECTION, SOLUTION INTRAMUSCULAR; INTRAVENOUS at 17:10

## 2020-07-12 RX ADMIN — HYDROCODONE BITARTRATE AND ACETAMINOPHEN 1 TABLET: 5; 325 TABLET ORAL at 22:05

## 2020-07-12 RX ADMIN — POTASSIUM CHLORIDE 20 MEQ: 1500 TABLET, EXTENDED RELEASE ORAL at 08:25

## 2020-07-12 RX ADMIN — POLYETHYLENE GLYCOL 3350 17 G: 17 POWDER, FOR SOLUTION ORAL at 21:31

## 2020-07-12 RX ADMIN — CALCIUM GLUCONATE 2 G: 20 INJECTION, SOLUTION INTRAVENOUS at 09:19

## 2020-07-12 RX ADMIN — INSULIN GLARGINE 10 UNITS: 100 INJECTION, SOLUTION SUBCUTANEOUS at 09:19

## 2020-07-12 RX ADMIN — AMIODARONE HYDROCHLORIDE 400 MG: 200 TABLET ORAL at 08:25

## 2020-07-12 RX ADMIN — PANTOPRAZOLE SODIUM 40 MG: 40 TABLET, DELAYED RELEASE ORAL at 06:22

## 2020-07-12 RX ADMIN — CHLORHEXIDINE GLUCONATE 0.12% ORAL RINSE 15 ML: 1.2 LIQUID ORAL at 08:25

## 2020-07-12 RX ADMIN — ENOXAPARIN SODIUM 30 MG: 30 INJECTION SUBCUTANEOUS at 17:10

## 2020-07-12 RX ADMIN — AMIODARONE HYDROCHLORIDE 400 MG: 200 TABLET ORAL at 17:10

## 2020-07-12 RX ADMIN — MUPIROCIN 1 APPLICATION: 20 OINTMENT TOPICAL at 21:31

## 2020-07-12 RX ADMIN — HYDROCODONE BITARTRATE AND ACETAMINOPHEN 1 TABLET: 5; 325 TABLET ORAL at 12:37

## 2020-07-12 RX ADMIN — HYDROCODONE BITARTRATE AND ACETAMINOPHEN 1 TABLET: 5; 325 TABLET ORAL at 17:10

## 2020-07-12 RX ADMIN — POTASSIUM CHLORIDE 20 MEQ: 1500 TABLET, EXTENDED RELEASE ORAL at 17:10

## 2020-07-12 RX ADMIN — HYDROCODONE BITARTRATE AND ACETAMINOPHEN 1 TABLET: 5; 325 TABLET ORAL at 04:11

## 2020-07-12 RX ADMIN — MUPIROCIN 1 APPLICATION: 20 OINTMENT TOPICAL at 08:34

## 2020-07-12 RX ADMIN — FUROSEMIDE 40 MG: 10 INJECTION, SOLUTION INTRAMUSCULAR; INTRAVENOUS at 08:25

## 2020-07-12 RX ADMIN — SODIUM BICARBONATE 50 MEQ: 84 INJECTION, SOLUTION INTRAVENOUS at 12:36

## 2020-07-12 RX ADMIN — MILRINONE LACTATE IN DEXTROSE 0.12 MCG/KG/MIN: 200 INJECTION, SOLUTION INTRAVENOUS at 09:19

## 2020-07-12 NOTE — PROGRESS NOTES
"NEPHROLOGY PROGRESS NOTE------[facility]    [facility]  [phone]  Xavier Kruse MD      Patient Care Team:  Jarrell Alaniz Jr., MD as PCP - General  Lauren Singh APRN as Nurse Practitioner (Nurse Practitioner)  Christelle Kruse MD as Consulting Physician (Nephrology)      Provider:  Xavier Kruse MD  Patient Name: Lonnie Salinas  :  1955    SUBJECTIVE:    F/U ARF/SUZANNE/CRF/CKD STG 2/RHABDOMYOLYSIS    Aguirre out. No angina. No real SOB. No dysuria.        Medication:    amiodarone 400 mg Oral BID With Meals   aspirin 81 mg Oral Daily   chlorhexidine 15 mL Mouth/Throat Q12H   enoxaparin 30 mg Subcutaneous Daily   furosemide 40 mg Intravenous BID   insulin glargine 10 Units Subcutaneous QAM   insulin lispro 0-7 Units Subcutaneous 4x Daily With Meals & Nightly   mupirocin 1 application Each Nare BID   pantoprazole 40 mg Oral Q AM   polyethylene glycol 17 g Oral BID   potassium chloride 20 mEq Oral BID With Meals   senna 1 tablet Oral BID       insulin 0-50 Units/hr Last Rate: Stopped (20 0600)   milrinone 0.125 mcg/kg/min Last Rate: 0.125 mcg/kg/min (20 0919)       OBJECTIVE    Vital Sign Min/Max for last 24 hours  Temp  Min: 97.4 °F (36.3 °C)  Max: 99.6 °F (37.6 °C)   BP  Min: 109/71  Max: 168/81   Pulse  Min: 83  Max: 100   No data recorded   SpO2  Min: 89 %  Max: 100 %   No data recorded   Weight  Min: 109 kg (240 lb 8.3 oz)  Max: 109 kg (240 lb 8.4 oz)     Flowsheet Rows      First Filed Value   Admission Height  182.9 cm (72\") Documented at 2020   Admission Weight  102 kg (225 lb 15.5 oz) Documented at 2020 09          No intake/output data recorded.  I/O last 3 completed shifts:  In: 4636.6 [P.O.:2210; I.V.:2426.6]  Out: 2350 [Urine:2210; Chest Tube:140]    Physical Exam:  General Appearance: NAD  Head: OP Clear  Eyes: conjunctivae and sclerae normal and no icterus  Neck: supple. +MILD JVD  Lungs: " DECREASED BS BIBASILAR  Heart: regular rhythm & normal rate and normal S1, S2 +JOSE EDUARDO  Chest: Wall no abnormalities observed  Abdomen: normal bowel sounds and soft non-tender +OBESITY +FOLETY  Extremities:  +TRACE PRETIBIAL EDEMA BILAT LE, no cyanosis and no redness  Skin: no bleeding, bruising or rash, turgor normal, color normal and no lesions noted  Neurologic: A and O x 4    Labs:    WBC WBC   Date Value Ref Range Status   07/12/2020 13.00 (H) 3.40 - 10.80 10*3/mm3 Final   07/11/2020 12.60 (H) 3.40 - 10.80 10*3/mm3 Final   07/10/2020 24.20 (H) 3.40 - 10.80 10*3/mm3 Final   07/09/2020 33.60 (C) 3.40 - 10.80 10*3/mm3 Final      HGB Hemoglobin   Date Value Ref Range Status   07/12/2020 11.3 (L) 13.0 - 17.7 g/dL Final   07/11/2020 10.5 (L) 13.0 - 17.7 g/dL Final   07/10/2020 11.4 (L) 12.0 - 17.0 g/dL Final   07/10/2020 11.1 (L) 12.0 - 17.0 g/dL Final   07/10/2020 11.3 (L) 12.0 - 17.0 g/dL Final   07/10/2020 12.3 12.0 - 17.0 g/dL Final   07/10/2020 12.1 (L) 13.0 - 17.7 g/dL Final   07/10/2020 12.5 12.0 - 17.0 g/dL Final   07/10/2020 12.3 12.0 - 17.0 g/dL Final   07/10/2020 12.0 12.0 - 17.0 g/dL Final   07/10/2020 12.1 12.0 - 17.0 g/dL Final   07/09/2020 12.1 12.0 - 17.0 g/dL Final   07/09/2020 13.2 12.0 - 17.0 g/dL Final   07/09/2020 14.5 12.0 - 17.0 g/dL Final   07/09/2020 13.4 13.0 - 17.7 g/dL Final   07/09/2020 13.6 12.0 - 17.0 g/dL Final      HCT Hematocrit   Date Value Ref Range Status   07/12/2020 33.6 (L) 37.5 - 51.0 % Final   07/11/2020 30.8 (L) 37.5 - 51.0 % Final   07/10/2020 33 (L) 38 - 51 % Final   07/10/2020 33 (L) 38 - 51 % Final   07/10/2020 33 (L) 38 - 51 % Final   07/10/2020 36 (L) 38 - 51 % Final   07/10/2020 36.7 (L) 37.5 - 51.0 % Final   07/10/2020 37 (L) 38 - 51 % Final   07/10/2020 36 (L) 38 - 51 % Final   07/10/2020 35 (L) 38 - 51 % Final   07/10/2020 36 (L) 38 - 51 % Final   07/09/2020 36 (L) 38 - 51 % Final   07/09/2020 39 38 - 51 % Final   07/09/2020 43 38 - 51 % Final   07/09/2020 41.4 37.5 -  51.0 % Final   07/09/2020 40 38 - 51 % Final      Platlets No results found for: LABPLAT   MCV MCV   Date Value Ref Range Status   07/12/2020 87.2 79.0 - 97.0 fL Final   07/11/2020 86.1 79.0 - 97.0 fL Final   07/10/2020 86.7 79.0 - 97.0 fL Final   07/09/2020 89.3 79.0 - 97.0 fL Final          Sodium Sodium   Date Value Ref Range Status   07/12/2020 143 136 - 145 mmol/L Final   07/11/2020 146 (H) 136 - 145 mmol/L Final   07/10/2020 147 (H) 136 - 145 mmol/L Final   07/10/2020 146 (H) 136 - 145 mmol/L Final   07/09/2020 142 136 - 145 mmol/L Final      Potassium Potassium   Date Value Ref Range Status   07/12/2020 4.5 3.5 - 5.2 mmol/L Final   07/11/2020 3.8 3.5 - 5.2 mmol/L Final   07/10/2020 3.6 3.5 - 5.2 mmol/L Final   07/10/2020 3.5 3.5 - 5.2 mmol/L Final   07/10/2020 2.7 (L) 3.5 - 5.2 mmol/L Final   07/09/2020 3.5 3.5 - 5.2 mmol/L Final     Comment:     Specimen hemolyzed.  Results may be affected.      Chloride Chloride   Date Value Ref Range Status   07/12/2020 103 98 - 107 mmol/L Final   07/11/2020 107 98 - 107 mmol/L Final   07/10/2020 105 98 - 107 mmol/L Final   07/10/2020 100 98 - 107 mmol/L Final   07/09/2020 100 98 - 107 mmol/L Final      CO2 CO2   Date Value Ref Range Status   07/12/2020 29.0 22.0 - 29.0 mmol/L Final   07/11/2020 28.0 22.0 - 29.0 mmol/L Final   07/10/2020 24.0 22.0 - 29.0 mmol/L Final   07/10/2020 17.0 (L) 22.0 - 29.0 mmol/L Final   07/09/2020 17.0 (L) 22.0 - 29.0 mmol/L Final      BUN BUN   Date Value Ref Range Status   07/12/2020   Final     Comment:     Testing performed by alternate method   07/12/2020 54 (H) 8 - 23 mg/dL Final   07/11/2020   Final     Comment:     Testing performed by alternate method   07/11/2020 46 (H) 8 - 23 mg/dL Final   07/10/2020   Final     Comment:     Testing performed by alternate method   07/10/2020 39 (H) 8 - 23 mg/dL Final   07/10/2020   Final     Comment:     Testing performed by alternate method   07/10/2020 36 (H) 8 - 23 mg/dL Final   07/09/2020   Final      Comment:     Testing performed by alternate method   07/09/2020 29 (H) 8 - 23 mg/dL Final      Creatinine Creatinine   Date Value Ref Range Status   07/12/2020 1.51 (H) 0.76 - 1.27 mg/dL Final   07/11/2020 1.79 (H) 0.76 - 1.27 mg/dL Final   07/10/2020 2.23 (H) 0.76 - 1.27 mg/dL Final   07/10/2020 2.80 (H) 0.76 - 1.27 mg/dL Final   07/09/2020 1.82 (H) 0.76 - 1.27 mg/dL Final      Calcium Calcium   Date Value Ref Range Status   07/12/2020 8.8 8.6 - 10.5 mg/dL Final   07/11/2020 8.8 8.6 - 10.5 mg/dL Final   07/10/2020 9.5 8.6 - 10.5 mg/dL Final   07/10/2020 9.3 8.6 - 10.5 mg/dL Final   07/09/2020 8.7 8.6 - 10.5 mg/dL Final      PO4 No components found for: PO4   Albumin Albumin   Date Value Ref Range Status   07/12/2020 3.80 3.50 - 5.20 g/dL Final   07/11/2020 3.80 3.50 - 5.20 g/dL Final   07/10/2020 4.60 3.50 - 5.20 g/dL Final   07/10/2020 4.70 3.50 - 5.20 g/dL Final   07/09/2020 4.30 3.50 - 5.20 g/dL Final      Magnesium Magnesium   Date Value Ref Range Status   07/12/2020 2.9 (H) 1.6 - 2.4 mg/dL Final   07/11/2020 3.0 (H) 1.6 - 2.4 mg/dL Final   07/10/2020 3.3 (H) 1.6 - 2.4 mg/dL Final      Uric Acid No components found for: URIC ACID     Imaging Results (Last 72 Hours)     Procedure Component Value Units Date/Time    XR Chest 1 View [[id number]] Collected:  07/12/20 0815     Updated:  07/12/20 0820    Narrative:       DATE OF EXAM:  7/12/2020 7:29 AM     PROCEDURE:  XR CHEST 1 VW-     INDICATIONS:  Worsening shortness of breath, coronary artery disease, recent heart  surgery. History of a tiny left apical pneumothorax following chest tube  removal.      COMPARISON:  07/11/2020.     TECHNIQUE:   Single radiographic view of the chest was obtained.     FINDINGS:  The left apical pneumothorax has resolved. The right internal jugular  sheath remains in place. The heart is enlarged. There are postsurgical  changes from a recent CABG procedure. The pulmonary vascular markings  are probably normal. There is persistent  bilateral basilar consolidation  and left perihilar consolidation which is unchanged. This is probably  due to atelectasis, but I cannot completely exclude airspace disease  from fluid overload or pneumonia. A new small right pleural effusion is  suspected. There is a stable small left pleural effusion.       Impression:          1. The left apical pneumothorax has resolved.  2. Cardiomegaly.  3. Unchanged left perihilar and bilateral basilar consolidation probably  due to atelectasis, but I cannot exclude airspace disease from fluid  overload or pneumonia.  3. New small right pleural effusion and an unchanged small left pleural  effusion.     Electronically Signed By-Andrew York On:7/12/2020 8:18 AM  This report was finalized on [id number] by  Andrew York, .    XR Chest 1 View [[id number]] Collected:  07/11/20 1501     Updated:  07/11/20 1505    Narrative:       DATE OF EXAM:  7/11/2020 2:31 PM     PROCEDURE:  XR CHEST 1 VW-     INDICATIONS:  Status post cardiac surgery, chest tube removal.      COMPARISON:  07/11/2020 at 0552 hours     TECHNIQUE:   Single radiographic view of the chest was obtained.     FINDINGS:  The mediastinal and left chest tube has been removed. There is a new  tiny 6 mm left apical pneumothorax. The [place]-Felipe catheter has been  removed. The right internal jugular sheath remains in place. The heart  is enlarged. There are bilateral basilar and left perihilar densities  with no significant interval improvement felt to be due to atelectasis.  The patient probably has mild pulmonary vascular congestion. There is an  unchanged left pleural effusion. The right pleural space is clear.       Impression:          1. Interval removal of the mediastinal and left chest tube. There is a  tiny 6 mm left apical pneumothorax which is an interval change from the  earlier chest x-ray.  2. The [place]-Felipe catheter has been removed.  3. No change in the bilateral basilar and left perihilar densities felt  to be  due to atelectasis with superimposed mild pulmonary vascular  congestion.     Electronically Signed ByMaddy York On:7/11/2020 3:03 PM  This report was finalized on [id number] by  Andrew York, .    XR Chest 1 View [[id number]] Collected:  07/11/20 0929     Updated:  07/11/20 0949    Narrative:       DATE OF EXAM:  7/11/2020 5:44 AM     PROCEDURE:  XR CHEST 1 VW-     INDICATIONS:  Coronary artery atherosclerotic vascular disease, previous myocardial  infarction, postoperative follow-up.      COMPARISON:  07/10/2020.     TECHNIQUE:   Single radiographic view of the chest was obtained.     FINDINGS:  The patient has been extubated and the nasogastric tube has been  removed. There is a stable mediastinal and left chest tube in place with  no pneumothorax. The [place]-Felipe catheter is in stable position. The heart  is enlarged. The patient probably has mild vascular congestion which is  an interval change from yesterday's study. There is also worsening  bilateral basilar consolidation likely due to atelectasis.  The right  pleural space is clear. The patient probably has a new small left  pleural effusion.       Impression:          1. The patient has been extubated and the nasogastric tube has been  removed.  2. Stable mediastinal left chest tube with no pneumothorax.  3. Probable mild pulmonary vascular congestion which is an interval  change from yesterday's study.  4. Worsening basilar consolidation felt to be due to atelectasis. The  patient probably has a new small left pleural effusion.     Electronically Signed ByMaddy York On:7/11/2020 9:47 AM  This report was finalized on [id number] by  Andrew York, .    XR Chest 1 View [[id number]] Collected:  07/10/20 0729     Updated:  07/10/20 0733    Narrative:          DATE OF EXAM:   7/10/2020 4:22 AM     PROCEDURE:   XR CHEST 1 VW-     INDICATIONS:   Post-Op Heart Surgery; I25.110-Atherosclerotic heart disease of native  coronary artery with unstable angina pectoris;  I21.4-Non-ST elevation  (NSTEMI) myocardial infarction; I21.4-Non-ST elevation (NSTEMI)  myocardial infarction; R94.31-Abnormal electrocardiogram (ECG) (EKG);  R07.9-Chest pain, unspecified; R06.00-Dyspnea, unspecified     COMPARISON:  7/9/2020     TECHNIQUE:   Portable chest radiograph.     FINDINGS:   Endotracheal tube tip 4.9 cm above the shyla. Interval placement of an  esophagogastric tube with the tip below the diaphragm. Stable right IJ  [place]-Felipe catheter with the tip overlying the main pulmonary arterial  outflow. Mediastinal drainage tube and left chest tube are unchanged. No  pneumothorax. Mild left basilar airspace disease likely atelectasis.  Stable positioning of intra-atrial balloon pump with a radiodense marker  2.3 cm caudal to the superior aspect of the aortic arch       Impression:       1. Placement of an esophagogastric tube with the tip below the  diaphragm. Remaining lines and support tubes stable.  2. No pneumothorax.  3. Mild left basilar airspace disease likely atelectasis with improved  aeration from the prior exam.     Electronically Signed By-Avel Khoury On:7/10/2020 7:31 AM  This report was finalized on [id number] by  Avel Khoury, .    XR Chest 1 View [[id number]] Collected:  07/09/20 2012     Updated:  07/09/20 2018    Narrative:       XR CHEST 1 VW-     Date of Exam: 7/9/2020 7:30 PM     Indication: Status post cardiac surgery.     Comparison Exams: Chest radiograph from earlier today     Technique: Single AP chest radiograph     FINDINGS:  Median sternotomy wires appear intact. An endotracheal tube has its tip  in the midtrachea. A right internal jugular [place]-Felipe catheter has tip  in the main pulmonary artery. Mediastinal and left chest tubes are in  place. No pneumothorax is identified. An intra-aortic balloon pump is  present, with its marker tip 3.1 cm below the aortic knob. There is  scattered bilateral atelectasis. The heart and mediastinal contours  appear stable. The  pulmonary vasculature appears normal.       Impression:       1.Endotracheal tube in good position.  2.Mediastinal and left chest tubes in place. No pneumothorax identified.  3.Intra-aortic balloon pump with marker tip 3.1 cm below aortic knob.  4.Scattered bilateral atelectasis.     Electronically Signed By-DR. Eliecer Santiago MD On:7/9/2020 8:16 PM  This report was finalized on [id number] by DR. Eliecer Santiago MD.    XR Chest 1 View [[id number]] Collected:  07/09/20 1013     Updated:  07/09/20 1024    Narrative:       EXAMINATION: XR CHEST 1 VW-     DATE OF EXAM: 7/9/2020 9:49 AM     INDICATION: post IABP placement; I25.110-Atherosclerotic heart disease  of native coronary artery with unstable angina pectoris; I21.4-Non-ST  elevation (NSTEMI) myocardial infarction; I21.4-Non-ST elevation  (NSTEMI) myocardial infarction; R94.31-Abnormal electrocardiogram (ECG)  (EKG); R07.9-Chest pain, unspecified; R06.00-Dyspnea, unspecified.     COMPARISON: Chest radiograph dated 07/08/2020     TECHNIQUE: Portable AP view of the chest was obtained.     FINDINGS:  The intra-aortic balloon pump marker tip is slightly high at the  superior aspect of the aortic arch. The cardiomediastinal silhouette is  at the upper limit of normal. Pulmonary vascularity appears within  normal limits. There are low lung volumes. There is no focal airspace  consolidation, pleural effusion, or pneumothorax. There are no acute  osseous findings.       Impression:       1. IABP marker tip slightly high with tip at the superior aspect of the  aortic knob.     Electronically Signed By-Brady Watson On:7/9/2020 10:22 AM  This report was finalized on [id number] by  Brady Watson, .          Results for orders placed during the hospital encounter of 07/08/20   XR Chest 1 View    Narrative DATE OF EXAM:  7/12/2020 7:29 AM     PROCEDURE:  XR CHEST 1 VW-     INDICATIONS:  Worsening shortness of breath, coronary artery disease, recent  heart  surgery. History of a tiny left apical pneumothorax following chest tube  removal.      COMPARISON:  07/11/2020.     TECHNIQUE:   Single radiographic view of the chest was obtained.     FINDINGS:  The left apical pneumothorax has resolved. The right internal jugular  sheath remains in place. The heart is enlarged. There are postsurgical  changes from a recent CABG procedure. The pulmonary vascular markings  are probably normal. There is persistent bilateral basilar consolidation  and left perihilar consolidation which is unchanged. This is probably  due to atelectasis, but I cannot completely exclude airspace disease  from fluid overload or pneumonia. A new small right pleural effusion is  suspected. There is a stable small left pleural effusion.       Impression    1. The left apical pneumothorax has resolved.  2. Cardiomegaly.  3. Unchanged left perihilar and bilateral basilar consolidation probably  due to atelectasis, but I cannot exclude airspace disease from fluid  overload or pneumonia.  3. New small right pleural effusion and an unchanged small left pleural  effusion.     Electronically Signed By-Andrew York On:7/12/2020 8:18 AM  This report was finalized on [id number] by  Andrew York, .   XR Chest 1 View    Narrative DATE OF EXAM:  7/11/2020 2:31 PM     PROCEDURE:  XR CHEST 1 VW-     INDICATIONS:  Status post cardiac surgery, chest tube removal.      COMPARISON:  07/11/2020 at 0552 hours     TECHNIQUE:   Single radiographic view of the chest was obtained.     FINDINGS:  The mediastinal and left chest tube has been removed. There is a new  tiny 6 mm left apical pneumothorax. The [place]-Felipe catheter has been  removed. The right internal jugular sheath remains in place. The heart  is enlarged. There are bilateral basilar and left perihilar densities  with no significant interval improvement felt to be due to atelectasis.  The patient probably has mild pulmonary vascular congestion. There is  an  unchanged left pleural effusion. The right pleural space is clear.       Impression    1. Interval removal of the mediastinal and left chest tube. There is a  tiny 6 mm left apical pneumothorax which is an interval change from the  earlier chest x-ray.  2. The [place]-Felipe catheter has been removed.  3. No change in the bilateral basilar and left perihilar densities felt  to be due to atelectasis with superimposed mild pulmonary vascular  congestion.     Electronically Signed By-Andrew York On:7/11/2020 3:03 PM  This report was finalized on [id number] by  Andrew York, .   XR Chest 1 View    Narrative DATE OF EXAM:  7/11/2020 5:44 AM     PROCEDURE:  XR CHEST 1 VW-     INDICATIONS:  Coronary artery atherosclerotic vascular disease, previous myocardial  infarction, postoperative follow-up.      COMPARISON:  07/10/2020.     TECHNIQUE:   Single radiographic view of the chest was obtained.     FINDINGS:  The patient has been extubated and the nasogastric tube has been  removed. There is a stable mediastinal and left chest tube in place with  no pneumothorax. The [place]-Felipe catheter is in stable position. The heart  is enlarged. The patient probably has mild vascular congestion which is  an interval change from yesterday's study. There is also worsening  bilateral basilar consolidation likely due to atelectasis.  The right  pleural space is clear. The patient probably has a new small left  pleural effusion.       Impression    1. The patient has been extubated and the nasogastric tube has been  removed.  2. Stable mediastinal left chest tube with no pneumothorax.  3. Probable mild pulmonary vascular congestion which is an interval  change from yesterday's study.  4. Worsening basilar consolidation felt to be due to atelectasis. The  patient probably has a new small left pleural effusion.     Electronically Signed ByMaddy York On:7/11/2020 9:47 AM  This report was finalized on [id number] by  Andrew York, .        Results for orders placed during the hospital encounter of 07/08/20   Duplex Vein Mapping Lower Extremity - Bilateral CAR    Narrative · The right greater saphenous vein is patent and of adequate size in the   thigh.  · The right greater saphenous vein is patent and of adequate size in the   calf.  · The left greater saphenous vein is patent and of adequate size in the   thigh.  · The left greater saphenous vein has an adequate segment in the mid calf   but is adequate at the ankle.            ASSESSMENT / PLAN      NSTEMI, initial episode of care (CMS/[facility])    NSTEMI (non-ST elevated myocardial infarction) (CMS/[facility])    Coronary artery disease involving native coronary artery of native heart with unstable angina pectoris (CMS/[facility])    1. ARF/SUZANNE/CRF/CKD STG 2-----Nonoliguric. BUN/Cr down more. +ARF/SUZANNE secondary to ATN from hypotension and contrast exposure and renal perfusion disturbance from IABP and Rhabdomyolysis. Support hemodynamics as much as possible. Spoke to patient and his wife. Dose meds for CrCl less than 10 cc/min. No NSAIDs or further IV dye.     2. HYPOKALEMIA------Replaced     3. ANGINA/CAD S/P NSTEMI S/P CARDIAC CATH S/P CABG------Weaning off of pressors/inotropes per ,Cardiology and per , CT surgery. On Milrinone.     4. HYPERLIPIDEMIA/ELEVATED CK------Statin on hold. Follow CK. Alkalinize gently     5. OA-------No further NSAIDs.       6. OBESITY/ELEVATED BMI    7. HYPOCALCEMIA------Replace IV. Follow ionized levels    8. HYPOMAGNESEMIA------replaced    9. HYPOPHOSPHATEMIA-----Replaced    10. MILD HYPERNATREMIA----Better    11. EDEMA/VOLUME EXCESS-------Continue Lasix IV      Xavier Kruse MD  [facility]  [phone]  07/12/20  10:14

## 2020-07-12 NOTE — PROGRESS NOTES
S/P POD# 3 CABG--Jaya  EF 50% (echo)    Subjective:  No c/o's, reports he slept a little bit    No events overnight  Cr down to 1.5  Wt up 7 kgs from preop  CVP 16  Drips:  milr .25        Intake/Output Summary (Last 24 hours) at 7/12/2020 0848  Last data filed at 7/12/2020 0600  Gross per 24 hour   Intake 3225.35 ml   Output 1650 ml   Net 1575.35 ml     Temp:  [97.4 °F (36.3 °C)-99.6 °F (37.6 °C)] 98.5 °F (36.9 °C)  Heart Rate:  [] 84  BP: (109-168)/(52-81) 127/78  Arterial Line BP: (102-136)/(48-70) 126/70      Results from last 7 days   Lab Units 07/12/20  0358 07/11/20  0454  07/10/20  0525  07/09/20  1903  07/08/20  1014   WBC 10*3/mm3 13.00* 12.60*  --  24.20*  --  33.60*   < > 12.30*   HEMOGLOBIN g/dL 11.3* 10.5*  --  12.1*  --  13.4   < > 15.6   HEMOGLOBIN, POC   --   --    < >  --    < >  --    < >  --    HEMATOCRIT % 33.6* 30.8*  --  36.7*  --  41.4   < > 46.2   HEMATOCRIT POC   --   --    < >  --    < >  --    < >  --    PLATELETS 10*3/mm3 113* 94*  --  225  --  215   < > 201   INR   --   --   --  1.26*  --  1.36*  --  1.00    < > = values in this interval not displayed.     Results from last 7 days   Lab Units 07/12/20  0358   CREATININE mg/dL 1.51*   POTASSIUM mmol/L 4.5   SODIUM mmol/L 143   MAGNESIUM mg/dL 2.9*   PHOSPHORUS mg/dL 3.6     ica 1.16    Physical Exam:  Up in chair, NAD, neuro intact, wife at side  Tele:  SR 80s  Diminished bases, 2L 94%  dsg c/d/i, No drainage  Benign abd, + BM  trace edema    Assessment/Plan:  Principal Problem:    NSTEMI, initial episode of care (CMS/[facility])  Active Problems:    NSTEMI (non-ST elevated myocardial infarction) (CMS/[facility])    Coronary artery disease involving native coronary artery of native heart with unstable angina pectoris (CMS/[facility])    MV CAD s/p CABG--IABP removed 7/10, on asa/statin, remains on milr  Preop cardiogenic shock--IABP/inotropes postop  NSTEMI--trop 1 on admisson  New dx DM type 2--DM educator  SUZANNE on CKD stage 2--cr improved, renal  following  HTN--stable  Dyslipidemia--statin   Hx tick-borne disease requiring hospitalization--resolved  Family hx premature CAD--father    Doing well POD#3.  Decrease milr to .125 and continue diuresis.  Likely d/c milr tomorrow and add beta blockers.  A1c noted at 7--new diagnosis of DM.  DM educator.  Will add metformin at discharge.  Lantus 10u daily while in hospital.  Cr down to 1.5 today.  Still requiring oxygen likely d/t excess volume.  Re-iterated sternal precautions.    Routine care--mobilize, d/c cordis & méndez  De-escal pt  Anticipate home at discharge    Evelyne Gonzalez, APRN  7/12/2020  08:48

## 2020-07-12 NOTE — PLAN OF CARE
Problem: Patient Care Overview  Goal: Plan of Care Review  Outcome: Ongoing (interventions implemented as appropriate)  Flowsheets  Taken 7/12/2020 0510 by Lnyn Singer RN  Progress: improving  Taken 7/12/2020 1810 by Swati Can PT  Plan of Care Reviewed With: patient;spouse  Outcome Summary: 63 yo male admitted 7/8 with NSTEMI.  s/p CABG 7/9.  Pt is normally very independent, works full time.  Pt feeling better today with lines/tubes removed, still on O2 @ 3L.  Able to transfer with min A and walked out of room using RW.  Gait very slow.  Anticipate steady progress, still using RW, hope to wean from use next session.  Goal for home with wife at d/c.  Will follow 3x/week. PPE worn:  gloves, mask, goggles.

## 2020-07-12 NOTE — PROGRESS NOTES
[facility]  Jose Juan Fatima MD, PhD        LOS:  LOS: 4 days   Patient Name: Lonnie Salinas  Age/Sex: 64 y.o. male  : 1955  MRN: [id number]    Day of Service: 20   Length of Stay: 4  Encounter Provider: Jose Juan Fatima MD  Place of Service: [facility]  Patient Care Team:  Jarrell Alaniz Jr., MD as PCP - General  Lauren Singh APRN as Nurse Practitioner (Nurse Practitioner)  Christelle Kruse MD as Consulting Physician (Nephrology)    Subjective:     Chief Complaint:  F/U CAD, right heart failure    Subjective:   Seen and examined, no anginal chest pain  Continues on milrinone for RV support with significant RV dysfunction prior to surgery with RV infarct historically with  of RCA status post PDA revascularization as well as multivessel left-sided revascularization  Titrate off milrinone as able  Diuresis  No fevers chill sweats nausea vomiting or diarrhea.    Replace electrolytes today  CV surgery and renal consults noted  Continue CV supportive care at this time.  Advance goal-directed medical therapy as tolerated hemodynamically by the patient    We will need aspirin statin beta-blocker ultimately per guidelines      Care discussed with nursing at bedside as well as titration of inotropes and vaso-tropes  Titration of goal-directed medical therapy as indicated    Balloon pump removed    Current Medications:   Scheduled Meds:    amiodarone 400 mg Oral BID With Meals   aspirin 81 mg Oral Daily   chlorhexidine 15 mL Mouth/Throat Q12H   enoxaparin 30 mg Subcutaneous Daily   furosemide 40 mg Intravenous BID   insulin glargine 10 Units Subcutaneous QAM   insulin lispro 0-7 Units Subcutaneous 4x Daily With Meals & Nightly   mupirocin 1 application Each Nare BID   pantoprazole 40 mg Oral Q AM   polyethylene glycol 17 g Oral BID   potassium chloride 20 mEq Oral BID With Meals   senna 1 tablet Oral BID     Continuous  Infusions:    insulin 0-50 Units/hr Last Rate: Stopped (07/12/20 0600)   milrinone 0.125 mcg/kg/min Last Rate: 0.125 mcg/kg/min (07/12/20 0919)       Allergies:  Allergies   Allergen Reactions   • Lisinopril Angioedema       ROS  Negative x14 were review of systems except as mentioned above  Objective:     Temp:  [97.4 °F (36.3 °C)-98.6 °F (37 °C)] 98.2 °F (36.8 °C)  Heart Rate:  [83-97] 84  BP: (109-168)/(52-81) 127/78     Intake/Output Summary (Last 24 hours) at 7/12/2020 1835  Last data filed at 7/12/2020 1757  Gross per 24 hour   Intake 2362.35 ml   Output 1950 ml   Net 412.35 ml     Body mass index is 35.52 kg/m².      07/11/20  0600 07/12/20  0500 07/12/20  0600   Weight: 105 kg (231 lb 7.7 oz) 109 kg (240 lb 8.4 oz) 109 kg (240 lb 8.3 oz)         Physical Exam:  Neuro:  CV:  Resp:    GI:  Ext:    Tele:  Extubated, moves all extremities no deficits, awake alert response to questions appropriately  S1S2 RRR, +cardiac rub, mild RV lift no heave, no gallops unchanged  Nasal cannula, anterior CTA/dim bases   , abd soft nontender nondistended  Pedal pulses palp, mild peripheral edema, balloon pump out.  Groin stable without hematoma  Sinus rhythm  Awake alert  Normocephalic atraumatic pupils equal round extraocular is intact bilaterally  Moves all extremities neurologically grossly intact bilaterally  No bony abnormalities grossly  All incisions clean and dry                                               Labs reviewed, creatinine downtrending, troponin downtrending peaked at 1.3    Lab Review:   Results from last 7 days   Lab Units 07/12/20  0358 07/11/20  0431   SODIUM mmol/L 143 146*   POTASSIUM mmol/L 4.5 3.8   CHLORIDE mmol/L 103 107   CO2 mmol/L 29.0 28.0   BUN  54* 46*   CREATININE mg/dL 1.51* 1.79*   GLUCOSE mg/dL 102* 112*   CALCIUM mg/dL 8.8 8.8   AST (SGOT) U/L 47* 58*   ALT (SGPT) U/L 15 19     Results from last 7 days   Lab Units 07/12/20  0358 07/11/20  0431 07/09/20  0412 07/08/20  2303 07/08/20  1601  07/08/20  1014   CK TOTAL U/L 729* 1,243* 517*  --  839*  --    TROPONIN T ng/mL  --   --   --  1.320* 1.280* 1.070*     Results from last 7 days   Lab Units 07/12/20  0358 07/11/20  0454   WBC 10*3/mm3 13.00* 12.60*   HEMOGLOBIN g/dL 11.3* 10.5*   HEMATOCRIT % 33.6* 30.8*   PLATELETS 10*3/mm3 113* 94*     Results from last 7 days   Lab Units 07/10/20  0525 07/09/20  1903 07/09/20  0412   INR  1.26* 1.36*  --    APTT seconds  --  22.9* 26.2*     Results from last 7 days   Lab Units 07/12/20  0358 07/11/20  0431   MAGNESIUM mg/dL 2.9* 3.0*     Results from last 7 days   Lab Units 07/08/20  1014   CHOLESTEROL mg/dL 90   TRIGLYCERIDES mg/dL 118   HDL CHOL mg/dL 35*     Results from last 7 days   Lab Units 07/09/20  0412 07/08/20  1014   PROBNP pg/mL 1,066.0* 1,236.0*     Results from last 7 days   Lab Units 07/09/20  0412   TSH uIU/mL 1.630       Recent Radiology:  Imaging Results (Most Recent)     Procedure Component Value Units Date/Time    XR Chest 1 View [[id number]] Collected:  07/12/20 0815     Updated:  07/12/20 0820    Narrative:       DATE OF EXAM:  7/12/2020 7:29 AM     PROCEDURE:  XR CHEST 1 VW-     INDICATIONS:  Worsening shortness of breath, coronary artery disease, recent heart  surgery. History of a tiny left apical pneumothorax following chest tube  removal.      COMPARISON:  07/11/2020.     TECHNIQUE:   Single radiographic view of the chest was obtained.     FINDINGS:  The left apical pneumothorax has resolved. The right internal jugular  sheath remains in place. The heart is enlarged. There are postsurgical  changes from a recent CABG procedure. The pulmonary vascular markings  are probably normal. There is persistent bilateral basilar consolidation  and left perihilar consolidation which is unchanged. This is probably  due to atelectasis, but I cannot completely exclude airspace disease  from fluid overload or pneumonia. A new small right pleural effusion is  suspected. There is a stable small left  pleural effusion.       Impression:          1. The left apical pneumothorax has resolved.  2. Cardiomegaly.  3. Unchanged left perihilar and bilateral basilar consolidation probably  due to atelectasis, but I cannot exclude airspace disease from fluid  overload or pneumonia.  3. New small right pleural effusion and an unchanged small left pleural  effusion.     Electronically Signed By-Andrew York On:7/12/2020 8:18 AM  This report was finalized on [id number] by  Andrew York, .    XR Chest 1 View [[id number]] Collected:  07/11/20 1501     Updated:  07/11/20 1505    Narrative:       DATE OF EXAM:  7/11/2020 2:31 PM     PROCEDURE:  XR CHEST 1 VW-     INDICATIONS:  Status post cardiac surgery, chest tube removal.      COMPARISON:  07/11/2020 at 0552 hours     TECHNIQUE:   Single radiographic view of the chest was obtained.     FINDINGS:  The mediastinal and left chest tube has been removed. There is a new  tiny 6 mm left apical pneumothorax. The [place]-Felipe catheter has been  removed. The right internal jugular sheath remains in place. The heart  is enlarged. There are bilateral basilar and left perihilar densities  with no significant interval improvement felt to be due to atelectasis.  The patient probably has mild pulmonary vascular congestion. There is an  unchanged left pleural effusion. The right pleural space is clear.       Impression:          1. Interval removal of the mediastinal and left chest tube. There is a  tiny 6 mm left apical pneumothorax which is an interval change from the  earlier chest x-ray.  2. The [place]-Felipe catheter has been removed.  3. No change in the bilateral basilar and left perihilar densities felt  to be due to atelectasis with superimposed mild pulmonary vascular  congestion.     Electronically Signed ByMaddy York On:7/11/2020 3:03 PM  This report was finalized on [id number] by  Andrew York, .    XR Chest 1 View [[id number]] Collected:  07/11/20 0929     Updated:  07/11/20 0949     Narrative:       DATE OF EXAM:  7/11/2020 5:44 AM     PROCEDURE:  XR CHEST 1 VW-     INDICATIONS:  Coronary artery atherosclerotic vascular disease, previous myocardial  infarction, postoperative follow-up.      COMPARISON:  07/10/2020.     TECHNIQUE:   Single radiographic view of the chest was obtained.     FINDINGS:  The patient has been extubated and the nasogastric tube has been  removed. There is a stable mediastinal and left chest tube in place with  no pneumothorax. The [place]-Felipe catheter is in stable position. The heart  is enlarged. The patient probably has mild vascular congestion which is  an interval change from yesterday's study. There is also worsening  bilateral basilar consolidation likely due to atelectasis.  The right  pleural space is clear. The patient probably has a new small left  pleural effusion.       Impression:          1. The patient has been extubated and the nasogastric tube has been  removed.  2. Stable mediastinal left chest tube with no pneumothorax.  3. Probable mild pulmonary vascular congestion which is an interval  change from yesterday's study.  4. Worsening basilar consolidation felt to be due to atelectasis. The  patient probably has a new small left pleural effusion.     Electronically Signed By-Andrew York On:7/11/2020 9:47 AM  This report was finalized on [id number] by  Andrew York, .    XR Chest 1 View [[id number]] Collected:  07/10/20 0729     Updated:  07/10/20 0733    Narrative:          DATE OF EXAM:   7/10/2020 4:22 AM     PROCEDURE:   XR CHEST 1 VW-     INDICATIONS:   Post-Op Heart Surgery; I25.110-Atherosclerotic heart disease of native  coronary artery with unstable angina pectoris; I21.4-Non-ST elevation  (NSTEMI) myocardial infarction; I21.4-Non-ST elevation (NSTEMI)  myocardial infarction; R94.31-Abnormal electrocardiogram (ECG) (EKG);  R07.9-Chest pain, unspecified; R06.00-Dyspnea, unspecified     COMPARISON:  7/9/2020     TECHNIQUE:   Portable chest  radiograph.     FINDINGS:   Endotracheal tube tip 4.9 cm above the shyla. Interval placement of an  esophagogastric tube with the tip below the diaphragm. Stable right IJ  [place]-Felipe catheter with the tip overlying the main pulmonary arterial  outflow. Mediastinal drainage tube and left chest tube are unchanged. No  pneumothorax. Mild left basilar airspace disease likely atelectasis.  Stable positioning of intra-atrial balloon pump with a radiodense marker  2.3 cm caudal to the superior aspect of the aortic arch       Impression:       1. Placement of an esophagogastric tube with the tip below the  diaphragm. Remaining lines and support tubes stable.  2. No pneumothorax.  3. Mild left basilar airspace disease likely atelectasis with improved  aeration from the prior exam.     Electronically Signed By-Avel Khoury On:7/10/2020 7:31 AM  This report was finalized on [id number] by  Avel Khoury, .    XR Chest 1 View [[id number]] Collected:  07/09/20 2012     Updated:  07/09/20 2018    Narrative:       XR CHEST 1 VW-     Date of Exam: 7/9/2020 7:30 PM     Indication: Status post cardiac surgery.     Comparison Exams: Chest radiograph from earlier today     Technique: Single AP chest radiograph     FINDINGS:  Median sternotomy wires appear intact. An endotracheal tube has its tip  in the midtrachea. A right internal jugular [place]-Felipe catheter has tip  in the main pulmonary artery. Mediastinal and left chest tubes are in  place. No pneumothorax is identified. An intra-aortic balloon pump is  present, with its marker tip 3.1 cm below the aortic knob. There is  scattered bilateral atelectasis. The heart and mediastinal contours  appear stable. The pulmonary vasculature appears normal.       Impression:       1.Endotracheal tube in good position.  2.Mediastinal and left chest tubes in place. No pneumothorax identified.  3.Intra-aortic balloon pump with marker tip 3.1 cm below aortic knob.  4.Scattered bilateral  atelectasis.     Electronically Signed By-DR. Eliecer Santiago MD On:7/9/2020 8:16 PM  This report was finalized on [id number] by DR. Eliecer Santiago MD.    XR Chest 1 View [[id number]] Collected:  07/09/20 1013     Updated:  07/09/20 1024    Narrative:       EXAMINATION: XR CHEST 1 VW-     DATE OF EXAM: 7/9/2020 9:49 AM     INDICATION: post IABP placement; I25.110-Atherosclerotic heart disease  of native coronary artery with unstable angina pectoris; I21.4-Non-ST  elevation (NSTEMI) myocardial infarction; I21.4-Non-ST elevation  (NSTEMI) myocardial infarction; R94.31-Abnormal electrocardiogram (ECG)  (EKG); R07.9-Chest pain, unspecified; R06.00-Dyspnea, unspecified.     COMPARISON: Chest radiograph dated 07/08/2020     TECHNIQUE: Portable AP view of the chest was obtained.     FINDINGS:  The intra-aortic balloon pump marker tip is slightly high at the  superior aspect of the aortic arch. The cardiomediastinal silhouette is  at the upper limit of normal. Pulmonary vascularity appears within  normal limits. There are low lung volumes. There is no focal airspace  consolidation, pleural effusion, or pneumothorax. There are no acute  osseous findings.       Impression:       1. IABP marker tip slightly high with tip at the superior aspect of the  aortic knob.     Electronically Signed By-Brady Watson On:7/9/2020 10:22 AM  This report was finalized on [id number] by  Brady Watson, .    US Renal Bilateral [[id number]] Collected:  07/08/20 2038     Updated:  07/08/20 2041    Narrative:       DATE OF EXAM:  7/8/2020 6:17 PM     PROCEDURE:  US RENAL BILATERAL-     INDICATIONS:  Chronic kidney disease     COMPARISON:  No Comparisons Available     TECHNIQUE:   Grayscale and color Doppler ultrasound evaluation of the kidneys and  urinary bladder was performed.        FINDINGS:  Both kidneys appear normal in size and echogenicity, with no stone,  mass, or hydronephrosis identified. The right kidney measures 11.5  cm,  while the left kidney measures 11.9 cm.     The urinary bladder is not well-seen secondary to Aguirre catheterization.          Impression:       Both kidneys appear within normal limits.     Electronically Signed By-DR. Eliecer Santiago MD On:7/8/2020 8:39 PM  This report was finalized on [id number] by DR. Eliecer Santiago MD.    XR Chest 1 View [[id number]] Collected:  07/08/20 1102     Updated:  07/08/20 1105    Narrative:       EXAMINATION: XR CHEST 1 VW-     DATE OF EXAM: 7/8/2020 10:43 AM     INDICATION: Chest pain for one day     COMPARISON: Chest radiograph dated 05/18/2014     TECHNIQUE: Portable AP view of the chest was obtained.     FINDINGS:  The cardiomediastinal silhouette is within normal limits. Pulmonary  vascularity is unremarkable. There is no focal airspace consolidation,  pleural effusion, or pneumothorax. There are no acute osseous findings.       Impression:       No acute cardiopulmonary abnormality.     Electronically Signed By-Brady Watson On:7/8/2020 11:03 AM  This report was finalized on [id number] by  Brady Watson, .          ECHOCARDIOGRAM:    Results for orders placed during the hospital encounter of 07/08/20   Adult Transthoracic Echo Limited W/ Cont if Necessary Per Protocol    Narrative · Right ventricular cavity is severely dilated.  · Moderately reduced right ventricular systolic function noted.  · Mild tricuspid valve regurgitation is present.     Overall LV function appears mildly reduced 45 to 50% with dyssynchrony   with pacing  RV is severely enlarged with at least moderate hypokinesis possibly   moderate to severe  Mild TR, trace MR, no aortic valve pathology  No masses or effusion seen  IVC not well seen cannot estimate right atrial pressure  Did not estimate diastolic dysfunction, no parameters identified           I reviewed the patient's new clinical results.    EKG:      Assessment and plan:       NSTEMI, initial episode of care (CMS/[facility])    NSTEMI  (non-ST elevated myocardial infarction) (CMS/HCC)    Coronary artery disease involving native coronary artery of native heart with unstable angina pectoris (CMS/HCC)    1. NSTEMI / MV CAD s/p 5vCABG 7/9/20  - troponin 1.0, 1.2, 1.3 peak and decline  - ECG with ST depression in V2 V3 with T wave inversion, stable with restoration of sinus rhythm today  - [facility] 7/8/2020 revealed MV CAD  - IABP removed successfully  -Weaning inotropes as able  Incisions clean and dry  Up to chair today  Would likely pursue antiplatelet therapy once he improves  Statin therapy is indicated once LFTs improve  Beta-blocker will be reinstituted when off Primacor and stable, significant RV dysfunction in place, may be somewhat preload dependent, careful diuresis, no beta-blockers for now     2. Hypotension  -Improved, other pressors weaned, continues milrinone for support     3. Post op Second degree AV block / CHB  - AV pacing as needed  Restoration of sinus rhythm today     4. H/o HTN   -Stable     5. Transaminitis / elevated CK  - holding statin, follow CK    6. Hypokalemia  -Replace potassium rhythm for magnesium greater than 2     7. SUZANNE on CKD stage 2  - renal on board     Today, continue to titrate down milrinone as able  Start beta-blocker when able hemodynamically  Diuresis for volume overload postoperatively  Appreciate nephrology and CV surgery involvement      Continue to follow in ICU    .      Greater than 35-minute spent face-to-face with the patient today with greater 50% time spent on direct patient care and coordination of care on the floor as well as additional time and documentation

## 2020-07-12 NOTE — PLAN OF CARE
Problem: Patient Care Overview  Goal: Plan of Care Review  Outcome: Ongoing (interventions implemented as appropriate)  Flowsheets (Taken 7/12/2020 0510)  Progress: improving  Note:   Post Op day 3; Pt stable overnight; Primacor gtt still on; Insulin gtt to be discontinued this morning; Pt pain controlled overnight; Pt urine output sufficient; Pt started having thick, creamy discharge from bilateral eye and sclera is slightly red; Warm compresses applied; Will communicate with dayshift to address this during rounds this morning;  Pt on 2L nc; attempted to wean off O2 but pt unable to keep sats above 92;  Pt continues to cough with production; Will continue to monitor.

## 2020-07-12 NOTE — THERAPY EVALUATION
Patient Name: Lonnie Salinas  : 1955    MRN: [id number]                              Today's Date: 2020       Admit Date: 2020    Visit Dx:     ICD-10-CM ICD-9-CM   1. Coronary artery disease involving native coronary artery of native heart with unstable angina pectoris (CMS/HCC) I25.110 414.01     411.1   2. NSTEMI, initial episode of care (CMS/HCC) I21.4 410.71   3. NSTEMI (non-ST elevated myocardial infarction) (CMS/HCC) I21.4 410.70   4. Abnormal EKG R94.31 794.31   5. Chest pain, unspecified type R07.9 786.50   6. Exertional dyspnea R06.00 786.09     Patient Active Problem List   Diagnosis   • Acquired thrombocytopenia (CMS/HCC)   • Hyperlipidemia   • Hypertension   • Obesity   • Preventative health care   • Allergic rhinitis   • NSTEMI, initial episode of care (CMS/HCC)   • NSTEMI (non-ST elevated myocardial infarction) (CMS/HCC)   • Coronary artery disease involving native coronary artery of native heart with unstable angina pectoris (CMS/HCC)     Past Medical History:   Diagnosis Date   • Allergic    • Dyslipidemia    • History of tick-borne disease    • Hyperlipemia    • Hypertension      Past Surgical History:   Procedure Laterality Date   • CARDIAC CATHETERIZATION N/A 2020    Procedure: Left Heart Cath with possible PCI;  Surgeon: Jose Juan Fatima MD;  Location: [facility] CATH INVASIVE LOCATION;  Service: Cardiology;  Laterality: N/A;   • CARDIAC CATHETERIZATION N/A 2020    Procedure: Intra-Aortic Baloon Pump Insertion;  Surgeon: Jose Juan Fatima MD;  Location: [facility] CATH INVASIVE LOCATION;  Service: Cardiology;  Laterality: N/A;     General Information     Row Name 20 180          PT Evaluation Time/Intention    Document Type  evaluation  -     Mode of Treatment  physical therapy  -     Row Name 20          General Information    Patient Profile Reviewed?  yes  -     Prior Level of Function  independent:  -     Existing  Precautions/Restrictions  sternal;cardiac;oxygen therapy device and L/min  -JH     Row Name 07/12/20 1806          Relationship/Environment    Lives With  spouse wife is a RN  -JH     Row Name 07/12/20 1806          Resource/Environmental Concerns    Current Living Arrangements  home/apartment/condo  -[facility] 07/12/20 1806          Home Main Entrance    Number of Stairs, Main Entrance  none  -JH     Row Name 07/12/20 1806          Stairs Within Home, Primary    Number of Stairs, Within Home, Primary  none  -JH     Row Name 07/12/20 1806          Cognitive Assessment/Intervention- PT/OT    Orientation Status (Cognition)  oriented x 4  -JH     Row Name 07/12/20 1806          Safety Issues, Functional Mobility    Safety Issues Affecting Function (Mobility)  safety precautions follow-through/compliance  -     Impairments Affecting Function (Mobility)  endurance/activity tolerance;shortness of breath;pain  -       User Key  (r) = Recorded By, (t) = Taken By, (c) = Cosigned By    Initials Name Provider Type     Swati Can, PT Physical Therapist        Mobility     Row Name 07/12/20 1807          Bed Mobility Assessment/Treatment    Comment (Bed Mobility)  up in chair on arrival  -JH     Row Name 07/12/20 1807          Transfer Assessment/Treatment    Comment (Transfers)  stood from recliner, cues not to push with UEs, min A to lower to commode  -JH     Row Name 07/12/20 1807          Sit-Stand Transfer    Sit-Stand [place] (Transfers)  minimum assist (75% patient effort);1 person assist  -JH     Row Name 07/12/20 1807          Gait/Stairs Assessment/Training    Gait/Stairs Assessment/Training  gait/ambulation independence;gait/ambulation assistive device  -     [place] Level (Gait)  minimum assist (75% patient effort);1 person assist  -     Assistive Device (Gait)  walker, front-wheeled  -     Distance in Feet (Gait)  50'  -     Comment (Gait/Stairs)  walks very slow, short steps,  frequent rest breaks  -       User Key  (r) = Recorded By, (t) = Taken By, (c) = Cosigned By    Initials Name Provider Type     Swati Can, PT Physical Therapist        Obj/Interventions     Row Name 07/12/20 1809          General ROM    GENERAL ROM COMMENTS  AROM WFLs, limited by sternal precautions BUEs  -JH     Row Name 07/12/20 1809          MMT (Manual Muscle Testing)    General MMT Comments  LEs gross 5/5  -JH     Row Name 07/12/20 1809          Therapeutic Exercise    Comment (Therapeutic Exercise)  educated on sternal precautions and use of heart hugger  -JH     Row Name 07/12/20 1809          Static Sitting Balance    Level of [place] (Unsupported Sitting, Static Balance)  independent  -     Sitting Position (Unsupported Sitting, Static Balance)  sitting in chair  -JH     Row Name 07/12/20 1809          Dynamic Sitting Balance    Level of [place], Reaches Outside Midline (Sitting, Dynamic Balance)  independent  -     Sitting Position, Reaches Outside Midline (Sitting, Dynamic Balance)  sitting in chair  -JH     Row Name 07/12/20 1809          Static Standing Balance    Level of [place] (Supported Standing, Static Balance)  contact guard assist  -     Assistive Device Utilized (Supported Standing, Static Balance)  walker, rolling  -JH     Row Name 07/12/20 1809          Dynamic Standing Balance    Level of [place], Reaches Outside Midline (Standing, Dynamic Balance)  minimal assist, 75% patient effort  -     Assistive Device Utilized (Supported Standing, Dynamic Balance)  walker, rolling  -       User Key  (r) = Recorded By, (t) = Taken By, (c) = Cosigned By    Initials Name Provider Type     Swati Can, PT Physical Therapist        Goals/Plan     Row Name 07/12/20 1813          Bed Mobility Goal 1 (PT)    Activity/Assistive Device (Bed Mobility Goal 1, PT)  sit to supine/supine to sit  -     [place] Level/Cues Needed (Bed Mobility Goal 1, PT)  conditional  independence  -     Time Frame (Bed Mobility Goal 1, PT)  2 weeks  -     Row Name 07/12/20 1813          Transfer Goal 1 (PT)    Activity/Assistive Device (Transfer Goal 1, PT)  sit-to-stand/stand-to-sit;bed-to-chair/chair-to-bed  -     [place] Level/Cues Needed (Transfer Goal 1, PT)  supervision required  -     Time Frame (Transfer Goal 1, PT)  2 weeks  -     Row Name 07/12/20 1813          Gait Training Goal 1 (PT)    Activity/Assistive Device (Gait Training Goal 1, PT)  gait (walking locomotion)  -     [place] Level (Gait Training Goal 1, PT)  independent  -     Distance (Gait Goal 1, PT)  300'  -     Time Frame (Gait Training Goal 1, PT)  2 weeks  -[facility] Name 07/12/20 1813          Patient Education Goal (PT)    Activity (Patient Education Goal, PT)  HEP and sternal precautions  -     [place]/Cues/Accuracy (Memory Goal 2, PT)  verbalizes understanding  -     Time Frame (Patient Education Goal, PT)  2 weeks  -       User Key  (r) = Recorded By, (t) = Taken By, (c) = Cosigned By    Initials Name Provider Type     Swati Can, PT Physical Therapist        Clinical Impression     Row Name 07/12/20 1810          Pain Assessment    Additional Documentation  Pain Scale: Numbers Pre/Post-Treatment (Group)  -JH     Row Name 07/12/20 1810          Pain Scale: Numbers Pre/Post-Treatment    Pain Scale: Numbers, Pretreatment  2/10  -     Pain Scale: Numbers, Post-Treatment  4/10  -     Pain Location - Orientation  incisional  -[facility] Name 07/12/20 1810          Plan of Care Review    Plan of Care Reviewed With  patient;spouse  -     Outcome Summary  63 yo male admitted 7/8 with NSTEMI.  s/p CABG 7/9.  Pt is normally very independent, works full time.  Pt feeling better today with lines/tubes removed, still on O2 @ 3L.  Able to transfer with min A and walked out of room using RW.  Gait very slow.  Anticipate steady progress, still using RW, hope to wean from use next  session.  Goal for home with wife at d/c.  Will follow 3x/week. PPE worn:  gloves, mask, goggles.  -     Row Name 07/12/20 1810          Physical Therapy Clinical Impression    Criteria for Skilled Interventions Met (PT Clinical Impression)  yes;treatment indicated  -     Rehab Potential (PT Clinical Summary)  good, to achieve stated therapy goals  -     Row Name 07/12/20 1810          Vital Signs    Intratreatment Heart Rate (beats/min)  89  -     Pre SpO2 (%)  96  -     O2 Delivery Pre Treatment  supplemental O2 3L  -     O2 Delivery Intra Treatment  supplemental O2  -     O2 Delivery Post Treatment  supplemental O2  -     Row Name 07/12/20 1810          Positioning and Restraints    Pre-Treatment Position  sitting in chair/recliner  -     Post Treatment Position  bathroom  -     Bathroom  notified nsg;encouraged to call for assist;with family/caregiver  -       User Key  (r) = Recorded By, (t) = Taken By, (c) = Cosigned By    Initials Name Provider Type     Swati Can, PT Physical Therapist        Outcome Measures    No documentation.       Physical Therapy Education                 Title: PT OT SLP Therapies (In Progress)     Topic: Physical Therapy (In Progress)     Point: Mobility training (Done)     Description:   Instruct learner(s) on safety and technique for assisting patient out of bed, chair or wheelchair.  Instruct in the proper use of assistive devices, such as walker, crutches, cane or brace.              Patient Friendly Description:   It's important to get you on your feet again, but we need to do so in a way that is safe for you. Falling has serious consequences, and your personal safety is the most important thing of all.        When it's time to get out of bed, one of us or a family member will sit next to you on the bed to give you support.     If your doctor or nurse tells you to use a walker, crutches, a cane, or a brace, be sure you use it every time you get out of  bed, even if you think you don't need it.    Learning Progress Summary           Patient Acceptance, E,TB, VU by  at 7/12/2020 1814   Family Acceptance, E,TB, VU by  at 7/12/2020 1814                   Point: Home exercise program (Not Started)     Description:   Instruct learner(s) on appropriate technique for monitoring, assisting and/or progressing patient with therapeutic exercises and activities.              Learner Progress:   Not documented in this visit.          Point: Body mechanics (Not Started)     Description:   Instruct learner(s) on proper positioning and spine alignment for patient and/or caregiver during mobility tasks and/or exercises.              Learner Progress:   Not documented in this visit.          Point: Precautions (Done)     Description:   Instruct learner(s) on prescribed precautions during mobility and gait tasks              Learning Progress Summary           Patient Acceptance, E,TB, VU by  at 7/12/2020 1814   Family Acceptance, E,TB, VU by  at 7/12/2020 1814                               User Key     Initials Effective Dates Name Provider Type Discipline     03/01/19 -  Swati Can, PT Physical Therapist PT              PT Recommendation and Plan  Planned Therapy Interventions (PT Eval): balance training, bed mobility training, gait training, transfer training, strengthening, patient/family education, home exercise program  Outcome Summary/Treatment Plan (PT)  Anticipated Discharge Disposition (PT): home with assist  Plan of Care Reviewed With: patient, spouse  Outcome Summary: 63 yo male admitted 7/8 with NSTEMI.  s/p CABG 7/9.  Pt is normally very independent, works full time.  Pt feeling better today with lines/tubes removed, still on O2 @ 3L.  Able to transfer with min A and walked out of room using RW.  Gait very slow.  Anticipate steady progress, still using RW, hope to wean from use next session.  Goal for home with wife at d/c.  Will follow 3x/week. PPE worn:   gloves, mask, goggles.     Time Calculation:   PT Charges     Row Name 07/12/20 1815             Time Calculation    Start Time  1355  -      Stop Time  1420  -      Time Calculation (min)  25 min  -      PT Received On  07/12/20  -      PT - Next Appointment  07/14/20  -      PT Goal Re-Cert Due Date  07/26/20  -         Time Calculation- PT    Total Timed Code Minutes- PT  10 minute(s)  -        User Key  (r) = Recorded By, (t) = Taken By, (c) = Cosigned By    Initials Name Provider Type     Swati Can, PT Physical Therapist        Therapy Charges for Today     Code Description Service Date Service Provider Modifiers Qty    57862543157 HC PT EVAL HIGH COMPLEXITY 3 7/12/2020 Swati Can, PT GP 1    76325875185 HC GAIT TRAINING EA 15 MIN 7/12/2020 Swati Can, PT GP 1               Swati Can PT  7/12/2020

## 2020-07-12 NOTE — PLAN OF CARE
Problem: Patient Care Overview  Goal: Plan of Care Review  Outcome: Ongoing (interventions implemented as appropriate)  Flowsheets (Taken 7/12/2020 6017)  Plan of Care Reviewed With: spouse; mother  Note:   POD#3, pt doing well. F/C and Right IJ Cordis discontinued. BM+. Has ambulated twice thus far. Attempted to wean 02 down, but drops to 88%. Encourage IS use.

## 2020-07-12 NOTE — PROGRESS NOTES
\[facility] [place] [facility]\"" [facility]  Jose Juan Fatima MD, PhD        LOS:  LOS: 3 days   Patient Name: Lonnie Salinas  Age/Sex: 64 y.o. male  : 1955  MRN: [id number]    Day of Service: 20   Length of Stay: 3  Encounter Provider: Jose Juan Fatima MD  Place of Service: [facility] CARDIOLOGY  Patient Care Team:  Jarrell Alaniz Jr., MD as PCP - General  Lauren Singh APRN as Nurse Practitioner (Nurse Practitioner)  Christelle Kruse MD as Consulting Physician (Nephrology)    Subjective:     Chief Complaint:  F/U CAD, right heart failure    Subjective:   Seen and examined, this evening, extubated successfully up to chair throughout the day, off epinephrine drip with [place] removed.  Remains on Primacor with improved PA pressures.  Overall progressing well with weaning of inotropic drips and vaso-tropic agents.  Off the ventilator.  EKG demonstrated sinus rhythm this morning with resolution of complete heart block.  There is anterolateral ST abnormalities with T wave inversion, there was posterior wall abnormality which could be posterior acute injury.  He is chest pain-free hemodynamically stable presently and electrically stable.  Continue with supportive care status post complete revascularization via surgical bypass.  Reevaluate with 2D echo if needed as discussed with the patient  Incisional pain  No fevers  Nursing coordination of care at the bedside today          Care discussed with nursing at bedside as well as titration of inotropes and vaso-tropes  Titration of goal-directed medical therapy as indicated    Balloon pump removed    Current Medications:   Scheduled Meds:    amiodarone 400 mg Oral BID With Meals   aspirin 81 mg Oral Daily   chlorhexidine 15 mL Mouth/Throat Q12H   [START ON 2020] enoxaparin 30 mg Subcutaneous Daily   furosemide 40 mg Intravenous BID   [START ON 2020] insulin lispro 0-7 Units Subcutaneous 4x Daily With Meals &  Nightly   mupirocin 1 application Each Nare BID   pantoprazole 40 mg Intravenous Q AM   polyethylene glycol 17 g Oral BID   potassium chloride 20 mEq Oral BID With Meals   senna 1 tablet Oral BID   sodium bicarbonate 50 mEq Intravenous Q8H     Continuous Infusions:    insulin 0-50 Units/hr Last Rate: 4.8 Units/hr (07/11/20 1825)   milrinone 0.25 mcg/kg/min Last Rate: 0.25 mcg/kg/min (07/11/20 2057)       Allergies:  Allergies   Allergen Reactions   • Lisinopril Angioedema       ROS  Negative x14 were review of systems except as mentioned above  Objective:     Temp:  [98.1 °F (36.7 °C)-99.6 °F (37.6 °C)] 98.1 °F (36.7 °C)  Heart Rate:  [] 94  BP: ()/(51-81) 114/65  Arterial Line BP: ()/(48-70) 126/70     Intake/Output Summary (Last 24 hours) at 7/11/2020 2133  Last data filed at 7/11/2020 2000  Gross per 24 hour   Intake 3424.22 ml   Output 1175 ml   Net 2249.22 ml     Body mass index is 34.18 kg/m².      07/10/20  0800 07/10/20  0937 07/11/20  0600   Weight: 104 kg (229 lb 15 oz) 104 kg (229 lb) 105 kg (231 lb 7.7 oz)         Physical Exam:  Neuro:  CV:  Resp:    GI:  Ext:    Tele:  Extubated, moves all extremities no deficits, awake alert response to questions appropriately  S1S2 RRR, +cardiac rub, mild RV lift no heave, no gallops  Nasal cannula, anterior CTA/dim bases ventilatory sounds  , abd soft nontender nondistended  Pedal pulses palp, mild peripheral edema, balloon pump out.  Groin stable without hematoma  Sinus rhythm  Awake alert  Normocephalic atraumatic pupils equal round extraocular is intact bilaterally  Moves all extremities neurologically grossly intact bilaterally  No bony abnormalities grossly  All incisions clean and dry                                               Labs reviewed, creatinine downtrending, troponin downtrending peaked at 1.3    Lab Review:   Results from last 7 days   Lab Units 07/11/20  0431 07/10/20  1333   SODIUM mmol/L 146* 147*   POTASSIUM mmol/L 3.8 3.6    CHLORIDE mmol/L 107 105   CO2 mmol/L 28.0 24.0   BUN  46* 39*   CREATININE mg/dL 1.79* 2.23*   GLUCOSE mg/dL 112* 152*   CALCIUM mg/dL 8.8 9.5   AST (SGOT) U/L 58* 75*   ALT (SGPT) U/L 19 28     Results from last 7 days   Lab Units 07/11/20  0431 07/09/20  0412 07/08/20  2303 07/08/20  1601 07/08/20  1014   CK TOTAL U/L 1,243* 517*  --  839*  --    TROPONIN T ng/mL  --   --  1.320* 1.280* 1.070*     Results from last 7 days   Lab Units 07/11/20  0454 07/10/20  1616  07/10/20  0525   WBC 10*3/mm3 12.60*  --   --  24.20*   HEMOGLOBIN g/dL 10.5*  --   --  12.1*   HEMOGLOBIN, POC g/dL  --  11.4*   < >  --    HEMATOCRIT % 30.8*  --   --  36.7*   HEMATOCRIT POC %  --  33*   < >  --    PLATELETS 10*3/mm3 94*  --   --  225    < > = values in this interval not displayed.     Results from last 7 days   Lab Units 07/10/20  0525 07/09/20  1903 07/09/20  0412   INR  1.26* 1.36*  --    APTT seconds  --  22.9* 26.2*     Results from last 7 days   Lab Units 07/11/20  0431 07/10/20  0525   MAGNESIUM mg/dL 3.0* 3.3*     Results from last 7 days   Lab Units 07/08/20  1014   CHOLESTEROL mg/dL 90   TRIGLYCERIDES mg/dL 118   HDL CHOL mg/dL 35*     Results from last 7 days   Lab Units 07/09/20  0412 07/08/20  1014   PROBNP pg/mL 1,066.0* 1,236.0*     Results from last 7 days   Lab Units 07/09/20  0412   TSH uIU/mL 1.630       Recent Radiology:  Imaging Results (Most Recent)     Procedure Component Value Units Date/Time    XR Chest 1 View [[id number]] Collected:  07/11/20 1501     Updated:  07/11/20 1505    Narrative:       DATE OF EXAM:  7/11/2020 2:31 PM     PROCEDURE:  XR CHEST 1 VW-     INDICATIONS:  Status post cardiac surgery, chest tube removal.      COMPARISON:  07/11/2020 at 0552 hours     TECHNIQUE:   Single radiographic view of the chest was obtained.     FINDINGS:  The mediastinal and left chest tube has been removed. There is a new  tiny 6 mm left apical pneumothorax. The [place]-Felipe catheter has been  removed. The right  internal jugular sheath remains in place. The heart  is enlarged. There are bilateral basilar and left perihilar densities  with no significant interval improvement felt to be due to atelectasis.  The patient probably has mild pulmonary vascular congestion. There is an  unchanged left pleural effusion. The right pleural space is clear.       Impression:          1. Interval removal of the mediastinal and left chest tube. There is a  tiny 6 mm left apical pneumothorax which is an interval change from the  earlier chest x-ray.  2. The [place]-Felipe catheter has been removed.  3. No change in the bilateral basilar and left perihilar densities felt  to be due to atelectasis with superimposed mild pulmonary vascular  congestion.     Electronically Signed By-Andrew York On:7/11/2020 3:03 PM  This report was finalized on [id number] by  Andrew York, .    XR Chest 1 View [[id number]] Collected:  07/11/20 0929     Updated:  07/11/20 0949    Narrative:       DATE OF EXAM:  7/11/2020 5:44 AM     PROCEDURE:  XR CHEST 1 VW-     INDICATIONS:  Coronary artery atherosclerotic vascular disease, previous myocardial  infarction, postoperative follow-up.      COMPARISON:  07/10/2020.     TECHNIQUE:   Single radiographic view of the chest was obtained.     FINDINGS:  The patient has been extubated and the nasogastric tube has been  removed. There is a stable mediastinal and left chest tube in place with  no pneumothorax. The [place]-Felipe catheter is in stable position. The heart  is enlarged. The patient probably has mild vascular congestion which is  an interval change from yesterday's study. There is also worsening  bilateral basilar consolidation likely due to atelectasis.  The right  pleural space is clear. The patient probably has a new small left  pleural effusion.       Impression:          1. The patient has been extubated and the nasogastric tube has been  removed.  2. Stable mediastinal left chest tube with no pneumothorax.  3.  Probable mild pulmonary vascular congestion which is an interval  change from yesterday's study.  4. Worsening basilar consolidation felt to be due to atelectasis. The  patient probably has a new small left pleural effusion.     Electronically Signed By-Andrew York On:7/11/2020 9:47 AM  This report was finalized on [id number] by  Andrew York, .    XR Chest 1 View [[id number]] Collected:  07/10/20 0729     Updated:  07/10/20 0733    Narrative:          DATE OF EXAM:   7/10/2020 4:22 AM     PROCEDURE:   XR CHEST 1 VW-     INDICATIONS:   Post-Op Heart Surgery; I25.110-Atherosclerotic heart disease of native  coronary artery with unstable angina pectoris; I21.4-Non-ST elevation  (NSTEMI) myocardial infarction; I21.4-Non-ST elevation (NSTEMI)  myocardial infarction; R94.31-Abnormal electrocardiogram (ECG) (EKG);  R07.9-Chest pain, unspecified; R06.00-Dyspnea, unspecified     COMPARISON:  7/9/2020     TECHNIQUE:   Portable chest radiograph.     FINDINGS:   Endotracheal tube tip 4.9 cm above the shyla. Interval placement of an  esophagogastric tube with the tip below the diaphragm. Stable right IJ  [place]-Felipe catheter with the tip overlying the main pulmonary arterial  outflow. Mediastinal drainage tube and left chest tube are unchanged. No  pneumothorax. Mild left basilar airspace disease likely atelectasis.  Stable positioning of intra-atrial balloon pump with a radiodense marker  2.3 cm caudal to the superior aspect of the aortic arch       Impression:       1. Placement of an esophagogastric tube with the tip below the  diaphragm. Remaining lines and support tubes stable.  2. No pneumothorax.  3. Mild left basilar airspace disease likely atelectasis with improved  aeration from the prior exam.     Electronically Signed By-Avel Khoury On:7/10/2020 7:31 AM  This report was finalized on [id number] by  Edna Davis    XR Chest 1 View [[id number]] Collected:  07/09/20 2012     Updated:  07/09/20 2018    Narrative:        XR CHEST 1 VW-     Date of Exam: 7/9/2020 7:30 PM     Indication: Status post cardiac surgery.     Comparison Exams: Chest radiograph from earlier today     Technique: Single AP chest radiograph     FINDINGS:  Median sternotomy wires appear intact. An endotracheal tube has its tip  in the midtrachea. A right internal jugular [place]-Felipe catheter has tip  in the main pulmonary artery. Mediastinal and left chest tubes are in  place. No pneumothorax is identified. An intra-aortic balloon pump is  present, with its marker tip 3.1 cm below the aortic knob. There is  scattered bilateral atelectasis. The heart and mediastinal contours  appear stable. The pulmonary vasculature appears normal.       Impression:       1.Endotracheal tube in good position.  2.Mediastinal and left chest tubes in place. No pneumothorax identified.  3.Intra-aortic balloon pump with marker tip 3.1 cm below aortic knob.  4.Scattered bilateral atelectasis.     Electronically Signed By-DR. Eliecer Santiago MD On:7/9/2020 8:16 PM  This report was finalized on [id number] by DR. Eliecer Santiago MD.    XR Chest 1 View [[id number]] Collected:  07/09/20 1013     Updated:  07/09/20 1024    Narrative:       EXAMINATION: XR CHEST 1 VW-     DATE OF EXAM: 7/9/2020 9:49 AM     INDICATION: post IABP placement; I25.110-Atherosclerotic heart disease  of native coronary artery with unstable angina pectoris; I21.4-Non-ST  elevation (NSTEMI) myocardial infarction; I21.4-Non-ST elevation  (NSTEMI) myocardial infarction; R94.31-Abnormal electrocardiogram (ECG)  (EKG); R07.9-Chest pain, unspecified; R06.00-Dyspnea, unspecified.     COMPARISON: Chest radiograph dated 07/08/2020     TECHNIQUE: Portable AP view of the chest was obtained.     FINDINGS:  The intra-aortic balloon pump marker tip is slightly high at the  superior aspect of the aortic arch. The cardiomediastinal silhouette is  at the upper limit of normal. Pulmonary vascularity appears within  normal  limits. There are low lung volumes. There is no focal airspace  consolidation, pleural effusion, or pneumothorax. There are no acute  osseous findings.       Impression:       1. IABP marker tip slightly high with tip at the superior aspect of the  aortic knob.     Electronically Signed By-Brady Watson On:7/9/2020 10:22 AM  This report was finalized on [id number] by  Brady Watson, .    US Renal Bilateral [[id number]] Collected:  07/08/20 2038     Updated:  07/08/20 2041    Narrative:       DATE OF EXAM:  7/8/2020 6:17 PM     PROCEDURE:  US RENAL BILATERAL-     INDICATIONS:  Chronic kidney disease     COMPARISON:  No Comparisons Available     TECHNIQUE:   Grayscale and color Doppler ultrasound evaluation of the kidneys and  urinary bladder was performed.        FINDINGS:  Both kidneys appear normal in size and echogenicity, with no stone,  mass, or hydronephrosis identified. The right kidney measures 11.5 cm,  while the left kidney measures 11.9 cm.     The urinary bladder is not well-seen secondary to Aguirre catheterization.          Impression:       Both kidneys appear within normal limits.     Electronically Signed By-DR. Eliecer Santiago MD On:7/8/2020 8:39 PM  This report was finalized on [id number] by DR. Eliecer Santiago MD.    XR Chest 1 View [[id number]] Collected:  07/08/20 1102     Updated:  07/08/20 1105    Narrative:       EXAMINATION: XR CHEST 1 VW-     DATE OF EXAM: 7/8/2020 10:43 AM     INDICATION: Chest pain for one day     COMPARISON: Chest radiograph dated 05/18/2014     TECHNIQUE: Portable AP view of the chest was obtained.     FINDINGS:  The cardiomediastinal silhouette is within normal limits. Pulmonary  vascularity is unremarkable. There is no focal airspace consolidation,  pleural effusion, or pneumothorax. There are no acute osseous findings.       Impression:       No acute cardiopulmonary abnormality.     Electronically Signed By-Brady Watson On:7/8/2020 11:03 AM  This report  was finalized on [id number] by  Brady Watson, .          ECHOCARDIOGRAM:    Results for orders placed during the hospital encounter of 07/08/20   Adult Transthoracic Echo Limited W/ Cont if Necessary Per Protocol    Narrative · Right ventricular cavity is severely dilated.  · Moderately reduced right ventricular systolic function noted.  · Mild tricuspid valve regurgitation is present.     Overall LV function appears mildly reduced 45 to 50% with dyssynchrony   with pacing  RV is severely enlarged with at least moderate hypokinesis possibly   moderate to severe  Mild TR, trace MR, no aortic valve pathology  No masses or effusion seen  IVC not well seen cannot estimate right atrial pressure  Did not estimate diastolic dysfunction, no parameters identified           I reviewed the patient's new clinical results.    EKG:      Assessment and plan:       NSTEMI, initial episode of care (CMS/[facility])    NSTEMI (non-ST elevated myocardial infarction) (CMS/[facility])    Coronary artery disease involving native coronary artery of native heart with unstable angina pectoris (CMS/[facility])    1. NSTEMI / MV CAD s/p 5vCABG 7/9/20  - troponin 1.0, 1.2, 1.3 peak and decline  - ECG with ST depression in V2 V3 with T wave inversion, stable with restoration of sinus rhythm today  - [facility] 7/8/2020 revealed MV CAD  - IABP removed successfully  -Weaning inotropes as able  Incisions clean and dry  Up to chair today  Would likely pursue antiplatelet therapy once he improves  Statin therapy is indicated once LFTs improve  Beta-blocker will be reinstituted when off Primacor and stable, significant RV dysfunction in place, may be somewhat preload dependent, careful diuresis, no beta-blockers for now     2. Hypotension  -Improved, other pressors weaned, continues milrinone for support     3. Post op Second degree AV block / CHB  - AV pacing as needed  Restoration of sinus rhythm today     4. H/o HTN   -Stable     5. Transaminitis / elevated CK  -  holding statin, follow CK    6. Hypokalemia  -Replace potassium rhythm for magnesium greater than 2     7. SUZANNE on CKD stage 2  - renal on board     .      Greater than 35-minute spent face-to-face with the patient today with greater 50% time spent on direct patient care and coordination of care on the floor as well as additional time and documentation

## 2020-07-13 LAB
ALBUMIN SERPL-MCNC: 3.4 G/DL (ref 3.5–5.2)
ALBUMIN/GLOB SERPL: 1.3 G/DL
ALP SERPL-CCNC: 54 U/L (ref 39–117)
ALT SERPL W P-5'-P-CCNC: 15 U/L (ref 1–41)
ANION GAP SERPL CALCULATED.3IONS-SCNC: 15 MMOL/L (ref 5–15)
AST SERPL-CCNC: 36 U/L (ref 1–40)
BILIRUB SERPL-MCNC: 0.5 MG/DL (ref 0–1.2)
BUN SERPL-MCNC: 56 MG/DL (ref 8–23)
BUN SERPL-MCNC: ABNORMAL MG/DL
BUN/CREAT SERPL: ABNORMAL
CA-I SERPL ISE-MCNC: 1.15 MMOL/L (ref 1.2–1.3)
CALCIUM SPEC-SCNC: 9.1 MG/DL (ref 8.6–10.5)
CHLORIDE SERPL-SCNC: 97 MMOL/L (ref 98–107)
CK SERPL-CCNC: 328 U/L (ref 20–200)
CO2 SERPL-SCNC: 26 MMOL/L (ref 22–29)
CREAT SERPL-MCNC: 1.3 MG/DL (ref 0.76–1.27)
DEPRECATED RDW RBC AUTO: 46.8 FL (ref 37–54)
ERYTHROCYTE [DISTWIDTH] IN BLOOD BY AUTOMATED COUNT: 15.3 % (ref 12.3–15.4)
GFR SERPL CREATININE-BSD FRML MDRD: 56 ML/MIN/1.73
GLOBULIN UR ELPH-MCNC: 2.6 GM/DL
GLUCOSE BLDC GLUCOMTR-MCNC: 102 MG/DL (ref 70–105)
GLUCOSE BLDC GLUCOMTR-MCNC: 113 MG/DL (ref 70–105)
GLUCOSE BLDC GLUCOMTR-MCNC: 139 MG/DL (ref 70–105)
GLUCOSE BLDC GLUCOMTR-MCNC: 93 MG/DL (ref 70–105)
GLUCOSE SERPL-MCNC: 126 MG/DL (ref 65–99)
HCT VFR BLD AUTO: 36.2 % (ref 37.5–51)
HGB BLD-MCNC: 11.8 G/DL (ref 13–17.7)
MAGNESIUM SERPL-MCNC: 2.7 MG/DL (ref 1.6–2.4)
MCH RBC QN AUTO: 28.6 PG (ref 26.6–33)
MCHC RBC AUTO-ENTMCNC: 32.7 G/DL (ref 31.5–35.7)
MCV RBC AUTO: 87.5 FL (ref 79–97)
PHOSPHATE SERPL-MCNC: 4 MG/DL (ref 2.5–4.5)
PLATELET # BLD AUTO: 139 10*3/MM3 (ref 140–450)
PMV BLD AUTO: 8.4 FL (ref 6–12)
POTASSIUM SERPL-SCNC: 4.4 MMOL/L (ref 3.5–5.2)
PROT SERPL-MCNC: 6 G/DL (ref 6–8.5)
RBC # BLD AUTO: 4.14 10*6/MM3 (ref 4.14–5.8)
SODIUM SERPL-SCNC: 138 MMOL/L (ref 136–145)
WBC # BLD AUTO: 11.8 10*3/MM3 (ref 3.4–10.8)

## 2020-07-13 PROCEDURE — 83735 ASSAY OF MAGNESIUM: CPT | Performed by: INTERNAL MEDICINE

## 2020-07-13 PROCEDURE — 93010 ELECTROCARDIOGRAM REPORT: CPT | Performed by: INTERNAL MEDICINE

## 2020-07-13 PROCEDURE — 80053 COMPREHEN METABOLIC PANEL: CPT | Performed by: INTERNAL MEDICINE

## 2020-07-13 PROCEDURE — 85027 COMPLETE CBC AUTOMATED: CPT | Performed by: NURSE PRACTITIONER

## 2020-07-13 PROCEDURE — 99024 POSTOP FOLLOW-UP VISIT: CPT | Performed by: NURSE PRACTITIONER

## 2020-07-13 PROCEDURE — 82962 GLUCOSE BLOOD TEST: CPT

## 2020-07-13 PROCEDURE — 97116 GAIT TRAINING THERAPY: CPT

## 2020-07-13 PROCEDURE — G0108 DIAB MANAGE TRN  PER INDIV: HCPCS

## 2020-07-13 PROCEDURE — 82330 ASSAY OF CALCIUM: CPT | Performed by: INTERNAL MEDICINE

## 2020-07-13 PROCEDURE — 82550 ASSAY OF CK (CPK): CPT | Performed by: INTERNAL MEDICINE

## 2020-07-13 PROCEDURE — 99231 SBSQ HOSP IP/OBS SF/LOW 25: CPT | Performed by: NURSE PRACTITIONER

## 2020-07-13 PROCEDURE — 97530 THERAPEUTIC ACTIVITIES: CPT

## 2020-07-13 PROCEDURE — 25010000002 ENOXAPARIN PER 10 MG: Performed by: THORACIC SURGERY (CARDIOTHORACIC VASCULAR SURGERY)

## 2020-07-13 PROCEDURE — 25010000002 CALCIUM GLUCONATE-NACL 1-0.675 GM/50ML-% SOLUTION: Performed by: INTERNAL MEDICINE

## 2020-07-13 PROCEDURE — 84100 ASSAY OF PHOSPHORUS: CPT | Performed by: INTERNAL MEDICINE

## 2020-07-13 PROCEDURE — 63710000001 INSULIN GLARGINE PER 5 UNITS: Performed by: NURSE PRACTITIONER

## 2020-07-13 RX ORDER — BUMETANIDE 1 MG/1
1 TABLET ORAL 2 TIMES DAILY
Status: DISCONTINUED | OUTPATIENT
Start: 2020-07-13 | End: 2020-07-14 | Stop reason: HOSPADM

## 2020-07-13 RX ORDER — CALCIUM GLUCONATE 20 MG/ML
1 INJECTION, SOLUTION INTRAVENOUS EVERY 12 HOURS
Status: COMPLETED | OUTPATIENT
Start: 2020-07-13 | End: 2020-07-13

## 2020-07-13 RX ORDER — CARVEDILOL 3.12 MG/1
3.12 TABLET ORAL 2 TIMES DAILY WITH MEALS
Status: DISCONTINUED | OUTPATIENT
Start: 2020-07-13 | End: 2020-07-14 | Stop reason: HOSPADM

## 2020-07-13 RX ADMIN — PANTOPRAZOLE SODIUM 40 MG: 40 TABLET, DELAYED RELEASE ORAL at 06:20

## 2020-07-13 RX ADMIN — CALCIUM GLUCONATE 1 G: 20 INJECTION, SOLUTION INTRAVENOUS at 08:42

## 2020-07-13 RX ADMIN — HYDROCODONE BITARTRATE AND ACETAMINOPHEN 1 TABLET: 5; 325 TABLET ORAL at 20:47

## 2020-07-13 RX ADMIN — AMIODARONE HYDROCHLORIDE 400 MG: 200 TABLET ORAL at 08:41

## 2020-07-13 RX ADMIN — INSULIN GLARGINE 10 UNITS: 100 INJECTION, SOLUTION SUBCUTANEOUS at 06:19

## 2020-07-13 RX ADMIN — MUPIROCIN 1 APPLICATION: 20 OINTMENT TOPICAL at 08:42

## 2020-07-13 RX ADMIN — POTASSIUM CHLORIDE 20 MEQ: 1500 TABLET, EXTENDED RELEASE ORAL at 08:42

## 2020-07-13 RX ADMIN — POTASSIUM CHLORIDE 20 MEQ: 1500 TABLET, EXTENDED RELEASE ORAL at 17:22

## 2020-07-13 RX ADMIN — CALCIUM GLUCONATE 1 G: 20 INJECTION, SOLUTION INTRAVENOUS at 20:46

## 2020-07-13 RX ADMIN — ASPIRIN 81 MG: 81 TABLET, COATED ORAL at 08:42

## 2020-07-13 RX ADMIN — BUMETANIDE 1 MG: 1 TABLET ORAL at 20:46

## 2020-07-13 RX ADMIN — HYDROCODONE BITARTRATE AND ACETAMINOPHEN 1 TABLET: 5; 325 TABLET ORAL at 08:41

## 2020-07-13 RX ADMIN — MUPIROCIN 1 APPLICATION: 20 OINTMENT TOPICAL at 20:48

## 2020-07-13 RX ADMIN — BUMETANIDE 1 MG: 1 TABLET ORAL at 08:41

## 2020-07-13 RX ADMIN — ENOXAPARIN SODIUM 30 MG: 30 INJECTION SUBCUTANEOUS at 15:40

## 2020-07-13 RX ADMIN — CARVEDILOL 3.12 MG: 3.12 TABLET, FILM COATED ORAL at 17:22

## 2020-07-13 RX ADMIN — CHLORHEXIDINE GLUCONATE 0.12% ORAL RINSE 15 ML: 1.2 LIQUID ORAL at 08:42

## 2020-07-13 RX ADMIN — AMIODARONE HYDROCHLORIDE 400 MG: 200 TABLET ORAL at 17:22

## 2020-07-13 NOTE — PROGRESS NOTES
[facility]        LOS:  LOS: 5 days   Patient Name: Lonnie Salinas  Age/Sex: 64 y.o. male  : 1955  MRN: [id number]    Day of Service: 20   Length of Stay: 5  Encounter Provider: SAADIA Branch  Place of Service: [facility] CARDIOLOGY  Patient Care Team:  Jarrell Alaniz Jr., MD as PCP - General  Lauren Singh APRN as Nurse Practitioner (Nurse Practitioner)  Christelle Kruse MD as Consulting Physician (Nephrology)    Subjective:     Chief Complaint:  F/U CAD, Heart Failure    Subjective:   Patient is sitting up in the chair.  He denies dyspnea and states that he ambulated out to the nursing station yesterday.      Current Medications:   Scheduled Meds:  amiodarone 400 mg Oral BID With Meals   aspirin 81 mg Oral Daily   bumetanide 1 mg Oral BID   calcium gluconate 1 g Intravenous Q12H   chlorhexidine 15 mL Mouth/Throat Q12H   enoxaparin 30 mg Subcutaneous Daily   insulin glargine 10 Units Subcutaneous QAM   insulin lispro 0-7 Units Subcutaneous 4x Daily With Meals & Nightly   mupirocin 1 application Each Nare BID   pantoprazole 40 mg Oral Q AM   potassium chloride 20 mEq Oral BID With Meals     Continuous Infusions:  insulin 0-50 Units/hr Last Rate: Stopped (20 0600)   milrinone 0.125 mcg/kg/min Last Rate: 0.125 mcg/kg/min (20 0919)       Allergies:  Allergies   Allergen Reactions   • Lisinopril Angioedema       ROS    Objective:     Temp:  [97.4 °F (36.3 °C)-98.5 °F (36.9 °C)] 98.5 °F (36.9 °C)  Heart Rate:  [68-91] 71  BP: (114-145)/(70-91) 125/77     Intake/Output Summary (Last 24 hours) at 2020 0838  Last data filed at 2020 0810  Gross per 24 hour   Intake 720 ml   Output 1450 ml   Net -730 ml     Body mass index is 36.33 kg/m².      20  0500 20  0600 20  0400   Weight: 109 kg (240 lb 8.4 oz) 109 kg (240 lb 8.3 oz) 112 kg (246 lb 0.5 oz)         Physical  Exam:  Neuro:  CV:  Resp:  GI:  Ext:  Tele: AAOx3  S1S2 RRR, no murmur  Non-labored, CTA/dim bases  BS+ hyperactive, abd soft  Pedal pulses palp, 1+ pitting BLE edema  SR                                                   Lab Review:   Results from last 7 days   Lab Units 07/13/20  0629 07/12/20  0358   SODIUM mmol/L 138 143   POTASSIUM mmol/L 4.4 4.5   CHLORIDE mmol/L 97* 103   CO2 mmol/L 26.0 29.0   BUN  56* 54*   CREATININE mg/dL 1.30* 1.51*   GLUCOSE mg/dL 126* 102*   CALCIUM mg/dL 9.1 8.8   AST (SGOT) U/L 36 47*   ALT (SGPT) U/L 15 15     Results from last 7 days   Lab Units 07/13/20  0629 07/12/20  0358 07/11/20  0431 07/09/20  0412 07/08/20  2303 07/08/20  1601 07/08/20  1014   CK TOTAL U/L 328* 729* 1,243* 517*  --  839*  --    TROPONIN T ng/mL  --   --   --   --  1.320* 1.280* 1.070*     Results from last 7 days   Lab Units 07/13/20  0342 07/12/20  0358   WBC 10*3/mm3 11.80* 13.00*   HEMOGLOBIN g/dL 11.8* 11.3*   HEMATOCRIT % 36.2* 33.6*   PLATELETS 10*3/mm3 139* 113*     Results from last 7 days   Lab Units 07/10/20  0525 07/09/20  1903 07/09/20  0412   INR  1.26* 1.36*  --    APTT seconds  --  22.9* 26.2*     Results from last 7 days   Lab Units 07/13/20  0629 07/12/20  0358   MAGNESIUM mg/dL 2.7* 2.9*     Results from last 7 days   Lab Units 07/08/20  1014   CHOLESTEROL mg/dL 90   TRIGLYCERIDES mg/dL 118   HDL CHOL mg/dL 35*     Results from last 7 days   Lab Units 07/09/20  0412 07/08/20  1014   PROBNP pg/mL 1,066.0* 1,236.0*     Results from last 7 days   Lab Units 07/09/20  0412   TSH uIU/mL 1.630       Recent Radiology:  Imaging Results (Most Recent)     Procedure Component Value Units Date/Time    XR Chest 1 View [[id number]] Collected:  07/12/20 0815     Updated:  07/12/20 0820    Narrative:       DATE OF EXAM:  7/12/2020 7:29 AM     PROCEDURE:  XR CHEST 1 VW-     INDICATIONS:  Worsening shortness of breath, coronary artery disease, recent heart  surgery. History of a tiny left apical pneumothorax  following chest tube  removal.      COMPARISON:  07/11/2020.     TECHNIQUE:   Single radiographic view of the chest was obtained.     FINDINGS:  The left apical pneumothorax has resolved. The right internal jugular  sheath remains in place. The heart is enlarged. There are postsurgical  changes from a recent CABG procedure. The pulmonary vascular markings  are probably normal. There is persistent bilateral basilar consolidation  and left perihilar consolidation which is unchanged. This is probably  due to atelectasis, but I cannot completely exclude airspace disease  from fluid overload or pneumonia. A new small right pleural effusion is  suspected. There is a stable small left pleural effusion.       Impression:          1. The left apical pneumothorax has resolved.  2. Cardiomegaly.  3. Unchanged left perihilar and bilateral basilar consolidation probably  due to atelectasis, but I cannot exclude airspace disease from fluid  overload or pneumonia.  3. New small right pleural effusion and an unchanged small left pleural  effusion.     Electronically Signed By-Andrew York On:7/12/2020 8:18 AM  This report was finalized on [id number] by  Andrew York, .    XR Chest 1 View [[id number]] Collected:  07/11/20 1501     Updated:  07/11/20 1505    Narrative:       DATE OF EXAM:  7/11/2020 2:31 PM     PROCEDURE:  XR CHEST 1 VW-     INDICATIONS:  Status post cardiac surgery, chest tube removal.      COMPARISON:  07/11/2020 at 0552 hours     TECHNIQUE:   Single radiographic view of the chest was obtained.     FINDINGS:  The mediastinal and left chest tube has been removed. There is a new  tiny 6 mm left apical pneumothorax. The [place]-Felipe catheter has been  removed. The right internal jugular sheath remains in place. The heart  is enlarged. There are bilateral basilar and left perihilar densities  with no significant interval improvement felt to be due to atelectasis.  The patient probably has mild pulmonary vascular  congestion. There is an  unchanged left pleural effusion. The right pleural space is clear.       Impression:          1. Interval removal of the mediastinal and left chest tube. There is a  tiny 6 mm left apical pneumothorax which is an interval change from the  earlier chest x-ray.  2. The [place]-Felipe catheter has been removed.  3. No change in the bilateral basilar and left perihilar densities felt  to be due to atelectasis with superimposed mild pulmonary vascular  congestion.     Electronically Signed By-Andrew York On:7/11/2020 3:03 PM  This report was finalized on [id number] by  Andrew York, .    XR Chest 1 View [[id number]] Collected:  07/11/20 0929     Updated:  07/11/20 0949    Narrative:       DATE OF EXAM:  7/11/2020 5:44 AM     PROCEDURE:  XR CHEST 1 VW-     INDICATIONS:  Coronary artery atherosclerotic vascular disease, previous myocardial  infarction, postoperative follow-up.      COMPARISON:  07/10/2020.     TECHNIQUE:   Single radiographic view of the chest was obtained.     FINDINGS:  The patient has been extubated and the nasogastric tube has been  removed. There is a stable mediastinal and left chest tube in place with  no pneumothorax. The [place]-Felipe catheter is in stable position. The heart  is enlarged. The patient probably has mild vascular congestion which is  an interval change from yesterday's study. There is also worsening  bilateral basilar consolidation likely due to atelectasis.  The right  pleural space is clear. The patient probably has a new small left  pleural effusion.       Impression:          1. The patient has been extubated and the nasogastric tube has been  removed.  2. Stable mediastinal left chest tube with no pneumothorax.  3. Probable mild pulmonary vascular congestion which is an interval  change from yesterday's study.  4. Worsening basilar consolidation felt to be due to atelectasis. The  patient probably has a new small left pleural effusion.     Electronically Signed  By-Andrew York On:7/11/2020 9:47 AM  This report was finalized on [id number] by  Andrew York, .    XR Chest 1 View [[id number]] Collected:  07/10/20 0729     Updated:  07/10/20 0733    Narrative:          DATE OF EXAM:   7/10/2020 4:22 AM     PROCEDURE:   XR CHEST 1 VW-     INDICATIONS:   Post-Op Heart Surgery; I25.110-Atherosclerotic heart disease of native  coronary artery with unstable angina pectoris; I21.4-Non-ST elevation  (NSTEMI) myocardial infarction; I21.4-Non-ST elevation (NSTEMI)  myocardial infarction; R94.31-Abnormal electrocardiogram (ECG) (EKG);  R07.9-Chest pain, unspecified; R06.00-Dyspnea, unspecified     COMPARISON:  7/9/2020     TECHNIQUE:   Portable chest radiograph.     FINDINGS:   Endotracheal tube tip 4.9 cm above the shyla. Interval placement of an  esophagogastric tube with the tip below the diaphragm. Stable right IJ  [place]-Felipe catheter with the tip overlying the main pulmonary arterial  outflow. Mediastinal drainage tube and left chest tube are unchanged. No  pneumothorax. Mild left basilar airspace disease likely atelectasis.  Stable positioning of intra-atrial balloon pump with a radiodense marker  2.3 cm caudal to the superior aspect of the aortic arch       Impression:       1. Placement of an esophagogastric tube with the tip below the  diaphragm. Remaining lines and support tubes stable.  2. No pneumothorax.  3. Mild left basilar airspace disease likely atelectasis with improved  aeration from the prior exam.     Electronically Signed By-Avel Khoury On:7/10/2020 7:31 AM  This report was finalized on [id number] by  Avel Khoury .    XR Chest 1 View [[id number]] Collected:  07/09/20 2012     Updated:  07/09/20 2018    Narrative:       XR CHEST 1 VW-     Date of Exam: 7/9/2020 7:30 PM     Indication: Status post cardiac surgery.     Comparison Exams: Chest radiograph from earlier today     Technique: Single AP chest radiograph     FINDINGS:  Median sternotomy wires appear intact.  An endotracheal tube has its tip  in the midtrachea. A right internal jugular [place]-Felipe catheter has tip  in the main pulmonary artery. Mediastinal and left chest tubes are in  place. No pneumothorax is identified. An intra-aortic balloon pump is  present, with its marker tip 3.1 cm below the aortic knob. There is  scattered bilateral atelectasis. The heart and mediastinal contours  appear stable. The pulmonary vasculature appears normal.       Impression:       1.Endotracheal tube in good position.  2.Mediastinal and left chest tubes in place. No pneumothorax identified.  3.Intra-aortic balloon pump with marker tip 3.1 cm below aortic knob.  4.Scattered bilateral atelectasis.     Electronically Signed By-DR. Eliecer Santiago MD On:7/9/2020 8:16 PM  This report was finalized on [id number] by DR. Eliecer Santiago MD.    XR Chest 1 View [[id number]] Collected:  07/09/20 1013     Updated:  07/09/20 1024    Narrative:       EXAMINATION: XR CHEST 1 VW-     DATE OF EXAM: 7/9/2020 9:49 AM     INDICATION: post IABP placement; I25.110-Atherosclerotic heart disease  of native coronary artery with unstable angina pectoris; I21.4-Non-ST  elevation (NSTEMI) myocardial infarction; I21.4-Non-ST elevation  (NSTEMI) myocardial infarction; R94.31-Abnormal electrocardiogram (ECG)  (EKG); R07.9-Chest pain, unspecified; R06.00-Dyspnea, unspecified.     COMPARISON: Chest radiograph dated 07/08/2020     TECHNIQUE: Portable AP view of the chest was obtained.     FINDINGS:  The intra-aortic balloon pump marker tip is slightly high at the  superior aspect of the aortic arch. The cardiomediastinal silhouette is  at the upper limit of normal. Pulmonary vascularity appears within  normal limits. There are low lung volumes. There is no focal airspace  consolidation, pleural effusion, or pneumothorax. There are no acute  osseous findings.       Impression:       1. IABP marker tip slightly high with tip at the superior aspect of the  aortic  [place].     Electronically Signed By-Brady Watson On:7/9/2020 10:22 AM  This report was finalized on [id number] by  Brady Watson .    US Renal Bilateral [[id number]] Collected:  07/08/20 2038     Updated:  07/08/20 2041    Narrative:       DATE OF EXAM:  7/8/2020 6:17 PM     PROCEDURE:  US RENAL BILATERAL-     INDICATIONS:  Chronic kidney disease     COMPARISON:  No Comparisons Available     TECHNIQUE:   Grayscale and color Doppler ultrasound evaluation of the kidneys and  urinary bladder was performed.        FINDINGS:  Both kidneys appear normal in size and echogenicity, with no stone,  mass, or hydronephrosis identified. The right kidney measures 11.5 cm,  while the left kidney measures 11.9 cm.     The urinary bladder is not well-seen secondary to Aguirre catheterization.          Impression:       Both kidneys appear within normal limits.     Electronically Signed By-DR. Eliecer Santiago MD On:7/8/2020 8:39 PM  This report was finalized on [id number] by DR. Eliecer Santiago MD.    XR Chest 1 View [[id number]] Collected:  07/08/20 1102     Updated:  07/08/20 1105    Narrative:       EXAMINATION: XR CHEST 1 VW-     DATE OF EXAM: 7/8/2020 10:43 AM     INDICATION: Chest pain for one day     COMPARISON: Chest radiograph dated 05/18/2014     TECHNIQUE: Portable AP view of the chest was obtained.     FINDINGS:  The cardiomediastinal silhouette is within normal limits. Pulmonary  vascularity is unremarkable. There is no focal airspace consolidation,  pleural effusion, or pneumothorax. There are no acute osseous findings.       Impression:       No acute cardiopulmonary abnormality.     Electronically Signed By-Brady Watson On:7/8/2020 11:03 AM  This report was finalized on [id number] by  Brady Watson .          ECHOCARDIOGRAM:    Results for orders placed during the hospital encounter of 07/08/20   Adult Transthoracic Echo Limited W/ Cont if Necessary Per Protocol    Narrative · Right ventricular  cavity is severely dilated.  · Moderately reduced right ventricular systolic function noted.  · Mild tricuspid valve regurgitation is present.     Overall LV function appears mildly reduced 45 to 50% with dyssynchrony   with pacing  RV is severely enlarged with at least moderate hypokinesis possibly   moderate to severe  Mild TR, trace MR, no aortic valve pathology  No masses or effusion seen  IVC not well seen cannot estimate right atrial pressure  Did not estimate diastolic dysfunction, no parameters identified           I reviewed the patient's new clinical results.    EKG:      Assessment:       NSTEMI, initial episode of care (CMS/[facility])    NSTEMI (non-ST elevated myocardial infarction) (CMS/[facility])    Coronary artery disease involving native coronary artery of native heart with unstable angina pectoris (CMS/[facility])    1. NSTEMI / MV CAD s/p 5vCABG 7/9/20  - troponin 1.0, 1.2, 1.3 peak and decline  - ECG with ST depression in V2 V3 with T wave inversion, stable with restoration of sinus rhythm today  - [facility] 7/8/2020 revealed MV CAD  - on aspirin  - statin therapy once CK improves, LFTs WNL  - beta blocker once off milrinone and hemodynamically stable    2. Right Heart failure / LV dysfunction  - Preop 2D echo 7/8 showed EF 50% with grade 1 diastolic dysfunction, moderate reduced RV function, moderate RVE.  - Post op 2D echo 7/10 shows EF = 45-50% with severe RVE and moderate to severe RV dysfunction, mild TR.  - IABP removed successfully  - Weaning inotropes as able  - beta blocker once off milrinone and hemodynamically stable  - significant RV dysfunction in place, may be somewhat preload dependent, careful diuresis  - on bumex 1 mg PO bid  - appears mildly hypervolemic     3. Post op Second degree AV block / CHB  - AV pacing as needed  - Restoration of sinus rhythm      4. H/o HTN   - Stable     5. Transaminitis / elevated CK  - holding statin  - LFTs WNL, CK trending downward 328 7/13      6. SUZANNE on CKD stage 2  -  renal on board      Today, continue to titrate down milrinone as able  Start beta-blocker when able hemodynamically  Diuresis for volume overload postoperatively  Appreciate nephrology and CV surgery involvement    Plan:   Patient remains on milrinone gtt but this has been titrated downward.  Plan to start BB when off inotrope and hemodynamically stable.  Tele shows SR without bradycardia or high grade AV block.  CK remains elevated, so will continue to hold statin.     Electronically signed by SAADIA Branch, 07/13/20, 8:50 AM.

## 2020-07-13 NOTE — PROGRESS NOTES
S/P POD# 4 CABG--Jaya  EF 50% (echo)    Subjective:  No c/o's, up to chair    No events overnight  Drips:  milr .125        Intake/Output Summary (Last 24 hours) at 7/13/2020 1304  Last data filed at 7/13/2020 0810  Gross per 24 hour   Intake 480 ml   Output 1350 ml   Net -870 ml     Temp:  [97.3 °F (36.3 °C)-98.5 °F (36.9 °C)] 97.3 °F (36.3 °C)  Heart Rate:  [68-91] 78  BP: (114-145)/(66-91) 124/66      Results from last 7 days   Lab Units 07/13/20  0342 07/12/20  0358  07/10/20  0525  07/09/20  1903  07/08/20  1014   WBC 10*3/mm3 11.80* 13.00*   < > 24.20*  --  33.60*   < > 12.30*   HEMOGLOBIN g/dL 11.8* 11.3*   < > 12.1*  --  13.4   < > 15.6   HEMOGLOBIN, POC   --   --    < >  --    < >  --    < >  --    HEMATOCRIT % 36.2* 33.6*   < > 36.7*  --  41.4   < > 46.2   HEMATOCRIT POC   --   --    < >  --    < >  --    < >  --    PLATELETS 10*3/mm3 139* 113*   < > 225  --  215   < > 201   INR   --   --   --  1.26*  --  1.36*  --  1.00    < > = values in this interval not displayed.     Results from last 7 days   Lab Units 07/13/20  0629   CREATININE mg/dL 1.30*   POTASSIUM mmol/L 4.4   SODIUM mmol/L 138   MAGNESIUM mg/dL 2.7*   PHOSPHORUS mg/dL 4.0     ica 1.15    Physical Exam:  Up in chair, NAD, neuro intact  Tele:  SR 70s  Diminished bases, 2L 98%  dsg c/d/i, No drainage  Benign abd, + BM  trace edema    Assessment/Plan:  Principal Problem:    NSTEMI, initial episode of care (CMS/[facility])  Active Problems:    NSTEMI (non-ST elevated myocardial infarction) (CMS/[facility])    Coronary artery disease involving native coronary artery of native heart with unstable angina pectoris (CMS/HCC)    MV CAD s/p CABG--IABP removed 7/10, on asa/statin, remains on milr  Preop cardiogenic shock--IABP/inotropes postop  NSTEMI--trop 1 on admisson  New dx DM type 2--DM educator  SUZANNE on CKD stage 2--cr improved, renal following  HTN--stable  Dyslipidemia--statin   Hx tick-borne disease requiring hospitalization--resolved  Family hx premature  CAD--father      Routine care--mobilize  DC Milrinone  Add low dose beta blocker   Anticipate home at discharge    RACHEL BINGHAM, APRN  7/13/2020  13:04

## 2020-07-13 NOTE — PROGRESS NOTES
"NEPHROLOGY PROGRESS NOTE------[facility]    [facility]  [phone]  Xavier Kruse MD      Patient Care Team:  Jarrell Alaniz Jr., MD as PCP - General  Lauren Singh APRN as Nurse Practitioner (Nurse Practitioner)  Christelle Kruse MD as Consulting Physician (Nephrology)      Provider:  Xavier Kruse MD  Patient Name: Lonnie Salinas  :  1955    SUBJECTIVE:    F/U ARF/SUZANNE/CRF/CKD STG 2/RHABDOMYOLYSIS    Up in chair. Still with some edema. No real SOB. No angina.      Medication:    amiodarone 400 mg Oral BID With Meals   aspirin 81 mg Oral Daily   chlorhexidine 15 mL Mouth/Throat Q12H   enoxaparin 30 mg Subcutaneous Daily   furosemide 40 mg Intravenous BID   insulin glargine 10 Units Subcutaneous QAM   insulin lispro 0-7 Units Subcutaneous 4x Daily With Meals & Nightly   mupirocin 1 application Each Nare BID   pantoprazole 40 mg Oral Q AM   potassium chloride 20 mEq Oral BID With Meals       insulin 0-50 Units/hr Last Rate: Stopped (20 0600)   milrinone 0.125 mcg/kg/min Last Rate: 0.125 mcg/kg/min (20 0919)       OBJECTIVE    Vital Sign Min/Max for last 24 hours  Temp  Min: 97.4 °F (36.3 °C)  Max: 98.5 °F (36.9 °C)   BP  Min: 114/70  Max: 145/91   Pulse  Min: 68  Max: 91   No data recorded   SpO2  Min: 92 %  Max: 98 %   No data recorded   Weight  Min: 112 kg (246 lb 0.5 oz)  Max: 112 kg (246 lb 0.5 oz)     Flowsheet Rows      First Filed Value   Admission Height  182.9 cm (72\") Documented at 2020   Admission Weight  102 kg (225 lb 15.5 oz) Documented at 2020 09          I/O this shift:  In: 240 [P.O.:240]  Out: -   I/O last 3 completed shifts:  In: 2362.4 [P.O.:1680; I.V.:682.4]  Out: 2700 [Urine:2700]    Physical Exam:  General Appearance: NAD  Head: OP Clear  Eyes: conjunctivae and sclerae normal and no icterus  Neck: supple. NO GROSS JVD NOW  Lungs: DECREASED BS BIBASILAR  Heart: regular rhythm & " normal rate and normal S1, S2 +JOSE EDUARDO  Chest: Wall no abnormalities observed  Abdomen: normal bowel sounds and soft non-tender +OBESITY +FOLETY  Extremities:  +TRACE PRETIBIAL EDEMA BILAT LE, no cyanosis and no redness  Skin: no bleeding, bruising or rash, turgor normal, color normal and no lesions noted  Neurologic: A and O x 4    Labs:    WBC WBC   Date Value Ref Range Status   07/13/2020 11.80 (H) 3.40 - 10.80 10*3/mm3 Final   07/12/2020 13.00 (H) 3.40 - 10.80 10*3/mm3 Final   07/11/2020 12.60 (H) 3.40 - 10.80 10*3/mm3 Final      HGB Hemoglobin   Date Value Ref Range Status   07/13/2020 11.8 (L) 13.0 - 17.7 g/dL Final   07/12/2020 11.3 (L) 13.0 - 17.7 g/dL Final   07/11/2020 10.5 (L) 13.0 - 17.7 g/dL Final   07/10/2020 11.4 (L) 12.0 - 17.0 g/dL Final   07/10/2020 11.1 (L) 12.0 - 17.0 g/dL Final   07/10/2020 11.3 (L) 12.0 - 17.0 g/dL Final      HCT Hematocrit   Date Value Ref Range Status   07/13/2020 36.2 (L) 37.5 - 51.0 % Final   07/12/2020 33.6 (L) 37.5 - 51.0 % Final   07/11/2020 30.8 (L) 37.5 - 51.0 % Final   07/10/2020 33 (L) 38 - 51 % Final   07/10/2020 33 (L) 38 - 51 % Final   07/10/2020 33 (L) 38 - 51 % Final      Platlets No results found for: LABPLAT   MCV MCV   Date Value Ref Range Status   07/13/2020 87.5 79.0 - 97.0 fL Final   07/12/2020 87.2 79.0 - 97.0 fL Final   07/11/2020 86.1 79.0 - 97.0 fL Final          Sodium Sodium   Date Value Ref Range Status   07/13/2020 138 136 - 145 mmol/L Final   07/12/2020 143 136 - 145 mmol/L Final   07/11/2020 146 (H) 136 - 145 mmol/L Final   07/10/2020 147 (H) 136 - 145 mmol/L Final      Potassium Potassium   Date Value Ref Range Status   07/13/2020 4.4 3.5 - 5.2 mmol/L Final   07/12/2020 4.5 3.5 - 5.2 mmol/L Final   07/11/2020 3.8 3.5 - 5.2 mmol/L Final   07/10/2020 3.6 3.5 - 5.2 mmol/L Final   07/10/2020 3.5 3.5 - 5.2 mmol/L Final      Chloride Chloride   Date Value Ref Range Status   07/13/2020 97 (L) 98 - 107 mmol/L Final   07/12/2020 103 98 - 107 mmol/L Final    07/11/2020 107 98 - 107 mmol/L Final   07/10/2020 105 98 - 107 mmol/L Final      CO2 CO2   Date Value Ref Range Status   07/13/2020 26.0 22.0 - 29.0 mmol/L Final   07/12/2020 29.0 22.0 - 29.0 mmol/L Final   07/11/2020 28.0 22.0 - 29.0 mmol/L Final   07/10/2020 24.0 22.0 - 29.0 mmol/L Final      BUN BUN   Date Value Ref Range Status   07/13/2020   Final     Comment:     Testing performed by alternate method   07/13/2020 56 (H) 8 - 23 mg/dL Final   07/12/2020   Final     Comment:     Testing performed by alternate method   07/12/2020 54 (H) 8 - 23 mg/dL Final   07/11/2020   Final     Comment:     Testing performed by alternate method   07/11/2020 46 (H) 8 - 23 mg/dL Final   07/10/2020   Final     Comment:     Testing performed by alternate method   07/10/2020 39 (H) 8 - 23 mg/dL Final      Creatinine Creatinine   Date Value Ref Range Status   07/13/2020 1.30 (H) 0.76 - 1.27 mg/dL Final   07/12/2020 1.51 (H) 0.76 - 1.27 mg/dL Final   07/11/2020 1.79 (H) 0.76 - 1.27 mg/dL Final   07/10/2020 2.23 (H) 0.76 - 1.27 mg/dL Final      Calcium Calcium   Date Value Ref Range Status   07/13/2020 9.1 8.6 - 10.5 mg/dL Final   07/12/2020 8.8 8.6 - 10.5 mg/dL Final   07/11/2020 8.8 8.6 - 10.5 mg/dL Final   07/10/2020 9.5 8.6 - 10.5 mg/dL Final      PO4 No components found for: PO4   Albumin Albumin   Date Value Ref Range Status   07/13/2020 3.40 (L) 3.50 - 5.20 g/dL Final   07/12/2020 3.80 3.50 - 5.20 g/dL Final   07/11/2020 3.80 3.50 - 5.20 g/dL Final   07/10/2020 4.60 3.50 - 5.20 g/dL Final      Magnesium Magnesium   Date Value Ref Range Status   07/13/2020 2.7 (H) 1.6 - 2.4 mg/dL Final   07/12/2020 2.9 (H) 1.6 - 2.4 mg/dL Final   07/11/2020 3.0 (H) 1.6 - 2.4 mg/dL Final      Uric Acid No components found for: URIC ACID     Imaging Results (Last 72 Hours)     Procedure Component Value Units Date/Time    XR Chest 1 View [[id number]] Collected:  07/12/20 0815     Updated:  07/12/20 0820    Narrative:       DATE OF EXAM:  7/12/2020  7:29 AM     PROCEDURE:  XR CHEST 1 VW-     INDICATIONS:  Worsening shortness of breath, coronary artery disease, recent heart  surgery. History of a tiny left apical pneumothorax following chest tube  removal.      COMPARISON:  07/11/2020.     TECHNIQUE:   Single radiographic view of the chest was obtained.     FINDINGS:  The left apical pneumothorax has resolved. The right internal jugular  sheath remains in place. The heart is enlarged. There are postsurgical  changes from a recent CABG procedure. The pulmonary vascular markings  are probably normal. There is persistent bilateral basilar consolidation  and left perihilar consolidation which is unchanged. This is probably  due to atelectasis, but I cannot completely exclude airspace disease  from fluid overload or pneumonia. A new small right pleural effusion is  suspected. There is a stable small left pleural effusion.       Impression:          1. The left apical pneumothorax has resolved.  2. Cardiomegaly.  3. Unchanged left perihilar and bilateral basilar consolidation probably  due to atelectasis, but I cannot exclude airspace disease from fluid  overload or pneumonia.  3. New small right pleural effusion and an unchanged small left pleural  effusion.     Electronically Signed By-Andrew York On:7/12/2020 8:18 AM  This report was finalized on [id number] by  Andrew York, .    XR Chest 1 View [[id number]] Collected:  07/11/20 1501     Updated:  07/11/20 1505    Narrative:       DATE OF EXAM:  7/11/2020 2:31 PM     PROCEDURE:  XR CHEST 1 VW-     INDICATIONS:  Status post cardiac surgery, chest tube removal.      COMPARISON:  07/11/2020 at 0552 hours     TECHNIQUE:   Single radiographic view of the chest was obtained.     FINDINGS:  The mediastinal and left chest tube has been removed. There is a new  tiny 6 mm left apical pneumothorax. The [place]-Felipe catheter has been  removed. The right internal jugular sheath remains in place. The heart  is enlarged. There are  bilateral basilar and left perihilar densities  with no significant interval improvement felt to be due to atelectasis.  The patient probably has mild pulmonary vascular congestion. There is an  unchanged left pleural effusion. The right pleural space is clear.       Impression:          1. Interval removal of the mediastinal and left chest tube. There is a  tiny 6 mm left apical pneumothorax which is an interval change from the  earlier chest x-ray.  2. The [place]-Felipe catheter has been removed.  3. No change in the bilateral basilar and left perihilar densities felt  to be due to atelectasis with superimposed mild pulmonary vascular  congestion.     Electronically Signed By-Andrew York On:7/11/2020 3:03 PM  This report was finalized on [id number] by  Andrew York, .    XR Chest 1 View [[id number]] Collected:  07/11/20 0929     Updated:  07/11/20 0949    Narrative:       DATE OF EXAM:  7/11/2020 5:44 AM     PROCEDURE:  XR CHEST 1 VW-     INDICATIONS:  Coronary artery atherosclerotic vascular disease, previous myocardial  infarction, postoperative follow-up.      COMPARISON:  07/10/2020.     TECHNIQUE:   Single radiographic view of the chest was obtained.     FINDINGS:  The patient has been extubated and the nasogastric tube has been  removed. There is a stable mediastinal and left chest tube in place with  no pneumothorax. The [place]-Felipe catheter is in stable position. The heart  is enlarged. The patient probably has mild vascular congestion which is  an interval change from yesterday's study. There is also worsening  bilateral basilar consolidation likely due to atelectasis.  The right  pleural space is clear. The patient probably has a new small left  pleural effusion.       Impression:          1. The patient has been extubated and the nasogastric tube has been  removed.  2. Stable mediastinal left chest tube with no pneumothorax.  3. Probable mild pulmonary vascular congestion which is an interval  change from  yesterday's study.  4. Worsening basilar consolidation felt to be due to atelectasis. The  patient probably has a new small left pleural effusion.     Electronically Signed By-Andrew York On:7/11/2020 9:47 AM  This report was finalized on [id number] by  Andrew York, .          Results for orders placed during the hospital encounter of 07/08/20   XR Chest 1 View    Narrative DATE OF EXAM:  7/12/2020 7:29 AM     PROCEDURE:  XR CHEST 1 VW-     INDICATIONS:  Worsening shortness of breath, coronary artery disease, recent heart  surgery. History of a tiny left apical pneumothorax following chest tube  removal.      COMPARISON:  07/11/2020.     TECHNIQUE:   Single radiographic view of the chest was obtained.     FINDINGS:  The left apical pneumothorax has resolved. The right internal jugular  sheath remains in place. The heart is enlarged. There are postsurgical  changes from a recent CABG procedure. The pulmonary vascular markings  are probably normal. There is persistent bilateral basilar consolidation  and left perihilar consolidation which is unchanged. This is probably  due to atelectasis, but I cannot completely exclude airspace disease  from fluid overload or pneumonia. A new small right pleural effusion is  suspected. There is a stable small left pleural effusion.       Impression    1. The left apical pneumothorax has resolved.  2. Cardiomegaly.  3. Unchanged left perihilar and bilateral basilar consolidation probably  due to atelectasis, but I cannot exclude airspace disease from fluid  overload or pneumonia.  3. New small right pleural effusion and an unchanged small left pleural  effusion.     Electronically Signed ByMaddy York On:7/12/2020 8:18 AM  This report was finalized on [id number] by  Andrew York, .   XR Chest 1 View    Narrative DATE OF EXAM:  7/11/2020 2:31 PM     PROCEDURE:  XR CHEST 1 VW-     INDICATIONS:  Status post cardiac surgery, chest tube removal.      COMPARISON:  07/11/2020 at 0552  hours     TECHNIQUE:   Single radiographic view of the chest was obtained.     FINDINGS:  The mediastinal and left chest tube has been removed. There is a new  tiny 6 mm left apical pneumothorax. The [place]-Felipe catheter has been  removed. The right internal jugular sheath remains in place. The heart  is enlarged. There are bilateral basilar and left perihilar densities  with no significant interval improvement felt to be due to atelectasis.  The patient probably has mild pulmonary vascular congestion. There is an  unchanged left pleural effusion. The right pleural space is clear.       Impression    1. Interval removal of the mediastinal and left chest tube. There is a  tiny 6 mm left apical pneumothorax which is an interval change from the  earlier chest x-ray.  2. The [place]-Felipe catheter has been removed.  3. No change in the bilateral basilar and left perihilar densities felt  to be due to atelectasis with superimposed mild pulmonary vascular  congestion.     Electronically Signed By-Andrew York On:7/11/2020 3:03 PM  This report was finalized on [id number] by  Andrew York, .   XR Chest 1 View    Narrative DATE OF EXAM:  7/11/2020 5:44 AM     PROCEDURE:  XR CHEST 1 VW-     INDICATIONS:  Coronary artery atherosclerotic vascular disease, previous myocardial  infarction, postoperative follow-up.      COMPARISON:  07/10/2020.     TECHNIQUE:   Single radiographic view of the chest was obtained.     FINDINGS:  The patient has been extubated and the nasogastric tube has been  removed. There is a stable mediastinal and left chest tube in place with  no pneumothorax. The [place]-Felipe catheter is in stable position. The heart  is enlarged. The patient probably has mild vascular congestion which is  an interval change from yesterday's study. There is also worsening  bilateral basilar consolidation likely due to atelectasis.  The right  pleural space is clear. The patient probably has a new small left  pleural effusion.        Impression    1. The patient has been extubated and the nasogastric tube has been  removed.  2. Stable mediastinal left chest tube with no pneumothorax.  3. Probable mild pulmonary vascular congestion which is an interval  change from yesterday's study.  4. Worsening basilar consolidation felt to be due to atelectasis. The  patient probably has a new small left pleural effusion.     Electronically Signed By-Andrew York On:7/11/2020 9:47 AM  This report was finalized on [id number] by  Andrew York, .       Results for orders placed during the hospital encounter of 07/08/20   Duplex Vein Mapping Lower Extremity - Bilateral CAR    Narrative · The right greater saphenous vein is patent and of adequate size in the   thigh.  · The right greater saphenous vein is patent and of adequate size in the   calf.  · The left greater saphenous vein is patent and of adequate size in the   thigh.  · The left greater saphenous vein has an adequate segment in the mid calf   but is adequate at the ankle.            ASSESSMENT / PLAN      NSTEMI, initial episode of care (CMS/[facility])    NSTEMI (non-ST elevated myocardial infarction) (CMS/[facility])    Coronary artery disease involving native coronary artery of native heart with unstable angina pectoris (CMS/[facility])    1. ARF/SUZANNE/CRF/CKD STG 2-----Nonoliguric. BUN/Cr down more. +ARF/SUZANNE secondary to ATN from hypotension and contrast exposure and renal perfusion disturbance from IABP and Rhabdomyolysis. Support hemodynamics as much as possible. Spoke to patient and his wife. Dose meds for CrCl less than 10 cc/min. No NSAIDs or further IV dye.     2. HYPOKALEMIA------Replaced     3. ANGINA/CAD S/P NSTEMI S/P CARDIAC CATH S/P CABG------per ,Cardiology and per , CT surgery. On Milrinone.     4. HYPERLIPIDEMIA/ELEVATED CK------Statin on hold. Follow CK. No further alkalinization needed at present     5. OA-------No further NSAIDs.       6. OBESITY/ELEVATED BMI    7.  HYPOCALCEMIA------Replace IV. Follow ionized levels    8. HYPOMAGNESEMIA------replaced    9. HYPOPHOSPHATEMIA-----Replaced    10. MILD HYPERNATREMIA----Better    11. EDEMA/VOLUME EXCESS-------Change to po diuretics today and follow      Xavier Kruse MD  [facility]  [phone]  07/13/20  08:14

## 2020-07-13 NOTE — PROGRESS NOTES
Continued Stay Note  TAWANA Murguia     Patient Name: Lonnie Salinas  MRN: [id number]  Today's Date: 7/13/2020    Admit Date: 7/8/2020    Discharge Plan     Row Name 07/13/20 1307       Plan    Plan  DC plan: home with family, watch for O2 needs    Plan Comments  barriers to DC: milrinone gtt,         Discharge Codes    No documentation.             Lynn Elise RN

## 2020-07-13 NOTE — THERAPY TREATMENT NOTE
Patient Name: Lonnie Salinas  : 1955    MRN: [id number]                              Today's Date: 2020       Admit Date: 2020    Visit Dx:     ICD-10-CM ICD-9-CM   1. Coronary artery disease involving native coronary artery of native heart with unstable angina pectoris (CMS/HCC) I25.110 414.01     411.1   2. NSTEMI, initial episode of care (CMS/HCC) I21.4 410.71   3. NSTEMI (non-ST elevated myocardial infarction) (CMS/HCC) I21.4 410.70   4. Abnormal EKG R94.31 794.31   5. Chest pain, unspecified type R07.9 786.50   6. Exertional dyspnea R06.00 786.09     Patient Active Problem List   Diagnosis   • Acquired thrombocytopenia (CMS/HCC)   • Hyperlipidemia   • Hypertension   • Obesity   • Preventative health care   • Allergic rhinitis   • NSTEMI, initial episode of care (CMS/HCC)   • NSTEMI (non-ST elevated myocardial infarction) (CMS/HCC)   • Coronary artery disease involving native coronary artery of native heart with unstable angina pectoris (CMS/HCC)     Past Medical History:   Diagnosis Date   • Allergic    • Dyslipidemia    • History of tick-borne disease    • Hyperlipemia    • Hypertension      Past Surgical History:   Procedure Laterality Date   • CARDIAC CATHETERIZATION N/A 2020    Procedure: Left Heart Cath with possible PCI;  Surgeon: Jose Juan Fatima MD;  Location: [facility] CATH INVASIVE LOCATION;  Service: Cardiology;  Laterality: N/A;   • CARDIAC CATHETERIZATION N/A 2020    Procedure: Intra-Aortic Baloon Pump Insertion;  Surgeon: Jose Juan Fatima MD;  Location: [facility] CATH INVASIVE LOCATION;  Service: Cardiology;  Laterality: N/A;     General Information     Row Name 20 1654          PT Evaluation Time/Intention    Document Type  therapy note (daily note)  -CM     Mode of Treatment  physical therapy  -CM     Row Name 20 1654          General Information    Patient Profile Reviewed?  yes RN notes pt had difficulty amb in room this morning; asks PT to  reexamine pt to see if he has regressed   -CM     Existing Precautions/Restrictions  sternal;cardiac  -CM     Row Name 07/13/20 1654          Cognitive Assessment/Intervention- PT/OT    Orientation Status (Cognition)  oriented x 4  -CM     Row Name 07/13/20 1654          Safety Issues, Functional Mobility    Impairments Affecting Function (Mobility)  endurance/activity tolerance;shortness of breath;pain  -CM       User Key  (r) = Recorded By, (t) = Taken By, (c) = Cosigned By    Initials Name Provider Type    Maren Bermeo, PT Physical Therapist        Mobility     Row Name 07/13/20 1655          Bed Mobility Assessment/Treatment    Bed Mobility Assessment/Treatment  bed mobility (all) activities;sit-supine  -CM     [place] Level (Bed Mobility)  minimum assist (75% patient effort);2 person assist  -CM     Sit-Supine [place] (Bed Mobility)  minimum assist (75% patient effort);2 person assist  -CM     Assistive Device (Bed Mobility)  head of bed elevated;draw sheet  -CM     Row Name 07/13/20 1655          Sit-Stand Transfer    Sit-Stand [place] (Transfers)  minimum assist (75% patient effort);1 person assist  -CM     Row Name 07/13/20 1655          Gait/Stairs Assessment/Training    Gait/Stairs Assessment/Training  gait/ambulation independence;gait/ambulation assistive device  -CM     [place] Level (Gait)  minimum assist (75% patient effort);1 person assist  -CM     Assistive Device (Gait)  walker, front-wheeled  -CM     Distance in Feet (Gait)  80   -CM     Pattern (Gait)  swing-through  -CM     Deviations/Abnormal Patterns (Gait)  gait speed decreased  -CM     Comment (Gait/Stairs)  did not need to stop to rest this date; does have extremely slow pace  -CM       User Key  (r) = Recorded By, (t) = Taken By, (c) = Cosigned By    Initials Name Provider Type    Maren Bermeo PT Physical Therapist        Obj/Interventions     Row Name 07/13/20 1656          Static Sitting Balance     Level of [place] (Unsupported Sitting, Static Balance)  supervision  -CM     Sitting Position (Unsupported Sitting, Static Balance)  sitting on edge of bed  -CM     Row Name 07/13/20 1656          Dynamic Sitting Balance    Level of [place], Reaches Outside Midline (Sitting, Dynamic Balance)  supervision  -CM     Sitting Position, Reaches Outside Midline (Sitting, Dynamic Balance)  sitting on edge of bed  -CM     Row Name 07/13/20 1656          Static Standing Balance    Level of [place] (Supported Standing, Static Balance)  contact guard assist  -CM     Assistive Device Utilized (Supported Standing, Static Balance)  walker, rolling  -CM     Row Name 07/13/20 1656          Dynamic Standing Balance    Level of [place], Reaches Outside Midline (Standing, Dynamic Balance)  contact guard assist  -CM     Assistive Device Utilized (Supported Standing, Dynamic Balance)  walker, rolling  -CM       User Key  (r) = Recorded By, (t) = Taken By, (c) = Cosigned By    Initials Name Provider Type    Maren Bermeo, PT Physical Therapist        Goals/Plan     Row Name 07/13/20 1700          Bed Mobility Goal 1 (PT)    Activity/Assistive Device (Bed Mobility Goal 1, PT)  sit to supine/supine to sit  -CM     [place] Level/Cues Needed (Bed Mobility Goal 1, PT)  conditional independence  -CM     Time Frame (Bed Mobility Goal 1, PT)  2 weeks  -CM     Progress/Outcomes (Bed Mobility Goal 1, PT)  progress slower than expected;goal ongoing  -CM     Row Name 07/13/20 1700          Transfer Goal 1 (PT)    Activity/Assistive Device (Transfer Goal 1, PT)  sit-to-stand/stand-to-sit;bed-to-chair/chair-to-bed  -CM     [place] Level/Cues Needed (Transfer Goal 1, PT)  supervision required  -CM     Time Frame (Transfer Goal 1, PT)  2 weeks  -CM     Progress/Outcome (Transfer Goal 1, PT)  continuing progress toward goal;progress slower than expected;goal ongoing  -CM     Row Name 07/13/20 1700          Gait  Training Goal 1 (PT)    Activity/Assistive Device (Gait Training Goal 1, PT)  gait (walking locomotion)  -CM     [place] Level (Gait Training Goal 1, PT)  independent  -CM     Distance (Gait Goal 1, PT)  300'  -CM     Time Frame (Gait Training Goal 1, PT)  2 weeks  -CM     Progress/Outcome (Gait Training Goal 1, PT)  continuing progress toward goal;goal ongoing;progress slower than expected  -CM     Row Name 07/13/20 1700          Patient Education Goal (PT)    Activity (Patient Education Goal, PT)  HEP and sternal precautions  -CM     [place]/Cues/Accuracy (Memory Goal 2, PT)  verbalizes understanding  -CM     Time Frame (Patient Education Goal, PT)  2 weeks  -CM     Progress/Outcome (Patient Education Goal, PT)  good progress toward goal  -CM       User Key  (r) = Recorded By, (t) = Taken By, (c) = Cosigned By    Initials Name Provider Type    Maren Bermeo, PT Physical Therapist        Clinical Impression     Row Name 07/13/20 3853          Pain Scale: Numbers Pre/Post-Treatment    Pain Scale: Numbers, Pretreatment  0/10 - no pain  -CM     Pain Scale: Numbers, Post-Treatment  0/10 - no pain  -CM     Row Name 07/13/20 3517          Plan of Care Review    Plan of Care Reviewed With  patient  -CM     Progress  improving  -CM     Outcome Summary  63 yo male seen s/p cabg on 7/9. Pt initially admitted for nstemi. Pt continues to do well, able to amb today w/ rw x 80 ft w/ min assist only. Pt does have very slow elio and is requiring increased assistance to get in/out of bed. He normally is working full time and ambulating long distances. Recommend home w/ home health and family assist when medical;y stable. As pt is now POD 4, will increase frequency to 5xwk to assure pt is meeting goals and progressing well.   -CM     Row Name 07/13/20 5836          Physical Therapy Clinical Impression    Criteria for Skilled Interventions Met (PT Clinical Impression)  yes;treatment indicated  -CM     Rehab  Potential (PT Clinical Summary)  good, to achieve stated therapy goals  -CM     Predicted Duration of Therapy (PT)  until d/c  -CM     Row Name 07/13/20 1656          Vital Signs    Intra Systolic BP Rehab  175  -CM     Intra Treatment Diastolic BP  95  -CM     Post Systolic BP Rehab  (S) 185 RN alerted to BP level  -CM     Post Treatment Diastolic BP  109  -CM     Posttreatment Heart Rate (beats/min)  83  -CM     Pre SpO2 (%)  96  -CM     O2 Delivery Pre Treatment  room air  -CM     Post SpO2 (%)  97  -CM     O2 Delivery Post Treatment  room air  -CM     Row Name 07/13/20 1656          Positioning and Restraints    Pre-Treatment Position  in bed  -CM     Post Treatment Position  bed  -CM     In Bed  notified nsg;fowlers;patient within staff view;encouraged to call for assist;call light within reach  -CM       User Key  (r) = Recorded By, (t) = Taken By, (c) = Cosigned By    Initials Name Provider Type    Maren Bermeo, PT Physical Therapist        Outcome Measures    No documentation.       Physical Therapy Education                 Title: PT OT SLP Therapies (Done)     Topic: Physical Therapy (Done)     Point: Mobility training (Done)     Description:   Instruct learner(s) on safety and technique for assisting patient out of bed, chair or wheelchair.  Instruct in the proper use of assistive devices, such as walker, crutches, cane or brace.              Patient Friendly Description:   It's important to get you on your feet again, but we need to do so in a way that is safe for you. Falling has serious consequences, and your personal safety is the most important thing of all.        When it's time to get out of bed, one of us or a family member will sit next to you on the bed to give you support.     If your doctor or nurse tells you to use a walker, crutches, a cane, or a brace, be sure you use it every time you get out of bed, even if you think you don't need it.    Learning Progress Summary            Patient Acceptance, E,TB, VU,DU by CM at 7/13/2020 1701    Acceptance, E,TB, VU by  at 7/12/2020 1814   Family Acceptance, E,TB, VU by  at 7/12/2020 1814                   Point: Home exercise program (Done)     Description:   Instruct learner(s) on appropriate technique for monitoring, assisting and/or progressing patient with therapeutic exercises and activities.              Learning Progress Summary           Patient Acceptance, E,TB, VU,DU by  at 7/13/2020 1701                   Point: Body mechanics (Done)     Description:   Instruct learner(s) on proper positioning and spine alignment for patient and/or caregiver during mobility tasks and/or exercises.              Learning Progress Summary           Patient Acceptance, E,TB, VU,DU by CM at 7/13/2020 1701                   Point: Precautions (Done)     Description:   Instruct learner(s) on prescribed precautions during mobility and gait tasks              Learning Progress Summary           Patient Acceptance, E,TB, VU,DU by  at 7/13/2020 1701    Acceptance, E,TB, VU by  at 7/12/2020 1814   Family Acceptance, E,TB, VU by  at 7/12/2020 1814                               User Key     Initials Effective Dates Name Provider Type Discipline     03/01/19 -  Swati Can, PT Physical Therapist PT     03/01/19 -  Maren Foy, PT Physical Therapist PT              PT Recommendation and Plan     Outcome Summary/Treatment Plan (PT)  Anticipated Discharge Disposition (PT): home with home health, home with assist  Plan of Care Reviewed With: patient  Progress: improving  Outcome Summary: 65 yo male seen s/p cabg on 7/9. Pt initially admitted for nstemi. Pt continues to do well, able to amb today w/ rw x 80 ft w/ min assist only. Pt does have very slow elio and is requiring increased assistance to get in/out of bed. He normally is working full time and ambulating long distances. Recommend home w/ home health and family assist when medical;y  stable. As pt is now POD 4, will increase frequency to 5xwk to assure pt is meeting goals and progressing well.      Time Calculation:   PT Charges     Row Name 07/13/20 1701             Time Calculation    Start Time  1550  -CM      Stop Time  1606  -CM      Time Calculation (min)  16 min  -CM      PT Received On  07/13/20  -CM      PT - Next Appointment  07/14/20  -CM         Time Calculation- PT    Total Timed Code Minutes- PT  16 minute(s)  -CM        User Key  (r) = Recorded By, (t) = Taken By, (c) = Cosigned By    Initials Name Provider Type    CM Maren Foy, PT Physical Therapist        Therapy Charges for Today     Code Description Service Date Service Provider Modifiers Qty    31273194417 HC GAIT TRAINING EA 15 MIN 7/13/2020 Maren Foy, PT GP 1    67729108922 HC PT THERAPEUTIC ACT EA 15 MIN 7/13/2020 Maren Foy, PT GP 1               Maren Foy, PT  7/13/2020

## 2020-07-13 NOTE — CONSULTS
"Diabetes Education  Assessment/Teaching    Patient Name:  Lonnie Salinas  YOB: 1955  MRN: [id number]  Admit Date:  7/8/2020      Assessment Date:  7/13/2020    Most Recent Value   General Information    Referral From:  MD order [MD consult for newly diagnosed]   Height  175.3 cm (69\")   Height Method  Stated   Weight  112 kg (246 lb 0.5 oz)   Weight Method  Standing scale   Pregnancy Assessment   Diabetes History   What type of diabetes do you have?  Type 2   Length of Diabetes Diagnosis  Newly diagnosed <6 months [Diagnosed this admission]   Current DM knowledge  poor   Have you had diabetes education/teaching in the past?  no   Do you test your blood sugar at home?  no   Have you had high blood sugar? (>140mg/dl)  yes   How often do you have high blood sugar?  unknown   When was your last high blood sugar?  Admission blood sugar 172   Do you have any diabetes complications?  heart disease   Education Preferences   What areas of diabetes would you like to learn about?  diabetes complications, diet information, medications for diabetes, testing my blood sugar at home, understanding diabetes   Nutrition Information   Assessment Topics   Healthy Eating - Assessment  Needs education   Taking Medication - Assessment  Needs education   Problem Solving - Assessment  Needs education   Reducing Risk - Assessment  Needs education   Monitoring - Assessment  Needs education   DM Goals   Healthy Eating - Goal  Today   Taking Medication - Goal  Today   Problem Solving - Goal  Today   Reducing Risk - Goal  Today   Monitoring - Goal  Today            Most Recent Value   DM Education Needs   Meter  Meter provided   Meter Type  Accuchek [Accu-chek Guide glucometer given to patient with return demonstration completed by patient using the lancing device, inserting the test strip into the meter, and applying blood to the test strip.  Blood sugar 133.]   Frequency of Testing  Daily [Log book given to patient with " discussion on checking blood sugars daily varying the times before meals and at bedtime.]   Medication  Oral, Actions, Side effects, Administration [Handout given with a list of oral meds used to treat diabetes explaining actions, side effects, and administration.]   Reducing Risks  A1C testing, Eye exam, Dental exam, Foot care, Cardiovascular, Neuropathy, Retinopathy [On 7/8/2020 A1c was 7.0%.  A1c info sheet given with discussion on A1c target and healthy blood sugar range.]   Healthy Eating  Other (comment) [Handouts given with discussion on 1 serving of carbs being = 15 grams, being allowed 4 servings of carbs per meal, carb counting, healthy eating guidelines for adults, poriton sizes, low carb snacks, healthy food choices, and reading food labels.]   Discharge Plan  Home   Motivation  Engaged, Strong   Teaching Method  Discussion, Demonstration, Handouts, Teach back   Patient Response  Verbalized understanding, Demonstrates adequately            Other Comments:  First Steps book to managing diabetes given to patient with discussion on diagnosis and diabetes. Discussed how diabetes puts you at higher risk for heart attack, stroke, kidney failure, blindness, and neuropathy.  Also discussed the importance of yearly eye exams, yearly dental exams, and regular visits to PCP. Discussed checking feet daily and wearing shoes when out of bed. Prescriptions started in discharge orders for lancets, test strips, and alcohol wipes. Referred patient to contact [facility] for classes and further education. Patient has no further questions or concerns related to diabetes at this time.        Electronically signed by:  Tana Cespedes RN  07/13/20 12:05

## 2020-07-13 NOTE — PLAN OF CARE
Ambulated unit, tires easily but walked a little farther than earlier in the afternoon.  Decreased narcotic requirement for pain.  Weaning O2 as tolerated.

## 2020-07-13 NOTE — PLAN OF CARE
Problem: Patient Care Overview  Goal: Plan of Care Review  Flowsheets (Taken 7/13/2020 0030)  Plan of Care Reviewed With: patient  Outcome Summary: 63 yo male seen s/p cabg on 7/9. Pt initially admitted for nstemi. Pt continues to do well, able to amb today w/ rw x 80 ft w/ min assist only. Pt does have very slow elio and is requiring increased assistance to get in/out of bed. He normally is working full time and ambulating long distances. Recommend home w/ home health and family assist when medical;y stable. As pt is now POD 4, will increase frequency to 5xwk to assure pt is meeting goals and progressing well.    PPE: gloves, mask, goggles.

## 2020-07-13 NOTE — PLAN OF CARE
Problem: Patient Care Overview  Goal: Plan of Care Review  Outcome: Ongoing (interventions implemented as appropriate)  Flowsheets (Taken 7/13/2020 0089)  Progress: improving  Plan of Care Reviewed With: patient  Outcome Summary: Patient is currently not on any drips and is on 2L to RA. Primacor was shut off today. Patient still has AV wires at this time. Patient is ambulating decently, but is weak. Patient is in good spirits.

## 2020-07-14 ENCOUNTER — READMISSION MANAGEMENT (OUTPATIENT)
Dept: CALL CENTER | Facility: HOSPITAL | Age: 65
End: 2020-07-14

## 2020-07-14 VITALS
TEMPERATURE: 98.1 F | DIASTOLIC BLOOD PRESSURE: 66 MMHG | RESPIRATION RATE: 18 BRPM | SYSTOLIC BLOOD PRESSURE: 126 MMHG | WEIGHT: 239.42 LBS | HEART RATE: 68 BPM | HEIGHT: 69 IN | OXYGEN SATURATION: 92 % | BODY MASS INDEX: 35.46 KG/M2

## 2020-07-14 LAB
ALBUMIN SERPL-MCNC: 3.5 G/DL (ref 3.5–5.2)
ALBUMIN/GLOB SERPL: 1.5 G/DL
ALP SERPL-CCNC: 61 U/L (ref 39–117)
ALT SERPL W P-5'-P-CCNC: 14 U/L (ref 1–41)
ANION GAP SERPL CALCULATED.3IONS-SCNC: 15 MMOL/L (ref 5–15)
AST SERPL-CCNC: 34 U/L (ref 1–40)
BACTERIA SPEC AEROBE CULT: NORMAL
BACTERIA SPEC AEROBE CULT: NORMAL
BASE DEFICIT: ABNORMAL
BASE EXCESS BLDA CALC-SCNC: 1 MMOL/L (ref 0–3)
BASE EXCESS BLDA CALC-SCNC: 3 MMOL/L (ref 0–3)
BASE EXCESS BLDA CALC-SCNC: <0 MMOL/L (ref 0–3)
BILIRUB SERPL-MCNC: 0.7 MG/DL (ref 0–1.2)
BUN SERPL-MCNC: 53 MG/DL (ref 8–23)
BUN SERPL-MCNC: ABNORMAL MG/DL
BUN/CREAT SERPL: ABNORMAL
CA-I BLDA-SCNC: 1.01 MMOL/L (ref 1.12–1.32)
CA-I BLDA-SCNC: 1.02 MMOL/L (ref 1.12–1.32)
CA-I BLDA-SCNC: 1.04 MMOL/L (ref 1.12–1.32)
CA-I BLDA-SCNC: 1.04 MMOL/L (ref 1.12–1.32)
CA-I BLDA-SCNC: 1.07 MMOL/L (ref 1.12–1.32)
CA-I BLDA-SCNC: 1.19 MMOL/L (ref 1.12–1.32)
CA-I BLDA-SCNC: 1.23 MMOL/L (ref 1.12–1.32)
CA-I SERPL ISE-MCNC: 1.18 MMOL/L (ref 1.2–1.3)
CALCIUM SPEC-SCNC: 9.4 MG/DL (ref 8.6–10.5)
CHLORIDE SERPL-SCNC: 99 MMOL/L (ref 98–107)
CK SERPL-CCNC: 274 U/L (ref 20–200)
CO2 BLDA-SCNC: 22 MMOL/L (ref 23–27)
CO2 BLDA-SCNC: 23 MMOL/L (ref 23–27)
CO2 BLDA-SCNC: 25 MMOL/L (ref 23–27)
CO2 BLDA-SCNC: 26 MMOL/L (ref 23–27)
CO2 BLDA-SCNC: 27 MMOL/L (ref 23–27)
CO2 BLDA-SCNC: 28 MMOL/L (ref 23–27)
CO2 BLDA-SCNC: 30 MMOL/L (ref 23–27)
CO2 SERPL-SCNC: 26 MMOL/L (ref 22–29)
CREAT SERPL-MCNC: 1.24 MG/DL (ref 0.76–1.27)
DEPRECATED RDW RBC AUTO: 48.1 FL (ref 37–54)
ERYTHROCYTE [DISTWIDTH] IN BLOOD BY AUTOMATED COUNT: 15.5 % (ref 12.3–15.4)
GFR SERPL CREATININE-BSD FRML MDRD: 59 ML/MIN/1.73
GLOBULIN UR ELPH-MCNC: 2.3 GM/DL
GLUCOSE BLDC GLUCOMTR-MCNC: 121 MG/DL (ref 70–105)
GLUCOSE BLDC GLUCOMTR-MCNC: 127 MG/DL (ref 70–105)
GLUCOSE BLDC GLUCOMTR-MCNC: 127 MG/DL (ref 70–105)
GLUCOSE BLDC GLUCOMTR-MCNC: 136 MG/DL (ref 70–105)
GLUCOSE BLDC GLUCOMTR-MCNC: 148 MG/DL (ref 70–105)
GLUCOSE BLDC GLUCOMTR-MCNC: 151 MG/DL (ref 70–105)
GLUCOSE BLDC GLUCOMTR-MCNC: 163 MG/DL (ref 70–105)
GLUCOSE BLDC GLUCOMTR-MCNC: 82 MG/DL (ref 70–105)
GLUCOSE BLDC GLUCOMTR-MCNC: 97 MG/DL (ref 70–105)
GLUCOSE SERPL-MCNC: 87 MG/DL (ref 65–99)
HCO3 BLDA-SCNC: 20.7 MMOL/L (ref 22–26)
HCO3 BLDA-SCNC: 21.5 MMOL/L (ref 22–26)
HCO3 BLDA-SCNC: 23.4 MMOL/L (ref 22–26)
HCO3 BLDA-SCNC: 24.6 MMOL/L (ref 22–26)
HCO3 BLDA-SCNC: 25.8 MMOL/L (ref 22–26)
HCO3 BLDA-SCNC: 26.6 MMOL/L (ref 22–26)
HCO3 BLDA-SCNC: 28.1 MMOL/L (ref 22–26)
HCT VFR BLD AUTO: 35.7 % (ref 37.5–51)
HCT VFR BLDA CALC: 30 % (ref 38–51)
HCT VFR BLDA CALC: 32 % (ref 38–51)
HCT VFR BLDA CALC: 32 % (ref 38–51)
HCT VFR BLDA CALC: 33 % (ref 38–51)
HCT VFR BLDA CALC: 34 % (ref 38–51)
HCT VFR BLDA CALC: 34 % (ref 38–51)
HCT VFR BLDA CALC: 41 % (ref 38–51)
HGB BLD-MCNC: 11.9 G/DL (ref 13–17.7)
HGB BLDA-MCNC: 10.2 G/DL (ref 12–17)
HGB BLDA-MCNC: 10.9 G/DL (ref 12–17)
HGB BLDA-MCNC: 10.9 G/DL (ref 12–17)
HGB BLDA-MCNC: 11.2 G/DL (ref 12–17)
HGB BLDA-MCNC: 11.6 G/DL (ref 12–17)
HGB BLDA-MCNC: 11.6 G/DL (ref 12–17)
HGB BLDA-MCNC: 13.9 G/DL (ref 12–17)
MAGNESIUM SERPL-MCNC: 2.6 MG/DL (ref 1.6–2.4)
MCH RBC QN AUTO: 29.2 PG (ref 26.6–33)
MCHC RBC AUTO-ENTMCNC: 33.5 G/DL (ref 31.5–35.7)
MCV RBC AUTO: 87.2 FL (ref 79–97)
PCO2 BLDA: ABNORMAL MM[HG]
PH BLDA: 7.26 PH UNITS (ref 7.35–7.45)
PH BLDA: 7.27 PH UNITS (ref 7.35–7.45)
PH BLDA: 7.27 PH UNITS (ref 7.35–7.45)
PH BLDA: 7.29 PH UNITS (ref 7.35–7.45)
PH BLDA: 7.3 PH UNITS (ref 7.35–7.45)
PH BLDA: 7.35 PH UNITS (ref 7.35–7.45)
PH BLDA: 7.36 PH UNITS (ref 7.35–7.45)
PHOSPHATE SERPL-MCNC: 4.2 MG/DL (ref 2.5–4.5)
PLATELET # BLD AUTO: 181 10*3/MM3 (ref 140–450)
PMV BLD AUTO: 7.9 FL (ref 6–12)
PO2 BLDA: 103 MM HG (ref 80–105)
PO2 BLDA: 383 MM HG (ref 80–105)
PO2 BLDA: 385 MM HG (ref 80–105)
PO2 BLDA: 389 MM HG (ref 80–105)
PO2 BLDA: 453 MM HG (ref 80–105)
PO2 BLDA: 50 MM HG (ref 80–105)
PO2 BLDA: 57 MM HG (ref 80–105)
POTASSIUM BLDA-SCNC: 3.4 MMOL/L (ref 3.5–4.9)
POTASSIUM BLDA-SCNC: 4 MMOL/L (ref 3.5–4.9)
POTASSIUM BLDA-SCNC: 4.3 MMOL/L (ref 3.5–4.9)
POTASSIUM BLDA-SCNC: 4.4 MMOL/L (ref 3.5–4.9)
POTASSIUM BLDA-SCNC: 4.7 MMOL/L (ref 3.5–4.9)
POTASSIUM BLDA-SCNC: 5.1 MMOL/L (ref 3.5–4.9)
POTASSIUM BLDA-SCNC: 5.5 MMOL/L (ref 3.5–4.9)
POTASSIUM SERPL-SCNC: 4.3 MMOL/L (ref 3.5–5.2)
PROT SERPL-MCNC: 5.8 G/DL (ref 6–8.5)
RBC # BLD AUTO: 4.09 10*6/MM3 (ref 4.14–5.8)
SAO2 % BLDCOA: 100 % (ref 95–98)
SAO2 % BLDCOA: 79 % (ref 95–98)
SAO2 % BLDCOA: 85 % (ref 95–98)
SAO2 % BLDCOA: 97 % (ref 95–98)
SODIUM BLD-SCNC: 133 MMOL/L (ref 138–146)
SODIUM BLD-SCNC: 133 MMOL/L (ref 138–146)
SODIUM BLD-SCNC: 134 MMOL/L (ref 138–146)
SODIUM BLD-SCNC: 135 MMOL/L (ref 138–146)
SODIUM BLD-SCNC: 135 MMOL/L (ref 138–146)
SODIUM BLD-SCNC: 136 MMOL/L (ref 138–146)
SODIUM BLD-SCNC: 141 MMOL/L (ref 138–146)
SODIUM SERPL-SCNC: 140 MMOL/L (ref 136–145)
WBC # BLD AUTO: 11.3 10*3/MM3 (ref 3.4–10.8)

## 2020-07-14 PROCEDURE — 82962 GLUCOSE BLOOD TEST: CPT

## 2020-07-14 PROCEDURE — 85027 COMPLETE CBC AUTOMATED: CPT | Performed by: NURSE PRACTITIONER

## 2020-07-14 PROCEDURE — 82330 ASSAY OF CALCIUM: CPT | Performed by: INTERNAL MEDICINE

## 2020-07-14 PROCEDURE — 99024 POSTOP FOLLOW-UP VISIT: CPT | Performed by: NURSE PRACTITIONER

## 2020-07-14 PROCEDURE — 97116 GAIT TRAINING THERAPY: CPT

## 2020-07-14 PROCEDURE — 83735 ASSAY OF MAGNESIUM: CPT | Performed by: INTERNAL MEDICINE

## 2020-07-14 PROCEDURE — 84100 ASSAY OF PHOSPHORUS: CPT | Performed by: INTERNAL MEDICINE

## 2020-07-14 PROCEDURE — 80053 COMPREHEN METABOLIC PANEL: CPT | Performed by: INTERNAL MEDICINE

## 2020-07-14 PROCEDURE — 99232 SBSQ HOSP IP/OBS MODERATE 35: CPT | Performed by: INTERNAL MEDICINE

## 2020-07-14 PROCEDURE — 25010000002 CALCIUM GLUCONATE-NACL 1-0.675 GM/50ML-% SOLUTION: Performed by: INTERNAL MEDICINE

## 2020-07-14 PROCEDURE — 63710000001 INSULIN GLARGINE PER 5 UNITS: Performed by: NURSE PRACTITIONER

## 2020-07-14 PROCEDURE — 82550 ASSAY OF CK (CPK): CPT | Performed by: INTERNAL MEDICINE

## 2020-07-14 RX ORDER — BUMETANIDE 1 MG/1
1 TABLET ORAL 2 TIMES DAILY
Qty: 60 TABLET | Refills: 1 | Status: SHIPPED | OUTPATIENT
Start: 2020-07-14 | End: 2020-09-08 | Stop reason: SDUPTHER

## 2020-07-14 RX ORDER — CALCIUM GLUCONATE 20 MG/ML
1 INJECTION, SOLUTION INTRAVENOUS EVERY 12 HOURS
Status: DISCONTINUED | OUTPATIENT
Start: 2020-07-14 | End: 2020-07-14 | Stop reason: HOSPADM

## 2020-07-14 RX ORDER — LANCETS
1 EACH MISCELLANEOUS DAILY
Qty: 102 EACH | Refills: 0 | Status: SHIPPED | OUTPATIENT
Start: 2020-07-14

## 2020-07-14 RX ORDER — POTASSIUM CHLORIDE 20 MEQ/1
20 TABLET, EXTENDED RELEASE ORAL 2 TIMES DAILY WITH MEALS
Qty: 60 TABLET | Refills: 1 | Status: SHIPPED | OUTPATIENT
Start: 2020-07-14 | End: 2020-08-12

## 2020-07-14 RX ORDER — CARVEDILOL 3.12 MG/1
3.12 TABLET ORAL 2 TIMES DAILY WITH MEALS
Qty: 60 TABLET | Refills: 3 | Status: SHIPPED | OUTPATIENT
Start: 2020-07-14 | End: 2020-07-29 | Stop reason: CLARIF

## 2020-07-14 RX ORDER — ISOPROPYL ALCOHOL 700 MG/ML
1 CLOTH TOPICAL DAILY
Qty: 100 EACH | Refills: 0 | Status: SHIPPED | OUTPATIENT
Start: 2020-07-14

## 2020-07-14 RX ORDER — HYDROCODONE BITARTRATE AND ACETAMINOPHEN 5; 325 MG/1; MG/1
1 TABLET ORAL EVERY 4 HOURS PRN
Qty: 20 TABLET | Refills: 0 | Status: SHIPPED | OUTPATIENT
Start: 2020-07-14 | End: 2020-07-19

## 2020-07-14 RX ADMIN — PANTOPRAZOLE SODIUM 40 MG: 40 TABLET, DELAYED RELEASE ORAL at 06:07

## 2020-07-14 RX ADMIN — CALCIUM GLUCONATE 1 G: 20 INJECTION, SOLUTION INTRAVENOUS at 09:10

## 2020-07-14 RX ADMIN — POTASSIUM CHLORIDE 20 MEQ: 1500 TABLET, EXTENDED RELEASE ORAL at 09:10

## 2020-07-14 RX ADMIN — INSULIN GLARGINE 10 UNITS: 100 INJECTION, SOLUTION SUBCUTANEOUS at 06:12

## 2020-07-14 RX ADMIN — MUPIROCIN 1 APPLICATION: 20 OINTMENT TOPICAL at 09:10

## 2020-07-14 RX ADMIN — AMIODARONE HYDROCHLORIDE 400 MG: 200 TABLET ORAL at 09:10

## 2020-07-14 RX ADMIN — CHLORHEXIDINE GLUCONATE 0.12% ORAL RINSE 15 ML: 1.2 LIQUID ORAL at 09:10

## 2020-07-14 RX ADMIN — CARVEDILOL 3.12 MG: 3.12 TABLET, FILM COATED ORAL at 09:10

## 2020-07-14 RX ADMIN — ASPIRIN 81 MG: 81 TABLET, COATED ORAL at 09:10

## 2020-07-14 RX ADMIN — BUMETANIDE 1 MG: 1 TABLET ORAL at 09:10

## 2020-07-14 NOTE — THERAPY TREATMENT NOTE
Patient Name: Lonnie Salinas  : 1955    MRN: [id number]                              Today's Date: 2020       Admit Date: 2020    Visit Dx:     ICD-10-CM ICD-9-CM   1. S/P CABG (coronary artery bypass graft) Z95.1 V45.81   2. NSTEMI, initial episode of care (CMS/HCC) I21.4 410.71   3. NSTEMI (non-ST elevated myocardial infarction) (CMS/HCC) I21.4 410.70   4. Abnormal EKG R94.31 794.31   5. Chest pain, unspecified type R07.9 786.50   6. Exertional dyspnea R06.00 786.09   7. Coronary artery disease involving native coronary artery of native heart with unstable angina pectoris (CMS/HCC) I25.110 414.01     411.1     Patient Active Problem List   Diagnosis   • Acquired thrombocytopenia (CMS/HCC)   • Hyperlipidemia   • Hypertension   • Obesity   • Preventative health care   • Allergic rhinitis   • NSTEMI, initial episode of care (CMS/HCC)   • NSTEMI (non-ST elevated myocardial infarction) (CMS/HCC)   • Coronary artery disease involving native coronary artery of native heart with unstable angina pectoris (CMS/HCC)     Past Medical History:   Diagnosis Date   • Allergic    • Dyslipidemia    • History of tick-borne disease    • Hyperlipemia    • Hypertension      Past Surgical History:   Procedure Laterality Date   • CARDIAC CATHETERIZATION N/A 2020    Procedure: Left Heart Cath with possible PCI;  Surgeon: Jose Juan Fatima MD;  Location: [facility] CATH INVASIVE LOCATION;  Service: Cardiology;  Laterality: N/A;   • CARDIAC CATHETERIZATION N/A 2020    Procedure: Intra-Aortic Baloon Pump Insertion;  Surgeon: Jose Juan Fatima MD;  Location: [facility] CATH INVASIVE LOCATION;  Service: Cardiology;  Laterality: N/A;     General Information     Row Name 20 140          PT Evaluation Time/Intention    Document Type  therapy note (daily note)  -CM     Mode of Treatment  physical therapy  -CM     Row Name 20          General Information    Patient Profile Reviewed?  yes possible  d/c home later today  -CM     Existing Precautions/Restrictions  sternal;cardiac  -CM     Row Name 07/14/20 1401          Safety Issues, Functional Mobility    Impairments Affecting Function (Mobility)  endurance/activity tolerance  -CM       User Key  (r) = Recorded By, (t) = Taken By, (c) = Cosigned By    Initials Name Provider Type    Maren Bermeo, PT Physical Therapist        Mobility     Row Name 07/14/20 1402          Bed Mobility Assessment/Treatment    Bed Mobility Assessment/Treatment  bed mobility (all) activities;sit-supine;scooting/bridging  -CM     Scooting/Bridging [place] (Bed Mobility)  verbal cues;supervision  -CM     Sit-Supine [place] (Bed Mobility)  minimum assist (75% patient effort);1 person assist  -CM     Row Name 07/14/20 1402          Sit-Stand Transfer    Sit-Stand [place] (Transfers)  supervision  -CM     Assistive Device (Sit-Stand Transfers)  walker, front-wheeled  -CM     Row Name 07/14/20 1404 07/14/20 1402       Gait/Stairs Assessment/Training    Gait/Stairs Assessment/Training  --  gait/ambulation independence;gait/ambulation assistive device  -CM    [place] Level (Gait)  --  supervision  -CM    Assistive Device (Gait)  --  walker, front-wheeled  -CM    Distance in Feet (Gait)  --  400 ft; normal to slow elio; denies soa or dizziness  -CM    Pattern (Gait)  --  swing-through  -CM    Comment (Gait/Stairs)  cardiac level IV  -CM  --      User Key  (r) = Recorded By, (t) = Taken By, (c) = Cosigned By    Initials Name Provider Type    Maren Bermeo, PT Physical Therapist        Obj/Interventions     Row Name 07/14/20 1404          Static Sitting Balance    Level of [place] (Unsupported Sitting, Static Balance)  independent  -CM     Sitting Position (Unsupported Sitting, Static Balance)  sitting in chair;sitting on edge of bed  -CM     Row Name 07/14/20 1404          Dynamic Sitting Balance    Level of [place], Reaches Outside Midline  (Sitting, Dynamic Balance)  supervision  -CM     Sitting Position, Reaches Outside Midline (Sitting, Dynamic Balance)  sitting on edge of bed  -CM     Row Name 07/14/20 1404          Static Standing Balance    Level of [place] (Supported Standing, Static Balance)  independent  -CM     Assistive Device Utilized (Supported Standing, Static Balance)  walker, rolling  -CM     Row Name 07/14/20 1404          Dynamic Standing Balance    Level of [place], Reaches Outside Midline (Standing, Dynamic Balance)  supervision  -CM     Assistive Device Utilized (Supported Standing, Dynamic Balance)  walker, rolling  -CM       User Key  (r) = Recorded By, (t) = Taken By, (c) = Cosigned By    Initials Name Provider Type    Maren Bermeo, PT Physical Therapist        Goals/Plan     Row Name 07/14/20 1409          Bed Mobility Goal 1 (PT)    Activity/Assistive Device (Bed Mobility Goal 1, PT)  sit to supine/supine to sit  -CM     [place] Level/Cues Needed (Bed Mobility Goal 1, PT)  conditional independence  -CM     Time Frame (Bed Mobility Goal 1, PT)  2 weeks  -CM     Progress/Outcomes (Bed Mobility Goal 1, PT)  good progress toward goal  -CM     Row Name 07/14/20 1409          Transfer Goal 1 (PT)    Activity/Assistive Device (Transfer Goal 1, PT)  sit-to-stand/stand-to-sit;bed-to-chair/chair-to-bed  -CM     [place] Level/Cues Needed (Transfer Goal 1, PT)  supervision required  -CM     Time Frame (Transfer Goal 1, PT)  2 weeks  -CM     Progress/Outcome (Transfer Goal 1, PT)  good progress toward goal  -CM     Row Name 07/14/20 1409          Gait Training Goal 1 (PT)    Activity/Assistive Device (Gait Training Goal 1, PT)  gait (walking locomotion)  -CM     [place] Level (Gait Training Goal 1, PT)  independent  -CM     Distance (Gait Goal 1, PT)  300'  -CM     Time Frame (Gait Training Goal 1, PT)  2 weeks  -CM     Progress/Outcome (Gait Training Goal 1, PT)  good progress toward goal  -CM     Row Name  07/14/20 1407          Patient Education Goal (PT)    Activity (Patient Education Goal, PT)  HEP and sternal precautions  -CM     [place]/Cues/Accuracy (Memory Goal 2, PT)  verbalizes understanding  -CM     Time Frame (Patient Education Goal, PT)  2 weeks  -CM     Progress/Outcome (Patient Education Goal, PT)  goal met  -CM       User Key  (r) = Recorded By, (t) = Taken By, (c) = Cosigned By    Initials Name Provider Type    CM Maren Foy, PT Physical Therapist        Clinical Impression     Row Name 07/14/20 2350          Pain Scale: Numbers Pre/Post-Treatment    Pain Scale: Numbers, Pretreatment  0/10 - no pain  -CM     Pain Scale: Numbers, Post-Treatment  0/10 - no pain  -CM     Row Name 07/14/20 1400          Plan of Care Review    Plan of Care Reviewed With  patient  -CM     Progress  improving  -CM     Outcome Summary  63 yo male seen s/p cabg on 7/9. Pt initially admitted for nstemi. Pt progressing well, w/ ability now to amb 400 ft w/ rw at slow to moderate pace. Timed Up and Go result now wnl. for age group. Also needs very little assist now for bed mobility. Recommend home w/ home health and family when stable.   -CM     Row Name 07/14/20 1401          Physical Therapy Clinical Impression    Criteria for Skilled Interventions Met (PT Clinical Impression)  yes;treatment indicated  -CM     Rehab Potential (PT Clinical Summary)  good, to achieve stated therapy goals  -CM     Row Name 07/14/20 1406          Vital Signs    Pre Systolic BP Rehab  114 chair  -CM     Pre Treatment Diastolic BP  70 map 81  -CM     Intra Systolic BP Rehab  170 sitting eob after amb  -CM     Intra Treatment Diastolic BP  96  -CM     Post Systolic BP Rehab  156 supine  -CM     Post Treatment Diastolic BP  86  -CM     Pretreatment Heart Rate (beats/min)  67  -CM     Intratreatment Heart Rate (beats/min)  78  -CM     Posttreatment Heart Rate (beats/min)  74  -CM     Pre SpO2 (%)  94  -CM     O2 Delivery Pre Treatment  room  air  -CM     Pre Patient Position  Sitting  -CM     Intra Patient Position  Standing  -CM     Post Patient Position  Supine  -CM     Recovery Time  unable to get follow up sats reading due to decreased hand circulation; no soa  -CM     Row Name 07/14/20 1405          Positioning and Restraints    In Bed  notified nsg;fowlers;call light within reach;encouraged to call for assist;side rails up x2 RN asked PT to place pt back in bed for lines to be pulled.   -CM       User Key  (r) = Recorded By, (t) = Taken By, (c) = Cosigned By    Initials Name Provider Type    Maren Bermeo, PT Physical Therapist        Outcome Measures     Row Name 07/14/20 1410          Timed Up and Go (TUG)    TUG Test 1  8 seconds  -CM     TUG Test 2  8 seconds  -CM     Timed Up and Go Comments  normal for pt's age group is 7.9 seconds,+/- 0.9 seconds  -CM     Row Name 07/14/20 1410          Functional Assessment    Outcome Measure Options  Timed Up and Go (TUG)  -CM       User Key  (r) = Recorded By, (t) = Taken By, (c) = Cosigned By    Initials Name Provider Type    Maren Bermeo, PT Physical Therapist        Physical Therapy Education                 Title: PT OT SLP Therapies (Done)     Topic: Physical Therapy (Done)     Point: Mobility training (Done)     Description:   Instruct learner(s) on safety and technique for assisting patient out of bed, chair or wheelchair.  Instruct in the proper use of assistive devices, such as walker, crutches, cane or brace.              Patient Friendly Description:   It's important to get you on your feet again, but we need to do so in a way that is safe for you. Falling has serious consequences, and your personal safety is the most important thing of all.        When it's time to get out of bed, one of us or a family member will sit next to you on the bed to give you support.     If your doctor or nurse tells you to use a walker, crutches, a cane, or a brace, be sure you use it every time you  get out of bed, even if you think you don't need it.    Learning Progress Summary           Patient Acceptance, E,TB, VU,DU by  at 7/14/2020 1411    Acceptance, E,TB, VU,DU by  at 7/13/2020 1701    Acceptance, E,TB, VU by  at 7/12/2020 1814   Family Acceptance, E,TB, VU by  at 7/12/2020 1814                   Point: Home exercise program (Done)     Description:   Instruct learner(s) on appropriate technique for monitoring, assisting and/or progressing patient with therapeutic exercises and activities.              Learning Progress Summary           Patient Acceptance, E,TB, VU,DU by CM at 7/14/2020 1411    Acceptance, E,TB, VU,DU by  at 7/13/2020 1701                   Point: Body mechanics (Done)     Description:   Instruct learner(s) on proper positioning and spine alignment for patient and/or caregiver during mobility tasks and/or exercises.              Learning Progress Summary           Patient Acceptance, E,TB, VU,DU by  at 7/14/2020 1411    Acceptance, E,TB, VU,DU by  at 7/13/2020 1701                   Point: Precautions (Done)     Description:   Instruct learner(s) on prescribed precautions during mobility and gait tasks              Learning Progress Summary           Patient Acceptance, E,TB, VU,DU by  at 7/14/2020 1411    Acceptance, E,TB, VU,DU by  at 7/13/2020 1701    Acceptance, E,TB, VU by  at 7/12/2020 1814   Family Acceptance, E,TB, VU by  at 7/12/2020 1814                               User Key     Initials Effective Dates Name Provider Type Discipline     03/01/19 -  Swati Can, PT Physical Therapist PT     03/01/19 -  Maren Foy PT Physical Therapist PT              PT Recommendation and Plan     Outcome Summary/Treatment Plan (PT)  Anticipated Discharge Disposition (PT): home with home health, home with assist  Plan of Care Reviewed With: patient  Progress: improving  Outcome Summary: 63 yo male seen s/p cabg on 7/9. Pt initially admitted for nstemi. Pt  progressing well, w/ ability now to amb 400 ft w/ rw at slow to moderate pace. Timed Up and Go result now wnl. for age group. Also needs very little assist now for bed mobility. Recommend home w/ home health and family when stable.      Time Calculation:   PT Charges     Row Name 07/14/20 1413             Time Calculation    Start Time  1108  -CM      Stop Time  1133  -CM      Time Calculation (min)  25 min  -CM      PT Received On  07/14/20  -CM      PT - Next Appointment  07/16/20  -CM         Time Calculation- PT    Total Timed Code Minutes- PT  25 minute(s)  -CM        User Key  (r) = Recorded By, (t) = Taken By, (c) = Cosigned By    Initials Name Provider Type    Maren Bermeo, PT Physical Therapist        Therapy Charges for Today     Code Description Service Date Service Provider Modifiers Qty    74974552411 HC GAIT TRAINING EA 15 MIN 7/13/2020 Maren Foy, PT GP 1    98689055247 HC PT THERAPEUTIC ACT EA 15 MIN 7/13/2020 Maren Foy, PT GP 1    10416689133 HC GAIT TRAINING EA 15 MIN 7/14/2020 Maren Foy, PT GP 2          PT G-Codes  Outcome Measure Options: Timed Up and Go (TUG)  TUG Test 1: 8 seconds  TUG Test 2: 8 seconds    Maren Foy PT  7/14/2020

## 2020-07-14 NOTE — PROGRESS NOTES
S/P POD# 5 CABG--Jaya  EF 50% (echo)    Subjective:  No c/o's, reports he took shower    No events overnight  Cr down to 1.2.4  Wt up 7 kgs from preop          Intake/Output Summary (Last 24 hours) at 7/14/2020 0907  Last data filed at 7/14/2020 0500  Gross per 24 hour   Intake 1037 ml   Output 1900 ml   Net -863 ml     Temp:  [97.3 °F (36.3 °C)-98.3 °F (36.8 °C)] 98.1 °F (36.7 °C)  Heart Rate:  [62-83] 76  BP: (113-154)/(59-83) 137/75      Results from last 7 days   Lab Units 07/14/20  0614 07/13/20  0342  07/10/20  0525  07/09/20  1903  07/08/20  1014   WBC 10*3/mm3 11.30* 11.80*   < > 24.20*  --  33.60*   < > 12.30*   HEMOGLOBIN g/dL 11.9* 11.8*   < > 12.1*  --  13.4   < > 15.6   HEMOGLOBIN, POC   --   --    < >  --    < >  --    < >  --    HEMATOCRIT % 35.7* 36.2*   < > 36.7*  --  41.4   < > 46.2   HEMATOCRIT POC   --   --    < >  --    < >  --    < >  --    PLATELETS 10*3/mm3 181 139*   < > 225  --  215   < > 201   INR   --   --   --  1.26*  --  1.36*  --  1.00    < > = values in this interval not displayed.     Results from last 7 days   Lab Units 07/14/20  0336   CREATININE mg/dL 1.24   POTASSIUM mmol/L 4.3   SODIUM mmol/L 140   MAGNESIUM mg/dL 2.6*   PHOSPHORUS mg/dL 4.2     ica 1.18    Physical Exam:  Up in chair, NAD, neuro intact, wife at side  Tele:  SR 70s  Diminished bases, RA 96%  Sternotomy/SVHS healing well  Benign abd, + BM  trace edema    Assessment/Plan:  Principal Problem:    NSTEMI, initial episode of care (CMS/[facility])  Active Problems:    NSTEMI (non-ST elevated myocardial infarction) (CMS/[facility])    Coronary artery disease involving native coronary artery of native heart with unstable angina pectoris (CMS/[facility])    MV CAD s/p CABG--IABP removed 7/10, on asa/statin/bb  Preop cardiogenic shock--IABP/inotropes postop  NSTEMI--trop 1 on admisson  New dx DM type 2--DM educator  SUZANNE on CKD stage 2--cr improved, renal following  HTN--stable  Dyslipidemia--statin   Hx tick-borne disease requiring  hospitalization--resolved  Family hx premature CAD--father    Doing well POD#5. A1c noted at 7--new diagnosis of DM.  Will add metformin at discharge.  Cr down to 1.24 today.  Could possibly go home today--will d/w Dr. Lake.    Routine care  Anticipate home at discharge--today or tomorrow.    Evelyne Mejia-Toyin, SAADIA  7/14/2020  09:07

## 2020-07-14 NOTE — PROGRESS NOTES
[facility]        LOS:  LOS: 6 days   Patient Name: Lonnie Salinas  Age/Sex: 64 y.o. male  : 1955  MRN: [id number]    Day of Service: 20   Length of Stay: 6  Encounter Provider: SAADIA Branch  Place of Service: [facility] CARDIOLOGY  Patient Care Team:  Jarrell Alaniz Jr., MD as PCP - General  Lauren Singh APRN as Nurse Practitioner (Nurse Practitioner)  Christelle Kruse MD as Consulting Physician (Nephrology)    Subjective:     Chief Complaint:  F/U CAD, Heart Failure    Subjective:   Seen and examined, ambulatory, home today per CV surgery, no contraindication from CV standpoint.  Cardiac rehab in 30 days  Follow with cardiology in 2 weeks for reevaluation of peripheral edema as well as up titration of medical therapy  RV failure discussed with optimization of volume needed as well as follow-up and optimization of medical therapy.    No acute events overnight.  Hemodynamically electrically stable.    Current Medications:   Scheduled Meds:  amiodarone 400 mg Oral BID With Meals   aspirin 81 mg Oral Daily   bumetanide 1 mg Oral BID   carvedilol 3.125 mg Oral BID With Meals   chlorhexidine 15 mL Mouth/Throat Q12H   enoxaparin 40 mg Subcutaneous Daily   insulin glargine 10 Units Subcutaneous QAM   insulin lispro 0-7 Units Subcutaneous 4x Daily With Meals & Nightly   mupirocin 1 application Each Nare BID   pantoprazole 40 mg Oral Q AM   potassium chloride 20 mEq Oral BID With Meals     Continuous Infusions:     Allergies:  Allergies   Allergen Reactions   • Lisinopril Angioedema       ROS    Objective:     Temp:  [97.3 °F (36.3 °C)-98.5 °F (36.9 °C)] 97.8 °F (36.6 °C)  Heart Rate:  [62-83] 70  BP: (113-154)/(59-83) 123/59     Intake/Output Summary (Last 24 hours) at 2020 0726  Last data filed at 2020 0500  Gross per 24 hour   Intake 1277 ml   Output 2200 ml   Net -923 ml     Body mass index is 35.36 kg/m².       07/12/20  0600 07/13/20  0400 07/14/20  0600   Weight: 109 kg (240 lb 8.3 oz) 112 kg (246 lb 0.5 oz) 109 kg (239 lb 6.7 oz)         Physical Exam:  Neuro:  CV:  Resp:  GI:  Ext:  Tele: AAOx3  S1S2 RRR, grade 1/6 systolic murmur  Non-labored, shallow, CTA/dim bases  BS+, abd soft  Pedal pulses palp, 1+ pitting BLE edema  SR / sinus arrhythmia        Unchanged from prior encounter yesterday                                           Lab Review:   Results from last 7 days   Lab Units 07/14/20  0336 07/13/20  0629   SODIUM mmol/L 140 138   POTASSIUM mmol/L 4.3 4.4   CHLORIDE mmol/L 99 97*   CO2 mmol/L 26.0 26.0   BUN  53* 56*   CREATININE mg/dL 1.24 1.30*   GLUCOSE mg/dL 87 126*   CALCIUM mg/dL 9.4 9.1   AST (SGOT) U/L 34 36   ALT (SGPT) U/L 14 15     Results from last 7 days   Lab Units 07/14/20  0336 07/13/20  0629 07/12/20  0358 07/11/20  0431 07/09/20  0412 07/08/20  2303 07/08/20  1601 07/08/20  1014   CK TOTAL U/L 274* 328* 729* 1,243* 517*  --  839*  --    TROPONIN T ng/mL  --   --   --   --   --  1.320* 1.280* 1.070*     Results from last 7 days   Lab Units 07/14/20  0614 07/13/20  0342   WBC 10*3/mm3 11.30* 11.80*   HEMOGLOBIN g/dL 11.9* 11.8*   HEMATOCRIT % 35.7* 36.2*   PLATELETS 10*3/mm3 181 139*     Results from last 7 days   Lab Units 07/10/20  0525 07/09/20  1903 07/09/20  0412   INR  1.26* 1.36*  --    APTT seconds  --  22.9* 26.2*     Results from last 7 days   Lab Units 07/14/20  0336 07/13/20  0629   MAGNESIUM mg/dL 2.6* 2.7*     Results from last 7 days   Lab Units 07/08/20  1014   CHOLESTEROL mg/dL 90   TRIGLYCERIDES mg/dL 118   HDL CHOL mg/dL 35*     Results from last 7 days   Lab Units 07/09/20  0412 07/08/20  1014   PROBNP pg/mL 1,066.0* 1,236.0*     Results from last 7 days   Lab Units 07/09/20  0412   TSH uIU/mL 1.630       Recent Radiology:  Imaging Results (Most Recent)     Procedure Component Value Units Date/Time    XR Chest 1 View [[id number]] Collected:  07/12/20 0815     Updated:  07/12/20  0820    Narrative:       DATE OF EXAM:  7/12/2020 7:29 AM     PROCEDURE:  XR CHEST 1 VW-     INDICATIONS:  Worsening shortness of breath, coronary artery disease, recent heart  surgery. History of a tiny left apical pneumothorax following chest tube  removal.      COMPARISON:  07/11/2020.     TECHNIQUE:   Single radiographic view of the chest was obtained.     FINDINGS:  The left apical pneumothorax has resolved. The right internal jugular  sheath remains in place. The heart is enlarged. There are postsurgical  changes from a recent CABG procedure. The pulmonary vascular markings  are probably normal. There is persistent bilateral basilar consolidation  and left perihilar consolidation which is unchanged. This is probably  due to atelectasis, but I cannot completely exclude airspace disease  from fluid overload or pneumonia. A new small right pleural effusion is  suspected. There is a stable small left pleural effusion.       Impression:          1. The left apical pneumothorax has resolved.  2. Cardiomegaly.  3. Unchanged left perihilar and bilateral basilar consolidation probably  due to atelectasis, but I cannot exclude airspace disease from fluid  overload or pneumonia.  3. New small right pleural effusion and an unchanged small left pleural  effusion.     Electronically Signed By-Andrew York On:7/12/2020 8:18 AM  This report was finalized on [id number] by  Andrew York, .    XR Chest 1 View [[id number]] Collected:  07/11/20 1501     Updated:  07/11/20 1505    Narrative:       DATE OF EXAM:  7/11/2020 2:31 PM     PROCEDURE:  XR CHEST 1 VW-     INDICATIONS:  Status post cardiac surgery, chest tube removal.      COMPARISON:  07/11/2020 at 0552 hours     TECHNIQUE:   Single radiographic view of the chest was obtained.     FINDINGS:  The mediastinal and left chest tube has been removed. There is a new  tiny 6 mm left apical pneumothorax. The [place]-Felipe catheter has been  removed. The right internal jugular sheath  remains in place. The heart  is enlarged. There are bilateral basilar and left perihilar densities  with no significant interval improvement felt to be due to atelectasis.  The patient probably has mild pulmonary vascular congestion. There is an  unchanged left pleural effusion. The right pleural space is clear.       Impression:          1. Interval removal of the mediastinal and left chest tube. There is a  tiny 6 mm left apical pneumothorax which is an interval change from the  earlier chest x-ray.  2. The [place]-Felipe catheter has been removed.  3. No change in the bilateral basilar and left perihilar densities felt  to be due to atelectasis with superimposed mild pulmonary vascular  congestion.     Electronically Signed By-Andrew York On:7/11/2020 3:03 PM  This report was finalized on [id number] by  Andrew York, .    XR Chest 1 View [[id number]] Collected:  07/11/20 0929     Updated:  07/11/20 0949    Narrative:       DATE OF EXAM:  7/11/2020 5:44 AM     PROCEDURE:  XR CHEST 1 VW-     INDICATIONS:  Coronary artery atherosclerotic vascular disease, previous myocardial  infarction, postoperative follow-up.      COMPARISON:  07/10/2020.     TECHNIQUE:   Single radiographic view of the chest was obtained.     FINDINGS:  The patient has been extubated and the nasogastric tube has been  removed. There is a stable mediastinal and left chest tube in place with  no pneumothorax. The [place]-Felipe catheter is in stable position. The heart  is enlarged. The patient probably has mild vascular congestion which is  an interval change from yesterday's study. There is also worsening  bilateral basilar consolidation likely due to atelectasis.  The right  pleural space is clear. The patient probably has a new small left  pleural effusion.       Impression:          1. The patient has been extubated and the nasogastric tube has been  removed.  2. Stable mediastinal left chest tube with no pneumothorax.  3. Probable mild pulmonary  vascular congestion which is an interval  change from yesterday's study.  4. Worsening basilar consolidation felt to be due to atelectasis. The  patient probably has a new small left pleural effusion.     Electronically Signed By-Andrew York On:7/11/2020 9:47 AM  This report was finalized on [id number] by  Andrew York, .    XR Chest 1 View [[id number]] Collected:  07/10/20 0729     Updated:  07/10/20 0733    Narrative:          DATE OF EXAM:   7/10/2020 4:22 AM     PROCEDURE:   XR CHEST 1 VW-     INDICATIONS:   Post-Op Heart Surgery; I25.110-Atherosclerotic heart disease of native  coronary artery with unstable angina pectoris; I21.4-Non-ST elevation  (NSTEMI) myocardial infarction; I21.4-Non-ST elevation (NSTEMI)  myocardial infarction; R94.31-Abnormal electrocardiogram (ECG) (EKG);  R07.9-Chest pain, unspecified; R06.00-Dyspnea, unspecified     COMPARISON:  7/9/2020     TECHNIQUE:   Portable chest radiograph.     FINDINGS:   Endotracheal tube tip 4.9 cm above the shyla. Interval placement of an  esophagogastric tube with the tip below the diaphragm. Stable right IJ  [place]-Felipe catheter with the tip overlying the main pulmonary arterial  outflow. Mediastinal drainage tube and left chest tube are unchanged. No  pneumothorax. Mild left basilar airspace disease likely atelectasis.  Stable positioning of intra-atrial balloon pump with a radiodense marker  2.3 cm caudal to the superior aspect of the aortic arch       Impression:       1. Placement of an esophagogastric tube with the tip below the  diaphragm. Remaining lines and support tubes stable.  2. No pneumothorax.  3. Mild left basilar airspace disease likely atelectasis with improved  aeration from the prior exam.     Electronically Signed By-Avel Khoury On:7/10/2020 7:31 AM  This report was finalized on [id number] by  Avel Khoury .    XR Chest 1 View [[id number]] Collected:  07/09/20 2012     Updated:  07/09/20 2018    Narrative:       XR CHEST 1 VW-      Date of Exam: 7/9/2020 7:30 PM     Indication: Status post cardiac surgery.     Comparison Exams: Chest radiograph from earlier today     Technique: Single AP chest radiograph     FINDINGS:  Median sternotomy wires appear intact. An endotracheal tube has its tip  in the midtrachea. A right internal jugular [place]-Felipe catheter has tip  in the main pulmonary artery. Mediastinal and left chest tubes are in  place. No pneumothorax is identified. An intra-aortic balloon pump is  present, with its marker tip 3.1 cm below the aortic knob. There is  scattered bilateral atelectasis. The heart and mediastinal contours  appear stable. The pulmonary vasculature appears normal.       Impression:       1.Endotracheal tube in good position.  2.Mediastinal and left chest tubes in place. No pneumothorax identified.  3.Intra-aortic balloon pump with marker tip 3.1 cm below aortic knob.  4.Scattered bilateral atelectasis.     Electronically Signed By-DR. Eliecer Santiago MD On:7/9/2020 8:16 PM  This report was finalized on [id number] by DR. Eliecer Santiago MD.    XR Chest 1 View [[id number]] Collected:  07/09/20 1013     Updated:  07/09/20 1024    Narrative:       EXAMINATION: XR CHEST 1 VW-     DATE OF EXAM: 7/9/2020 9:49 AM     INDICATION: post IABP placement; I25.110-Atherosclerotic heart disease  of native coronary artery with unstable angina pectoris; I21.4-Non-ST  elevation (NSTEMI) myocardial infarction; I21.4-Non-ST elevation  (NSTEMI) myocardial infarction; R94.31-Abnormal electrocardiogram (ECG)  (EKG); R07.9-Chest pain, unspecified; R06.00-Dyspnea, unspecified.     COMPARISON: Chest radiograph dated 07/08/2020     TECHNIQUE: Portable AP view of the chest was obtained.     FINDINGS:  The intra-aortic balloon pump marker tip is slightly high at the  superior aspect of the aortic arch. The cardiomediastinal silhouette is  at the upper limit of normal. Pulmonary vascularity appears within  normal limits. There are low lung  volumes. There is no focal airspace  consolidation, pleural effusion, or pneumothorax. There are no acute  osseous findings.       Impression:       1. IABP marker tip slightly high with tip at the superior aspect of the  aortic knob.     Electronically Signed By-Brady Watson On:7/9/2020 10:22 AM  This report was finalized on [id number] by  Brady Watson, .    US Renal Bilateral [[id number]] Collected:  07/08/20 2038     Updated:  07/08/20 2041    Narrative:       DATE OF EXAM:  7/8/2020 6:17 PM     PROCEDURE:  US RENAL BILATERAL-     INDICATIONS:  Chronic kidney disease     COMPARISON:  No Comparisons Available     TECHNIQUE:   Grayscale and color Doppler ultrasound evaluation of the kidneys and  urinary bladder was performed.        FINDINGS:  Both kidneys appear normal in size and echogenicity, with no stone,  mass, or hydronephrosis identified. The right kidney measures 11.5 cm,  while the left kidney measures 11.9 cm.     The urinary bladder is not well-seen secondary to Aguirre catheterization.          Impression:       Both kidneys appear within normal limits.     Electronically Signed By-DR. Eliecer Santiago MD On:7/8/2020 8:39 PM  This report was finalized on [id number] by DR. Eliecer Santiago MD.    XR Chest 1 View [[id number]] Collected:  07/08/20 1102     Updated:  07/08/20 1105    Narrative:       EXAMINATION: XR CHEST 1 VW-     DATE OF EXAM: 7/8/2020 10:43 AM     INDICATION: Chest pain for one day     COMPARISON: Chest radiograph dated 05/18/2014     TECHNIQUE: Portable AP view of the chest was obtained.     FINDINGS:  The cardiomediastinal silhouette is within normal limits. Pulmonary  vascularity is unremarkable. There is no focal airspace consolidation,  pleural effusion, or pneumothorax. There are no acute osseous findings.       Impression:       No acute cardiopulmonary abnormality.     Electronically Signed By-Brady Watson On:7/8/2020 11:03 AM  This report was finalized on  [id number] by  Brady Watson, .          ECHOCARDIOGRAM:    Results for orders placed during the hospital encounter of 07/08/20   Adult Transthoracic Echo Limited W/ Cont if Necessary Per Protocol    Narrative · Right ventricular cavity is severely dilated.  · Moderately reduced right ventricular systolic function noted.  · Mild tricuspid valve regurgitation is present.     Overall LV function appears mildly reduced 45 to 50% with dyssynchrony   with pacing  RV is severely enlarged with at least moderate hypokinesis possibly   moderate to severe  Mild TR, trace MR, no aortic valve pathology  No masses or effusion seen  IVC not well seen cannot estimate right atrial pressure  Did not estimate diastolic dysfunction, no parameters identified           I reviewed the patient's new clinical results.    EKG:      Assessment:       NSTEMI, initial episode of care (CMS/[facility])    NSTEMI (non-ST elevated myocardial infarction) (CMS/[facility])    Coronary artery disease involving native coronary artery of native heart with unstable angina pectoris (CMS/[facility])    1. NSTEMI / MV CAD s/p 5vCABG 7/9/20  - troponin 1.0, 1.2, 1.3 peak and decline  - ECG with ST depression in V2 V3 with T wave inversion, stable with restoration of sinus rhythm today  - [facility] 7/8/2020 revealed MV CAD  - on aspirin, BB  - statin therapy once CK improves, LFTs WNL     2. Right Heart failure / LV dysfunction  - Preop 2D echo 7/8 showed EF 50% with grade 1 diastolic dysfunction, moderate reduced RV function, moderate RVE.  - Post op 2D echo 7/10 shows EF = 45-50% with severe RVE and moderate to severe RV dysfunction, mild TR.  - IABP removed successfully  - Weaning inotropes as able  - off milrinone, BB started 7/13  - significant RV dysfunction in place, may be somewhat preload dependent, careful diuresis;   - appears mildly hypervolemic  - on bumex 1 mg PO bid     3. Post op Second degree AV block / CHB  - AV pacing as needed  - Restoration of sinus rhythm       4. H/o HTN   - Stable     5. Transaminitis / elevated CK  - holding statin  - LFTs WNL, CK trending downward 328 --> 274 7/14      6. SUZANNE on CKD stage 2  - renal on board   - Cr 1.30 --> 1.24    Plan:   Further optimize goal-directed medical therapy  Home today per CV surgery  Get BMP, LFTs and CBC as outpatient in 1 week to 2 weeks  Back in clinic in 2 weeks for further titration of his medical therapy  Continue aspirin lifelong  Complete revascularization  Bumex 1 mg p.o. twice daily for volume overload and RV failure, labs as outpatient  Blood pressure stable  Chest pain-free    No contraindication for CV standpoint for discharge home today if okay with CV surgery    Jose Juan Fatima MD, PhD

## 2020-07-14 NOTE — DISCHARGE SUMMARY
Date of Admission: 7/8/2020  Date of Discharge: 7/14/2020    Discharge Diagnosis:   MV CAD s/p CABG--IABP removed 7/10, on asa/statin/bb  Preop cardiogenic shock--IABP/inotropes postop  NSTEMI--trop 1 on admisson  New dx DM type 2--DM educator  Postop SUZANNE on CKD stage 2--cr improved, renal following  HTN--stable  Dyslipidemia--statin   Hx tick-borne disease requiring hospitalization--resolved  Family hx premature CAD--father    Presenting Problem/History of Present Illness  Exertional dyspnea [R06.00]  Abnormal EKG [R94.31]  NSTEMI (non-ST elevated myocardial infarction) (CMS/[facility]) [I21.4]  NSTEMI, initial episode of care (CMS/[facility]) [I21.4]  Chest pain, unspecified type [R07.9]  NSTEMI (non-ST elevated myocardial infarction) (CMS/[facility]) [I21.4]     Hospital Course  Patient is a 64 y.o. male who presented with CP and ruled in for NSTEMI.  Cardiac cath revealed MV CAD and IABP was placed while in cath lab by Dr. Fatima.  Dr. Lake was consulted for surgical evaluation.  Pt underwent ER CABG.  Please see op note for full details.  His hospital course was complicated by cardiogenic shock.  IABP was removed on POD#1.  Creatinine noted to be 2.8 on POD#1 and Dr. Kruse was consulted.  Slowly inotropic support was weaned and kidney function returning to normal.  He did develop at temp > 101 and was retested for COVID--it was negative.  Cultures were negative or still pending.  No further temps noted.  WBC normal.  By POD#5, he was ready for d/c home with family and home health nursing. Creatinine was 1.24 on discharge.  He was also diagnosed with new onset DM type 2--a1c 7.  Metformin was initiated.  Norco 5/325 #20, no refills.  Dr. Kruse wanted pt on Bumex and KCl bid.    Procedures Performed  Procedure(s):  7/9/2020 per Dr. Valentino  Emergency CABG x5 (LIMA to distal LAD, SV to PDA, SV seq to OM1 and OM2, SV to OM3, LLE EVH, intraoperative transesophageal echocardiogram.  CORONARY ARTERY BYPASS GRAFTING X 5 USING LEFT EDWARD   AND LEFT ENDOSCOPICALLY HARVESTED SAPHENOUS VEIN    7/8/2020 cardiac cath and IABP placement per Dr. Fatima       Consults:   Consults     Date and Time Order Name Status Description    7/9/2020 1901 Inpatient Cardiology Consult      7/8/2020 1546 Inpatient Nephrology Consult Completed     7/8/2020 1230 Inpatient Cardiothoracic Surgery Consult Completed     7/8/2020 1057 Cardiology (on-call MD unless specified) Completed           Pertinent Test Results:    Lab Results   Component Value Date    WBC 11.30 (H) 07/14/2020    HGB 11.9 (L) 07/14/2020    HCT 35.7 (L) 07/14/2020    MCV 87.2 07/14/2020     07/14/2020      Lab Results   Component Value Date    GLUCOSE 87 07/14/2020    CALCIUM 9.4 07/14/2020     07/14/2020    K 4.3 07/14/2020    CO2 26.0 07/14/2020    CL 99 07/14/2020    BUN  07/14/2020      Comment:      Testing performed by alternate method    BUN 53 (H) 07/14/2020    CREATININE 1.24 07/14/2020    EGFRIFNONA 59 (L) 07/14/2020    BCR  07/14/2020      Comment:      Testing not performed    ANIONGAP 15.0 07/14/2020     Lab Results   Component Value Date    INR 1.26 (H) 07/10/2020    PROTIME 12.7 (H) 07/10/2020         Condition on Discharge: Stable for discharge to home with assistance of family and home health.    Vital Signs  Temp:  [97.8 °F (36.6 °C)-98.3 °F (36.8 °C)] 98.1 °F (36.7 °C)  Heart Rate:  [62-83] 70  BP: (105-156)/(59-86) 131/63      Discharge Disposition  Home-Health Care Svc    Discharge Medications     Discharge Medications      New Medications      Instructions Start Date   Accu-Chek FastClix Lancets misc   1 each, Does not apply, Daily      Alcohol Wipes 70 % misc   1 each, Apply externally, Daily      bumetanide 1 MG tablet  Commonly known as:  BUMEX   1 mg, Oral, 2 Times Daily      carvedilol 3.125 MG tablet  Commonly known as:  COREG   3.125 mg, Oral, 2 Times Daily With Meals      glucose blood test strip  Commonly known as:  Accu-Chek Guide   1 each, Other, Daily, Use as  instructed      HYDROcodone-acetaminophen 5-325 MG per tablet  Commonly known as:  NORCO   1 tablet, Oral, Every 4 Hours PRN      metFORMIN 500 MG tablet  Commonly known as:  GLUCOPHAGE   500 mg, Oral, Daily With Breakfast      potassium chloride 20 MEQ CR tablet  Commonly known as:  K-DUR,KLOR-CON   20 mEq, Oral, 2 Times Daily With Meals, While on bumetanide         Continue These Medications      Instructions Start Date   aspirin 81 MG tablet   81 mg, Oral, Daily      atorvastatin 40 MG tablet  Commonly known as:  LIPITOR   TAKE ONE TABLET BY MOUTH DAILY         Stop These Medications    hydroCHLOROthiazide 25 MG tablet  Commonly known as:  HYDRODIURIL     irbesartan 300 MG tablet  Commonly known as:  AVAPRO            Discharge Diet:   Healthy Heart Diet    Activity at Discharge:   As tolerated, no lifting > 10 pounds x 6 weeks    Follow-up Appointments  Future Appointments   Date Time Provider Department Center   8/3/2020 11:15 AM Mary Monterroso APRN [facility] [place] None   8/24/2020  3:45 PM Jarrell Alaniz Jr., MD [facility] None   8/31/2020 12:00 PM Lui Lake MD [facility] [place] None     Additional Instructions for the Follow-ups that You Need to Schedule     Discharge Follow-up with PCP   As directed       Currently Documented PCP:    Jarrell Alaniz Jr., MD    PCP Phone Number:    [phone]     Follow Up Details:  in one month for f/u new diagnosis of DM type 2         Discharge Follow-up with Specialty: Cardiology; 2 Weeks   As directed      Specialty:  Cardiology    Follow Up:  2 Weeks    Follow Up Details:  Dr. Fatima--call for appt         Discharge Follow-up with Specified Provider: Cardiac Surgery; 6 Weeks   As directed      To:  Cardiac Surgery    Follow Up:  6 Weeks    Follow Up Details:  Dr. Lake 8/3/2020 at 11:15 and 8/31/2020 at 12 Noon         Discharge Follow-up with Specified Provider: Dr. Kruse; 2 Weeks   As directed      To:  Dr. Kruse    Follow Up:  2 Weeks    Follow Up  Details:  call for appt         Referral to Home Health   As directed      Face to Face Visit Date:  7/14/2020    Follow-up provider for Plan of Care?:  I will be treating the patient on an ongoing basis.  Please send me the Plan of Care for signature.    Follow-up provider:  SHELLEY RODRIGUES [[id number]]    Reason/Clinical Findings:  S/P Cardiac Surgery    Describe mobility limitations that make leaving home difficult:  S/p Cardiac Surgery    Nursing/Therapeutic Services Requested:  Skilled Nursing    Skilled nursing orders:  Other (Post Cardiac Surgery Care)    Frequency:  Other (3 times weekly)         Basic Metabolic Panel    Jul 19, 2020 (Approximate)      Basic Metabolic Panel    Jul 21, 2020 (Approximate)      Call MD With Problems / Concerns    Aug 14, 2020 (Approximate)      Instructions: Call for temp >101 or any surgical wound drainage    Order Comments:  Instructions: Call for temp >101 or any surgical wound drainage                Test Results Pending at Discharge   Order Current Status    POCT Electrolytes +HGB +HCT In process    POCT Electrolytes +HGB +HCT In process    Blood Culture - Blood, Blood, Arterial Line Preliminary result    Blood Culture - Blood, Blood, Arterial Line Preliminary result        STS infomation:  Pt was discharged on asa/bb/statin.       Evelyne Gonzalez, APRN  07/14/20  13:14    Overall doing well; discharge.

## 2020-07-14 NOTE — PLAN OF CARE
Problem: Patient Care Overview  Goal: Plan of Care Review  Flowsheets (Taken 7/14/2020 8232)  Plan of Care Reviewed With: patient  Outcome Summary: 65 yo male seen s/p cabg on 7/9. Pt initially admitted for nstemi. Pt progressing well, w/ ability now to amb 400 ft w/ rw at slow to moderate pace. Timed Up and Go result now wnl. for age group. Also needs very little assist now for bed mobility. Recommend home w/ home health and family when stable.    PPE: gloves, mask, goggles.

## 2020-07-14 NOTE — PLAN OF CARE
Problem: Patient Care Overview  Goal: Plan of Care Review  Outcome: Ongoing (interventions implemented as appropriate)  Flowsheets  Taken 7/14/2020 0207  Progress: improving  Taken 7/14/2020 0000  Plan of Care Reviewed With: patient  Note:   Patient alert and oriented. Ambulated in room to shower with minimal nursing assistance. On 2L NC. Cardiac rhythm changed from NSR to sinus arrhythmia. BP within normal limits, on the high end of normal after ambulation. Patient has fair use of IS, but needs encouragement to use device.  Goal: Individualization and Mutuality  Outcome: Ongoing (interventions implemented as appropriate)  Goal: Discharge Needs Assessment  Outcome: Ongoing (interventions implemented as appropriate)  Goal: Interprofessional Rounds/Family Conf  Outcome: Ongoing (interventions implemented as appropriate)     Problem: Cardiac Surgery (Adult)  Goal: Signs and Symptoms of Listed Potential Problems Will be Absent, Minimized or Managed (Cardiac Surgery)  Outcome: Ongoing (interventions implemented as appropriate)  Goal: Anesthesia/Sedation Recovery  Outcome: Ongoing (interventions implemented as appropriate)     Problem: Cardiac: ACS (Acute Coronary Syndrome) (Adult)  Goal: Signs and Symptoms of Listed Potential Problems Will be Absent, Minimized or Managed (Cardiac: ACS)  Outcome: Ongoing (interventions implemented as appropriate)     Problem: Skin Injury Risk (Adult)  Goal: Identify Related Risk Factors and Signs and Symptoms  Outcome: Ongoing (interventions implemented as appropriate)  Goal: Skin Health and Integrity  Outcome: Ongoing (interventions implemented as appropriate)     Problem: Fall Risk (Adult)  Goal: Identify Related Risk Factors and Signs and Symptoms  Outcome: Ongoing (interventions implemented as appropriate)  Goal: Absence of Fall  Outcome: Ongoing (interventions implemented as appropriate)

## 2020-07-14 NOTE — OUTREACH NOTE
Prep Survey      Responses   [facility] patient discharged from?  [place]   Is LACE score < 7 ?  No   Eligibility  [facility]   Date of Admission  07/08/20   Date of Discharge  07/14/20   Discharge Disposition  Home or Self Care   Discharge diagnosis  NSTEMI,    MV CAD s/p CABG,   New dx DM type 2   COVID-19 Test Status  Negative   Does the patient have one of the following disease processes/diagnoses(primary or secondary)?  Cardiothoracic surgery   Does the patient have Home health ordered?  No   Is there a DME ordered?  No   Prep survey completed?  Yes          Jocelynn Davis RN

## 2020-07-14 NOTE — DISCHARGE INSTR - ACTIVITY
Shower daily  Clean incisions with antibacterial soap daily  Call for temp >101 or any surgical wound drainage  No driving until given permission by physician

## 2020-07-14 NOTE — PROGRESS NOTES
"NEPHROLOGY PROGRESS NOTE------[facility]    [facility]  [phone]  Xavier Kruse MD      Patient Care Team:  Jarrell Alaniz Jr., MD as PCP - General  Lauren Singh APRN as Nurse Practitioner (Nurse Practitioner)  Christelle Kruse MD as Consulting Physician (Nephrology)      Provider:  Xavier Kruse MD  Patient Name: Lonnie Salinas  :  1955    SUBJECTIVE:    F/U ARF/SUZANNE/CRF/CKD STG 2/RHABDOMYOLYSIS     Up in chair. No SOB, CP, dysuria, palpitations.      Medication:    amiodarone 400 mg Oral BID With Meals   aspirin 81 mg Oral Daily   bumetanide 1 mg Oral BID   carvedilol 3.125 mg Oral BID With Meals   chlorhexidine 15 mL Mouth/Throat Q12H   enoxaparin 40 mg Subcutaneous Daily   insulin glargine 10 Units Subcutaneous QAM   insulin lispro 0-7 Units Subcutaneous 4x Daily With Meals & Nightly   mupirocin 1 application Each Nare BID   pantoprazole 40 mg Oral Q AM   potassium chloride 20 mEq Oral BID With Meals          OBJECTIVE    Vital Sign Min/Max for last 24 hours  Temp  Min: 97.3 °F (36.3 °C)  Max: 98.5 °F (36.9 °C)   BP  Min: 113/68  Max: 154/75   Pulse  Min: 62  Max: 83   No data recorded   SpO2  Min: 90 %  Max: 99 %   No data recorded   Weight  Min: 109 kg (239 lb 6.7 oz)  Max: 109 kg (239 lb 6.7 oz)     Flowsheet Rows      First Filed Value   Admission Height  182.9 cm (72\") Documented at 2020 0944   Admission Weight  102 kg (225 lb 15.5 oz) Documented at 2020 0944          No intake/output data recorded.  I/O last 3 completed shifts:  In: 1277 [P.O.:1020; I.V.:257]  Out: 2700 [Urine:2700]    Physical Exam:  General Appearance: NAD  Head: OP Clear  Eyes: conjunctivae and sclerae normal and no icterus  Neck: supple. NO GROSS JVD NOW  Lungs: DECREASED BS BIBASILAR  Heart: regular rhythm & normal rate and normal S1, S2 +JOSE EDUARDO  Chest: Wall no abnormalities observed  Abdomen: normal bowel sounds and soft non-tender " +OBESITY +FOLETY  Extremities:  +TRACE PRETIBIAL EDEMA BILAT LE, no cyanosis and no redness  Skin: no bleeding, bruising or rash, turgor normal, color normal and no lesions noted  Neurologic: A and O x 4    Labs:    WBC WBC   Date Value Ref Range Status   07/14/2020 11.30 (H) 3.40 - 10.80 10*3/mm3 Final   07/13/2020 11.80 (H) 3.40 - 10.80 10*3/mm3 Final   07/12/2020 13.00 (H) 3.40 - 10.80 10*3/mm3 Final      HGB Hemoglobin   Date Value Ref Range Status   07/14/2020 11.9 (L) 13.0 - 17.7 g/dL Final   07/13/2020 11.8 (L) 13.0 - 17.7 g/dL Final   07/12/2020 11.3 (L) 13.0 - 17.7 g/dL Final      HCT Hematocrit   Date Value Ref Range Status   07/14/2020 35.7 (L) 37.5 - 51.0 % Final   07/13/2020 36.2 (L) 37.5 - 51.0 % Final   07/12/2020 33.6 (L) 37.5 - 51.0 % Final      Platlets No results found for: LABPLAT   MCV MCV   Date Value Ref Range Status   07/14/2020 87.2 79.0 - 97.0 fL Final   07/13/2020 87.5 79.0 - 97.0 fL Final   07/12/2020 87.2 79.0 - 97.0 fL Final          Sodium Sodium   Date Value Ref Range Status   07/14/2020 140 136 - 145 mmol/L Final   07/13/2020 138 136 - 145 mmol/L Final   07/12/2020 143 136 - 145 mmol/L Final      Potassium Potassium   Date Value Ref Range Status   07/14/2020 4.3 3.5 - 5.2 mmol/L Final   07/13/2020 4.4 3.5 - 5.2 mmol/L Final   07/12/2020 4.5 3.5 - 5.2 mmol/L Final      Chloride Chloride   Date Value Ref Range Status   07/14/2020 99 98 - 107 mmol/L Final   07/13/2020 97 (L) 98 - 107 mmol/L Final   07/12/2020 103 98 - 107 mmol/L Final      CO2 CO2   Date Value Ref Range Status   07/14/2020 26.0 22.0 - 29.0 mmol/L Final   07/13/2020 26.0 22.0 - 29.0 mmol/L Final   07/12/2020 29.0 22.0 - 29.0 mmol/L Final      BUN BUN   Date Value Ref Range Status   07/14/2020   Final     Comment:     Testing performed by alternate method   07/14/2020 53 (H) 8 - 23 mg/dL Final   07/13/2020   Final     Comment:     Testing performed by alternate method   07/13/2020 56 (H) 8 - 23 mg/dL Final   07/12/2020    Final     Comment:     Testing performed by alternate method   07/12/2020 54 (H) 8 - 23 mg/dL Final      Creatinine Creatinine   Date Value Ref Range Status   07/14/2020 1.24 0.76 - 1.27 mg/dL Final   07/13/2020 1.30 (H) 0.76 - 1.27 mg/dL Final   07/12/2020 1.51 (H) 0.76 - 1.27 mg/dL Final      Calcium Calcium   Date Value Ref Range Status   07/14/2020 9.4 8.6 - 10.5 mg/dL Final   07/13/2020 9.1 8.6 - 10.5 mg/dL Final   07/12/2020 8.8 8.6 - 10.5 mg/dL Final      PO4 No components found for: PO4   Albumin Albumin   Date Value Ref Range Status   07/14/2020 3.50 3.50 - 5.20 g/dL Final   07/13/2020 3.40 (L) 3.50 - 5.20 g/dL Final   07/12/2020 3.80 3.50 - 5.20 g/dL Final      Magnesium Magnesium   Date Value Ref Range Status   07/14/2020 2.6 (H) 1.6 - 2.4 mg/dL Final   07/13/2020 2.7 (H) 1.6 - 2.4 mg/dL Final   07/12/2020 2.9 (H) 1.6 - 2.4 mg/dL Final      Uric Acid No components found for: URIC ACID     Imaging Results (Last 72 Hours)     Procedure Component Value Units Date/Time    XR Chest 1 View [[id number]] Collected:  07/12/20 0815     Updated:  07/12/20 0820    Narrative:       DATE OF EXAM:  7/12/2020 7:29 AM     PROCEDURE:  XR CHEST 1 VW-     INDICATIONS:  Worsening shortness of breath, coronary artery disease, recent heart  surgery. History of a tiny left apical pneumothorax following chest tube  removal.      COMPARISON:  07/11/2020.     TECHNIQUE:   Single radiographic view of the chest was obtained.     FINDINGS:  The left apical pneumothorax has resolved. The right internal jugular  sheath remains in place. The heart is enlarged. There are postsurgical  changes from a recent CABG procedure. The pulmonary vascular markings  are probably normal. There is persistent bilateral basilar consolidation  and left perihilar consolidation which is unchanged. This is probably  due to atelectasis, but I cannot completely exclude airspace disease  from fluid overload or pneumonia. A new small right pleural effusion  is  suspected. There is a stable small left pleural effusion.       Impression:          1. The left apical pneumothorax has resolved.  2. Cardiomegaly.  3. Unchanged left perihilar and bilateral basilar consolidation probably  due to atelectasis, but I cannot exclude airspace disease from fluid  overload or pneumonia.  3. New small right pleural effusion and an unchanged small left pleural  effusion.     Electronically Signed ByMaddy York On:7/12/2020 8:18 AM  This report was finalized on [id number] by  Andrew York, .    XR Chest 1 View [[id number]] Collected:  07/11/20 1501     Updated:  07/11/20 1505    Narrative:       DATE OF EXAM:  7/11/2020 2:31 PM     PROCEDURE:  XR CHEST 1 VW-     INDICATIONS:  Status post cardiac surgery, chest tube removal.      COMPARISON:  07/11/2020 at 0552 hours     TECHNIQUE:   Single radiographic view of the chest was obtained.     FINDINGS:  The mediastinal and left chest tube has been removed. There is a new  tiny 6 mm left apical pneumothorax. The [place]-Felipe catheter has been  removed. The right internal jugular sheath remains in place. The heart  is enlarged. There are bilateral basilar and left perihilar densities  with no significant interval improvement felt to be due to atelectasis.  The patient probably has mild pulmonary vascular congestion. There is an  unchanged left pleural effusion. The right pleural space is clear.       Impression:          1. Interval removal of the mediastinal and left chest tube. There is a  tiny 6 mm left apical pneumothorax which is an interval change from the  earlier chest x-ray.  2. The [place]-Felipe catheter has been removed.  3. No change in the bilateral basilar and left perihilar densities felt  to be due to atelectasis with superimposed mild pulmonary vascular  congestion.     Electronically Signed ByMaddy York On:7/11/2020 3:03 PM  This report was finalized on [id number] by  Andrew York, .    XR Chest 1 View [[id number]] Collected:   07/11/20 0929     Updated:  07/11/20 0949    Narrative:       DATE OF EXAM:  7/11/2020 5:44 AM     PROCEDURE:  XR CHEST 1 VW-     INDICATIONS:  Coronary artery atherosclerotic vascular disease, previous myocardial  infarction, postoperative follow-up.      COMPARISON:  07/10/2020.     TECHNIQUE:   Single radiographic view of the chest was obtained.     FINDINGS:  The patient has been extubated and the nasogastric tube has been  removed. There is a stable mediastinal and left chest tube in place with  no pneumothorax. The [place]-Felipe catheter is in stable position. The heart  is enlarged. The patient probably has mild vascular congestion which is  an interval change from yesterday's study. There is also worsening  bilateral basilar consolidation likely due to atelectasis.  The right  pleural space is clear. The patient probably has a new small left  pleural effusion.       Impression:          1. The patient has been extubated and the nasogastric tube has been  removed.  2. Stable mediastinal left chest tube with no pneumothorax.  3. Probable mild pulmonary vascular congestion which is an interval  change from yesterday's study.  4. Worsening basilar consolidation felt to be due to atelectasis. The  patient probably has a new small left pleural effusion.     Electronically Signed By-Andrew York On:7/11/2020 9:47 AM  This report was finalized on [id number] by  Andrew York, .          Results for orders placed during the hospital encounter of 07/08/20   XR Chest 1 View    Narrative DATE OF EXAM:  7/12/2020 7:29 AM     PROCEDURE:  XR CHEST 1 VW-     INDICATIONS:  Worsening shortness of breath, coronary artery disease, recent heart  surgery. History of a tiny left apical pneumothorax following chest tube  removal.      COMPARISON:  07/11/2020.     TECHNIQUE:   Single radiographic view of the chest was obtained.     FINDINGS:  The left apical pneumothorax has resolved. The right internal jugular  sheath remains in place.  The heart is enlarged. There are postsurgical  changes from a recent CABG procedure. The pulmonary vascular markings  are probably normal. There is persistent bilateral basilar consolidation  and left perihilar consolidation which is unchanged. This is probably  due to atelectasis, but I cannot completely exclude airspace disease  from fluid overload or pneumonia. A new small right pleural effusion is  suspected. There is a stable small left pleural effusion.       Impression    1. The left apical pneumothorax has resolved.  2. Cardiomegaly.  3. Unchanged left perihilar and bilateral basilar consolidation probably  due to atelectasis, but I cannot exclude airspace disease from fluid  overload or pneumonia.  3. New small right pleural effusion and an unchanged small left pleural  effusion.     Electronically Signed By-Andrew York On:7/12/2020 8:18 AM  This report was finalized on [id number] by  Andrew York, .   XR Chest 1 View    Narrative DATE OF EXAM:  7/11/2020 2:31 PM     PROCEDURE:  XR CHEST 1 VW-     INDICATIONS:  Status post cardiac surgery, chest tube removal.      COMPARISON:  07/11/2020 at 0552 hours     TECHNIQUE:   Single radiographic view of the chest was obtained.     FINDINGS:  The mediastinal and left chest tube has been removed. There is a new  tiny 6 mm left apical pneumothorax. The [place]-Felipe catheter has been  removed. The right internal jugular sheath remains in place. The heart  is enlarged. There are bilateral basilar and left perihilar densities  with no significant interval improvement felt to be due to atelectasis.  The patient probably has mild pulmonary vascular congestion. There is an  unchanged left pleural effusion. The right pleural space is clear.       Impression    1. Interval removal of the mediastinal and left chest tube. There is a  tiny 6 mm left apical pneumothorax which is an interval change from the  earlier chest x-ray.  2. The [place]-Felipe catheter has been removed.  3. No  change in the bilateral basilar and left perihilar densities felt  to be due to atelectasis with superimposed mild pulmonary vascular  congestion.     Electronically Signed ByMaddy York On:7/11/2020 3:03 PM  This report was finalized on [id number] by  Andrew York, .   XR Chest 1 View    Narrative DATE OF EXAM:  7/11/2020 5:44 AM     PROCEDURE:  XR CHEST 1 VW-     INDICATIONS:  Coronary artery atherosclerotic vascular disease, previous myocardial  infarction, postoperative follow-up.      COMPARISON:  07/10/2020.     TECHNIQUE:   Single radiographic view of the chest was obtained.     FINDINGS:  The patient has been extubated and the nasogastric tube has been  removed. There is a stable mediastinal and left chest tube in place with  no pneumothorax. The [place]-Felipe catheter is in stable position. The heart  is enlarged. The patient probably has mild vascular congestion which is  an interval change from yesterday's study. There is also worsening  bilateral basilar consolidation likely due to atelectasis.  The right  pleural space is clear. The patient probably has a new small left  pleural effusion.       Impression    1. The patient has been extubated and the nasogastric tube has been  removed.  2. Stable mediastinal left chest tube with no pneumothorax.  3. Probable mild pulmonary vascular congestion which is an interval  change from yesterday's study.  4. Worsening basilar consolidation felt to be due to atelectasis. The  patient probably has a new small left pleural effusion.     Electronically Signed ByMaddy York On:7/11/2020 9:47 AM  This report was finalized on [id number] by  Andrew York, .       Results for orders placed during the hospital encounter of 07/08/20   Duplex Vein Mapping Lower Extremity - Bilateral CAR    Narrative · The right greater saphenous vein is patent and of adequate size in the   thigh.  · The right greater saphenous vein is patent and of adequate size in the   calf.  · The left  greater saphenous vein is patent and of adequate size in the   thigh.  · The left greater saphenous vein has an adequate segment in the mid calf   but is adequate at the ankle.            ASSESSMENT / PLAN      NSTEMI, initial episode of care (CMS/[facility])    NSTEMI (non-ST elevated myocardial infarction) (CMS/HCC)    Coronary artery disease involving native coronary artery of native heart with unstable angina pectoris (CMS/[facility])    1. ARF/SUZANNE/CRF/CKD STG 2-----Nonoliguric. BUN/Cr down more. +ARF/SUZANNE secondary to ATN from hypotension and contrast exposure and renal perfusion disturbance from IABP and Rhabdomyolysis. Support hemodynamics as much as possible. Spoke to patient and his wife. Dose meds for CrCl less than 10 cc/min. No NSAIDs or further IV dye.     2. HYPOKALEMIA------Replaced     3. ANGINA/CAD S/P NSTEMI S/P CARDIAC CATH S/P CABG------per ,Cardiology and per , CT surgery. On Milrinone.     4. HYPERLIPIDEMIA/ELEVATED CK------Statin on hold. Follow CK. No further alkalinization needed at present     5. OA-------No further NSAIDs.       6. OBESITY/ELEVATED BMI    7. HYPOCALCEMIA------Replace IV. Follow ionized levels    8. HYPOMAGNESEMIA------replaced    9. HYPOPHOSPHATEMIA-----Replaced    10. MILD HYPERNATREMIA----Better    11. EDEMA/VOLUME EXCESS-------Diuresing gently with po Bumex      Xavier Kruse MD  [facility]  [phone]  07/14/20  08:00

## 2020-07-14 NOTE — SIGNIFICANT NOTE
Discharge instructions reviewed with and given to patient. Patient verbalizes understanding of discharge instructions, new medications, follow-up appointments, and incision care. Discharge documents given to patient.

## 2020-07-15 ENCOUNTER — TRANSITIONAL CARE MANAGEMENT TELEPHONE ENCOUNTER (OUTPATIENT)
Dept: CALL CENTER | Facility: HOSPITAL | Age: 65
End: 2020-07-15

## 2020-07-15 LAB
BACTERIA SPEC AEROBE CULT: NORMAL
BACTERIA SPEC AEROBE CULT: NORMAL

## 2020-07-15 NOTE — PROGRESS NOTES
Case Management Discharge Note      Final Note: home              Final Discharge Disposition Code: 01 - home or self-care

## 2020-07-15 NOTE — OUTREACH NOTE
Call Center TCM Note      Responses   [facility] patient discharged from?  [place]   Does the patient have one of the following disease processes/diagnoses(primary or secondary)?  Cardiothoracic surgery   TCM attempt successful?  Yes   Call start time  1244   Call end time  1247   Discharge diagnosis  NSTEMI,    MV CAD s/p CABG,   New dx DM type 2   Meds reviewed with patient/caregiver?  Yes   Is the patient having any side effects they believe may be caused by any medication additions or changes?  No   Does the patient have all medications related to this admission filled (includes all antibiotics, pain medications, cardiac medications, etc.)  Yes   Is the patient taking all medications as directed (includes completed medication regime)?  Yes   Does the patient have a primary care provider?   Yes   Does the patient have an appointment scheduled with their C/T surgeon?  Yes   Comments regarding PCP  Jarrell Alaniz MD   Has the patient kept scheduled appointments due by today?  Yes   Comments  Post op 08/06/2020, cardiologist 07/29/2020, PCP 08/27/2020   What is the Home health agency?   [facility]   Has home health visited the patient within 72 hours of discharge?  Yes   Did the patient receive a copy of their discharge instructions?  Yes   Nursing interventions  Reviewed instructions with patient   What is the patient's perception of their health status since discharge?  Improving   Is the patient/caregiver able to teach back normal signs of recovery?  Nausea and lack of appetite, Constipation, Depression or irritability, Pain or discomfort at incisional site   Is the patient /caregiver able to teach back basic post-op care?  Continue use of incentive spirometry at least 1 week post discharge, Take showers only when approved by MD-sponge bathe until then, Do not remove steri-strips, Lifting as instructed by MD in discharge instructions, No tub bath, swimming, or hot tub until instructed by MD, Practice 'cough and deep  breath', Drive as instructed by MD in discharge instructions, Keep incision areas clean, dry and protected   Is the patient/caregiver able to teach back signs and symptoms of incisional infection?  Increased redness, swelling or pain at the incisonal site, Fever, Increased drainage or bleeding, Incisional warmth, Pus or odor from incision   Is the patient/caregiver able to teach back steps to recovery at home?  Weigh daily, Rest and rebuild strength, gradually increase activity, Make a list of questions for surgeon's appointment, Eat a well-balance diet, Set small, achievable goals for return to baseline health, Practice good oral hygiene   Is the patient /caregiver able to teach back the importance of cardiac rehab?  Yes   Is the patient/caregiver able to teach back the hierarchy of who to call/visit for symptoms/problems? PCP, Specialist, Home health nurse, Urgent Care, ED, 911  Yes   TCM call completed?  Yes   Wrap up additional comments  PT feeling very well s/p NSTEMI and CABG. No SOB, chest pain, nausea. Very littel post op discomfort. Pt undersands all medication changes. New onset DM2. Pt has multiple upcoming appts so will keep 6 mth fwp with PCP on 08/27/2020          Tana Chairez MA    7/15/2020, 12:51

## 2020-07-15 NOTE — PAYOR COMM NOTE
"DC Notification for Lonnie Salinas  1955  Ref #[id number]  DC 2020 routine to home.    [facility]    [phone]      Lonnie Salinas (64 y.o. Male)     Date of Birth Social Security Number Address Home Phone MRN    1955  [address] [place] [address] [phone] [id number]    Church Marital Status          None        Admission Date Admission Type Admitting Provider Attending Provider Department, Room/Bed    20 Emergency Jose Juan Fatima MD  [facility] CARDIOVASCULAR CARE UNIT,     Discharge Date Discharge Disposition Discharge Destination        2020 Home-Health Care Svc              Attending Provider:  (none)   Allergies:  Lisinopril    Isolation:  None   Infection:  None   Code Status:  Prior    Ht:  175.3 cm (69\")   Wt:  109 kg (239 lb 6.7 oz)    Admission Cmt:  None   Principal Problem:  NSTEMI, initial episode of care (CMS/[facility]) [I21.4] More...                 Active Insurance as of 2020     Primary Coverage     Payor Plan Insurance Group Employer/Plan Group    [facility] BLUE [id number]     Payor Plan Address Payor Plan Phone Number Payor Plan Fax Number Effective Dates    [address]   2016 - None Entered    [place] [address]       Subscriber Name Subscriber Birth Date Member ID       GABY SALINAS 1955 [id number]                 Emergency Contacts      (Rel.) Home Phone Work Phone Mobile Phone    GABY SALINAS (Spouse) -- -- [phone]               Discharge Summary      Lui Lake MD at 20 1259          Date of Admission: 2020  Date of Discharge: 2020    Discharge Diagnosis:   MV CAD s/p CABG--IABP removed 7/10, on asa/statin/bb  Preop cardiogenic shock--IABP/inotropes postop  NSTEMI--trop 1 on admisson  New dx DM type 2--DM educator  Postop SUZANNE on CKD stage 2--cr improved, renal " following  HTN--stable  Dyslipidemia--statin   Hx tick-borne disease requiring hospitalization--resolved  Family hx premature CAD--father    Presenting Problem/History of Present Illness  Exertional dyspnea [R06.00]  Abnormal EKG [R94.31]  NSTEMI (non-ST elevated myocardial infarction) (CMS/[facility]) [I21.4]  NSTEMI, initial episode of care (CMS/[facility]) [I21.4]  Chest pain, unspecified type [R07.9]  NSTEMI (non-ST elevated myocardial infarction) (CMS/[facility]) [I21.4]     Hospital Course  Patient is a 64 y.o. male who presented with CP and ruled in for NSTEMI.  Cardiac cath revealed MV CAD and IABP was placed while in cath lab by Dr. Fatima.  Dr. Lake was consulted for surgical evaluation.  Pt underwent ER CABG.  Please see op note for full details.  His hospital course was complicated by cardiogenic shock.  IABP was removed on POD#1.  Creatinine noted to be 2.8 on POD#1 and Dr. Kruse was consulted.  Slowly inotropic support was weaned and kidney function returning to normal.  He did develop at temp > 101 and was retested for COVID--it was negative.  Cultures were negative or still pending.  No further temps noted.  WBC normal.  By POD#5, he was ready for d/c home with family and home health nursing. Creatinine was 1.24 on discharge.  He was also diagnosed with new onset DM type 2--a1c 7.  Metformin was initiated.  Norco 5/325 #20, no refills.  Dr. Kruse wanted pt on Bumex and KCl bid.    Procedures Performed  Procedure(s):  7/9/2020 per Dr. Valentino  Emergency CABG x5 (LIMA to distal LAD, SV to PDA, SV seq to OM1 and OM2, SV to OM3, LLE EVH, intraoperative transesophageal echocardiogram.  CORONARY ARTERY BYPASS GRAFTING X 5 USING LEFT EDWARD  AND LEFT ENDOSCOPICALLY HARVESTED SAPHENOUS VEIN    7/8/2020 cardiac cath and IABP placement per Dr. Fatima       Consults:   Consults     Date and Time Order Name Status Description    7/9/2020 1901 Inpatient Cardiology Consult      7/8/2020 1546 Inpatient Nephrology Consult Completed      7/8/2020 1230 Inpatient Cardiothoracic Surgery Consult Completed     7/8/2020 1057 Cardiology (on-call MD unless specified) Completed           Pertinent Test Results:    Lab Results   Component Value Date    WBC 11.30 (H) 07/14/2020    HGB 11.9 (L) 07/14/2020    HCT 35.7 (L) 07/14/2020    MCV 87.2 07/14/2020     07/14/2020      Lab Results   Component Value Date    GLUCOSE 87 07/14/2020    CALCIUM 9.4 07/14/2020     07/14/2020    K 4.3 07/14/2020    CO2 26.0 07/14/2020    CL 99 07/14/2020    BUN  07/14/2020      Comment:      Testing performed by alternate method    BUN 53 (H) 07/14/2020    CREATININE 1.24 07/14/2020    EGFRIFNONA 59 (L) 07/14/2020    BCR  07/14/2020      Comment:      Testing not performed    ANIONGAP 15.0 07/14/2020     Lab Results   Component Value Date    INR 1.26 (H) 07/10/2020    PROTIME 12.7 (H) 07/10/2020         Condition on Discharge: Stable for discharge to home with assistance of family and home health.    Vital Signs  Temp:  [97.8 °F (36.6 °C)-98.3 °F (36.8 °C)] 98.1 °F (36.7 °C)  Heart Rate:  [62-83] 70  BP: (105-156)/(59-86) 131/63      Discharge Disposition  Home-Health Care [facility]    Discharge Medications     Discharge Medications      New Medications      Instructions Start Date   Accu-Chek FastClix Lancets misc   1 each, Does not apply, Daily      Alcohol Wipes 70 % misc   1 each, Apply externally, Daily      bumetanide 1 MG tablet  Commonly known as:  BUMEX   1 mg, Oral, 2 Times Daily      carvedilol 3.125 MG tablet  Commonly known as:  COREG   3.125 mg, Oral, 2 Times Daily With Meals      glucose blood test strip  Commonly known as:  Accu-Chek Guide   1 each, Other, Daily, Use as instructed      HYDROcodone-acetaminophen 5-325 MG per tablet  Commonly known as:  NORCO   1 tablet, Oral, Every 4 Hours PRN      metFORMIN 500 MG tablet  Commonly known as:  GLUCOPHAGE   500 mg, Oral, Daily With Breakfast      potassium chloride 20 MEQ CR tablet  Commonly known as:   K-DUR,KLOR-CON   20 mEq, Oral, 2 Times Daily With Meals, While on bumetanide         Continue These Medications      Instructions Start Date   aspirin 81 MG tablet   81 mg, Oral, Daily      atorvastatin 40 MG tablet  Commonly known as:  LIPITOR   TAKE ONE TABLET BY MOUTH DAILY         Stop These Medications    hydroCHLOROthiazide 25 MG tablet  Commonly known as:  HYDRODIURIL     irbesartan 300 MG tablet  Commonly known as:  AVAPRO            Discharge Diet:   Healthy Heart Diet    Activity at Discharge:   As tolerated, no lifting > 10 pounds x 6 weeks    Follow-up Appointments  Future Appointments   Date Time Provider Department Center   8/3/2020 11:15 AM Mary Monterroso APRN [facility] None   8/24/2020  3:45 PM Jarrell Alaniz Jr., MD [facility] None   8/31/2020 12:00 PM Lui Lake MD [facility] None     Additional Instructions for the Follow-ups that You Need to Schedule     Discharge Follow-up with PCP   As directed       Currently Documented PCP:    Jarrell Alaniz Jr., MD    PCP Phone Number:    [phone]     Follow Up Details:  in one month for f/u new diagnosis of DM type 2         Discharge Follow-up with Specialty: Cardiology; 2 Weeks   As directed      Specialty:  Cardiology    Follow Up:  2 Weeks    Follow Up Details:  Dr. Fatima--call for appt         Discharge Follow-up with Specified Provider: Cardiac Surgery; 6 Weeks   As directed      To:  Cardiac Surgery    Follow Up:  6 Weeks    Follow Up Details:  Dr. Lake 8/3/2020 at 11:15 and 8/31/2020 at 12 Noon         Discharge Follow-up with Specified Provider: Dr. Kruse; 2 Weeks   As directed      To:  Dr. Kruse    Follow Up:  2 Weeks    Follow Up Details:  call for appt         Referral to Home Health   As directed      Face to Face Visit Date:  7/14/2020    Follow-up provider for Plan of Care?:  I will be treating the patient on an ongoing basis.  Please send me the Plan of Care for signature.    Follow-up provider:  LILIA  LUI RAINEY [[id number]]    Reason/Clinical Findings:  S/P Cardiac Surgery    Describe mobility limitations that make leaving home difficult:  S/p Cardiac Surgery    Nursing/Therapeutic Services Requested:  Skilled Nursing    Skilled nursing orders:  Other (Post Cardiac Surgery Care)    Frequency:  Other (3 times weekly)         Basic Metabolic Panel    Jul 19, 2020 (Approximate)      Basic Metabolic Panel    Jul 21, 2020 (Approximate)      Call MD With Problems / Concerns    Aug 14, 2020 (Approximate)      Instructions: Call for temp >101 or any surgical wound drainage    Order Comments:  Instructions: Call for temp >101 or any surgical wound drainage                Test Results Pending at Discharge   Order Current Status    POCT Electrolytes +HGB +HCT In process    POCT Electrolytes +HGB +HCT In process    Blood Culture - Blood, Blood, Arterial Line Preliminary result    Blood Culture - Blood, Blood, Arterial Line Preliminary result        STS infomation:  Pt was discharged on asa/bb/statin.       Evelyne Gonzalez, APRN  07/14/20  13:14    Overall doing well; discharge.              Electronically signed by Lui Lake MD at 07/14/20 2019

## 2020-07-16 LAB
ARTERIAL PATENCY WRIST A: ABNORMAL
ATMOSPHERIC PRESS: ABNORMAL MM[HG]
ATMOSPHERIC PRESS: NORMAL MM[HG]
BASE EXCESS BLDA CALC-SCNC: -10.5 MMOL/L
BASE EXCESS BLDV CALC-SCNC: NORMAL MMOL/L
BDY SITE: ABNORMAL
BDY SITE: NORMAL
BODY TEMPERATURE: 97.2 C
BODY TEMPERATURE: NORMAL
CA-I BLDA-SCNC: 1.07 MMOL/L (ref 1.15–1.33)
CA-I BLDA-SCNC: 1.13 MMOL/L (ref 1.15–1.33)
CO2 BLDA-SCNC: 20.3 MMOL/L (ref 23–27)
CO2 BLDA-SCNC: NORMAL MMOL/L
GLUCOSE BLDC GLUCOMTR-MCNC: 290 MG/DL (ref 74–100)
GLUCOSE BLDC GLUCOMTR-MCNC: 298 MG/DL (ref 74–100)
HCO3 BLDA-SCNC: 18.7 MMOL/L
HCO3 BLDV-SCNC: NORMAL MMOL/L
HCT VFR BLDA CALC: 36 % (ref 38–51)
HCT VFR BLDA CALC: 37 % (ref 38–51)
HEMODILUTION: YES
HGB BLDA-MCNC: 12.3 G/DL (ref 12–17)
HGB BLDA-MCNC: 12.5 G/DL (ref 12–17)
INHALED O2 CONCENTRATION: 100 %
INHALED O2 CONCENTRATION: NORMAL %
MODALITY: ABNORMAL
MODALITY: NORMAL
PCO2 BLDA: 54 MM HG
PCO2 BLDV: NORMAL MM[HG]
PCO2 TEMP ADJ BLD: 52.2 MM HG (ref 35–48)
PEEP RESPIRATORY: 8 CM[H2O]
PEEP RESPIRATORY: NORMAL CM[H2O]
PH BLDA: 7.15 PH UNITS
PH BLDV: NORMAL [PH]
PH, TEMP CORRECTED: 7.16 PH UNITS
PO2 BLDA: 70.3 MM HG
PO2 BLDV: NORMAL MM[HG]
PO2 TEMP ADJ BLD: 66.7 MM HG (ref 83–108)
POTASSIUM BLDA-SCNC: 2.5 MMOL/L (ref 3.5–4.5)
POTASSIUM BLDA-SCNC: 2.5 MMOL/L (ref 3.5–4.5)
RESPIRATORY RATE: 18
SAO2 % BLDCOA: 87.7 %
SAO2 % BLDCOV: NORMAL %
SODIUM BLD-SCNC: 143 MMOL/L (ref 138–146)
SODIUM BLD-SCNC: 144 MMOL/L (ref 138–146)
VENTILATOR MODE: AC
VENTILATOR MODE: NORMAL
VT ON VENT VENT: 700 ML
VT ON VENT VENT: NORMAL ML

## 2020-07-20 PROCEDURE — 93010 ELECTROCARDIOGRAM REPORT: CPT | Performed by: INTERNAL MEDICINE

## 2020-07-22 ENCOUNTER — READMISSION MANAGEMENT (OUTPATIENT)
Dept: CALL CENTER | Facility: HOSPITAL | Age: 65
End: 2020-07-22

## 2020-07-22 ENCOUNTER — TRANSCRIBE ORDERS (OUTPATIENT)
Dept: ADMINISTRATIVE | Facility: HOSPITAL | Age: 65
End: 2020-07-22

## 2020-07-22 ENCOUNTER — LAB (OUTPATIENT)
Dept: LAB | Facility: HOSPITAL | Age: 65
End: 2020-07-22

## 2020-07-22 DIAGNOSIS — E87.6 HYPOKALEMIA: ICD-10-CM

## 2020-07-22 DIAGNOSIS — E87.6 HYPOKALEMIA: Primary | ICD-10-CM

## 2020-07-22 LAB
ANION GAP SERPL CALCULATED.3IONS-SCNC: 12.5 MMOL/L (ref 5–15)
BUN SERPL-MCNC: 13 MG/DL (ref 8–23)
BUN/CREAT SERPL: 11.6 (ref 7–25)
CALCIUM SPEC-SCNC: 9.3 MG/DL (ref 8.6–10.5)
CHLORIDE SERPL-SCNC: 102 MMOL/L (ref 98–107)
CO2 SERPL-SCNC: 24.5 MMOL/L (ref 22–29)
CREAT SERPL-MCNC: 1.12 MG/DL (ref 0.76–1.27)
GFR SERPL CREATININE-BSD FRML MDRD: 66 ML/MIN/1.73
GLUCOSE SERPL-MCNC: 78 MG/DL (ref 65–99)
POTASSIUM SERPL-SCNC: 5.1 MMOL/L (ref 3.5–5.2)
SODIUM SERPL-SCNC: 139 MMOL/L (ref 136–145)

## 2020-07-22 PROCEDURE — 36415 COLL VENOUS BLD VENIPUNCTURE: CPT

## 2020-07-22 PROCEDURE — 80048 BASIC METABOLIC PNL TOTAL CA: CPT

## 2020-07-22 RX ORDER — ATORVASTATIN CALCIUM 40 MG/1
TABLET, FILM COATED ORAL
Qty: 90 TABLET | Refills: 0 | Status: SHIPPED | OUTPATIENT
Start: 2020-07-22 | End: 2020-10-19

## 2020-07-22 NOTE — OUTREACH NOTE
CT Surgery Week 2 Survey      Responses   [facility] patient discharged from?  [facility]   Does the patient have one of the following disease processes/diagnoses(primary or secondary)?  Cardiothoracic surgery   Week 2 attempt successful?  Yes   Call start time  1138   Call end time  1139   Discharge diagnosis  NSTEMI,    MV CAD s/p CABG,   New dx DM type 2   Meds reviewed with patient/caregiver?  Yes   Is the patient having any side effects they believe may be caused by any medication additions or changes?  No   Does the patient have all medications related to this admission filled (includes all antibiotics, pain medications, cardiac medications, etc.)  Yes   Is the patient taking all medications as directed (includes completed medication regime)?  Yes   Does the patient have a primary care provider?   Yes   Does the patient have an appointment scheduled with their C/T surgeon?  Yes   Has the patient kept scheduled appointments due by today?  Yes   What is the Home health agency?   [facility]   Has home health visited the patient within 72 hours of discharge?  Yes   Psychosocial issues?  No   Comments  Patient reports he is doing great!   Did the patient receive a copy of their discharge instructions?  Yes   Nursing interventions  Reviewed instructions with patient   What is the patient's perception of their health status since discharge?  Improving   Nursing interventions  Nurse provided patient education   Is the patient/caregiver able to teach back normal signs of recovery?  Pain or discomfort at incisional site   Nursing interventions  Reassured on normal signs of recovery   Is the patient /caregiver able to teach back basic post-op care?  Take showers only when approved by MD-sponge bathe until then, Do not remove steri-strips, Lifting as instructed by MD in discharge instructions, No tub bath, swimming, or hot tub until instructed by MD, Drive as instructed by MD in discharge instructions, Keep incision areas  clean, dry and protected   Is the patient/caregiver able to teach back signs and symptoms of incisional infection?  Increased redness, swelling or pain at the incisonal site, Fever, Increased drainage or bleeding, Incisional warmth, Pus or odor from incision   Is the patient/caregiver able to teach back steps to recovery at home?  Make a list of questions for surgeon's appointment, Rest and rebuild strength, gradually increase activity   Is the patient /caregiver able to teach back the importance of cardiac rehab?  Yes   Is the patient/caregiver able to teach back the hierarchy of who to call/visit for symptoms/problems? PCP, Specialist, Home health nurse, Urgent Care, ED, 911  Yes   Week 2 call completed?  Yes          Erik Villa RN

## 2020-07-23 ENCOUNTER — TELEPHONE (OUTPATIENT)
Dept: CARDIOLOGY | Facility: CLINIC | Age: 65
End: 2020-07-23

## 2020-07-23 NOTE — TELEPHONE ENCOUNTER
Dr Kushal Vidal from [facility] [employer] is calling for patient Lonnie Salinas. She is needing to know what diagnosis Mr Salinas has and the length of his short term disability. Has a follow up 7-29-20. Requesting a return call        Marcy @ [facility]  [phone]

## 2020-07-24 ENCOUNTER — TELEPHONE (OUTPATIENT)
Dept: CARDIOLOGY | Facility: CLINIC | Age: 65
End: 2020-07-24

## 2020-07-24 NOTE — TELEPHONE ENCOUNTER
20-Dr. Fatima  Pt. Called, he would like to know if his [facility] papers have been filled out yet. His  is 59 and his phone number is . Thank you.

## 2020-07-29 ENCOUNTER — OFFICE VISIT (OUTPATIENT)
Dept: CARDIOLOGY | Facility: CLINIC | Age: 65
End: 2020-07-29

## 2020-07-29 ENCOUNTER — TELEPHONE (OUTPATIENT)
Dept: CARDIAC REHAB | Facility: HOSPITAL | Age: 65
End: 2020-07-29

## 2020-07-29 VITALS
RESPIRATION RATE: 18 BRPM | WEIGHT: 213.8 LBS | HEART RATE: 120 BPM | SYSTOLIC BLOOD PRESSURE: 122 MMHG | BODY MASS INDEX: 31.67 KG/M2 | HEIGHT: 69 IN | DIASTOLIC BLOOD PRESSURE: 68 MMHG

## 2020-07-29 DIAGNOSIS — E78.5 HYPERLIPIDEMIA, UNSPECIFIED HYPERLIPIDEMIA TYPE: ICD-10-CM

## 2020-07-29 DIAGNOSIS — I10 ESSENTIAL HYPERTENSION: ICD-10-CM

## 2020-07-29 DIAGNOSIS — I21.4 NSTEMI (NON-ST ELEVATED MYOCARDIAL INFARCTION) (HCC): ICD-10-CM

## 2020-07-29 DIAGNOSIS — I25.110 CORONARY ARTERY DISEASE INVOLVING NATIVE CORONARY ARTERY OF NATIVE HEART WITH UNSTABLE ANGINA PECTORIS (HCC): ICD-10-CM

## 2020-07-29 DIAGNOSIS — R00.0 TACHYCARDIA: Primary | ICD-10-CM

## 2020-07-29 DIAGNOSIS — I21.4 NSTEMI, INITIAL EPISODE OF CARE (HCC): ICD-10-CM

## 2020-07-29 PROCEDURE — 99214 OFFICE O/P EST MOD 30 MIN: CPT | Performed by: INTERNAL MEDICINE

## 2020-07-29 RX ORDER — POTASSIUM CHLORIDE 750 MG/1
10 TABLET, FILM COATED, EXTENDED RELEASE ORAL DAILY
Qty: 30 TABLET | Refills: 3 | Status: SHIPPED | OUTPATIENT
Start: 2020-07-29 | End: 2020-11-23

## 2020-07-29 RX ORDER — METOPROLOL SUCCINATE 25 MG/1
25 TABLET, EXTENDED RELEASE ORAL 2 TIMES DAILY
Qty: 60 TABLET | Refills: 5 | Status: SHIPPED | OUTPATIENT
Start: 2020-07-29 | End: 2020-08-12 | Stop reason: DRUGHIGH

## 2020-07-29 NOTE — TELEPHONE ENCOUNTER
Patient to follow up and Dr. Fatima in two weeks. Requests call back after appointment to discuss Cardiac Rehab.

## 2020-07-29 NOTE — PROGRESS NOTES
Cardiology clinic note  Jose Juan Fatima MD, PhD  Subjective:     Encounter Date:07/29/2020      Patient ID: Lonnie Salinas is a 64 y.o. male.    Chief Complaint:  Chief Complaint   Patient presents with   • Follow-up   • Coronary Artery Disease       HPI:  History of Present Illness  I the pleasure of seeing this 64-year-old male today who is known to me from prior hospitalization status post left heart catheterization which demonstrated multivessel CAD after non-ST elevation myocardial infarction, balloon pump placement, mildly reduced EF 45 to 50% with at least moderate RV dysfunction, ultimately undergoing multivessel bypass surgery with LIMA to LAD, saphenous vein graft to PDA, vein graft to OM1 and OM 2 sequential jump graft, saphenous vein graft to OM 3 with left lower extremity vein harvest.  His case was complicated by cardiogenic shock also required multiple vasopressor as well as prolonged milrinone support with pulmonary hypertension and RV failure successfully weaned off with institution of heart failure therapies and beta-blockade.  He presents back to clinic today doing well chest pain-free with diminishing edema however he is tachycardic.  Blood pressure at home is running 1 20-1 40 systolic and he denies palpitations or presyncope.  He has no complaints and is doing well.  He is asked about driving colleges which we would prefer him to wait 30 days for the sternotomy to heal followed by initiation of cardiac rehab prior to potential driving for the sole reason of preventing any steering wheel related chest trauma prior to his healing of his sternotomy.  No other concerning signs or symptoms today.  No PND orthopnea chest pain reported    Review of systems x14 point review of systems is negative except as mentioned above    Historical data copied for previous encounters and EMR is unchanged    Most recent labs reviewed from 7/22, potassium 5.0 sodium 140 and creatinine of 1.12    The following  portions of the patient's history were reviewed and updated as appropriate: allergies, current medications, past family history, past medical history, past social history, past surgical history and problem list.    Problem List:  Patient Active Problem List   Diagnosis   • Acquired thrombocytopenia (CMS/HCC)   • Hyperlipidemia   • Hypertension   • Obesity   • Preventative health care   • Allergic rhinitis   • NSTEMI, initial episode of care (CMS/[facility])   • NSTEMI (non-ST elevated myocardial infarction) (CMS/[facility])   • Coronary artery disease involving native coronary artery of native heart with unstable angina pectoris (CMS/[facility])       Past Medical History:  Past Medical History:   Diagnosis Date   • Allergic    • Dyslipidemia    • History of tick-borne disease    • Hyperlipemia    • Hypertension        Past Surgical History:  Past Surgical History:   Procedure Laterality Date   • CARDIAC CATHETERIZATION N/A 7/8/2020    Procedure: Left Heart Cath with possible PCI;  Surgeon: Jose Juan Fatima MD;  Location: [facility] CATH INVASIVE LOCATION;  Service: Cardiology;  Laterality: N/A;   • CARDIAC CATHETERIZATION N/A 7/8/2020    Procedure: Intra-Aortic Baloon Pump Insertion;  Surgeon: Jose Juan Fatima MD;  Location: [facility] CATH INVASIVE LOCATION;  Service: Cardiology;  Laterality: N/A;   • CORONARY ARTERY BYPASS GRAFT N/A 7/9/2020    Procedure: CORONARY ARTERY BYPASS GRAFTING X 5 USING LEFT EDWARD  AND LEFT ENDOSCOPICALLY HARVESTED SAPHENOUS VEIN;  Surgeon: Lui Lake MD;  [facility];  Service: Cardiothoracic;  Laterality: N/A;  MINERVA DONE BY SRINIVASAN       Social History:  Social History     Socioeconomic History   • Marital status:      Spouse name: Not on file   • Number of children: Not on file   • Years of education: Not on file   • Highest education level: Not on file   Tobacco Use   • Smoking status: Never Smoker   • Smokeless tobacco: Never Used   Substance and Sexual Activity   • Alcohol  "use: No     Frequency: Never   • Drug use: No   • Sexual activity: Defer   Social History Narrative    Lives with wife       Allergies:  Allergies   Allergen Reactions   • Lisinopril Angioedema       Immunizations:  Immunization History   Administered Date(s) Administered   • Td 07/08/2014       ROS:  ROS       Objective:         /68 (BP Location: Left arm, Patient Position: Sitting)   Pulse 120   Resp 18   Ht 175.3 cm (69\")   Wt 97 kg (213 lb 12.8 oz)   BMI 31.57 kg/m²     Physical Exam  No acute distress alert and oriented x3 afebrile vital signs stable  Regular rhythm but tachycardic, EKG demonstrates sinus tachycardia  Clear to auscultation bilaterally mild diminished left lower base cleared with deep inspiration  Soft nontender nondistended bowel sounds positive  No clubbing cyanosis with edema pitting bilateral lower extremities left greater than right 1+  No neurologic deficits grossly  Normocephalic atraumatic pupils equal round extraocular was tight bilateral  Trachea midline is supple very mild trace JVD with HJR  Normal mood and affect  No bony abnormalities grossly  Incisions well-healed and well approximated without any erythema or drainage    In-Office Procedure(s):  Procedures  EKG today 7- 12:06 PM  Sinus tachycardia, nonspecific abnormalities  Inferior infarct old  Abnormal EKG    ASCVD RIsk Score::  The ASCVD Risk score (Karen MARTA Jr., et al., 2013) failed to calculate for the following reasons:    The patient has a prior MI or stroke diagnosis    Recent Radiology:  Imaging Results (Most Recent)     None          Lab Review:   Lab on 07/22/2020   Component Date Value   • Glucose 07/22/2020 78    • BUN 07/22/2020 13    • Creatinine 07/22/2020 1.12    • Sodium 07/22/2020 139    • Potassium 07/22/2020 5.1    • Chloride 07/22/2020 102    • CO2 07/22/2020 24.5    • Calcium 07/22/2020 9.3    • eGFR Non African Amer 07/22/2020 66    • BUN/Creatinine Ratio 07/22/2020 11.6    • Anion Gap " 07/22/2020 12.5    No results displayed because visit has over 200 results.      Lab on 02/22/2020   Component Date Value   • Glucose 02/22/2020 108*   • BUN 02/22/2020 17    • Creatinine 02/22/2020 1.17    • Sodium 02/22/2020 139    • Potassium 02/22/2020 4.5    • Chloride 02/22/2020 99    • CO2 02/22/2020 26.3    • Calcium 02/22/2020 10.1    • Total Protein 02/22/2020 7.1    • Albumin 02/22/2020 4.20    • ALT (SGPT) 02/22/2020 32    • AST (SGOT) 02/22/2020 22    • Alkaline Phosphatase 02/22/2020 77    • Total Bilirubin 02/22/2020 0.6    • eGFR Non African Amer 02/22/2020 63    • Globulin 02/22/2020 2.9    • A/G Ratio 02/22/2020 1.4    • BUN/Creatinine Ratio 02/22/2020 14.5    • Anion Gap 02/22/2020 13.7    • Total Cholesterol 02/22/2020 116    • Triglycerides 02/22/2020 206*   • HDL Cholesterol 02/22/2020 30*   • LDL Cholesterol  02/22/2020 45    • VLDL Cholesterol 02/22/2020 41.2*   • LDL/HDL Ratio 02/22/2020 1.49    • WBC 02/22/2020 7.61    • RBC 02/22/2020 5.60    • Hemoglobin 02/22/2020 16.1    • Hematocrit 02/22/2020 49.0    • MCV 02/22/2020 87.5    • MCH 02/22/2020 28.8    • MCHC 02/22/2020 32.9    • RDW 02/22/2020 13.8    • RDW-SD 02/22/2020 44.1    • MPV 02/22/2020 9.8    • Platelets 02/22/2020 197    • Neutrophil % 02/22/2020 67.3    • Lymphocyte % 02/22/2020 22.1    • Monocyte % 02/22/2020 8.0    • Eosinophil % 02/22/2020 1.4    • Basophil % 02/22/2020 0.8    • Immature Grans % 02/22/2020 0.4    • Neutrophils, Absolute 02/22/2020 5.12    • Lymphocytes, Absolute 02/22/2020 1.68    • Monocytes, Absolute 02/22/2020 0.61    • Eosinophils, Absolute 02/22/2020 0.11    • Basophils, Absolute 02/22/2020 0.06    • Immature Grans, Absolute 02/22/2020 0.03    • nRBC 02/22/2020 0.0                 Assessment:         Systolic heart failure with RV dysfunction  Multivessel CAD status post 5 vessel bypass  Continue aspirin daily for life  Statin therapy high intensity per guidelines  Change beta-blocker with sinus  tachycardia from Coreg to metoprolol XL 25 mg p.o. twice daily  With RV dysfunction and used LV systolic function start Entresto 24/26 twice daily  BMP in 5 days  Decrease potassium supplement to 10 mill equivalents daily from 40  Continue Bumex twice daily present dose    Return to clinic in 2 weeks for follow-up as well as review of labs and further medication titration as clinically indicated    It is a pleasure to be involved in his cardiovascular care.  Please call with any questions or concerns    Multiple cardiac comorbidities assessed individually today    Greater than 25-minute spent face-to-face with the patient swells and coordination of care, review of EKG, vitals reviewed, blood pressure log review, medication changes and additional 10 minutes of documentation    Level of Care:                 Jose Juan Fatima MD  07/29/20  .

## 2020-07-30 ENCOUNTER — TELEPHONE (OUTPATIENT)
Dept: CARDIOLOGY | Facility: CLINIC | Age: 65
End: 2020-07-30

## 2020-07-30 ENCOUNTER — READMISSION MANAGEMENT (OUTPATIENT)
Dept: CALL CENTER | Facility: HOSPITAL | Age: 65
End: 2020-07-30

## 2020-07-30 NOTE — OUTREACH NOTE
CT Surgery Week 3 Survey      Responses   [facility] patient discharged from?  [facility]   Does the patient have one of the following disease processes/diagnoses(primary or secondary)?  Cardiothoracic surgery   Week 3 attempt successful?  Yes   Call start time  0949   Call end time  0953   Meds reviewed with patient/caregiver?  Yes   Comments regarding appointments  Pt saw cardiology yesterday.   Has the patient kept scheduled appointments due by today?  Yes   What is the Home health agency?   HH never came, they declined.   What is the patient's perception of their health status since discharge?  Improving   Week 3 call completed?  Yes   Revoked  No further contact(revokes)-requires comment   Graduated/Revoked comments  Pt is back to his baseline. He has had no problems or questions. He will begin cardiac rehab next week.          Melly Schroeder RN

## 2020-08-06 ENCOUNTER — LAB (OUTPATIENT)
Dept: LAB | Facility: HOSPITAL | Age: 65
End: 2020-08-06

## 2020-08-06 ENCOUNTER — OFFICE VISIT (OUTPATIENT)
Dept: CARDIAC SURGERY | Facility: CLINIC | Age: 65
End: 2020-08-06

## 2020-08-06 ENCOUNTER — TRANSCRIBE ORDERS (OUTPATIENT)
Dept: ADMINISTRATIVE | Facility: HOSPITAL | Age: 65
End: 2020-08-06

## 2020-08-06 VITALS
TEMPERATURE: 98.5 F | RESPIRATION RATE: 20 BRPM | OXYGEN SATURATION: 98 % | HEART RATE: 100 BPM | WEIGHT: 214.5 LBS | BODY MASS INDEX: 31.77 KG/M2 | SYSTOLIC BLOOD PRESSURE: 138 MMHG | HEIGHT: 69 IN | DIASTOLIC BLOOD PRESSURE: 85 MMHG

## 2020-08-06 DIAGNOSIS — Z95.1 S/P CABG X 5: Primary | ICD-10-CM

## 2020-08-06 DIAGNOSIS — M62.82 NON-TRAUMATIC RHABDOMYOLYSIS: ICD-10-CM

## 2020-08-06 DIAGNOSIS — I10 ESSENTIAL HYPERTENSION: ICD-10-CM

## 2020-08-06 DIAGNOSIS — Z09 POSTOPERATIVE FOLLOW-UP: ICD-10-CM

## 2020-08-06 DIAGNOSIS — M62.82 NON-TRAUMATIC RHABDOMYOLYSIS: Primary | ICD-10-CM

## 2020-08-06 DIAGNOSIS — Z95.1 S/P CABG (CORONARY ARTERY BYPASS GRAFT): ICD-10-CM

## 2020-08-06 LAB
ANION GAP SERPL CALCULATED.3IONS-SCNC: 13.7 MMOL/L (ref 5–15)
BUN SERPL-MCNC: 13 MG/DL (ref 8–23)
BUN/CREAT SERPL: 12.4 (ref 7–25)
CALCIUM SPEC-SCNC: 9.7 MG/DL (ref 8.6–10.5)
CHLORIDE SERPL-SCNC: 100 MMOL/L (ref 98–107)
CK SERPL-CCNC: 46 U/L (ref 20–200)
CO2 SERPL-SCNC: 26.3 MMOL/L (ref 22–29)
CREAT SERPL-MCNC: 1.05 MG/DL (ref 0.76–1.27)
GFR SERPL CREATININE-BSD FRML MDRD: 72 ML/MIN/1.73
GLUCOSE SERPL-MCNC: 80 MG/DL (ref 65–99)
POTASSIUM SERPL-SCNC: 3.7 MMOL/L (ref 3.5–5.2)
SODIUM SERPL-SCNC: 140 MMOL/L (ref 136–145)

## 2020-08-06 PROCEDURE — 36415 COLL VENOUS BLD VENIPUNCTURE: CPT

## 2020-08-06 PROCEDURE — 82550 ASSAY OF CK (CPK): CPT

## 2020-08-06 PROCEDURE — 80048 BASIC METABOLIC PNL TOTAL CA: CPT

## 2020-08-06 PROCEDURE — 99024 POSTOP FOLLOW-UP VISIT: CPT | Performed by: NURSE PRACTITIONER

## 2020-08-06 NOTE — PROGRESS NOTES
"CARDIOVASCULAR SURGERY FOLLOW-UP PROGRESS NOTE  Chief Complaint: Post-op Follow Up        HPI:   Dear Jarrell Agudelo Jr., MD and colleagues:    It was nice to see Lonnie Salinas in follow up today after cardiac surgery.  As you know, he is a 64 y.o. male with CAD who underwent Emergent CABG at [facility] by Dr. Lui Lake on 7/9/2020. He did well postoperatively and continues to do well. He comes in today complaining of nothing. He reports he is eating, sleeping, and ambulating well. He is anxious to decrease his pill burden but I advised him that all prescribed medications on his current regimen are needed. Possibly with time and healthier habits such as eating well and exercising, his pill burden can be reduced. His heart rate was noted to be elevated at 100 during today's visit. He reports he recently saw Dr. Fatima who switched his Coreg to Toprol. He reports his heart rate was averaging 120's prior to the medication switch. He is scheduled to see Dr. Fatima next week for heart rate follow up. He is otherwise recovering as expected with no additional concerns.  His activity level has been good. Upon exam his sternal and EVH incisions are healing well with no redness, drainage, or fluctuance.  He will follow up in office again on 8/31/2020. He was advised to call our office with concerns or if wounds develop redness or drainage.    Physical Exam:         /85 (BP Location: Right arm, Patient Position: Sitting, Cuff Size: Adult)   Pulse 100   Temp 98.5 °F (36.9 °C) (Oral)   Resp 20   Ht 175.3 cm (69\")   Wt 97.3 kg (214 lb 8 oz)   SpO2 98%   BMI 31.68 kg/m²   Heart:  regular rate and rhythm, S1, S2 normal, no murmur, click, rub or gallop  Lungs:  clear to auscultation bilaterally  Extremities:  trace lower extremity edema bilaterally  Incision(s):  mid chest healing well, no significant drainage, no dehiscence, no significant erythema, left leg healing well, no significant drainage, no dehiscence, " no significant erythema    Assessment/Plan:     S/P CABG. Overall, he is doing well.    Postop SUZANNE    No heavy lifting > 10 pounds for 3 more weeks  Keep incisions clean and dry  OK to drive if not taking narcotic pain medicine  OK to begin cardiac rehab  Follow-up as scheduled with cardiology  Return to clinic in 3 week(s) with no new studies    No restrictions of activity.      Thank you for allowing me to participate in the care of your patient.    Regards,  RACHEL BINGHAM, APRN       Seizure disorder

## 2020-08-12 ENCOUNTER — OFFICE VISIT (OUTPATIENT)
Dept: CARDIOLOGY | Facility: CLINIC | Age: 65
End: 2020-08-12

## 2020-08-12 VITALS
RESPIRATION RATE: 18 BRPM | HEART RATE: 111 BPM | HEIGHT: 69 IN | BODY MASS INDEX: 31.4 KG/M2 | SYSTOLIC BLOOD PRESSURE: 138 MMHG | WEIGHT: 212 LBS | DIASTOLIC BLOOD PRESSURE: 82 MMHG

## 2020-08-12 DIAGNOSIS — I10 ESSENTIAL HYPERTENSION: ICD-10-CM

## 2020-08-12 DIAGNOSIS — Z95.1 S/P CABG X 5: Primary | ICD-10-CM

## 2020-08-12 PROCEDURE — 99214 OFFICE O/P EST MOD 30 MIN: CPT | Performed by: INTERNAL MEDICINE

## 2020-08-12 RX ORDER — IRBESARTAN 75 MG/1
75 TABLET ORAL DAILY
Qty: 30 TABLET | Refills: 3 | Status: SHIPPED | OUTPATIENT
Start: 2020-08-12 | End: 2020-12-07

## 2020-08-12 RX ORDER — METOPROLOL SUCCINATE 50 MG/1
50 TABLET, EXTENDED RELEASE ORAL 2 TIMES DAILY
Qty: 60 TABLET | Refills: 5 | Status: SHIPPED | OUTPATIENT
Start: 2020-08-12 | End: 2021-02-08

## 2020-08-13 NOTE — PROGRESS NOTES
Cardiology clinic note  Jose Juan Fatima MD, PhD  Subjective:     Encounter Date:08/12/2020      Patient ID: Lonnie Salinas is a 64 y.o. male.    Chief Complaint:  Chief Complaint   Patient presents with   • Follow-up       HPI:  History of Present Illness  For prior encounter  History of Present Illness  Previously I the pleasure of seeing this 64-year-old male today who is known to me from prior hospitalization status post left heart catheterization which demonstrated multivessel CAD after non-ST elevation myocardial infarction, balloon pump placement, mildly reduced EF 45 to 50% with at least moderate RV dysfunction, ultimately undergoing multivessel bypass surgery with LIMA to LAD, saphenous vein graft to PDA, vein graft to OM1 and OM 2 sequential jump graft, saphenous vein graft to OM 3 with left lower extremity vein harvest.  His case was complicated by cardiogenic shock also required multiple vasopressor as well as prolonged milrinone support with pulmonary hypertension and RV failure successfully weaned off with institution of heart failure therapies and beta-blockade.  He presents back to clinic today doing well chest pain-free with diminishing edema however he is tachycardic.  Blood pressure at home is running 1 20-1 40 systolic and he denies palpitations or presyncope.  He has no complaints and is doing well.  He is asked about driving colleges which we would prefer him to wait 30 days for the sternotomy to heal followed by initiation of cardiac rehab prior to potential driving for the sole reason of preventing any steering wheel related chest trauma prior to his healing of his sternotomy.  No other concerning signs or symptoms today.  No PND orthopnea chest pain reported    Today he presents doing well without any chest pain.  He says he is more active status post bypass surgery and his blood pressure is improved which will now allow better heart failure therapies to be advanced.  His heart rate is  still in the 100s regular sinus rhythm.  No syncope presyncope anginal chest pain and he is mowing his yard now 1 month status post multivessel bypass and he says he is probably doing more than he should but he otherwise feels well.  He has minimal peripheral edema left greater than right at the site distal to his vein harvest.  I was able to define with him that he is allergic with angioedema with lisinopril however he had been tolerating irbesartan 150 mg p.o. daily for hypertension prior to his surgery without any problems and we discussed given mild reduction in his EF of 50% with some degree of RV dysfunction this medicine should be reinstituted today with blood pressure 138/82 in addition to up titration of his beta-blocker with heart rate in the 100s.  He is amenable for this and will follow-up with CV surgery.  He will start cardiac rehab soon.  No fevers chill sweats nausea vomiting diarrhea chest pain and shortness of breath is improving as he is getting further out from his bypass surgery.    Today  Metoprolol XL increased to 50 twice daily  Irbesartan 75 mg p.o. daily restart  BMP in 1 week  Return to clinic in 30 days  Cardiac rehab referral     Review of systems x14 point review of systems is negative except as mentioned above     Historical data copied for previous encounters and EMR is unchanged  The following portions of the patient's history were reviewed and updated as appropriate: allergies, current medications, past family history, past medical history, past social history, past surgical history and problem list.    Problem List:  Patient Active Problem List   Diagnosis   • Acquired thrombocytopenia (CMS/[facility])   • Hyperlipidemia   • Hypertension   • Obesity   • Preventative health care   • Allergic rhinitis   • NSTEMI, initial episode of care (CMS/[facility])   • NSTEMI (non-ST elevated myocardial infarction) (CMS/[facility])   • Coronary artery disease involving native coronary artery of native heart with  "unstable angina pectoris (CMS/HCC)   • S/P CABG x 5       Past Medical History:  Past Medical History:   Diagnosis Date   • Allergic    • Dyslipidemia    • History of tick-borne disease    • Hyperlipemia    • Hypertension        Past Surgical History:  Past Surgical History:   Procedure Laterality Date   • CARDIAC CATHETERIZATION N/A 7/8/2020    Procedure: Left Heart Cath with possible PCI;  Surgeon: Jose Juan Fatima MD;  Location: [facility] CATH INVASIVE LOCATION;  Service: Cardiology;  Laterality: N/A;   • CARDIAC CATHETERIZATION N/A 7/8/2020    Procedure: Intra-Aortic Baloon Pump Insertion;  Surgeon: Jose Juan Fatima MD;  Location: [facility] CATH INVASIVE LOCATION;  Service: Cardiology;  Laterality: N/A;   • CORONARY ARTERY BYPASS GRAFT N/A 7/9/2020    Procedure: CORONARY ARTERY BYPASS GRAFTING X 5 USING LEFT EWDARD  AND LEFT ENDOSCOPICALLY HARVESTED SAPHENOUS VEIN;  Surgeon: Lui Lake MD;  Location: [facility] CVOR;  Service: Cardiothoracic;  Laterality: N/A;  MINERVA DONE BY SRINIVASAN       Social History:  Social History     Socioeconomic History   • Marital status:      Spouse name: Not on file   • Number of children: Not on file   • Years of education: Not on file   • Highest education level: Not on file   Tobacco Use   • Smoking status: Never Smoker   • Smokeless tobacco: Never Used   Substance and Sexual Activity   • Alcohol use: No     Frequency: Never   • Drug use: No   • Sexual activity: Defer   Social History Narrative    Lives with wife       Allergies:  Allergies   Allergen Reactions   • Lisinopril Angioedema       Immunizations:  Immunization History   Administered Date(s) Administered   • Td 07/08/2014       ROS:  ROS       Objective:         /82 (BP Location: Left arm, Patient Position: Sitting)   Pulse 111   Resp 18   Ht 175.3 cm (69\")   Wt 96.2 kg (212 lb)   BMI 31.31 kg/m²     Physical Exam  No acute distress alert and oriented x3 afebrile vital signs stable  Regular rhythm " but tachycardic, EKG demonstrates sinus tachycardia  Clear to auscultation bilaterally mild diminished left lower base cleared with deep inspiration  Soft nontender nondistended bowel sounds positive  No clubbing cyanosis with edema pitting bilateral lower extremities left greater than right 1+  No neurologic deficits grossly  Normocephalic atraumatic pupils equal round extraocular was tight bilateral  Trachea midline is supple very mild trace JVD with HJR  Normal mood and affect  No bony abnormalities grossly  Incisions well-healed and well approximated without any erythema or drainage  Essentially unchanged from prior encounter  In-Office Procedure(s):  Procedures    ASCVD RIsk Score::  The ASCVD Risk score (Karen LOZANO Jr., et al., 2013) failed to calculate for the following reasons:    The patient has a prior MI or stroke diagnosis    Recent Radiology:  Imaging Results (Most Recent)     None          Lab Review:   Lab on 08/06/2020   Component Date Value   • Creatine Kinase 08/06/2020 46    • Glucose 08/06/2020 80    • BUN 08/06/2020 13    • Creatinine 08/06/2020 1.05    • Sodium 08/06/2020 140    • Potassium 08/06/2020 3.7    • Chloride 08/06/2020 100    • CO2 08/06/2020 26.3    • Calcium 08/06/2020 9.7    • eGFR Non  Amer 08/06/2020 72    • BUN/Creatinine Ratio 08/06/2020 12.4    • Anion Gap 08/06/2020 13.7    Lab on 07/22/2020   Component Date Value   • Glucose 07/22/2020 78    • BUN 07/22/2020 13    • Creatinine 07/22/2020 1.12    • Sodium 07/22/2020 139    • Potassium 07/22/2020 5.1    • Chloride 07/22/2020 102    • CO2 07/22/2020 24.5    • Calcium 07/22/2020 9.3    • eGFR Non African Amer 07/22/2020 66    • BUN/Creatinine Ratio 07/22/2020 11.6    • Anion Gap 07/22/2020 12.5    No results displayed because visit has over 200 results.      Lab on 02/22/2020   Component Date Value   • Glucose 02/22/2020 108*   • BUN 02/22/2020 17    • Creatinine 02/22/2020 1.17    • Sodium 02/22/2020 139    • Potassium  02/22/2020 4.5    • Chloride 02/22/2020 99    • CO2 02/22/2020 26.3    • Calcium 02/22/2020 10.1    • Total Protein 02/22/2020 7.1    • Albumin 02/22/2020 4.20    • ALT (SGPT) 02/22/2020 32    • AST (SGOT) 02/22/2020 22    • Alkaline Phosphatase 02/22/2020 77    • Total Bilirubin 02/22/2020 0.6    • eGFR Non African Amer 02/22/2020 63    • Globulin 02/22/2020 2.9    • A/G Ratio 02/22/2020 1.4    • BUN/Creatinine Ratio 02/22/2020 14.5    • Anion Gap 02/22/2020 13.7    • Total Cholesterol 02/22/2020 116    • Triglycerides 02/22/2020 206*   • HDL Cholesterol 02/22/2020 30*   • LDL Cholesterol  02/22/2020 45    • VLDL Cholesterol 02/22/2020 41.2*   • LDL/HDL Ratio 02/22/2020 1.49    • WBC 02/22/2020 7.61    • RBC 02/22/2020 5.60    • Hemoglobin 02/22/2020 16.1    • Hematocrit 02/22/2020 49.0    • MCV 02/22/2020 87.5    • MCH 02/22/2020 28.8    • MCHC 02/22/2020 32.9    • RDW 02/22/2020 13.8    • RDW-SD 02/22/2020 44.1    • MPV 02/22/2020 9.8    • Platelets 02/22/2020 197    • Neutrophil % 02/22/2020 67.3    • Lymphocyte % 02/22/2020 22.1    • Monocyte % 02/22/2020 8.0    • Eosinophil % 02/22/2020 1.4    • Basophil % 02/22/2020 0.8    • Immature Grans % 02/22/2020 0.4    • Neutrophils, Absolute 02/22/2020 5.12    • Lymphocytes, Absolute 02/22/2020 1.68    • Monocytes, Absolute 02/22/2020 0.61    • Eosinophils, Absolute 02/22/2020 0.11    • Basophils, Absolute 02/22/2020 0.06    • Immature Grans, Absolute 02/22/2020 0.03    • nRBC 02/22/2020 0.0                 Assessment:          Diagnosis Plan   1. S/P CABG x 5  Ambulatory Referral to Cardiac Rehab   2. Essential hypertension  Basic Metabolic Panel          Plan:      Systolic heart failure with RV dysfunction, EF 50% post bypass surgery  Multivessel CAD status post 5 vessel bypass  Continue aspirin daily for life  Statin therapy high intensity per guidelines  Uptitrate metoprolol XL to 50 twice daily  Restart irbesartan 75 mg p.o. daily and titrate up as allowed by  blood pressure, goal dose 150 to 300 mg daily  BMP in 1 week  Continue Bumex twice daily present dose  Potassium supplementation    Return to clinic in 4 weeks for follow-up as well as review of labs and further medication titration as clinically indicated     It is a pleasure to be involved in his cardiovascular care.  Please call with any questions or concerns     Multiple cardiac comorbidities assessed individually today     Greater than 25-minute spent face-to-face with the patient swells and coordination of care, review of medications, allergies and relative contraindication to ARV's, vitals reviewed, blood pressure log review, medication changes and additional 10 minutes of documentation      Level of Care:                 Jose Juan Fatima MD  08/13/20  .

## 2020-08-24 ENCOUNTER — TELEPHONE (OUTPATIENT)
Dept: CARDIAC REHAB | Facility: HOSPITAL | Age: 65
End: 2020-08-24

## 2020-08-24 NOTE — TELEPHONE ENCOUNTER
To see if patient interested in cardiac rehab. Has had follow up with surgeon and cardiologist. Doing well. Been walking daily and doing daily exercises. Goes back to work next week. Gave patient number if in future feels he would like to participate.

## 2020-08-27 ENCOUNTER — OFFICE VISIT (OUTPATIENT)
Dept: FAMILY MEDICINE CLINIC | Facility: CLINIC | Age: 65
End: 2020-08-27

## 2020-08-27 VITALS
RESPIRATION RATE: 18 BRPM | OXYGEN SATURATION: 98 % | HEART RATE: 96 BPM | DIASTOLIC BLOOD PRESSURE: 81 MMHG | BODY MASS INDEX: 31.67 KG/M2 | WEIGHT: 213.8 LBS | HEIGHT: 69 IN | SYSTOLIC BLOOD PRESSURE: 117 MMHG | TEMPERATURE: 97.8 F

## 2020-08-27 DIAGNOSIS — E11.9 TYPE 2 DIABETES MELLITUS WITHOUT COMPLICATION, WITHOUT LONG-TERM CURRENT USE OF INSULIN (HCC): ICD-10-CM

## 2020-08-27 DIAGNOSIS — I25.5 ISCHEMIC CARDIOMYOPATHY: ICD-10-CM

## 2020-08-27 DIAGNOSIS — I10 ESSENTIAL HYPERTENSION: Primary | ICD-10-CM

## 2020-08-27 DIAGNOSIS — I25.110 CORONARY ARTERY DISEASE INVOLVING NATIVE CORONARY ARTERY OF NATIVE HEART WITH UNSTABLE ANGINA PECTORIS (HCC): ICD-10-CM

## 2020-08-27 DIAGNOSIS — E78.5 HYPERLIPIDEMIA, UNSPECIFIED HYPERLIPIDEMIA TYPE: ICD-10-CM

## 2020-08-27 PROCEDURE — 99214 OFFICE O/P EST MOD 30 MIN: CPT | Performed by: FAMILY MEDICINE

## 2020-08-27 NOTE — PATIENT INSTRUCTIONS
Continue current medications.    Have the follow-up EKG done and call her for results.    Follow-up in the office in 6 weeks.

## 2020-08-27 NOTE — PROGRESS NOTES
"Subjective   Lonnie Salinas is a 65 y.o. male.     Chief Complaint   Patient presents with   • Hypertension     6 month followup   • Hyperlipidemia   • Coronary Artery Disease       HPI  Chief complaint: Coronary artery disease hypertension hyperlipidemia    The patient is a 65-year-old white male who comes in for follow-up and maintenance of his current problems which include    1.  Coronary artery disease-since I saw the patient last he in July of this year went to the emergency room because of chest pain.  Patient was found to have acute coronary syndrome.  Patient had urgent left heart catheterization.  Patient underwent bypass grafting.  Postoperatively the patient is done well.  He is now on aspirin 81 mg daily beta-blocker irbesartan patient left ventricular ejection fraction is 45 to 50%.  He does take Bumex 1 mg twice a day and potassium.  He denied chest pain shortness of breath orthopnea or PND.    2.  Hypertension-stable-the patient metoprolol 50 mg twice a day irbesartan 75 mg daily Bumex 1 mg twice a day potassium.  He denied headache lightheadedness dizziness or chest pain.    3.  Hyperlipidemia-stable-patient is on Lipitor 40 mg daily.  He denies myalgias and arthralgias.  Denies nausea or anorexia.    4.  Type 2 diabetes mellitus-stable-patient on metformin 500 mg daily.  Denies polydipsia polyphagia or polyuria.        The following portions of the patient's history were reviewed and updated as appropriate: allergies, current medications, past family history, past medical history, past social history, past surgical history and problem list.    Review of Systems    Objective     /81 (BP Location: Right arm, Patient Position: Sitting, Cuff Size: Adult)   Pulse 96   Temp 97.8 °F (36.6 °C) (Temporal)   Resp 18   Ht 175.3 cm (69\")   Wt 97 kg (213 lb 12.8 oz)   SpO2 98%   BMI 31.57 kg/m²     Physical Exam   Constitutional: He is oriented to person, place, and time. He appears " well-developed and well-nourished.   HENT:   Head: Normocephalic and atraumatic.   Eyes: Pupils are equal, round, and reactive to light. Conjunctivae and EOM are normal.   Neck: Normal range of motion. Neck supple.   Cardiovascular: Regular rhythm, normal heart sounds and intact distal pulses. Tachycardia present.   Pulmonary/Chest: Effort normal and breath sounds normal.   Abdominal: Soft. Bowel sounds are normal.   Musculoskeletal: Normal range of motion.   Neurological: He is alert and oriented to person, place, and time.   Skin: Skin is warm and dry.   Psychiatric: He has a normal mood and affect. His behavior is normal.   Nursing note and vitals reviewed.        Assessment/Plan   Lonnie was seen today for hypertension, hyperlipidemia and coronary artery disease.    Diagnoses and all orders for this visit:    Essential hypertension  -     ECG 12 Lead; Future    Coronary artery disease involving native coronary artery of native heart with unstable angina pectoris (CMS/HCC)  -     ECG 12 Lead; Future    Hyperlipidemia, unspecified hyperlipidemia type    Type 2 diabetes mellitus without complication, without long-term current use of insulin (CMS/HCC)    Ischemic cardiomyopathy      Patient Instructions   Continue current medications.    Have the follow-up EKG done and call her for results.    Follow-up in the office in 6 weeks.      Jarrell Alaniz Jr., MD    08/27/20

## 2020-08-28 ENCOUNTER — HOSPITAL ENCOUNTER (OUTPATIENT)
Dept: CARDIOLOGY | Facility: HOSPITAL | Age: 65
Discharge: HOME | End: 2020-08-28
Admitting: FAMILY MEDICINE

## 2020-08-28 ENCOUNTER — TELEPHONE (OUTPATIENT)
Dept: CARDIOLOGY | Facility: CLINIC | Age: 65
End: 2020-08-28

## 2020-08-28 DIAGNOSIS — I10 ESSENTIAL HYPERTENSION: ICD-10-CM

## 2020-08-28 DIAGNOSIS — I25.110 CORONARY ARTERY DISEASE INVOLVING NATIVE CORONARY ARTERY OF NATIVE HEART WITH UNSTABLE ANGINA PECTORIS (HCC): ICD-10-CM

## 2020-08-28 PROCEDURE — 93005 ELECTROCARDIOGRAM TRACING: CPT | Performed by: FAMILY MEDICINE

## 2020-08-28 NOTE — TELEPHONE ENCOUNTER
20-Dr. Fatima  Pt. Called. Wanting to know if you faxed his work release yet.  1957-Phone--[phone]. The Fax number is . Thank you

## 2020-08-29 PROCEDURE — 93010 ELECTROCARDIOGRAM REPORT: CPT | Performed by: INTERNAL MEDICINE

## 2020-08-31 ENCOUNTER — OFFICE VISIT (OUTPATIENT)
Dept: CARDIAC SURGERY | Facility: CLINIC | Age: 65
End: 2020-08-31

## 2020-08-31 VITALS
BODY MASS INDEX: 29.12 KG/M2 | WEIGHT: 215 LBS | DIASTOLIC BLOOD PRESSURE: 84 MMHG | RESPIRATION RATE: 20 BRPM | HEIGHT: 72 IN | HEART RATE: 94 BPM | SYSTOLIC BLOOD PRESSURE: 124 MMHG | OXYGEN SATURATION: 97 %

## 2020-08-31 DIAGNOSIS — Z09 POSTOPERATIVE FOLLOW-UP: ICD-10-CM

## 2020-08-31 DIAGNOSIS — Z95.1 S/P CABG X 5: Primary | ICD-10-CM

## 2020-08-31 PROCEDURE — 99024 POSTOP FOLLOW-UP VISIT: CPT | Performed by: NURSE PRACTITIONER

## 2020-09-01 NOTE — PROGRESS NOTES
"CARDIOVASCULAR SURGERY FOLLOW-UP PROGRESS NOTE  Chief Complaint: Post-op Follow Up        HPI:   Dear Jarrell Agudelo Jr., MD and colleagues:    It was nice to see Lonnie Salinas in follow up today after cardiac surgery.  As you know, he is a 65 y.o. male with CAD who underwent Emergent CABG at [facility] by Dr. Lui Lake on 7/9/2020. He did well postoperatively and continues to do well. He comes in today complaining of nothing. He states he is eating, sleeping and ambulating well. He is pleased with his recovery and has already returned to work.  His activity level has been good.   He feels he is ambulating well enough that he will not need cardiac rehab.    Physical Exam:         /84 (BP Location: Right arm, Patient Position: Sitting, Cuff Size: Adult)   Pulse 94   Resp 20   Ht 182.9 cm (72\")   Wt 97.5 kg (215 lb)   SpO2 97%   BMI 29.16 kg/m²   Heart:  regular rate and rhythm, S1, S2 normal, no murmur, click, rub or gallop  Lungs:  clear to auscultation bilaterally  Extremities:  trace lower extremity edema bilaterally  Incision(s):  mid chest healing well, no significant drainage, no dehiscence, no significant erythema, left leg healing well, no significant drainage, no dehiscence, no significant erythema    Assessment/Plan:     S/P CABG. Overall, he is doing well.    Postop SUZANNE    Keep incisions clean and dry  OK to begin cardiac rehab  Follow-up as scheduled with cardiology    No restrictions of activity.      Thank you for allowing me to participate in the care of your patient.    Regards,  SAADIA BLANKENSHIP      "

## 2020-09-08 ENCOUNTER — TELEPHONE (OUTPATIENT)
Dept: FAMILY MEDICINE CLINIC | Facility: CLINIC | Age: 65
End: 2020-09-08

## 2020-09-08 RX ORDER — BUMETANIDE 1 MG/1
TABLET ORAL
Qty: 60 TABLET | Refills: 0 | OUTPATIENT
Start: 2020-09-08

## 2020-09-08 RX ORDER — BUMETANIDE 1 MG/1
1 TABLET ORAL 2 TIMES DAILY
Qty: 180 TABLET | Refills: 1 | Status: SHIPPED | OUTPATIENT
Start: 2020-09-08 | End: 2021-03-11

## 2020-09-23 ENCOUNTER — OFFICE VISIT (OUTPATIENT)
Dept: CARDIOLOGY | Facility: CLINIC | Age: 65
End: 2020-09-23

## 2020-09-23 VITALS
HEIGHT: 72 IN | DIASTOLIC BLOOD PRESSURE: 82 MMHG | RESPIRATION RATE: 18 BRPM | HEART RATE: 87 BPM | WEIGHT: 218 LBS | BODY MASS INDEX: 29.53 KG/M2 | SYSTOLIC BLOOD PRESSURE: 128 MMHG

## 2020-09-23 DIAGNOSIS — I25.110 CORONARY ARTERY DISEASE INVOLVING NATIVE CORONARY ARTERY OF NATIVE HEART WITH UNSTABLE ANGINA PECTORIS (HCC): ICD-10-CM

## 2020-09-23 DIAGNOSIS — I25.5 ISCHEMIC CARDIOMYOPATHY: ICD-10-CM

## 2020-09-23 DIAGNOSIS — I21.4 NSTEMI, INITIAL EPISODE OF CARE (HCC): Primary | ICD-10-CM

## 2020-09-23 PROCEDURE — 99214 OFFICE O/P EST MOD 30 MIN: CPT | Performed by: INTERNAL MEDICINE

## 2020-09-23 NOTE — PROGRESS NOTES
Cardiology clinic note  Jose Juan Fatima MD, PhD  Subjective:     Encounter Date:09/23/2020      Patient ID: Lonnie Salinas is a 65 y.o. male.    Chief Complaint:  Chief Complaint   Patient presents with   • Follow-up       HPI:  History of Present Illness  For prior encounter  History of Present Illness  Previously I the pleasure of seeing this 64-year-old male today who is known to me from prior hospitalization status post left heart catheterization which demonstrated multivessel CAD after non-ST elevation myocardial infarction, balloon pump placement, mildly reduced EF 45 to 50% with at least moderate RV dysfunction, ultimately undergoing multivessel bypass surgery with LIMA to LAD, saphenous vein graft to PDA, vein graft to OM1 and OM 2 sequential jump graft, saphenous vein graft to OM 3 with left lower extremity vein harvest.  His case was complicated by cardiogenic shock also required multiple vasopressor as well as prolonged milrinone support with pulmonary hypertension and RV failure successfully weaned off with institution of heart failure therapies and beta-blockade.  He presents back to clinic today doing well chest pain-free with diminishing edema however he is tachycardic.  Blood pressure at home is running 1 20-1 40 systolic and he denies palpitations or presyncope.  He has no complaints and is doing well.  He is asked about driving colleges which we would prefer him to wait 30 days for the sternotomy to heal followed by initiation of cardiac rehab prior to potential driving for the sole reason of preventing any steering wheel related chest trauma prior to his healing of his sternotomy.  No other concerning signs or symptoms today.  No PND orthopnea chest pain reported     Last clinic he presents doing well without any chest pain.  He says he is more active status post bypass surgery and his blood pressure is improved which will now allow better heart failure therapies to be advanced.  His heart rate  is still in the 100s regular sinus rhythm.  No syncope presyncope anginal chest pain and he is mowing his yard now 1 month status post multivessel bypass and he says he is probably doing more than he should but he otherwise feels well.  He has minimal peripheral edema left greater than right at the site distal to his vein harvest.  I was able to define with him that he is allergic with angioedema with lisinopril however he had been tolerating irbesartan 150 mg p.o. daily for hypertension prior to his surgery without any problems and we discussed given mild reduction in his EF of 50% with some degree of RV dysfunction this medicine should be reinstituted today with blood pressure 138/82 in addition to up titration of his beta-blocker with heart rate in the 100s.  He is amenable for this and will follow-up with CV surgery.  He will start cardiac rehab soon.  No fevers chill sweats nausea vomiting diarrhea chest pain and shortness of breath is improving as he is getting further out from his bypass surgery.    Today he presents doing well hemodynamically electrically stable.  Blood pressure is 128/82 with a heart rate of 80 to sinus rhythm and he has no edema no heart failure signs or symptoms otherwise he said he is felt the best he is felt thus far and continues to improve daily.  Fevers chill sweats nausea vomiting diarrhea.     Previously  Metoprolol XL increased to 50 twice daily  Irbesartan 75 mg p.o. daily restart  BMP in 1 week  Return to clinic in 30 days  Cardiac rehab referral    Continue these doses, patient walking and exercising on a daily basis, no changes to medical therapy today     Review of systems x14 point review of systems is negative except as mentioned above     Historical data copied for previous encounters and EMR is unchanged  The following portions of the patient's history were reviewed and updated as appropriate: allergies, current medications, past family history, past medical history, past  social history, past surgical history and problem list.    Problem List:  Patient Active Problem List   Diagnosis   • Acquired thrombocytopenia (CMS/HCC)   • Hyperlipidemia   • Hypertension   • Obesity   • Preventative health care   • Allergic rhinitis   • NSTEMI, initial episode of care (CMS/HCC)   • NSTEMI (non-ST elevated myocardial infarction) (CMS/HCC)   • Coronary artery disease involving native coronary artery of native heart with unstable angina pectoris (CMS/HCC)   • S/P CABG x 5   • Type 2 diabetes mellitus without complication, without long-term current use of insulin (CMS/HCC)   • Ischemic cardiomyopathy       Past Medical History:  Past Medical History:   Diagnosis Date   • Allergic    • Coronary artery disease    • Dyslipidemia    • History of tick-borne disease    • Hyperlipemia    • Hypertension        Past Surgical History:  Past Surgical History:   Procedure Laterality Date   • CARDIAC CATHETERIZATION N/A 7/8/2020    Procedure: Left Heart Cath with possible PCI;  Surgeon: Jose Juan Fatima MD;  Location: [facility] CATH INVASIVE LOCATION;  Service: Cardiology;  Laterality: N/A;   • CARDIAC CATHETERIZATION N/A 7/8/2020    Procedure: Intra-Aortic Baloon Pump Insertion;  Surgeon: Jose Juan Fatima MD;  Location: [facility] CATH INVASIVE LOCATION;  Service: Cardiology;  Laterality: N/A;   • CORONARY ARTERY BYPASS GRAFT N/A 7/9/2020    Procedure: CORONARY ARTERY BYPASS GRAFTING X 5 USING LEFT EDWARD  AND LEFT ENDOSCOPICALLY HARVESTED SAPHENOUS VEIN;  Surgeon: Lui Lake MD;  Location: [facility] CVOR;  Service: Cardiothoracic;  Laterality: N/A;  MINERVA DONE BY SRINIVASAN       Social History:  Social History     Socioeconomic History   • Marital status:      Spouse name: Not on file   • Number of children: Not on file   • Years of education: Not on file   • Highest education level: Not on file   Tobacco Use   • Smoking status: Never Smoker   • Smokeless tobacco: Never Used   Substance and Sexual  "Activity   • Alcohol use: No     Frequency: Never   • Drug use: No   • Sexual activity: Defer   Social History Narrative    Lives with wife       Allergies:  Allergies   Allergen Reactions   • Lisinopril Angioedema       Immunizations:  Immunization History   Administered Date(s) Administered   • Td 07/08/2014       ROS:  ROS       Objective:         /82 (BP Location: Left arm, Patient Position: Sitting)   Pulse 87   Resp 18   Ht 182.9 cm (72\")   Wt 98.9 kg (218 lb)   BMI 29.57 kg/m²     Physical Exam  No acute distress alert and oriented x3 afebrile vital signs stable  Regular rhythm but tachycardic, EKG demonstrates sinus tachycardia  Clear to auscultation bilaterally mild diminished left lower base cleared with deep inspiration  Soft nontender nondistended bowel sounds positive  No clubbing cyanosis with edema pitting bilateral lower extremities left greater than right 1+  No neurologic deficits grossly  Normocephalic atraumatic pupils equal round extraocular was tight bilateral  Trachea midline is supple very mild trace JVD with HJR  Normal mood and affect  No bony abnormalities grossly  Incisions well-healed and well approximated without any erythema or drainage  Essentially unchanged from prior encounter  In-Office Procedure(s):  Procedures    ASCVD RIsk Score::  The ASCVD Risk score (Karenjo LOZANO Jr., et al., 2013) failed to calculate for the following reasons:    The patient has a prior MI or stroke diagnosis    Recent Radiology:  Imaging Results (Most Recent)     None          Lab Review:   Lab on 08/06/2020   Component Date Value   • Creatine Kinase 08/06/2020 46    • Glucose 08/06/2020 80    • BUN 08/06/2020 13    • Creatinine 08/06/2020 1.05    • Sodium 08/06/2020 140    • Potassium 08/06/2020 3.7    • Chloride 08/06/2020 100    • CO2 08/06/2020 26.3    • Calcium 08/06/2020 9.7    • eGFR Non  Amer 08/06/2020 72    • BUN/Creatinine Ratio 08/06/2020 12.4    • Anion Gap 08/06/2020 13.7    Lab " on 07/22/2020   Component Date Value   • Glucose 07/22/2020 78    • BUN 07/22/2020 13    • Creatinine 07/22/2020 1.12    • Sodium 07/22/2020 139    • Potassium 07/22/2020 5.1    • Chloride 07/22/2020 102    • CO2 07/22/2020 24.5    • Calcium 07/22/2020 9.3    • eGFR Non African Amer 07/22/2020 66    • BUN/Creatinine Ratio 07/22/2020 11.6    • Anion Gap 07/22/2020 12.5    No results displayed because visit has over 200 results.                   Assessment:         No diagnosis found.       Plan:      Systolic heart failure with RV dysfunction, EF 50% post bypass surgery  Multivessel CAD status post 5 vessel bypass  Continue aspirin daily for life  Statin therapy high intensity per guidelines  Continue metoprolol XL to 50 twice daily  Continue irbesartan 75 mg p.o. daily and titrate up as allowed by blood pressure, goal dose 150 to 300 mg daily  Continue Bumex twice daily present dose  Potassium supplementation     Return to clinic in  3 months for follow-up as well as review of labs and further medication titration as clinically indicated     It is a pleasure to be involved in his cardiovascular care.  Please call with any questions or concerns     Multiple cardiac comorbidities assessed individually today     Greater than 25-minute spent face-to-face with the patient swells and coordination of care, review of medications, allergies and relative contraindication to ARV's, vitals reviewed, blood pressure log review, medication changes and additional 10 minutes of documentation      Level of Care:                 Jose Juan Fatima MD  09/23/20  .

## 2020-10-19 RX ORDER — ATORVASTATIN CALCIUM 40 MG/1
TABLET, FILM COATED ORAL
Qty: 90 TABLET | Refills: 1 | Status: SHIPPED | OUTPATIENT
Start: 2020-10-19 | End: 2020-12-14 | Stop reason: SDUPTHER

## 2020-11-23 RX ORDER — POTASSIUM CHLORIDE 750 MG/1
TABLET, FILM COATED, EXTENDED RELEASE ORAL
Qty: 30 TABLET | Refills: 2 | Status: SHIPPED | OUTPATIENT
Start: 2020-11-23 | End: 2021-02-22

## 2020-12-14 ENCOUNTER — TELEPHONE (OUTPATIENT)
Dept: FAMILY MEDICINE CLINIC | Facility: CLINIC | Age: 65
End: 2020-12-14

## 2020-12-14 RX ORDER — IRBESARTAN 75 MG/1
TABLET ORAL
Qty: 30 TABLET | Refills: 5 | Status: SHIPPED | OUTPATIENT
Start: 2020-12-14 | End: 2021-06-14

## 2020-12-14 RX ORDER — IRBESARTAN 75 MG/1
75 TABLET ORAL DAILY
Qty: 90 TABLET | Refills: 1 | Status: CANCELLED | OUTPATIENT
Start: 2020-12-14

## 2020-12-14 RX ORDER — ATORVASTATIN CALCIUM 40 MG/1
40 TABLET, FILM COATED ORAL DAILY
Qty: 90 TABLET | Refills: 1 | Status: SHIPPED | OUTPATIENT
Start: 2020-12-14 | End: 2021-10-20

## 2020-12-23 ENCOUNTER — OFFICE VISIT (OUTPATIENT)
Dept: CARDIOLOGY | Facility: CLINIC | Age: 65
End: 2020-12-23

## 2020-12-23 VITALS
HEIGHT: 72 IN | HEART RATE: 71 BPM | RESPIRATION RATE: 18 BRPM | SYSTOLIC BLOOD PRESSURE: 122 MMHG | BODY MASS INDEX: 30.1 KG/M2 | WEIGHT: 222.2 LBS | DIASTOLIC BLOOD PRESSURE: 70 MMHG

## 2020-12-23 DIAGNOSIS — I25.5 ISCHEMIC CARDIOMYOPATHY: ICD-10-CM

## 2020-12-23 DIAGNOSIS — I25.110 CORONARY ARTERY DISEASE INVOLVING NATIVE CORONARY ARTERY OF NATIVE HEART WITH UNSTABLE ANGINA PECTORIS (HCC): ICD-10-CM

## 2020-12-23 DIAGNOSIS — I21.4 NSTEMI (NON-ST ELEVATED MYOCARDIAL INFARCTION) (HCC): ICD-10-CM

## 2020-12-23 DIAGNOSIS — I21.4 NSTEMI, INITIAL EPISODE OF CARE (HCC): Primary | ICD-10-CM

## 2020-12-23 PROCEDURE — 99214 OFFICE O/P EST MOD 30 MIN: CPT | Performed by: INTERNAL MEDICINE

## 2020-12-23 NOTE — PROGRESS NOTES
Cardiology clinic note  Jose Juan Fatima MD, PhD  Subjective:     Encounter Date:12/23/2020      Patient ID: Lonnie Salinas is a 65 y.o. male.    Chief Complaint:  Chief Complaint   Patient presents with   • Follow-up       HPI:  History of Present Illness  History of Present Illness  Previously I the pleasure of seeing this 64-year-old male today who is known to me from prior hospitalization status post left heart catheterization which demonstrated multivessel CAD after non-ST elevation myocardial infarction, balloon pump placement, mildly reduced EF 45 to 50% with at least moderate RV dysfunction, ultimately undergoing multivessel bypass surgery with LIMA to LAD, saphenous vein graft to PDA, vein graft to OM1 and OM 2 sequential jump graft, saphenous vein graft to OM 3 with left lower extremity vein harvest.  His case was complicated by cardiogenic shock also required multiple vasopressor as well as prolonged milrinone support with pulmonary hypertension and RV failure successfully weaned off with institution of heart failure therapies and beta-blockade.  He presents back to clinic today doing well chest pain-free with diminishing edema however he is tachycardic.  Blood pressure at home is running 1 20-1 40 systolic and he denies palpitations or presyncope.  He has no complaints and is doing well.  He is asked about driving colleges which we would prefer him to wait 30 days for the sternotomy to heal followed by initiation of cardiac rehab prior to potential driving for the sole reason of preventing any steering wheel related chest trauma prior to his healing of his sternotomy.  No other concerning signs or symptoms today.  No PND orthopnea chest pain reported     Last clinic he presents doing well without any chest pain.  He says he is more active status post bypass surgery and his blood pressure is improved which will now allow better heart failure therapies to be advanced.  His heart rate is still in the 100s  regular sinus rhythm.  No syncope presyncope anginal chest pain and he is mowing his yard now 1 month status post multivessel bypass and he says he is probably doing more than he should but he otherwise feels well.  He has minimal peripheral edema left greater than right at the site distal to his vein harvest.  I was able to define with him that he is allergic with angioedema with lisinopril however he had been tolerating irbesartan 150 mg p.o. daily for hypertension prior to his surgery without any problems and we discussed given mild reduction in his EF of 50% with some degree of RV dysfunction this medicine should be reinstituted today with blood pressure 138/82 in addition to up titration of his beta-blocker with heart rate in the 100s.  He is amenable for this and will follow-up with CV surgery.  He will start cardiac rehab soon.  No fevers chill sweats nausea vomiting diarrhea chest pain and shortness of breath is improving as he is getting further out from his bypass surgery.     Today he presents doing well hemodynamically electrically stable.  Slight peripheral edema and he says he is drinking a lot of water someone told him it was healthy.  We did discuss right-sided symptoms with pitting edema peripherally in the setting of ongoing diuretic therapy and is contra intuitive for increased water intake along with diuretic management and salt restriction which he voiced understanding easily.  We decrease his free water intake otherwise no chest pain syncope PND orthopnea he has no limitations he is CCS class I NYHA class I.  Known RV dysfunction with mild underlying LV dysfunction EF 45 to 50%     Plan today  Continue metoprolol  Continue irbesartan  Continue statin therapy  Decrease free water intake  Continue Bumex 1 mg p.o. twice daily  We will consider Aldactone 12 to 25 mg p.o. daily  Continue potassium supplementation presently  Return to clinic in 6 months for follow-upContinue these doses, patient  walking and exercising on a daily basis, no changes to medical therapy today     Review of systems x14 point review of systems is negative except as mentioned above     Historical data copied for previous encounters and EMR is unchanged    The following portions of the patient's history were reviewed and updated as appropriate: allergies, current medications, past family history, past medical history, past social history, past surgical history and problem list.    Problem List:  Patient Active Problem List   Diagnosis   • Acquired thrombocytopenia (CMS/HCC)   • Hyperlipidemia   • Hypertension   • Obesity   • Preventative health care   • Allergic rhinitis   • NSTEMI, initial episode of care (CMS/HCC)   • NSTEMI (non-ST elevated myocardial infarction) (CMS/HCC)   • Coronary artery disease involving native coronary artery of native heart with unstable angina pectoris (CMS/HCC)   • S/P CABG x 5   • Type 2 diabetes mellitus without complication, without long-term current use of insulin (CMS/HCC)   • Ischemic cardiomyopathy       Past Medical History:  Past Medical History:   Diagnosis Date   • Allergic    • Coronary artery disease    • Dyslipidemia    • History of tick-borne disease    • Hyperlipemia    • Hypertension        Past Surgical History:  Past Surgical History:   Procedure Laterality Date   • CARDIAC CATHETERIZATION N/A 7/8/2020    Procedure: Left Heart Cath with possible PCI;  Surgeon: Jose Juan Fatima MD;  Location: [facility] CATH INVASIVE LOCATION;  Service: Cardiology;  Laterality: N/A;   • CARDIAC CATHETERIZATION N/A 7/8/2020    Procedure: Intra-Aortic Baloon Pump Insertion;  Surgeon: Jose Juan Fatima MD;  Location: [facility] CATH INVASIVE LOCATION;  Service: Cardiology;  Laterality: N/A;   • CORONARY ARTERY BYPASS GRAFT N/A 7/9/2020    Procedure: CORONARY ARTERY BYPASS GRAFTING X 5 USING LEFT EDWARD  AND LEFT ENDOSCOPICALLY HARVESTED SAPHENOUS VEIN;  Surgeon: Lui Lake MD;  Location:   "[facility];  Service: Cardiothoracic;  Laterality: N/A;  MINERVA DONE BY SRINIVASAN       Social History:  Social History     Socioeconomic History   • Marital status:      Spouse name: Not on file   • Number of children: Not on file   • Years of education: Not on file   • Highest education level: Not on file   Tobacco Use   • Smoking status: Never Smoker   • Smokeless tobacco: Never Used   Substance and Sexual Activity   • Alcohol use: No     Frequency: Never   • Drug use: No   • Sexual activity: Defer   Social History Narrative    Lives with wife       Allergies:  Allergies   Allergen Reactions   • Lisinopril Angioedema       Immunizations:  Immunization History   Administered Date(s) Administered   • Td 07/08/2014       ROS:  ROS       Objective:         /70 (BP Location: Left arm, Patient Position: Sitting)   Pulse 71   Resp 18   Ht 182.9 cm (72\")   Wt 101 kg (222 lb 3.2 oz)   BMI 30.14 kg/m²     Physical Exam  No acute distress alert and orient x3 afebrile vital signs stable  Regular rhythm no rubs murmurs gallops  No heave no lift  Clear to auscultation bilaterally  Abdomen soft nontender distended bowel sounds positive  Trace JVD HJR only with 1+ pitting edema to the shins  Neurologically grossly tight bilaterally  Normal mood and affect  Normocephalic atraumatic pupils equal round  In-Office Procedure(s):  Procedures    ASCVD RIsk Score::  The ASCVD Risk score (Holly Ridgejo LOZANO Jr., et al., 2013) failed to calculate for the following reasons:    The patient has a prior MI or stroke diagnosis    Recent Radiology:  Imaging Results (Most Recent)     None          Lab Review:   Lab on 08/06/2020   Component Date Value   • Creatine Kinase 08/06/2020 46    • Glucose 08/06/2020 80    • BUN 08/06/2020 13    • Creatinine 08/06/2020 1.05    • Sodium 08/06/2020 140    • Potassium 08/06/2020 3.7    • Chloride 08/06/2020 100    • CO2 08/06/2020 26.3    • Calcium 08/06/2020 9.7    • eGFR Non  Amer 08/06/2020 72    • " BUN/Creatinine Ratio 08/06/2020 12.4    • Anion Gap 08/06/2020 13.7    Lab on 07/22/2020   Component Date Value   • Glucose 07/22/2020 78    • BUN 07/22/2020 13    • Creatinine 07/22/2020 1.12    • Sodium 07/22/2020 139    • Potassium 07/22/2020 5.1    • Chloride 07/22/2020 102    • CO2 07/22/2020 24.5    • Calcium 07/22/2020 9.3    • eGFR Non African Amer 07/22/2020 66    • BUN/Creatinine Ratio 07/22/2020 11.6    • Anion Gap 07/22/2020 12.5    No results displayed because visit has over 200 results.                   Assessment:         No diagnosis found.       Plan:        Plan above  Ischemic cardiomyopathy  Right-sided heart failure with RV dysfunction  Essential hypertension  Hyperlipidemia  Status post multivessel bypass surgery        Return to clinic in 6 months        Level of Care:                 Jose Juan Fatima MD  12/23/20  .

## 2021-02-08 RX ORDER — METOPROLOL SUCCINATE 50 MG/1
TABLET, EXTENDED RELEASE ORAL
Qty: 60 TABLET | Refills: 5 | Status: SHIPPED | OUTPATIENT
Start: 2021-02-08 | End: 2021-08-13 | Stop reason: SDUPTHER

## 2021-02-22 RX ORDER — POTASSIUM CHLORIDE 750 MG/1
TABLET, FILM COATED, EXTENDED RELEASE ORAL
Qty: 30 TABLET | Refills: 1 | Status: SHIPPED | OUTPATIENT
Start: 2021-02-22 | End: 2021-06-21

## 2021-03-03 ENCOUNTER — OFFICE VISIT (OUTPATIENT)
Dept: FAMILY MEDICINE CLINIC | Facility: CLINIC | Age: 66
End: 2021-03-03

## 2021-03-03 VITALS
HEIGHT: 72 IN | RESPIRATION RATE: 18 BRPM | OXYGEN SATURATION: 98 % | WEIGHT: 227.6 LBS | BODY MASS INDEX: 30.83 KG/M2 | SYSTOLIC BLOOD PRESSURE: 148 MMHG | DIASTOLIC BLOOD PRESSURE: 82 MMHG | TEMPERATURE: 96.9 F | HEART RATE: 93 BPM

## 2021-03-03 DIAGNOSIS — E11.9 TYPE 2 DIABETES MELLITUS WITHOUT COMPLICATION, WITHOUT LONG-TERM CURRENT USE OF INSULIN (HCC): Chronic | ICD-10-CM

## 2021-03-03 DIAGNOSIS — I25.110 CORONARY ARTERY DISEASE INVOLVING NATIVE CORONARY ARTERY OF NATIVE HEART WITH UNSTABLE ANGINA PECTORIS (HCC): Chronic | ICD-10-CM

## 2021-03-03 DIAGNOSIS — E78.5 HYPERLIPIDEMIA, UNSPECIFIED HYPERLIPIDEMIA TYPE: Chronic | ICD-10-CM

## 2021-03-03 DIAGNOSIS — I10 ESSENTIAL HYPERTENSION: Primary | Chronic | ICD-10-CM

## 2021-03-03 PROBLEM — J30.9 ALLERGIC RHINITIS: Chronic | Status: ACTIVE | Noted: 2019-07-15

## 2021-03-03 PROBLEM — I25.5 ISCHEMIC CARDIOMYOPATHY: Chronic | Status: ACTIVE | Noted: 2020-08-27

## 2021-03-03 PROCEDURE — 99214 OFFICE O/P EST MOD 30 MIN: CPT | Performed by: FAMILY MEDICINE

## 2021-03-03 NOTE — PATIENT INSTRUCTIONS
Have the follow up labs done and call for results.    Continue your current medications and treatment.    Follow up in the office in 6 months.

## 2021-03-03 NOTE — PROGRESS NOTES
"Subjective   Lonnie Salinas is a 65 y.o. male.     Chief Complaint   Patient presents with   • Diabetes     6 month followup   • Hypertension       HPI  Chief complaint: Hypertension hyperlipidemia type 2 diabetes mellitus coronary artery disease    The patient is a 65-year-old white male comes in for follow-up and maintenance of his current problems which include    1.  Hypertension-stable-patient on irbesartan 75 mg daily metoprolol 50 mg twice a day Bumex 1 mg twice a day and potassium.  He denied headache lightheadedness dizziness or chest pain.  He denies shortness of breath.  He wonders whether he can come off his Bumex and potassium.    2.  Hyperlipidemia-stable-patient on Lipitor 40 mg daily.  Denies myalgias and arthralgias.  No nausea or anorexia.    3.  Type 2 diabetes mellitus-stable-patient on Metformin 500 mg once a day.  He denies polydipsia polyphagia or polyuria.  Denies low blood sugars.  He wonders whether he can come off the Metformin.    4.  Coronary artery disease-stable-patient on aspirin 81 mg daily irbesartan 75 mg daily metoprolol 50 mg twice a day Bumex 1 mg twice a day and potassium.  Patient had urgent cardiovascular bypass several months ago.  Patient states he is feeling great.  He wonders whether he can come off medications.      The following portions of the patient's history were reviewed and updated as appropriate: allergies, current medications, past family history, past medical history, past social history, past surgical history and problem list.    Review of Systems    Objective     /82 (BP Location: Right arm, Patient Position: Sitting, Cuff Size: Large Adult)   Pulse 93   Temp 96.9 °F (36.1 °C) (Infrared)   Resp 18   Ht 182.9 cm (72\")   Wt 103 kg (227 lb 9.6 oz)   SpO2 98%   BMI 30.87 kg/m²     Physical Exam  Vitals signs and nursing note reviewed.   Constitutional:       Appearance: He is well-developed and normal weight.   HENT:      Head: Normocephalic and " atraumatic.      Nose: Nose normal.      Mouth/Throat:      Mouth: Mucous membranes are moist.      Pharynx: Oropharynx is clear.   Eyes:      Extraocular Movements: Extraocular movements intact.      Conjunctiva/sclera: Conjunctivae normal.      Pupils: Pupils are equal, round, and reactive to light.   Neck:      Musculoskeletal: Normal range of motion and neck supple.   Cardiovascular:      Rate and Rhythm: Normal rate and regular rhythm.      Pulses: Normal pulses.      Heart sounds: Normal heart sounds.   Pulmonary:      Effort: Pulmonary effort is normal.      Breath sounds: Normal breath sounds.   Abdominal:      General: Abdomen is flat. Bowel sounds are normal.      Palpations: Abdomen is soft.   Musculoskeletal: Normal range of motion.   Skin:     General: Skin is warm and dry.   Neurological:      Mental Status: He is alert and oriented to person, place, and time.   Psychiatric:         Behavior: Behavior normal.         Thought Content: Thought content normal.         Judgment: Judgment normal.         Adult Transthoracic Echo Limited W/ Cont if Necessary Per Protocol (07/10/2020 10:02)  Cardiac Catheterization/Vascular Study (07/08/2020 12:47)  Basic Metabolic Panel (08/06/2020 14:15)    Assessment/Plan   Diagnoses and all orders for this visit:    1. Essential hypertension (Primary)-he is to continue his Bumex 1 mg twice a day potassium irbesartan 75 mg daily and metoprolol 50 mg twice a day.  -     CBC & Differential; Future  -     Comprehensive Metabolic Panel; Future    2. Hyperlipidemia, unspecified hyperlipidemia type-he is going to continue his Lipitor 40 mg daily.  -     Lipid Panel; Future    3. Type 2 diabetes mellitus without complication, without long-term current use of insulin (CMS/[facility])-patient was advised that determination about whether he could come off of Metformin pending results of his A1c.  In the meantime continue Metformin 500 mg daily  -     Hemoglobin A1c; Future  -     Protein /  Creatinine Ratio, Urine - Urine, Clean Catch; Future    4. Coronary artery disease involving native coronary artery of native heart with unstable angina pectoris (CMS/HCC)-patient was advised the best way to assess his heart function at this time would be to do an echocardiogram.  Offered to do the echocardiogram.  Patient states he would like to wait until he can see his cardiologist to discuss it with him.      Patient Instructions   Have the follow up labs done and call for results.    Continue your current medications and treatment.    Follow up in the office in 6 months.      Jarrell Alaniz Jr., MD    03/03/21

## 2021-03-04 ENCOUNTER — LAB (OUTPATIENT)
Dept: LAB | Facility: HOSPITAL | Age: 66
End: 2021-03-04

## 2021-03-04 DIAGNOSIS — E11.9 TYPE 2 DIABETES MELLITUS WITHOUT COMPLICATION, WITHOUT LONG-TERM CURRENT USE OF INSULIN (HCC): Chronic | ICD-10-CM

## 2021-03-04 DIAGNOSIS — I10 ESSENTIAL HYPERTENSION: ICD-10-CM

## 2021-03-04 DIAGNOSIS — E78.5 HYPERLIPIDEMIA, UNSPECIFIED HYPERLIPIDEMIA TYPE: Chronic | ICD-10-CM

## 2021-03-04 LAB
ALBUMIN SERPL-MCNC: 4.3 G/DL (ref 3.5–5.2)
ALBUMIN/GLOB SERPL: 1.5 G/DL
ALP SERPL-CCNC: 75 U/L (ref 39–117)
ALT SERPL W P-5'-P-CCNC: 38 U/L (ref 1–41)
ANION GAP SERPL CALCULATED.3IONS-SCNC: 8.9 MMOL/L (ref 5–15)
AST SERPL-CCNC: 34 U/L (ref 1–40)
BASOPHILS # BLD AUTO: 0.07 10*3/MM3 (ref 0–0.2)
BASOPHILS NFR BLD AUTO: 1 % (ref 0–1.5)
BILIRUB SERPL-MCNC: 0.4 MG/DL (ref 0–1.2)
BUN SERPL-MCNC: 14 MG/DL (ref 8–23)
BUN/CREAT SERPL: 13.6 (ref 7–25)
CALCIUM SPEC-SCNC: 9.2 MG/DL (ref 8.6–10.5)
CHLORIDE SERPL-SCNC: 103 MMOL/L (ref 98–107)
CHOLEST SERPL-MCNC: 111 MG/DL (ref 0–200)
CO2 SERPL-SCNC: 28.1 MMOL/L (ref 22–29)
CREAT SERPL-MCNC: 1.03 MG/DL (ref 0.76–1.27)
CREAT UR-MCNC: 287.9 MG/DL
DEPRECATED RDW RBC AUTO: 46.3 FL (ref 37–54)
EOSINOPHIL # BLD AUTO: 0.15 10*3/MM3 (ref 0–0.4)
EOSINOPHIL NFR BLD AUTO: 2.2 % (ref 0.3–6.2)
ERYTHROCYTE [DISTWIDTH] IN BLOOD BY AUTOMATED COUNT: 14.4 % (ref 12.3–15.4)
GFR SERPL CREATININE-BSD FRML MDRD: 73 ML/MIN/1.73
GLOBULIN UR ELPH-MCNC: 2.9 GM/DL
GLUCOSE SERPL-MCNC: 147 MG/DL (ref 65–99)
HBA1C MFR BLD: 6 % (ref 3.5–5.6)
HCT VFR BLD AUTO: 47.3 % (ref 37.5–51)
HDLC SERPL-MCNC: 26 MG/DL (ref 40–60)
HGB BLD-MCNC: 15.4 G/DL (ref 13–17.7)
IMM GRANULOCYTES # BLD AUTO: 0.02 10*3/MM3 (ref 0–0.05)
IMM GRANULOCYTES NFR BLD AUTO: 0.3 % (ref 0–0.5)
LDLC SERPL CALC-MCNC: 37 MG/DL (ref 0–100)
LDLC/HDLC SERPL: 0.78 {RATIO}
LYMPHOCYTES # BLD AUTO: 2.07 10*3/MM3 (ref 0.7–3.1)
LYMPHOCYTES NFR BLD AUTO: 30.4 % (ref 19.6–45.3)
MCH RBC QN AUTO: 28.7 PG (ref 26.6–33)
MCHC RBC AUTO-ENTMCNC: 32.6 G/DL (ref 31.5–35.7)
MCV RBC AUTO: 88.2 FL (ref 79–97)
MONOCYTES # BLD AUTO: 0.53 10*3/MM3 (ref 0.1–0.9)
MONOCYTES NFR BLD AUTO: 7.8 % (ref 5–12)
NEUTROPHILS NFR BLD AUTO: 3.96 10*3/MM3 (ref 1.7–7)
NEUTROPHILS NFR BLD AUTO: 58.3 % (ref 42.7–76)
NRBC BLD AUTO-RTO: 0 /100 WBC (ref 0–0.2)
PLATELET # BLD AUTO: 185 10*3/MM3 (ref 140–450)
PMV BLD AUTO: 9.8 FL (ref 6–12)
POTASSIUM SERPL-SCNC: 4.3 MMOL/L (ref 3.5–5.2)
PROT SERPL-MCNC: 7.2 G/DL (ref 6–8.5)
PROT UR-MCNC: 16 MG/DL
PROT/CREAT UR: 55.6 MG/G CREA (ref 0–200)
RBC # BLD AUTO: 5.36 10*6/MM3 (ref 4.14–5.8)
SODIUM SERPL-SCNC: 140 MMOL/L (ref 136–145)
TRIGL SERPL-MCNC: 324 MG/DL (ref 0–150)
VLDLC SERPL-MCNC: 48 MG/DL (ref 5–40)
WBC # BLD AUTO: 6.8 10*3/MM3 (ref 3.4–10.8)

## 2021-03-04 PROCEDURE — 80053 COMPREHEN METABOLIC PANEL: CPT

## 2021-03-04 PROCEDURE — 80061 LIPID PANEL: CPT

## 2021-03-04 PROCEDURE — 84156 ASSAY OF PROTEIN URINE: CPT

## 2021-03-04 PROCEDURE — 82570 ASSAY OF URINE CREATININE: CPT

## 2021-03-04 PROCEDURE — 83036 HEMOGLOBIN GLYCOSYLATED A1C: CPT

## 2021-03-04 PROCEDURE — 85025 COMPLETE CBC W/AUTO DIFF WBC: CPT

## 2021-03-04 PROCEDURE — 36415 COLL VENOUS BLD VENIPUNCTURE: CPT

## 2021-03-11 RX ORDER — BUMETANIDE 1 MG/1
TABLET ORAL
Qty: 180 TABLET | Refills: 0 | Status: SHIPPED | OUTPATIENT
Start: 2021-03-11 | End: 2021-06-13

## 2021-06-13 RX ORDER — BUMETANIDE 1 MG/1
TABLET ORAL
Qty: 180 TABLET | Refills: 0 | Status: SHIPPED | OUTPATIENT
Start: 2021-06-13 | End: 2021-09-13

## 2021-06-15 RX ORDER — IRBESARTAN 75 MG/1
TABLET ORAL
Qty: 30 TABLET | Refills: 5 | Status: SHIPPED | OUTPATIENT
Start: 2021-06-15 | End: 2021-12-08 | Stop reason: SDUPTHER

## 2021-06-21 RX ORDER — POTASSIUM CHLORIDE 750 MG/1
TABLET, FILM COATED, EXTENDED RELEASE ORAL
Qty: 30 TABLET | Refills: 3 | Status: SHIPPED | OUTPATIENT
Start: 2021-06-21 | End: 2021-10-20 | Stop reason: SDUPTHER

## 2021-06-23 ENCOUNTER — OFFICE VISIT (OUTPATIENT)
Dept: CARDIOLOGY | Facility: CLINIC | Age: 66
End: 2021-06-23

## 2021-06-23 VITALS
DIASTOLIC BLOOD PRESSURE: 82 MMHG | SYSTOLIC BLOOD PRESSURE: 142 MMHG | RESPIRATION RATE: 18 BRPM | WEIGHT: 229 LBS | HEART RATE: 91 BPM | HEIGHT: 72 IN | BODY MASS INDEX: 31.02 KG/M2

## 2021-06-23 DIAGNOSIS — E78.5 HYPERLIPIDEMIA, UNSPECIFIED HYPERLIPIDEMIA TYPE: ICD-10-CM

## 2021-06-23 DIAGNOSIS — I21.4 NSTEMI, INITIAL EPISODE OF CARE (HCC): Primary | ICD-10-CM

## 2021-06-23 DIAGNOSIS — Z95.1 S/P CABG X 5: ICD-10-CM

## 2021-06-23 DIAGNOSIS — I10 ESSENTIAL HYPERTENSION: ICD-10-CM

## 2021-06-23 DIAGNOSIS — I25.5 ISCHEMIC CARDIOMYOPATHY: ICD-10-CM

## 2021-06-23 PROCEDURE — 99214 OFFICE O/P EST MOD 30 MIN: CPT | Performed by: INTERNAL MEDICINE

## 2021-06-23 NOTE — PROGRESS NOTES
Cardiology clinic note  Jose Juan Fatima MD, PhD  Subjective:     Encounter Date:06/23/2021      Patient ID: Lonnie Salinas is a 65 y.o. male.    Chief Complaint:  Chief Complaint   Patient presents with   • Follow-up       HPI:  History of Present Illness  Previously I the pleasure of seeing this 64-year-old male today who is known to me from prior hospitalization status post left heart catheterization which demonstrated multivessel CAD after non-ST elevation myocardial infarction, balloon pump placement, mildly reduced EF 45 to 50% with at least moderate RV dysfunction, ultimately undergoing multivessel bypass surgery with LIMA to LAD, saphenous vein graft to PDA, vein graft to OM1 and OM 2 sequential jump graft, saphenous vein graft to OM 3 with left lower extremity vein harvest.  His case was complicated by cardiogenic shock also required multiple vasopressor as well as prolonged milrinone support with pulmonary hypertension and RV failure successfully weaned off with institution of heart failure therapies and beta-blockade.  He presents back to clinic today doing well chest pain-free with diminishing edema however he is tachycardic.  Blood pressure at home is running 1 20-1 40 systolic and he denies palpitations or presyncope.  He has no complaints and is doing well.  He is asked about driving colleges which we would prefer him to wait 30 days for the sternotomy to heal followed by initiation of cardiac rehab prior to potential driving for the sole reason of preventing any steering wheel related chest trauma prior to his healing of his sternotomy.  No other concerning signs or symptoms today.  No PND orthopnea chest pain reported     Last clinic he presents doing well without any chest pain.  He says he is more active status post bypass surgery and his blood pressure is improved which will now allow better heart failure therapies to be advanced.  His heart rate is still in the 100s regular sinus rhythm.  No  syncope presyncope anginal chest pain and he is mowing his yard now 1 month status post multivessel bypass and he says he is probably doing more than he should but he otherwise feels well.  He has minimal peripheral edema left greater than right at the site distal to his vein harvest.  I was able to define with him that he is allergic with angioedema with lisinopril however he had been tolerating irbesartan 150 mg p.o. daily for hypertension prior to his surgery without any problems and we discussed given mild reduction in his EF of 50% with some degree of RV dysfunction this medicine should be reinstituted today with blood pressure 138/82 in addition to up titration of his beta-blocker with heart rate in the 100s.  He is amenable for this and will follow-up with CV surgery.  He will start cardiac rehab soon.  No fevers chill sweats nausea vomiting diarrhea chest pain and shortness of breath is improving as he is getting further out from his bypass surgery.     Today he presents doing well, trace edema, loop diuretics are effective as well as decreasing free water, he is gaining weight slightly secondary to being sedentary after correction.  No chest pain or shortness of breath, no palpitations or unexplained syncope.  No heart failure signs or symptoms presently.  Hemodynamically electrically stable.   Again similar last visit We did discuss right-sided symptoms with pitting edema peripherally in the setting of ongoing diuretic therapy and is counter intuitive for increased water intake along with diuretic management and salt restriction which he voiced understanding.   he is CCS class I NYHA class I.  Known RV dysfunction with mild underlying LV dysfunction EF 45 to 50%, encourage diet and weight loss per AHA guidelines, low carbohydrate good protein intake diet along with moderate intensity exercise and walking.  Blood pressure and heart rate are well controlled     Plan today  Continue metoprolol  Continue  irbesartan  Continue statin therapy  Free water and salt restriction.  Continue Bumex 1 mg p.o. twice daily  We will consider Aldactone 12 to 25 mg p.o. daily if needed  Continue potassium supplementation presently  Return to clinic in 6 months for follow-upContinue these doses, patient walking and exercising on a daily basis, no changes to medical therapy today  Transition Plavix monotherapy 75 mg daily, stop aspirin, low risk for bleeding via has bled score     Review of systems x14 point review of systems is negative except as mentioned above     Historical data copied for previous encounters and EMR is unchanged        The following portions of the patient's history were reviewed and updated as appropriate: allergies, current medications, past family history, past medical history, past social history, past surgical history and problem list.    Problem List:  Patient Active Problem List   Diagnosis   • Acquired thrombocytopenia (CMS/[facility])   • Hyperlipidemia   • Hypertension   • Obesity   • Preventative health care   • Allergic rhinitis   • NSTEMI, initial episode of care (CMS/[facility])   • NSTEMI (non-ST elevated myocardial infarction) (CMS/[facility])   • Coronary artery disease involving native coronary artery of native heart with unstable angina pectoris (CMS/[facility])   • S/P CABG x 5   • Type 2 diabetes mellitus without complication, without long-term current use of insulin (CMS/[facility])   • Ischemic cardiomyopathy       Past Medical History:  Past Medical History:   Diagnosis Date   • Allergic    • Coronary artery disease    • Dyslipidemia    • History of tick-borne disease    • Hyperlipemia    • Hypertension        Past Surgical History:  Past Surgical History:   Procedure Laterality Date   • CARDIAC CATHETERIZATION N/A 7/8/2020    Procedure: Left Heart Cath with possible PCI;  Surgeon: Jose Juan Fatima MD;  Location: [facility] CATH INVASIVE LOCATION;  Service: Cardiology;  Laterality: N/A;   • CARDIAC CATHETERIZATION N/A  "7/8/2020    Procedure: Intra-Aortic Baloon Pump Insertion;  Surgeon: Jose Juan Fatima MD;  Location: [facility] CATH INVASIVE LOCATION;  Service: Cardiology;  Laterality: N/A;   • CORONARY ARTERY BYPASS GRAFT N/A 7/9/2020    Procedure: CORONARY ARTERY BYPASS GRAFTING X 5 USING LEFT EDWARD  AND LEFT ENDOSCOPICALLY HARVESTED SAPHENOUS VEIN;  Surgeon: Lui Lake MD;  Location: [facility] CVOR;  Service: Cardiothoracic;  Laterality: N/A;  MINERVA DONE BY ANES       Social History:  Social History     Socioeconomic History   • Marital status:      Spouse name: Not on file   • Number of children: Not on file   • Years of education: Not on file   • Highest education level: Not on file   Tobacco Use   • Smoking status: Never Smoker   • Smokeless tobacco: Never Used   Vaping Use   • Vaping Use: Never used   Substance and Sexual Activity   • Alcohol use: No   • Drug use: No   • Sexual activity: Defer       Allergies:  Allergies   Allergen Reactions   • Lisinopril Angioedema       Immunizations:  Immunization History   Administered Date(s) Administered   • Td 07/08/2014   • Tdap 07/06/2020       ROS:  ROS       Objective:         /82 (BP Location: Left arm, Patient Position: Sitting)   Pulse 91   Resp 18   Ht 182.9 cm (72\")   Wt 104 kg (229 lb)   BMI 31.06 kg/m²     Physical Exam  Regular rate and rhythm no rubs gallops no heave no lift, 1 of 6 stock ejection murmur unchanged  No clubbing cyanosis, trace edema left lower extremity  Clear to auscultation  Normocephalic/atraumatic pupils are round  To be soft nontender nondistended  Neurologically gross intact bilateral  No carotid bruits no JVD    In-Office Procedure(s):  Procedures    ASCVD RIsk Score::  The ASCVD Risk score (Karen MARTA Montemayor, et al., 2013) failed to calculate for the following reasons:    The patient has a prior MI or stroke diagnosis    Recent Radiology:  Imaging Results (Most Recent)     None          Lab Review:   Lab on 03/04/2021 "   Component Date Value   • Glucose 03/04/2021 147*   • BUN 03/04/2021 14    • Creatinine 03/04/2021 1.03    • Sodium 03/04/2021 140    • Potassium 03/04/2021 4.3    • Chloride 03/04/2021 103    • CO2 03/04/2021 28.1    • Calcium 03/04/2021 9.2    • Total Protein 03/04/2021 7.2    • Albumin 03/04/2021 4.30    • ALT (SGPT) 03/04/2021 38    • AST (SGOT) 03/04/2021 34    • Alkaline Phosphatase 03/04/2021 75    • Total Bilirubin 03/04/2021 0.4    • eGFR Non African Amer 03/04/2021 73    • Globulin 03/04/2021 2.9    • A/G Ratio 03/04/2021 1.5    • BUN/Creatinine Ratio 03/04/2021 13.6    • Anion Gap 03/04/2021 8.9    • Hemoglobin A1C 03/04/2021 6.0*   • Total Cholesterol 03/04/2021 111    • Triglycerides 03/04/2021 324*   • HDL Cholesterol 03/04/2021 26*   • LDL Cholesterol  03/04/2021 37    • VLDL Cholesterol 03/04/2021 48*   • LDL/HDL Ratio 03/04/2021 0.78    • Protein/Creatinine Ratio* 03/04/2021 55.6    • Creatinine, Urine 03/04/2021 287.9    • Total Protein, Urine 03/04/2021 16.0    • WBC 03/04/2021 6.80    • RBC 03/04/2021 5.36    • Hemoglobin 03/04/2021 15.4    • Hematocrit 03/04/2021 47.3    • MCV 03/04/2021 88.2    • MCH 03/04/2021 28.7    • MCHC 03/04/2021 32.6    • RDW 03/04/2021 14.4    • RDW-SD 03/04/2021 46.3    • MPV 03/04/2021 9.8    • Platelets 03/04/2021 185    • Neutrophil % 03/04/2021 58.3    • Lymphocyte % 03/04/2021 30.4    • Monocyte % 03/04/2021 7.8    • Eosinophil % 03/04/2021 2.2    • Basophil % 03/04/2021 1.0    • Immature Grans % 03/04/2021 0.3    • Neutrophils, Absolute 03/04/2021 3.96    • Lymphocytes, Absolute 03/04/2021 2.07    • Monocytes, Absolute 03/04/2021 0.53    • Eosinophils, Absolute 03/04/2021 0.15    • Basophils, Absolute 03/04/2021 0.07    • Immature Grans, Absolute 03/04/2021 0.02    • nRBC 03/04/2021 0.0                 Assessment:         Multivessel coronary disease  Mildly reduced LV systolic function, ischemic cardiomyopathy  Essential pretension  Hyperlipidemia  Chronic  systolic and diastolic congestive heart failure  Non-insulin-dependent diabetes           Joes Juan Fatima MD  06/23/21  .

## 2021-06-24 RX ORDER — CLOPIDOGREL BISULFATE 75 MG/1
75 TABLET ORAL DAILY
Qty: 30 TABLET | Refills: 5 | Status: SHIPPED | OUTPATIENT
Start: 2021-06-24 | End: 2021-12-20 | Stop reason: SDUPTHER

## 2021-07-21 ENCOUNTER — TRANSCRIBE ORDERS (OUTPATIENT)
Dept: ADMINISTRATIVE | Facility: HOSPITAL | Age: 66
End: 2021-07-21

## 2021-07-21 ENCOUNTER — LAB (OUTPATIENT)
Dept: LAB | Facility: HOSPITAL | Age: 66
End: 2021-07-21

## 2021-07-21 DIAGNOSIS — N18.30 MALIGNANT HYPERTENSIVE HEART AND CHRONIC KIDNEY DISEASE STAGE III (HCC): ICD-10-CM

## 2021-07-21 DIAGNOSIS — E55.9 AVITAMINOSIS D: ICD-10-CM

## 2021-07-21 DIAGNOSIS — D63.1 ANEMIA IN END-STAGE RENAL DISEASE (HCC): ICD-10-CM

## 2021-07-21 DIAGNOSIS — I13.10 MALIGNANT HYPERTENSIVE HEART AND CHRONIC KIDNEY DISEASE STAGE III (HCC): ICD-10-CM

## 2021-07-21 DIAGNOSIS — N25.81 SECONDARY HYPERPARATHYROIDISM OF RENAL ORIGIN (HCC): ICD-10-CM

## 2021-07-21 DIAGNOSIS — N18.6 ANEMIA IN END-STAGE RENAL DISEASE (HCC): ICD-10-CM

## 2021-07-21 DIAGNOSIS — I10 ESSENTIAL HYPERTENSION, MALIGNANT: ICD-10-CM

## 2021-07-21 DIAGNOSIS — R80.9 PROTEINURIA, UNSPECIFIED TYPE: ICD-10-CM

## 2021-07-21 DIAGNOSIS — D63.1 ANEMIA IN END-STAGE RENAL DISEASE (HCC): Primary | ICD-10-CM

## 2021-07-21 DIAGNOSIS — N18.6 ANEMIA IN END-STAGE RENAL DISEASE (HCC): Primary | ICD-10-CM

## 2021-07-21 LAB
25(OH)D3 SERPL-MCNC: 25.3 NG/ML
ANION GAP SERPL CALCULATED.3IONS-SCNC: 11.1 MMOL/L (ref 5–15)
BASOPHILS # BLD AUTO: 0.06 10*3/MM3 (ref 0–0.2)
BASOPHILS NFR BLD AUTO: 1 % (ref 0–1.5)
BILIRUB UR QL STRIP: NEGATIVE
BUN SERPL-MCNC: 16 MG/DL (ref 8–23)
BUN/CREAT SERPL: 13.4 (ref 7–25)
CALCIUM SPEC-SCNC: 9.9 MG/DL (ref 8.6–10.5)
CHLORIDE SERPL-SCNC: 103 MMOL/L (ref 98–107)
CK SERPL-CCNC: 51 U/L (ref 20–200)
CLARITY UR: CLEAR
CO2 SERPL-SCNC: 28.9 MMOL/L (ref 22–29)
COLOR UR: YELLOW
CREAT SERPL-MCNC: 1.19 MG/DL (ref 0.76–1.27)
CREAT UR-MCNC: 89.5 MG/DL
DEPRECATED RDW RBC AUTO: 45.3 FL (ref 37–54)
EOSINOPHIL # BLD AUTO: 0.14 10*3/MM3 (ref 0–0.4)
EOSINOPHIL NFR BLD AUTO: 2.4 % (ref 0.3–6.2)
ERYTHROCYTE [DISTWIDTH] IN BLOOD BY AUTOMATED COUNT: 13.9 % (ref 12.3–15.4)
GFR SERPL CREATININE-BSD FRML MDRD: 62 ML/MIN/1.73
GLUCOSE SERPL-MCNC: 89 MG/DL (ref 65–99)
GLUCOSE UR STRIP-MCNC: NEGATIVE MG/DL
HCT VFR BLD AUTO: 45.6 % (ref 37.5–51)
HGB BLD-MCNC: 15.3 G/DL (ref 13–17.7)
HGB UR QL STRIP.AUTO: NEGATIVE
IMM GRANULOCYTES # BLD AUTO: 0.01 10*3/MM3 (ref 0–0.05)
IMM GRANULOCYTES NFR BLD AUTO: 0.2 % (ref 0–0.5)
KETONES UR QL STRIP: NEGATIVE
LEUKOCYTE ESTERASE UR QL STRIP.AUTO: NEGATIVE
LYMPHOCYTES # BLD AUTO: 1.29 10*3/MM3 (ref 0.7–3.1)
LYMPHOCYTES NFR BLD AUTO: 22.4 % (ref 19.6–45.3)
MAGNESIUM SERPL-MCNC: 2.5 MG/DL (ref 1.6–2.4)
MCH RBC QN AUTO: 29.7 PG (ref 26.6–33)
MCHC RBC AUTO-ENTMCNC: 33.6 G/DL (ref 31.5–35.7)
MCV RBC AUTO: 88.4 FL (ref 79–97)
MONOCYTES # BLD AUTO: 0.51 10*3/MM3 (ref 0.1–0.9)
MONOCYTES NFR BLD AUTO: 8.9 % (ref 5–12)
NEUTROPHILS NFR BLD AUTO: 3.74 10*3/MM3 (ref 1.7–7)
NEUTROPHILS NFR BLD AUTO: 65.1 % (ref 42.7–76)
NITRITE UR QL STRIP: NEGATIVE
NRBC BLD AUTO-RTO: 0 /100 WBC (ref 0–0.2)
PH UR STRIP.AUTO: 7 [PH] (ref 5–8)
PLATELET # BLD AUTO: 173 10*3/MM3 (ref 140–450)
PMV BLD AUTO: 9.6 FL (ref 6–12)
POTASSIUM SERPL-SCNC: 4.4 MMOL/L (ref 3.5–5.2)
PROT UR QL STRIP: NEGATIVE
PROT UR-MCNC: 5 MG/DL
PTH-INTACT SERPL-MCNC: 21.5 PG/ML (ref 15–65)
RBC # BLD AUTO: 5.16 10*6/MM3 (ref 4.14–5.8)
SODIUM SERPL-SCNC: 143 MMOL/L (ref 136–145)
SP GR UR STRIP: 1.02 (ref 1–1.03)
URATE SERPL-MCNC: 7.2 MG/DL (ref 3.4–7)
UROBILINOGEN UR QL STRIP: NORMAL
WBC # BLD AUTO: 5.75 10*3/MM3 (ref 3.4–10.8)

## 2021-07-21 PROCEDURE — 85025 COMPLETE CBC W/AUTO DIFF WBC: CPT

## 2021-07-21 PROCEDURE — 84550 ASSAY OF BLOOD/URIC ACID: CPT

## 2021-07-21 PROCEDURE — 82570 ASSAY OF URINE CREATININE: CPT

## 2021-07-21 PROCEDURE — 82550 ASSAY OF CK (CPK): CPT

## 2021-07-21 PROCEDURE — 84156 ASSAY OF PROTEIN URINE: CPT

## 2021-07-21 PROCEDURE — 83735 ASSAY OF MAGNESIUM: CPT

## 2021-07-21 PROCEDURE — 80048 BASIC METABOLIC PNL TOTAL CA: CPT

## 2021-07-21 PROCEDURE — 36415 COLL VENOUS BLD VENIPUNCTURE: CPT

## 2021-07-21 PROCEDURE — 81003 URINALYSIS AUTO W/O SCOPE: CPT

## 2021-07-21 PROCEDURE — 82306 VITAMIN D 25 HYDROXY: CPT

## 2021-07-21 PROCEDURE — 83970 ASSAY OF PARATHORMONE: CPT

## 2021-08-13 RX ORDER — METOPROLOL SUCCINATE 50 MG/1
50 TABLET, EXTENDED RELEASE ORAL 2 TIMES DAILY
Qty: 180 TABLET | Refills: 1 | Status: SHIPPED | OUTPATIENT
Start: 2021-08-13 | End: 2022-02-09 | Stop reason: SDUPTHER

## 2021-09-03 ENCOUNTER — OFFICE VISIT (OUTPATIENT)
Dept: FAMILY MEDICINE CLINIC | Facility: CLINIC | Age: 66
End: 2021-09-03

## 2021-09-03 VITALS
OXYGEN SATURATION: 99 % | RESPIRATION RATE: 16 BRPM | HEART RATE: 78 BPM | SYSTOLIC BLOOD PRESSURE: 136 MMHG | TEMPERATURE: 96.9 F | BODY MASS INDEX: 30.85 KG/M2 | WEIGHT: 227.8 LBS | DIASTOLIC BLOOD PRESSURE: 82 MMHG | HEIGHT: 72 IN

## 2021-09-03 DIAGNOSIS — I10 ESSENTIAL HYPERTENSION: Primary | ICD-10-CM

## 2021-09-03 DIAGNOSIS — E78.5 HYPERLIPIDEMIA, UNSPECIFIED HYPERLIPIDEMIA TYPE: Chronic | ICD-10-CM

## 2021-09-03 DIAGNOSIS — E11.9 TYPE 2 DIABETES MELLITUS WITHOUT COMPLICATION, WITHOUT LONG-TERM CURRENT USE OF INSULIN (HCC): ICD-10-CM

## 2021-09-03 DIAGNOSIS — I25.110 CORONARY ARTERY DISEASE INVOLVING NATIVE CORONARY ARTERY OF NATIVE HEART WITH UNSTABLE ANGINA PECTORIS (HCC): Chronic | ICD-10-CM

## 2021-09-03 DIAGNOSIS — I25.5 ISCHEMIC CARDIOMYOPATHY: Chronic | ICD-10-CM

## 2021-09-03 PROCEDURE — 99214 OFFICE O/P EST MOD 30 MIN: CPT | Performed by: FAMILY MEDICINE

## 2021-09-03 NOTE — PROGRESS NOTES
"Subjective   Lonnie Salinas is a 66 y.o. male.     Chief Complaint   Patient presents with   • Hypertension     6 month followup, med refills    • Hyperlipidemia   • Coronary Artery Disease   • Diabetes     need diabetic foot exam       HPI  Chief complaint: Hypertension type 2 diabetes mellitus hyperlipidemia coronary artery disease    The patient is a 66-year-old white male comes in for follow-up and maintenance of his current problems which include    1.  Hypertension-stable-patient on Avapro 75 mg daily metoprolol 50 mg twice a day Bumex 1 mg daily and potassium.  He denied headache lightheadedness dizziness or chest pain.    2.  Hyperlipidemia-stable-patient on Lipitor 40 mg daily.  He denies myalgias and arthralgias.  Denies nausea or anorexia.    3.  Type 2 diabetes mellitus-stable-patient on Metformin 500 mg daily.  He denied polydipsia polyphagia or polyuria.  Denied low blood sugars.  Patient is due for A1c.  He does not check his sugars.  He states that he used to do so but they were always normal so he quit doing it.    4.  Coronary artery disease with ischemic cardiomyopathy-stable-patient on irbesartan 75 mg daily metoprolol 50 mg twice a day and Bumex and potassium.  Denies chest pain shortness of breath orthopnea or PND.    The following portions of the patient's history were reviewed and updated as appropriate: allergies, current medications, past family history, past medical history, past social history, past surgical history and problem list.    Review of Systems    Objective     /82 (BP Location: Right arm, Patient Position: Sitting, Cuff Size: Adult)   Pulse 78   Temp 96.9 °F (36.1 °C) (Infrared)   Resp 16   Ht 182.9 cm (72\")   Wt 103 kg (227 lb 12.8 oz)   SpO2 99%   BMI 30.90 kg/m²     Physical Exam  Vitals and nursing note reviewed.   Constitutional:       Appearance: He is well-developed.   HENT:      Head: Normocephalic and atraumatic.      Nose: Nose normal.      " Mouth/Throat:      Mouth: Mucous membranes are moist.      Pharynx: Oropharynx is clear.   Eyes:      Extraocular Movements: Extraocular movements intact.      Conjunctiva/sclera: Conjunctivae normal.      Pupils: Pupils are equal, round, and reactive to light.   Cardiovascular:      Rate and Rhythm: Normal rate and regular rhythm.      Pulses: Normal pulses.      Heart sounds: Normal heart sounds.   Pulmonary:      Effort: Pulmonary effort is normal.      Breath sounds: Normal breath sounds.   Abdominal:      General: Abdomen is flat. Bowel sounds are normal.      Palpations: Abdomen is soft.   Musculoskeletal:         General: Normal range of motion.      Cervical back: Neck supple.   Skin:     General: Skin is warm and dry.   Neurological:      Mental Status: He is alert and oriented to person, place, and time.   Psychiatric:         Behavior: Behavior normal.         Thought Content: Thought content normal.         Judgment: Judgment normal.       Protein / Creatinine Ratio, Urine - Urine, Clean Catch (03/04/2021 06:44)  Lipid Panel (03/04/2021 06:44)  Hemoglobin A1c (03/04/2021 06:44)  Comprehensive Metabolic Panel (03/04/2021 06:44)  CBC & Differential (03/04/2021 06:44)      Assessment/Plan   Diagnoses and all orders for this visit:    1. Essential hypertension (Primary)  -     Basic Metabolic Panel; Future    2. Type 2 diabetes mellitus without complication, without long-term current use of insulin (CMS/HCC)  -     Basic Metabolic Panel; Future  -     Hemoglobin A1c; Future    3. Hyperlipidemia, unspecified hyperlipidemia type    4. Coronary artery disease involving native coronary artery of native heart with unstable angina pectoris (CMS/HCC)    5. Ischemic cardiomyopathy      Patient Instructions   Continue your current medications and treatment.    Have the follow up labs done and call for results.    Follow up in the office in 6 months.      Jarrell Alaniz Jr., MD    09/03/21

## 2021-09-13 RX ORDER — BUMETANIDE 1 MG/1
TABLET ORAL
Qty: 180 TABLET | Refills: 0 | Status: SHIPPED | OUTPATIENT
Start: 2021-09-13 | End: 2021-12-07

## 2021-09-15 ENCOUNTER — LAB (OUTPATIENT)
Dept: LAB | Facility: HOSPITAL | Age: 66
End: 2021-09-15

## 2021-09-15 DIAGNOSIS — E11.9 TYPE 2 DIABETES MELLITUS WITHOUT COMPLICATION, WITHOUT LONG-TERM CURRENT USE OF INSULIN (HCC): ICD-10-CM

## 2021-09-15 DIAGNOSIS — I10 ESSENTIAL HYPERTENSION: ICD-10-CM

## 2021-09-15 LAB
ANION GAP SERPL CALCULATED.3IONS-SCNC: 13.4 MMOL/L (ref 5–15)
BUN SERPL-MCNC: 16 MG/DL (ref 8–23)
BUN/CREAT SERPL: 14 (ref 7–25)
CALCIUM SPEC-SCNC: 9.7 MG/DL (ref 8.6–10.5)
CHLORIDE SERPL-SCNC: 104 MMOL/L (ref 98–107)
CO2 SERPL-SCNC: 22.6 MMOL/L (ref 22–29)
CREAT SERPL-MCNC: 1.14 MG/DL (ref 0.76–1.27)
GFR SERPL CREATININE-BSD FRML MDRD: 65 ML/MIN/1.73
GLUCOSE SERPL-MCNC: 104 MG/DL (ref 65–99)
HBA1C MFR BLD: 6 % (ref 3.5–5.6)
POTASSIUM SERPL-SCNC: 4.1 MMOL/L (ref 3.5–5.2)
SODIUM SERPL-SCNC: 140 MMOL/L (ref 136–145)

## 2021-09-15 PROCEDURE — 83036 HEMOGLOBIN GLYCOSYLATED A1C: CPT

## 2021-09-15 PROCEDURE — 36415 COLL VENOUS BLD VENIPUNCTURE: CPT

## 2021-09-15 PROCEDURE — 80048 BASIC METABOLIC PNL TOTAL CA: CPT

## 2021-10-20 RX ORDER — ATORVASTATIN CALCIUM 40 MG/1
TABLET, FILM COATED ORAL
Qty: 90 TABLET | Refills: 1 | Status: SHIPPED | OUTPATIENT
Start: 2021-10-20 | End: 2022-04-19

## 2021-10-20 RX ORDER — POTASSIUM CHLORIDE 750 MG/1
10 TABLET, FILM COATED, EXTENDED RELEASE ORAL DAILY
Qty: 30 TABLET | Refills: 3 | Status: SHIPPED | OUTPATIENT
Start: 2021-10-20 | End: 2022-02-09 | Stop reason: SDUPTHER

## 2021-12-07 RX ORDER — BUMETANIDE 1 MG/1
TABLET ORAL
Qty: 180 TABLET | Refills: 0 | Status: SHIPPED | OUTPATIENT
Start: 2021-12-07 | End: 2022-03-09

## 2021-12-08 RX ORDER — IRBESARTAN 75 MG/1
75 TABLET ORAL DAILY
Qty: 90 TABLET | Refills: 1 | Status: SHIPPED | OUTPATIENT
Start: 2021-12-08 | End: 2022-06-02 | Stop reason: SDUPTHER

## 2021-12-20 RX ORDER — CLOPIDOGREL BISULFATE 75 MG/1
75 TABLET ORAL DAILY
Qty: 90 TABLET | Refills: 3 | Status: SHIPPED | OUTPATIENT
Start: 2021-12-20 | End: 2022-12-13 | Stop reason: SDUPTHER

## 2021-12-21 ENCOUNTER — OFFICE VISIT (OUTPATIENT)
Dept: CARDIOLOGY | Facility: CLINIC | Age: 66
End: 2021-12-21

## 2021-12-21 VITALS
RESPIRATION RATE: 18 BRPM | BODY MASS INDEX: 30.83 KG/M2 | HEIGHT: 72 IN | WEIGHT: 227.6 LBS | HEART RATE: 97 BPM | DIASTOLIC BLOOD PRESSURE: 78 MMHG | SYSTOLIC BLOOD PRESSURE: 120 MMHG

## 2021-12-21 DIAGNOSIS — I10 ESSENTIAL HYPERTENSION: ICD-10-CM

## 2021-12-21 DIAGNOSIS — I21.4 NSTEMI, INITIAL EPISODE OF CARE (HCC): Primary | ICD-10-CM

## 2021-12-21 DIAGNOSIS — E78.5 HYPERLIPIDEMIA, UNSPECIFIED HYPERLIPIDEMIA TYPE: ICD-10-CM

## 2021-12-21 DIAGNOSIS — Z95.1 S/P CABG X 5: ICD-10-CM

## 2021-12-21 DIAGNOSIS — I25.5 ISCHEMIC CARDIOMYOPATHY: ICD-10-CM

## 2021-12-21 PROCEDURE — 99214 OFFICE O/P EST MOD 30 MIN: CPT | Performed by: INTERNAL MEDICINE

## 2021-12-21 NOTE — PROGRESS NOTES
Cardiology Clinic Note  Jose Juan Fatima MD, PhD    Subjective:     Encounter Date:12/21/2021      Patient ID: Lonnie Salinas is a 66 y.o. male.    Chief Complaint:  Chief Complaint   Patient presents with   • Follow-up       HPI:      Previously I the pleasure of seeing this 64-year-old male today who is known to me from prior hospitalization status post left heart catheterization which demonstrated multivessel CAD after non-ST elevation myocardial infarction, balloon pump placement, mildly reduced EF 45 to 50% with at least moderate RV dysfunction, ultimately undergoing multivessel bypass surgery with LIMA to LAD, saphenous vein graft to PDA, vein graft to OM1 and OM 2 sequential jump graft, saphenous vein graft to OM 3 with left lower extremity vein harvest.  His case was complicated by cardiogenic shock also required multiple vasopressor as well as prolonged milrinone support with pulmonary hypertension and RV failure successfully weaned off with institution of heart failure therapies and beta-blockade.  He presents back to clinic today doing well chest pain-free with diminishing edema however he is tachycardic.  Blood pressure at home is running 1 20-1 40 systolic and he denies palpitations or presyncope.  He has no complaints and is doing well.  He is asked about driving colleges which we would prefer him to wait 30 days for the sternotomy to heal followed by initiation of cardiac rehab prior to potential driving for the sole reason of preventing any steering wheel related chest trauma prior to his healing of his sternotomy.  No other concerning signs or symptoms today.  No PND orthopnea chest pain reported     Last clinic he presents doing well without any chest pain.  He says he is more active status post bypass surgery and his blood pressure is improved which will now allow better heart failure therapies to be advanced.  His heart rate is still in the 100s regular sinus rhythm.  No syncope presyncope  anginal chest pain and he is mowing his yard now 1 month status post multivessel bypass and he says he is probably doing more than he should but he otherwise feels well.  He has minimal peripheral edema left greater than right at the site distal to his vein harvest.  I was able to define with him that he is allergic with angioedema with lisinopril however he had been tolerating irbesartan 150 mg p.o. daily for hypertension prior to his surgery without any problems and we discussed given mild reduction in his EF of 50% with some degree of RV dysfunction this medicine should be reinstituted today with blood pressure 138/82 in addition to up titration of his beta-blocker with heart rate in the 100s.  He is amenable for this and will follow-up with CV surgery.  He will start cardiac rehab soon.  No fevers chill sweats nausea vomiting diarrhea chest pain and shortness of breath is improving as he is getting further out from his bypass surgery.     Today he presents doing well, trace edema, loop diuretics are effective as well as decreasing free water similar to prior encounter,  No chest pain or shortness of breath, no palpitations or unexplained syncope.  No heart failure signs or symptoms presently.  Hemodynamically electrically stable.   Again similar last visit We did discuss right-sided symptoms with pitting edema peripherally in the setting of ongoing diuretic therapy and is counter intuitive for increased water intake along with diuretic management and salt restriction which he voiced understanding.   he is CCS class I NYHA class I.  Known RV dysfunction with mild underlying LV dysfunction EF 45 to 50%, encourage diet and weight loss per AHA guidelines, low carbohydrate good protein intake diet along with moderate intensity exercise and walking.  Blood pressure and heart rate are well controlled     Plan today  Continue metoprolol  Continue irbesartan  Continue statin therapy  Free water and salt  "restriction.  Continue Bumex 1 mg p.o. twice daily  Continue potassium supplementation presently  NYHA class I CCS class I  Return to clinic in 6 months for follow-upContinue these doses, patient walking and exercising on a daily basis, no changes to medical therapy today  Transition Plavix monotherapy 75 mg daily, stop aspirin, low risk for bleeding via has bled score     Review of systems x14 point review of systems is negative except as mentioned above     Historical data copied for previous encounters and EMR is unchanged     Historical data copied forward from previous encounters in EMR including the history, exam, and assessment/plan has been reviewed and is unchanged unless noted otherwise.    Cardiac medicines reviewed with risk, benefits, and necessity of each discussed.    Risk and benefit of cardiac testing reviewed including death heart attack stroke pain bleeding infection need for vascular /cardiovascular surgery were discussed and the patient     Objective:         /78 (BP Location: Left arm, Patient Position: Sitting)   Pulse 97   Resp 18   Ht 182.9 cm (72\")   Wt 103 kg (227 lb 9.6 oz)   BMI 30.87 kg/m²     Physical Exam  Regular rate rhythm no rubs gallops, faint 1 out of 6 murmur  No heave no lift  No clubbing cyanosis or edema  No carotid bruits or JVD  Normal cap refill  Assessment:         CAD status post bypass surgery June of last year  Secondary prevention goals  Continue Plavix, off aspirin  ARB beta-blocker with mild reduced EF 45 to 50%  Diabetes control per primary goal A1c less than 7  Goal lipid studies less than 70 for LDL    CAD clinically silent  Hypertension controlled  Hyperlipidemia controlled      Follow-up clinic 6 months      The pleasure to be involved in this patient's cardiovascular care.  Please call with any questions or concerns  Jose Juan Fatima MD, PhD    Most recent EKG as reviewed and interpreted by me:  Procedures     Most recent echo as reviewed and " interpreted by me:  Results for orders placed during the hospital encounter of 07/08/20    Adult Transthoracic Echo Limited W/ Cont if Necessary Per Protocol    Interpretation Summary  · Right ventricular cavity is severely dilated.  · Moderately reduced right ventricular systolic function noted.  · Mild tricuspid valve regurgitation is present.    Overall LV function appears mildly reduced 45 to 50% with dyssynchrony with pacing  RV is severely enlarged with at least moderate hypokinesis possibly moderate to severe  Mild TR, trace MR, no aortic valve pathology  No masses or effusion seen  IVC not well seen cannot estimate right atrial pressure  Did not estimate diastolic dysfunction, no parameters identified      Most recent stress test as reviewed and interpreted by me:      Most recent cardiac catheterization as reviewed interpreted by me:  Results for orders placed during the hospital encounter of 07/08/20    Cardiac Catheterization/Vascular Study    Narrative   Jose Juan Fatima MD, PhD  [facility] cardiology  Date of service 7-8-2020    Procedure  1.  Left heart catheterization with coronary angiography left ventriculography in FLORES position    Indication  Non-ST elevation myocardial infarction    After informed consent the patient was brought to the catheterization lab sterilely prepped and draped usual fashion with exposure the right groin right common femoral tibial access via micropuncture modified Seldinger technique.  JL4 and JR4 diagnostic catheters used for selective left and right coronary angiography with the pigtail catheter used across the aortic valve followed by left ventriculography in FLORES position EDP measurement and transaortic valve gradient assessment.  Secondary to findings of multivessel coronary artery disease with subtotally occluded mid LAD, diffuse ramus proximal, obtuse marginal and RCA disease with  of the RCA decision was made for evaluation of urgent coronary  artery bypass grafting.  After discussion with CV surgery as well as KHAI I flow in the continuation LAD from the mid to distal portion after the takeoff the diagonal branch decision was made for balloon pump insertion to augment diastolic perfusion pressure.  The 6 [place] sheath was then exchanged for standard compatible balloon pump sheath which was placed under fluoroscopic guidance.  Pigtail catheter was used for aortic arch visualization with angiography of the ostium of the left subclavian.  Appropriately sized aortic balloon pump was positioned 1 to 2 cm distal to the takeoff of the left subclavian in the descending thoracic aorta with one-to-one augmentation.  Cineangiography of inflated balloon revealed termination well above anatomic landmarks for renal arteries.  Patient had no complications.  He was transferred to ICU for urgent CABG evaluation.    Complications none  Blood loss less than 10 cc  Moderate conscious sedation time time of 1 hour  Moderate conscious Tatian was utilized with IV Versed and fentanyl administered by registered nurse with continuous ECG pulse oximetry and hemodynamic monitoring throughout the entirety the case.    Findings  1.  Opening aortic pressure of 84/55  2.  Closing pressure 111/45  #3, high normal LVEDP 15 to 18 mmHg  4.  Overall preserved LV systolic function with basal inferior wall akinesis EF 50 to 55% estimated  No significant transaortic valve gradient    Angiography  1.  Left main is medium to large caliber with no angiographic disease of the left main  2.  The LAD has diffuse disease most significantly with subtotally occluded mid to distal portion after the takeoff of the diagonal branch with KHAI I flow, proximal portion with diffuse disease but not greater than 50%, diagonals patent  3.  Ramus intermedius proximal 80% disease  4.  Circumflex proximally patent with obtuse marginals with proximal 80% disease  5.  RCA  just after the ostial takeoff with  reconstitution of left to right collateralization via septal collaterals and epicardial lateral collaterals to the PLV branch and PDA branches distally.    Recommendations and conclusions  1.  Urgent CABG evaluation for multivessel bypass, critical three-vessel CAD  -If patient decompensates would do emergent Impella supported revascularization of the LAD  2.  Balloon pump one-to-one diastolic augmentation of coronary flow will be continued  3.  Aspirin statin will be utilized, no beta-blocker with marginal blood pressures and RV dysfunction by 2D echo  4.  Case discussed with CV surgery after angiography and the case was concluded  5.  Other recommendations to follow bypass evaluation    Jose Juan Fatima MD, PhD    The following portions of the patient's history were reviewed and updated as appropriate: allergies, current medications, past family history, past medical history, past social history, past surgical history and problem list.      ROS:  14 point review of systems negative except as mentioned above    Current Outpatient Medications:   •  atorvastatin (LIPITOR) 40 MG tablet, TAKE ONE TABLET BY MOUTH DAILY, Disp: 90 tablet, Rfl: 1  •  bumetanide (BUMEX) 1 MG tablet, TAKE ONE TABLET BY MOUTH TWICE A DAY, Disp: 180 tablet, Rfl: 0  •  clopidogrel (PLAVIX) 75 MG tablet, Take 1 tablet by mouth Daily., Disp: 90 tablet, Rfl: 3  •  irbesartan (AVAPRO) 75 MG tablet, Take 1 tablet by mouth Daily., Disp: 90 tablet, Rfl: 1  •  metFORMIN (GLUCOPHAGE) 500 MG tablet, TAKE ONE TABLET BY MOUTH DAILY WITH BREAKFAST, Disp: 90 tablet, Rfl: 0  •  metoprolol succinate XL (TOPROL-XL) 50 MG 24 hr tablet, Take 1 tablet by mouth 2 (Two) Times a Day., Disp: 180 tablet, Rfl: 1  •  potassium chloride 10 MEQ CR tablet, Take 1 tablet by mouth Daily., Disp: 30 tablet, Rfl: 3  •  Accu-Chek FastClix Lancets misc, 1 each Daily., Disp: 102 each, Rfl: 0  •  glucose blood (Accu-Chek Guide) test strip, 1 each by Other route Daily. Use as  instructed, Disp: 100 each, Rfl: 0  •  Isopropyl Alcohol (ALCOHOL WIPES) 70 % misc, Apply 1 each topically Daily., Disp: 100 each, Rfl: 0    Problem List:  Patient Active Problem List   Diagnosis   • Hyperlipidemia   • Hypertension   • Obesity   • Preventative health care   • Allergic rhinitis   • NSTEMI, initial episode of care ([facility])   • NSTEMI (non-ST elevated myocardial infarction) ([facility])   • Coronary artery disease involving native coronary artery of native heart with unstable angina pectoris ([facility])   • S/P CABG x 5   • Type 2 diabetes mellitus without complication, without long-term current use of insulin ([facility])   • Ischemic cardiomyopathy     Past Medical History:  Past Medical History:   Diagnosis Date   • Allergic    • Coronary artery disease    • Dyslipidemia    • History of tick-borne disease    • Hyperlipemia    • Hypertension      Past Surgical History:  Past Surgical History:   Procedure Laterality Date   • CARDIAC CATHETERIZATION N/A 7/8/2020    Procedure: Left Heart Cath with possible PCI;  Surgeon: Jose Juan Fatima MD;  Location: [facility] CATH INVASIVE LOCATION;  Service: Cardiology;  Laterality: N/A;   • CARDIAC CATHETERIZATION N/A 7/8/2020    Procedure: Intra-Aortic Baloon Pump Insertion;  Surgeon: Jose Juan Fatima MD;  Location: [facility] CATH INVASIVE LOCATION;  Service: Cardiology;  Laterality: N/A;   • CORONARY ARTERY BYPASS GRAFT N/A 7/9/2020    Procedure: CORONARY ARTERY BYPASS GRAFTING X 5 USING LEFT EDWARD  AND LEFT ENDOSCOPICALLY HARVESTED SAPHENOUS VEIN;  Surgeon: Lui Lake MD;  Location: [facility] CVOR;  Service: Cardiothoracic;  Laterality: N/A;  MINERVA DONE BY SRINIVASAN     Social History:  Social History     Socioeconomic History   • Marital status:    Tobacco Use   • Smoking status: Never Smoker   • Smokeless tobacco: Never Used   Vaping Use   • Vaping Use: Never used   Substance and Sexual Activity   • Alcohol use: No   • Drug use: No   • Sexual activity: Defer      Allergies:  Allergies   Allergen Reactions   • Lisinopril Angioedema     Immunizations:  Immunization History   Administered Date(s) Administered   • COVID-19 (PFIZER) 08/06/2021, 08/27/2021   • Td 07/08/2014   • Tdap 07/06/2020            In-Office Procedure(s):  No orders to display        ASCVD RIsk Score::  The ASCVD Risk score (Karen MARTA Montemayor, et al., 2013) failed to calculate for the following reasons:    The patient has a prior MI or stroke diagnosis    Imaging:    Results for orders placed during the hospital encounter of 07/08/20    XR Chest 1 View    Narrative  DATE OF EXAM:  7/12/2020 7:29 AM    PROCEDURE:  XR CHEST 1 VW-    INDICATIONS:  Worsening shortness of breath, coronary artery disease, recent heart  surgery. History of a tiny left apical pneumothorax following chest tube  removal.    COMPARISON:  07/11/2020.    TECHNIQUE:  Single radiographic view of the chest was obtained.    FINDINGS:  The left apical pneumothorax has resolved. The right internal jugular  sheath remains in place. The heart is enlarged. There are postsurgical  changes from a recent CABG procedure. The pulmonary vascular markings  are probably normal. There is persistent bilateral basilar consolidation  and left perihilar consolidation which is unchanged. This is probably  due to atelectasis, but I cannot completely exclude airspace disease  from fluid overload or pneumonia. A new small right pleural effusion is  suspected. There is a stable small left pleural effusion.    Impression  1. The left apical pneumothorax has resolved.  2. Cardiomegaly.  3. Unchanged left perihilar and bilateral basilar consolidation probably  due to atelectasis, but I cannot exclude airspace disease from fluid  overload or pneumonia.  3. New small right pleural effusion and an unchanged small left pleural  effusion.    Electronically Signed By-Andrew York On:7/12/2020 8:18 AM  This report was finalized on [id number] by  Andrew York, .       Results  for orders placed during the hospital encounter of 07/08/20    US Renal Bilateral    Narrative  DATE OF EXAM:  7/8/2020 6:17 PM    PROCEDURE:  US RENAL BILATERAL-    INDICATIONS:  Chronic kidney disease    COMPARISON:  No Comparisons Available    TECHNIQUE:  Grayscale and color Doppler ultrasound evaluation of the kidneys and  urinary bladder was performed.      FINDINGS:  Both kidneys appear normal in size and echogenicity, with no stone,  mass, or hydronephrosis identified. The right kidney measures 11.5 cm,  while the left kidney measures 11.9 cm.    The urinary bladder is not well-seen secondary to Aguirre catheterization.    Impression  Both kidneys appear within normal limits.    Electronically Signed By-DR. Eliecer Santiago MD On:7/8/2020 8:39 PM  This report was finalized on [id number] by DR. Eliecer Santiago MD.          Lab Review:   Lab on 09/15/2021   Component Date Value   • Hemoglobin A1C 09/15/2021 6.0*   • Glucose 09/15/2021 104*   • BUN 09/15/2021 16    • Creatinine 09/15/2021 1.14    • Sodium 09/15/2021 140    • Potassium 09/15/2021 4.1    • Chloride 09/15/2021 104    • CO2 09/15/2021 22.6    • Calcium 09/15/2021 9.7    • eGFR Non  Amer 09/15/2021 65    • BUN/Creatinine Ratio 09/15/2021 14.0    • Anion Gap 09/15/2021 13.4    Lab on 07/21/2021   Component Date Value   • Glucose 07/21/2021 89    • BUN 07/21/2021 16    • Creatinine 07/21/2021 1.19    • Sodium 07/21/2021 143    • Potassium 07/21/2021 4.4    • Chloride 07/21/2021 103    • CO2 07/21/2021 28.9    • Calcium 07/21/2021 9.9    • eGFR Non  Amer 07/21/2021 62    • BUN/Creatinine Ratio 07/21/2021 13.4    • Anion Gap 07/21/2021 11.1    • Creatine Kinase 07/21/2021 51    • Magnesium 07/21/2021 2.5*   • PTH, Intact 07/21/2021 21.5    • Uric Acid 07/21/2021 7.2*   • 25 Hydroxy, Vitamin D 07/21/2021 25.3    • Color, UA 07/21/2021 Yellow    • Appearance, UA 07/21/2021 Clear    • pH, UA 07/21/2021 7.0    • Specific [place], UA  07/21/2021 1.017    • Glucose, UA 07/21/2021 Negative    • Ketones, UA 07/21/2021 Negative    • Bilirubin, UA 07/21/2021 Negative    • Blood, UA 07/21/2021 Negative    • Protein, UA 07/21/2021 Negative    • Leuk Esterase, UA 07/21/2021 Negative    • Nitrite, UA 07/21/2021 Negative    • Urobilinogen, UA 07/21/2021 0.2 E.U./dL    • Creatinine, Urine 07/21/2021 89.5    • Total Protein, Urine 07/21/2021 5.0    • WBC 07/21/2021 5.75    • RBC 07/21/2021 5.16    • Hemoglobin 07/21/2021 15.3    • Hematocrit 07/21/2021 45.6    • MCV 07/21/2021 88.4    • MCH 07/21/2021 29.7    • MCHC 07/21/2021 33.6    • RDW 07/21/2021 13.9    • RDW-SD 07/21/2021 45.3    • MPV 07/21/2021 9.6    • Platelets 07/21/2021 173    • Neutrophil % 07/21/2021 65.1    • Lymphocyte % 07/21/2021 22.4    • Monocyte % 07/21/2021 8.9    • Eosinophil % 07/21/2021 2.4    • Basophil % 07/21/2021 1.0    • Immature Grans % 07/21/2021 0.2    • Neutrophils, Absolute 07/21/2021 3.74    • Lymphocytes, Absolute 07/21/2021 1.29    • Monocytes, Absolute 07/21/2021 0.51    • Eosinophils, Absolute 07/21/2021 0.14    • Basophils, Absolute 07/21/2021 0.06    • Immature Grans, Absolute 07/21/2021 0.01    • nRBC 07/21/2021 0.0      Recent labs reviewed and interpreted for clinical significance and application            Level of Care:           Jose Juan Fatima MD  12/21/21  .

## 2022-02-09 RX ORDER — METOPROLOL SUCCINATE 50 MG/1
50 TABLET, EXTENDED RELEASE ORAL 2 TIMES DAILY
Qty: 180 TABLET | Refills: 1 | Status: SHIPPED | OUTPATIENT
Start: 2022-02-09 | End: 2022-08-10 | Stop reason: SDUPTHER

## 2022-02-09 RX ORDER — POTASSIUM CHLORIDE 750 MG/1
10 TABLET, FILM COATED, EXTENDED RELEASE ORAL DAILY
Qty: 30 TABLET | Refills: 3 | Status: SHIPPED | OUTPATIENT
Start: 2022-02-09 | End: 2022-06-13 | Stop reason: SDUPTHER

## 2022-03-09 RX ORDER — BUMETANIDE 1 MG/1
TABLET ORAL
Qty: 180 TABLET | Refills: 0 | Status: SHIPPED | OUTPATIENT
Start: 2022-03-09 | End: 2022-06-01

## 2022-03-10 ENCOUNTER — OFFICE VISIT (OUTPATIENT)
Dept: FAMILY MEDICINE CLINIC | Facility: CLINIC | Age: 67
End: 2022-03-10

## 2022-03-10 ENCOUNTER — LAB (OUTPATIENT)
Dept: LAB | Facility: HOSPITAL | Age: 67
End: 2022-03-10

## 2022-03-10 VITALS
SYSTOLIC BLOOD PRESSURE: 145 MMHG | HEIGHT: 60 IN | OXYGEN SATURATION: 98 % | DIASTOLIC BLOOD PRESSURE: 79 MMHG | BODY MASS INDEX: 47.55 KG/M2 | HEART RATE: 70 BPM | WEIGHT: 242.2 LBS | TEMPERATURE: 96.8 F

## 2022-03-10 DIAGNOSIS — E78.5 HYPERLIPIDEMIA, UNSPECIFIED HYPERLIPIDEMIA TYPE: Chronic | ICD-10-CM

## 2022-03-10 DIAGNOSIS — I10 PRIMARY HYPERTENSION: Chronic | ICD-10-CM

## 2022-03-10 DIAGNOSIS — E11.9 TYPE 2 DIABETES MELLITUS WITHOUT COMPLICATION, WITHOUT LONG-TERM CURRENT USE OF INSULIN: Chronic | ICD-10-CM

## 2022-03-10 DIAGNOSIS — I25.110 CORONARY ARTERY DISEASE INVOLVING NATIVE CORONARY ARTERY OF NATIVE HEART WITH UNSTABLE ANGINA PECTORIS: Primary | Chronic | ICD-10-CM

## 2022-03-10 LAB
ALT SERPL W P-5'-P-CCNC: 34 U/L (ref 1–41)
ANION GAP SERPL CALCULATED.3IONS-SCNC: 11 MMOL/L (ref 5–15)
BUN SERPL-MCNC: 13 MG/DL (ref 8–23)
BUN/CREAT SERPL: 11.7 (ref 7–25)
CALCIUM SPEC-SCNC: 9.7 MG/DL (ref 8.6–10.5)
CHLORIDE SERPL-SCNC: 102 MMOL/L (ref 98–107)
CHOLEST SERPL-MCNC: 108 MG/DL (ref 0–200)
CO2 SERPL-SCNC: 27 MMOL/L (ref 22–29)
CREAT SERPL-MCNC: 1.11 MG/DL (ref 0.76–1.27)
CREAT UR-MCNC: 126.4 MG/DL
EGFRCR SERPLBLD CKD-EPI 2021: 74.2 ML/MIN/1.73
GLUCOSE SERPL-MCNC: 119 MG/DL (ref 65–99)
HBA1C MFR BLD: 6.1 % (ref 3.5–5.6)
HDLC SERPL-MCNC: 26 MG/DL (ref 40–60)
LDLC SERPL CALC-MCNC: 46 MG/DL (ref 0–100)
LDLC/HDLC SERPL: 1.41 {RATIO}
POTASSIUM SERPL-SCNC: 3.8 MMOL/L (ref 3.5–5.2)
PROT ?TM UR-MCNC: 6.8 MG/DL
PROT/CREAT UR: 53.8 MG/G CREA (ref 0–200)
SODIUM SERPL-SCNC: 140 MMOL/L (ref 136–145)
TRIGL SERPL-MCNC: 227 MG/DL (ref 0–150)
VLDLC SERPL-MCNC: 36 MG/DL (ref 5–40)

## 2022-03-10 PROCEDURE — 36415 COLL VENOUS BLD VENIPUNCTURE: CPT

## 2022-03-10 PROCEDURE — 82570 ASSAY OF URINE CREATININE: CPT

## 2022-03-10 PROCEDURE — 80061 LIPID PANEL: CPT

## 2022-03-10 PROCEDURE — 83036 HEMOGLOBIN GLYCOSYLATED A1C: CPT

## 2022-03-10 PROCEDURE — 84460 ALANINE AMINO (ALT) (SGPT): CPT

## 2022-03-10 PROCEDURE — 84156 ASSAY OF PROTEIN URINE: CPT

## 2022-03-10 PROCEDURE — 80048 BASIC METABOLIC PNL TOTAL CA: CPT

## 2022-03-10 PROCEDURE — 99214 OFFICE O/P EST MOD 30 MIN: CPT | Performed by: FAMILY MEDICINE

## 2022-03-10 NOTE — PROGRESS NOTES
"Subjective   Lonnie Salinas is a 66 y.o. male.     Chief Complaint   Patient presents with   • Hypertension   • Hyperlipidemia   • Diabetes     6 month f/u       HPI  Chief complaint: Hypertension hyperlipidemia type 2 diabetes mellitus coronary artery disease    Patient is a 66-year-old white male comes in for follow-up and maintenance of his current problems which include    1.  Hypertension-stable out of is on Avapro 75 mg once a day and metoprolol 50 mg twice a day Bumex 1 mg daily potassium 10 mEq daily.  He denied headache lightheaded dizziness or chest pain.    2.  Hyperlipidemia-stable-the patient is currently on Lipitor 40 mg daily.  Denies myalgias and arthralgias.    3.  Type 2 diabetes mellitus-stable-patient on Metformin 500 mg once a day.  Is asymptomatic.  Is due for A1c.    4.  Coronary artery disease/status post coronary artery bypass grafting-stable-patient is on Plavix 75 mg once a day and metoprolol 50 mg twice a day.  Denied chest pain shortness breath orthopnea or PND.  Ejection fraction left ventricle is normal.        The following portions of the patient's history were reviewed and updated as appropriate: allergies, current medications, past family history, past medical history, past social history, past surgical history and problem list.    Review of Systems    Objective     /79 (BP Location: Left arm, Patient Position: Sitting, Cuff Size: Large Adult)   Pulse 70   Temp 96.8 °F (36 °C) (Infrared)   Ht 72 cm (28.35\")   Wt 110 kg (242 lb 3.2 oz)   SpO2 98%   .93 kg/m²     Physical Exam  Vitals and nursing note reviewed.   Constitutional:       Appearance: He is well-developed.   HENT:      Head: Normocephalic and atraumatic.   Eyes:      Pupils: Pupils are equal, round, and reactive to light.   Cardiovascular:      Rate and Rhythm: Normal rate and regular rhythm.      Pulses: Normal pulses.      Heart sounds: Normal heart sounds. No murmur heard.    No friction rub. No " gallop.   Pulmonary:      Effort: Pulmonary effort is normal.      Breath sounds: Normal breath sounds.   Abdominal:      General: Abdomen is flat. Bowel sounds are normal.      Palpations: Abdomen is soft.   Musculoskeletal:         General: Normal range of motion.      Cervical back: Neck supple.   Skin:     General: Skin is warm and dry.   Neurological:      Mental Status: He is alert and oriented to person, place, and time.   Psychiatric:         Behavior: Behavior normal.         Thought Content: Thought content normal.         Judgment: Judgment normal.           Assessment/Plan   Diagnoses and all orders for this visit:    1. Coronary artery disease involving native coronary artery of native heart with unstable angina pectoris (HCC) (Primary)    2. Primary hypertension  -     Basic Metabolic Panel; Future    3. Hyperlipidemia, unspecified hyperlipidemia type  -     ALT; Future  -     Lipid Panel; Future    4. Type 2 diabetes mellitus without complication, without long-term current use of insulin (HCC)  -     Hemoglobin A1c; Future  -     Protein / Creatinine Ratio, Urine - Urine, Clean Catch; Future      Patient Instructions   Continue your current medications and treatment.    Have the follow up labs done and calll for results.    Follow up in the office in 6 months.      Jarrell Alaniz Jr., MD    03/10/22

## 2022-03-10 NOTE — PATIENT INSTRUCTIONS
Continue your current medications and treatment.    Have the follow up labs done and calll for results.    Follow up in the office in 6 months.

## 2022-04-19 RX ORDER — ATORVASTATIN CALCIUM 40 MG/1
TABLET, FILM COATED ORAL
Qty: 90 TABLET | Refills: 1 | Status: SHIPPED | OUTPATIENT
Start: 2022-04-19 | End: 2022-10-25

## 2022-06-01 RX ORDER — BUMETANIDE 1 MG/1
TABLET ORAL
Qty: 180 TABLET | Refills: 0 | Status: SHIPPED | OUTPATIENT
Start: 2022-06-01 | End: 2022-08-30

## 2022-06-02 RX ORDER — IRBESARTAN 75 MG/1
75 TABLET ORAL DAILY
Qty: 90 TABLET | Refills: 1 | Status: SHIPPED | OUTPATIENT
Start: 2022-06-02 | End: 2022-11-28 | Stop reason: SDUPTHER

## 2022-06-13 ENCOUNTER — OFFICE VISIT (OUTPATIENT)
Dept: CARDIOLOGY | Facility: CLINIC | Age: 67
End: 2022-06-13

## 2022-06-13 VITALS
SYSTOLIC BLOOD PRESSURE: 132 MMHG | HEART RATE: 64 BPM | RESPIRATION RATE: 18 BRPM | WEIGHT: 220.2 LBS | DIASTOLIC BLOOD PRESSURE: 82 MMHG | HEIGHT: 71 IN | BODY MASS INDEX: 30.83 KG/M2

## 2022-06-13 DIAGNOSIS — I10 ESSENTIAL HYPERTENSION: ICD-10-CM

## 2022-06-13 DIAGNOSIS — R00.0 TACHYCARDIA: ICD-10-CM

## 2022-06-13 DIAGNOSIS — I25.110 CORONARY ARTERY DISEASE INVOLVING NATIVE CORONARY ARTERY OF NATIVE HEART WITH UNSTABLE ANGINA PECTORIS: ICD-10-CM

## 2022-06-13 DIAGNOSIS — I21.4 NSTEMI, INITIAL EPISODE OF CARE: Primary | ICD-10-CM

## 2022-06-13 DIAGNOSIS — Z95.1 S/P CABG X 5: ICD-10-CM

## 2022-06-13 DIAGNOSIS — E78.5 HYPERLIPIDEMIA, UNSPECIFIED HYPERLIPIDEMIA TYPE: ICD-10-CM

## 2022-06-13 PROCEDURE — 99214 OFFICE O/P EST MOD 30 MIN: CPT | Performed by: INTERNAL MEDICINE

## 2022-06-13 RX ORDER — POTASSIUM CHLORIDE 750 MG/1
10 TABLET, FILM COATED, EXTENDED RELEASE ORAL DAILY
Qty: 30 TABLET | Refills: 3 | Status: SHIPPED | OUTPATIENT
Start: 2022-06-13 | End: 2022-10-14 | Stop reason: SDUPTHER

## 2022-06-14 NOTE — PROGRESS NOTES
Cardiology Clinic Note  Jose Juan Fatima MD, PhD    Subjective:     Encounter Date:06/13/2022      Patient ID: Lonnie Salinas is a 66 y.o. male.    Chief Complaint:  Chief Complaint   Patient presents with   • Follow-up       HPI:       Previously I the pleasure of seeing this 66-year-old male today who is known to me from prior hospitalization status post left heart catheterization which demonstrated multivessel CAD after non-ST elevation myocardial infarction, balloon pump placement, mildly reduced EF 45 to 50% with at least moderate RV dysfunction, ultimately undergoing multivessel bypass surgery with LIMA to LAD, saphenous vein graft to PDA, vein graft to OM1 and OM 2 sequential jump graft, saphenous vein graft to OM 3 with left lower extremity vein harvest.  His case was complicated by cardiogenic shock also required multiple vasopressor as well as prolonged milrinone support with pulmonary hypertension and RV failure successfully weaned off with institution of heart failure therapies and beta-blockade.  He presents back to clinic today doing well chest pain-free with diminishing edema however he is tachycardic.  Blood pressure at home is running 1 20-1 40 systolic and he denies palpitations or presyncope.  He has no complaints and is doing well.  He is asked about driving colleges which we would prefer him to wait 30 days for the sternotomy to heal followed by initiation of cardiac rehab prior to potential driving for the sole reason of preventing any steering wheel related chest trauma prior to his healing of his sternotomy.  No other concerning signs or symptoms today.  No PND orthopnea chest pain reported     Last year on encounter, he presents doing well without any chest pain.  He says he is more active status post bypass surgery and his blood pressure is improved which will now allow better heart failure therapies to be advanced.  His heart rate is still in the 100s regular sinus rhythm.  No syncope  presyncope anginal chest pain and he is mowing his yard now 1 month status post multivessel bypass and he says he is probably doing more than he should but he otherwise feels well.  He has minimal peripheral edema left greater than right at the site distal to his vein harvest.  I was able to define with him that he is allergic with angioedema with lisinopril however he had been tolerating irbesartan 150 mg p.o. daily for hypertension prior to his surgery without any problems and we discussed given mild reduction in his EF of 50% with some degree of RV dysfunction this medicine should be reinstituted today with blood pressure 138/82 in addition to up titration of his beta-blocker with heart rate in the 100s.  He is amenable for this and will follow-up with CV surgery.  He will start cardiac rehab soon.  No fevers chill sweats nausea vomiting diarrhea chest pain and shortness of breath is improving as he is getting further out from his bypass surgery.     Today he presents doing well, no significant edema, loop diuretics are effective as well as decreasing free water similar to prior encounter,  No chest pain or shortness of breath, no palpitations or unexplained syncope.  No heart failure signs or symptoms presently.  Hemodynamically electrically stable.   Again similar last visit We did discuss right-sided symptoms with pitting edema peripherally in the setting of ongoing diuretic therapy and is counter intuitive for increased water intake along with diuretic management and salt restriction which he voiced understanding.   he is CCS class I NYHA class I.  Known RV dysfunction with mild underlying LV dysfunction EF 45 to 50%, encourage diet and weight loss per AHA guidelines, low carbohydrate good protein intake diet along with moderate intensity exercise and walking.  Blood pressure and heart rate are well controlled, no new symptoms today otherwise doing very well on present medications    Exam  Regular rate and  "rhythm no rubs murmurs gallops  No clubbing cyanosis or edema  No carotid bruits or JVD  Normal pulses  Will cap refill  Intact grossly  Clear to auscultation  Soft nontender nondistended  Vitals reviewed below    Treated medical conditions/assessment  CAD  History of cardiogenic shock  Ischemic cardiomyopathy  History of nons ST elevation myocardial infarction  Hypertension hyperlipidemia  Risk factors for the coronary disease       Plan today  Continue metoprolol  Continue irbesartan  Continue statin therapy  Free water and salt restriction.  Continue Bumex   Continue potassium supplementation presently  NYHA class I CCS class I  Return to clinic in 1 year for follow-upContinue these doses, patient walking and exercising on a daily basis, no changes to medical therapy today  Transition Plavix monotherapy 75 mg daily, stop aspirin, low risk for bleeding via has bled score     Review of systems x14 point review of systems is negative except as mentioned above      Historical data copied forward from previous encounters in EMR including the history, exam, and assessment/plan has been reviewed and is unchanged unless noted otherwise.    Cardiac medicines reviewed with risk, benefits, and necessity of each discussed.    Risk and benefit of cardiac testing reviewed including death heart attack stroke pain bleeding infection need for vascular /cardiovascular surgery were discussed and the patient     Objective:         /82 (BP Location: Left arm, Patient Position: Sitting)   Pulse 64   Resp 18   Ht 180.3 cm (71\")   Wt 99.9 kg (220 lb 3.2 oz)   BMI 30.71 kg/m²     Physical Exam    Assessment:         There are no diagnoses linked to this encounter.       Plan:              The pleasure to be involved in this patient's cardiovascular care.  Please call with any questions or concerns  Jose Juan Fatima MD, PhD    Most recent EKG as reviewed and interpreted by me:  Procedures     Most recent echo as reviewed and " interpreted by me:  Results for orders placed during the hospital encounter of 07/08/20    Adult Transthoracic Echo Limited W/ Cont if Necessary Per Protocol    Interpretation Summary  · Right ventricular cavity is severely dilated.  · Moderately reduced right ventricular systolic function noted.  · Mild tricuspid valve regurgitation is present.    Overall LV function appears mildly reduced 45 to 50% with dyssynchrony with pacing  RV is severely enlarged with at least moderate hypokinesis possibly moderate to severe  Mild TR, trace MR, no aortic valve pathology  No masses or effusion seen  IVC not well seen cannot estimate right atrial pressure  Did not estimate diastolic dysfunction, no parameters identified      Most recent stress test as reviewed and interpreted by me:      Most recent cardiac catheterization as reviewed interpreted by me:  Results for orders placed during the hospital encounter of 07/08/20    Cardiac Catheterization/Vascular Study    Narrative   Jose Juan Fatima MD, PhD  [facility] cardiology  Date of service 7-8-2020    Procedure  1.  Left heart catheterization with coronary angiography left ventriculography in FLORES position    Indication  Non-ST elevation myocardial infarction    After informed consent the patient was brought to the catheterization lab sterilely prepped and draped usual fashion with exposure the right groin right common femoral tibial access via micropuncture modified Seldinger technique.  JL4 and JR4 diagnostic catheters used for selective left and right coronary angiography with the pigtail catheter used across the aortic valve followed by left ventriculography in FLORES position EDP measurement and transaortic valve gradient assessment.  Secondary to findings of multivessel coronary artery disease with subtotally occluded mid LAD, diffuse ramus proximal, obtuse marginal and RCA disease with  of the RCA decision was made for evaluation of urgent coronary  artery bypass grafting.  After discussion with CV surgery as well as KHAI I flow in the continuation LAD from the mid to distal portion after the takeoff the diagonal branch decision was made for balloon pump insertion to augment diastolic perfusion pressure.  The 6 Libyan sheath was then exchanged for standard compatible balloon pump sheath which was placed under fluoroscopic guidance.  Pigtail catheter was used for aortic arch visualization with angiography of the ostium of the left subclavian.  Appropriately sized aortic balloon pump was positioned 1 to 2 cm distal to the takeoff of the left subclavian in the descending thoracic aorta with one-to-one augmentation.  Cineangiography of inflated balloon revealed termination well above anatomic landmarks for renal arteries.  Patient had no complications.  He was transferred to ICU for urgent CABG evaluation.    Complications none  Blood loss less than 10 cc  Moderate conscious sedation time time of 1 hour  Moderate conscious Tatian was utilized with IV Versed and fentanyl administered by registered nurse with continuous ECG pulse oximetry and hemodynamic monitoring throughout the entirety the case.    Findings  1.  Opening aortic pressure of 84/55  2.  Closing pressure 111/45  #3, high normal LVEDP 15 to 18 mmHg  4.  Overall preserved LV systolic function with basal inferior wall akinesis EF 50 to 55% estimated  No significant transaortic valve gradient    Angiography  1.  Left main is medium to large caliber with no angiographic disease of the left main  2.  The LAD has diffuse disease most significantly with subtotally occluded mid to distal portion after the takeoff of the diagonal branch with KHAI I flow, proximal portion with diffuse disease but not greater than 50%, diagonals patent  3.  Ramus intermedius proximal 80% disease  4.  Circumflex proximally patent with obtuse marginals with proximal 80% disease  5.  RCA  just after the ostial takeoff with  reconstitution of left to right collateralization via septal collaterals and epicardial lateral collaterals to the PLV branch and PDA branches distally.    Recommendations and conclusions  1.  Urgent CABG evaluation for multivessel bypass, critical three-vessel CAD  -If patient decompensates would do emergent Impella supported revascularization of the LAD  2.  Balloon pump one-to-one diastolic augmentation of coronary flow will be continued  3.  Aspirin statin will be utilized, no beta-blocker with marginal blood pressures and RV dysfunction by 2D echo  4.  Case discussed with CV surgery after angiography and the case was concluded  5.  Other recommendations to follow bypass evaluation    Jose Juan Fatima MD, PhD    The following portions of the patient's history were reviewed and updated as appropriate: allergies, current medications, past family history, past medical history, past social history, past surgical history and problem list.      ROS:  14 point review of systems negative except as mentioned above    Current Outpatient Medications:   •  atorvastatin (LIPITOR) 40 MG tablet, TAKE ONE TABLET BY MOUTH DAILY, Disp: 90 tablet, Rfl: 1  •  bumetanide (BUMEX) 1 MG tablet, TAKE ONE TABLET BY MOUTH TWICE A DAY, Disp: 180 tablet, Rfl: 0  •  clopidogrel (PLAVIX) 75 MG tablet, Take 1 tablet by mouth Daily., Disp: 90 tablet, Rfl: 3  •  irbesartan (AVAPRO) 75 MG tablet, Take 1 tablet by mouth Daily., Disp: 90 tablet, Rfl: 1  •  metFORMIN (GLUCOPHAGE) 500 MG tablet, TAKE ONE TABLET BY MOUTH DAILY WITH BREAKFAST, Disp: 90 tablet, Rfl: 0  •  metoprolol succinate XL (TOPROL-XL) 50 MG 24 hr tablet, Take 1 tablet by mouth 2 (Two) Times a Day., Disp: 180 tablet, Rfl: 1  •  potassium chloride 10 MEQ CR tablet, Take 1 tablet by mouth Daily., Disp: 30 tablet, Rfl: 3  •  Accu-Chek FastClix Lancets misc, 1 each Daily., Disp: 102 each, Rfl: 0  •  glucose blood (Accu-Chek Guide) test strip, 1 each by Other route Daily. Use as  instructed, Disp: 100 each, Rfl: 0  •  Isopropyl Alcohol (ALCOHOL WIPES) 70 % misc, Apply 1 each topically Daily., Disp: 100 each, Rfl: 0    Problem List:  Patient Active Problem List   Diagnosis   • Hyperlipidemia   • Hypertension   • Obesity   • Preventative health care   • Allergic rhinitis   • NSTEMI, initial episode of care ([facility])   • NSTEMI (non-ST elevated myocardial infarction) ([facility])   • Coronary artery disease involving native coronary artery of native heart with unstable angina pectoris ([facility])   • S/P CABG x 5   • Type 2 diabetes mellitus without complication, without long-term current use of insulin ([facility])   • Ischemic cardiomyopathy     Past Medical History:  Past Medical History:   Diagnosis Date   • Allergic    • Coronary artery disease    • Dyslipidemia    • History of tick-borne disease    • Hyperlipemia    • Hypertension      Past Surgical History:  Past Surgical History:   Procedure Laterality Date   • CARDIAC CATHETERIZATION N/A 7/8/2020    Procedure: Left Heart Cath with possible PCI;  Surgeon: Jose Juan Fatima MD;  Location: [facility] CATH INVASIVE LOCATION;  Service: Cardiology;  Laterality: N/A;   • CARDIAC CATHETERIZATION N/A 7/8/2020    Procedure: Intra-Aortic Baloon Pump Insertion;  Surgeon: Jose Juan Fatima MD;  Location: [facility] CATH INVASIVE LOCATION;  Service: Cardiology;  Laterality: N/A;   • CORONARY ARTERY BYPASS GRAFT N/A 7/9/2020    Procedure: CORONARY ARTERY BYPASS GRAFTING X 5 USING LEFT EDWARD  AND LEFT ENDOSCOPICALLY HARVESTED SAPHENOUS VEIN;  Surgeon: Lui Lake MD;  Location: [facility] CVOR;  Service: Cardiothoracic;  Laterality: N/A;  MINERVA DONE BY SRINIVASAN     Social History:  Social History     Socioeconomic History   • Marital status:    Tobacco Use   • Smoking status: Never Smoker   • Smokeless tobacco: Never Used   Vaping Use   • Vaping Use: Never used   Substance and Sexual Activity   • Alcohol use: No   • Drug use: No   • Sexual activity: Defer      Allergies:  Allergies   Allergen Reactions   • Lisinopril Angioedema     Immunizations:  Immunization History   Administered Date(s) Administered   • COVID-19 (PFIZER) PURPLE CAP 08/06/2021, 08/27/2021   • Td 07/08/2014   • Tdap 07/06/2020            In-Office Procedure(s):  No orders to display        ASCVD RIsk Score::  The ASCVD Risk score (Karen LOZANO Jr., et al., 2013) failed to calculate for the following reasons:    The patient has a prior MI or stroke diagnosis    Imaging:    Results for orders placed during the hospital encounter of 07/08/20    XR Chest 1 View    Narrative  DATE OF EXAM:  7/12/2020 7:29 AM    PROCEDURE:  XR CHEST 1 VW-    INDICATIONS:  Worsening shortness of breath, coronary artery disease, recent heart  surgery. History of a tiny left apical pneumothorax following chest tube  removal.    COMPARISON:  07/11/2020.    TECHNIQUE:  Single radiographic view of the chest was obtained.    FINDINGS:  The left apical pneumothorax has resolved. The right internal jugular  sheath remains in place. The heart is enlarged. There are postsurgical  changes from a recent CABG procedure. The pulmonary vascular markings  are probably normal. There is persistent bilateral basilar consolidation  and left perihilar consolidation which is unchanged. This is probably  due to atelectasis, but I cannot completely exclude airspace disease  from fluid overload or pneumonia. A new small right pleural effusion is  suspected. There is a stable small left pleural effusion.    Impression  1. The left apical pneumothorax has resolved.  2. Cardiomegaly.  3. Unchanged left perihilar and bilateral basilar consolidation probably  due to atelectasis, but I cannot exclude airspace disease from fluid  overload or pneumonia.  3. New small right pleural effusion and an unchanged small left pleural  effusion.    Electronically Signed By-Andrew York On:7/12/2020 8:18 AM  This report was finalized on [id number] by  Andrew York, .        Results for orders placed during the hospital encounter of 07/08/20    US Renal Bilateral    Narrative  DATE OF EXAM:  7/8/2020 6:17 PM    PROCEDURE:  US RENAL BILATERAL-    INDICATIONS:  Chronic kidney disease    COMPARISON:  No Comparisons Available    TECHNIQUE:  Grayscale and color Doppler ultrasound evaluation of the kidneys and  urinary bladder was performed.      FINDINGS:  Both kidneys appear normal in size and echogenicity, with no stone,  mass, or hydronephrosis identified. The right kidney measures 11.5 cm,  while the left kidney measures 11.9 cm.    The urinary bladder is not well-seen secondary to Aguirre catheterization.    Impression  Both kidneys appear within normal limits.    Electronically Signed By-DR. Eliecer Santiago MD On:7/8/2020 8:39 PM  This report was finalized on [id number] by DR. Eliecer Santiago MD.          Lab Review:   Lab on 03/10/2022   Component Date Value   • ALT (SGPT) 03/10/2022 34    • Glucose 03/10/2022 119 (A)   • BUN 03/10/2022 13    • Creatinine 03/10/2022 1.11    • Sodium 03/10/2022 140    • Potassium 03/10/2022 3.8    • Chloride 03/10/2022 102    • CO2 03/10/2022 27.0    • Calcium 03/10/2022 9.7    • BUN/Creatinine Ratio 03/10/2022 11.7    • Anion Gap 03/10/2022 11.0    • eGFR 03/10/2022 74.2    • Hemoglobin A1C 03/10/2022 6.1 (A)   • Total Cholesterol 03/10/2022 108    • Triglycerides 03/10/2022 227 (A)   • HDL Cholesterol 03/10/2022 26 (A)   • LDL Cholesterol  03/10/2022 46    • VLDL Cholesterol 03/10/2022 36    • LDL/HDL Ratio 03/10/2022 1.41    • Protein/Creatinine Ratio* 03/10/2022 53.8    • Creatinine, Urine 03/10/2022 126.4    • Total Protein, Urine 03/10/2022 6.8      Recent labs reviewed and interpreted for clinical significance and application            Level of Care:           Jose uJan Fatima MD  06/14/22  .

## 2022-08-10 ENCOUNTER — TELEPHONE (OUTPATIENT)
Dept: CARDIOLOGY | Facility: CLINIC | Age: 67
End: 2022-08-10

## 2022-08-10 RX ORDER — METOPROLOL SUCCINATE 50 MG/1
50 TABLET, EXTENDED RELEASE ORAL 2 TIMES DAILY
Qty: 180 TABLET | Refills: 1 | Status: SHIPPED | OUTPATIENT
Start: 2022-08-10 | End: 2023-02-17 | Stop reason: SDUPTHER

## 2022-08-30 RX ORDER — BUMETANIDE 1 MG/1
TABLET ORAL
Qty: 180 TABLET | Refills: 0 | Status: SHIPPED | OUTPATIENT
Start: 2022-08-30 | End: 2022-12-11

## 2022-09-13 ENCOUNTER — LAB (OUTPATIENT)
Dept: LAB | Facility: HOSPITAL | Age: 67
End: 2022-09-13

## 2022-09-13 ENCOUNTER — OFFICE VISIT (OUTPATIENT)
Dept: FAMILY MEDICINE CLINIC | Facility: CLINIC | Age: 67
End: 2022-09-13

## 2022-09-13 VITALS
HEIGHT: 71 IN | OXYGEN SATURATION: 100 % | DIASTOLIC BLOOD PRESSURE: 80 MMHG | HEART RATE: 69 BPM | TEMPERATURE: 97.1 F | BODY MASS INDEX: 30.52 KG/M2 | WEIGHT: 218 LBS | RESPIRATION RATE: 18 BRPM | SYSTOLIC BLOOD PRESSURE: 132 MMHG

## 2022-09-13 DIAGNOSIS — E78.5 HYPERLIPIDEMIA, UNSPECIFIED HYPERLIPIDEMIA TYPE: Chronic | ICD-10-CM

## 2022-09-13 DIAGNOSIS — I10 PRIMARY HYPERTENSION: Primary | Chronic | ICD-10-CM

## 2022-09-13 DIAGNOSIS — E11.9 TYPE 2 DIABETES MELLITUS WITHOUT COMPLICATION, WITHOUT LONG-TERM CURRENT USE OF INSULIN: Chronic | ICD-10-CM

## 2022-09-13 DIAGNOSIS — I25.110 CORONARY ARTERY DISEASE INVOLVING NATIVE CORONARY ARTERY OF NATIVE HEART WITH UNSTABLE ANGINA PECTORIS: Chronic | ICD-10-CM

## 2022-09-13 LAB
ANION GAP SERPL CALCULATED.3IONS-SCNC: 13 MMOL/L (ref 5–15)
BUN SERPL-MCNC: 16 MG/DL (ref 8–23)
BUN/CREAT SERPL: 13 (ref 7–25)
CALCIUM SPEC-SCNC: 9.4 MG/DL (ref 8.6–10.5)
CHLORIDE SERPL-SCNC: 105 MMOL/L (ref 98–107)
CO2 SERPL-SCNC: 25 MMOL/L (ref 22–29)
CREAT SERPL-MCNC: 1.23 MG/DL (ref 0.76–1.27)
EGFRCR SERPLBLD CKD-EPI 2021: 65.2 ML/MIN/1.73
GLUCOSE SERPL-MCNC: 94 MG/DL (ref 65–99)
HBA1C MFR BLD: 6 % (ref 3.5–5.6)
POTASSIUM SERPL-SCNC: 3.7 MMOL/L (ref 3.5–5.2)
SODIUM SERPL-SCNC: 143 MMOL/L (ref 136–145)

## 2022-09-13 PROCEDURE — 83036 HEMOGLOBIN GLYCOSYLATED A1C: CPT

## 2022-09-13 PROCEDURE — 99214 OFFICE O/P EST MOD 30 MIN: CPT | Performed by: FAMILY MEDICINE

## 2022-09-13 PROCEDURE — 36415 COLL VENOUS BLD VENIPUNCTURE: CPT

## 2022-09-13 PROCEDURE — 80048 BASIC METABOLIC PNL TOTAL CA: CPT

## 2022-09-13 NOTE — PATIENT INSTRUCTIONS
Continue your current medications and treatment.    Have the follow up labs done an call for results.    Follow up in 6 months.

## 2022-09-13 NOTE — PROGRESS NOTES
"Subjective   Lonnie Salinas is a 67 y.o. male.     Chief Complaint   Patient presents with   • Diabetes     6 mth f/u   • Hypertension   • Hyperlipidemia       HPI  Complaint: Hypertension hyperlipidemia type 2 diabetes mellitus    The patient is a 67-year-old white male comes in for follow-up and maintenance of his current problems which include    1.  Hypertension-stable-the patient is currently on irbesartan 75 mg daily metoprolol 50 mg twice a day Bumex 1 mg daily and potassium.  Blood pressure stable.    2.  Hyperlipidemia-stable-patient is currently on Lipitor 40 mg daily.  He denies myalgias and arthralgias.    3.  Coronary artery disease/status post ST segment elevation MI-stable-patient on Plavix 75 mg daily and metoprolol.  Patient denies chest pain shortness of breath orthopnea or PND.    4.  Type 2 diabetes mellitus-stable-the patient is currently on metformin 500 mg twice a day.  He is due for an A1c.        The following portions of the patient's history were reviewed and updated as appropriate: allergies, current medications, past family history, past medical history, past social history, past surgical history and problem list.    Review of Systems    Objective     /80 (BP Location: Left arm, Patient Position: Sitting, Cuff Size: Adult)   Pulse 69   Temp 97.1 °F (36.2 °C) (Infrared)   Resp 18   Ht 180.3 cm (71\")   Wt 98.9 kg (218 lb)   SpO2 100%   BMI 30.40 kg/m²     Physical Exam  Vitals and nursing note reviewed.   Constitutional:       Appearance: He is well-developed.   HENT:      Head: Normocephalic and atraumatic.   Eyes:      Pupils: Pupils are equal, round, and reactive to light.   Cardiovascular:      Rate and Rhythm: Normal rate and regular rhythm.      Pulses: Normal pulses.      Heart sounds: Normal heart sounds. No murmur heard.    No friction rub. No gallop.   Pulmonary:      Effort: Pulmonary effort is normal.      Breath sounds: Normal breath sounds.   Abdominal:      " General: Bowel sounds are normal.      Palpations: Abdomen is soft.   Musculoskeletal:         General: Normal range of motion.      Cervical back: Neck supple.   Skin:     General: Skin is warm and dry.   Neurological:      Mental Status: He is alert and oriented to person, place, and time.   Psychiatric:         Behavior: Behavior normal.         Thought Content: Thought content normal.         Judgment: Judgment normal.           Assessment & Plan   Diagnoses and all orders for this visit:    1. Primary hypertension (Primary)    2. Hyperlipidemia, unspecified hyperlipidemia type    3. Coronary artery disease involving native coronary artery of native heart with unstable angina pectoris (HCC)    4. Type 2 diabetes mellitus without complication, without long-term current use of insulin (HCC)  -     Basic Metabolic Panel; Future  -     Hemoglobin A1c; Future      Patient Instructions   Continue your current medications and treatment.    Have the follow up labs done an call for results.    Follow up in 6 months.          Jarrell Alaniz Jr., MD    09/13/22

## 2022-10-14 RX ORDER — POTASSIUM CHLORIDE 750 MG/1
10 TABLET, FILM COATED, EXTENDED RELEASE ORAL DAILY
Qty: 30 TABLET | Refills: 3 | Status: SHIPPED | OUTPATIENT
Start: 2022-10-14 | End: 2023-02-09

## 2022-10-25 RX ORDER — ATORVASTATIN CALCIUM 40 MG/1
TABLET, FILM COATED ORAL
Qty: 90 TABLET | Refills: 1 | Status: SHIPPED | OUTPATIENT
Start: 2022-10-25

## 2022-11-28 RX ORDER — IRBESARTAN 75 MG/1
75 TABLET ORAL DAILY
Qty: 90 TABLET | Refills: 1 | Status: SHIPPED | OUTPATIENT
Start: 2022-11-28

## 2022-12-11 RX ORDER — BUMETANIDE 1 MG/1
TABLET ORAL
Qty: 180 TABLET | Refills: 0 | Status: SHIPPED | OUTPATIENT
Start: 2022-12-11 | End: 2023-03-13 | Stop reason: SDUPTHER

## 2022-12-13 RX ORDER — CLOPIDOGREL BISULFATE 75 MG/1
75 TABLET ORAL DAILY
Qty: 90 TABLET | Refills: 3 | Status: SHIPPED | OUTPATIENT
Start: 2022-12-13

## 2023-02-09 RX ORDER — POTASSIUM CHLORIDE 750 MG/1
TABLET, FILM COATED, EXTENDED RELEASE ORAL
Qty: 30 TABLET | Refills: 3 | Status: SHIPPED | OUTPATIENT
Start: 2023-02-09

## 2023-02-17 RX ORDER — METOPROLOL SUCCINATE 50 MG/1
50 TABLET, EXTENDED RELEASE ORAL 2 TIMES DAILY
Qty: 180 TABLET | Refills: 1 | Status: SHIPPED | OUTPATIENT
Start: 2023-02-17

## 2023-02-17 NOTE — TELEPHONE ENCOUNTER
Caller: GABY BLACK    Relationship: Emergency Contact    Best call back number: [phone]    Requested Prescriptions:   Requested Prescriptions     Pending Prescriptions Disp Refills   • metoprolol succinate XL (TOPROL-XL) 50 MG 24 hr tablet 180 tablet 1     Sig: Take 1 tablet by mouth 2 (Two) Times a Day.        Pharmacy where request should be sent: [id number] [place] [facility] [address] - [phone] [facility] [phone] FX     Additional details provided by patient: PATIENT WILL BE OUT OF MEDICATION TOMORROW.     Does the patient have less than a 3 day supply:  [x] Yes  [] No    Would you like a call back once the refill request has been completed: [] Yes [x] No    If the office needs to give you a call back, can they leave a voicemail: [] Yes [x] No    Felicia Becerra Rep   02/17/23 08:51 EST

## 2023-02-27 ENCOUNTER — TELEPHONE (OUTPATIENT)
Dept: FAMILY MEDICINE CLINIC | Facility: CLINIC | Age: 68
End: 2023-02-27
Payer: COMMERCIAL

## 2023-03-06 ENCOUNTER — OFFICE VISIT (OUTPATIENT)
Dept: FAMILY MEDICINE CLINIC | Facility: CLINIC | Age: 68
End: 2023-03-06
Payer: COMMERCIAL

## 2023-03-06 ENCOUNTER — LAB (OUTPATIENT)
Dept: LAB | Facility: HOSPITAL | Age: 68
End: 2023-03-06
Payer: COMMERCIAL

## 2023-03-06 VITALS
SYSTOLIC BLOOD PRESSURE: 127 MMHG | DIASTOLIC BLOOD PRESSURE: 77 MMHG | WEIGHT: 225 LBS | TEMPERATURE: 98.1 F | HEIGHT: 71 IN | HEART RATE: 73 BPM | OXYGEN SATURATION: 100 % | BODY MASS INDEX: 31.5 KG/M2

## 2023-03-06 DIAGNOSIS — I10 PRIMARY HYPERTENSION: Chronic | ICD-10-CM

## 2023-03-06 DIAGNOSIS — I25.110 CORONARY ARTERY DISEASE INVOLVING NATIVE CORONARY ARTERY OF NATIVE HEART WITH UNSTABLE ANGINA PECTORIS: Chronic | ICD-10-CM

## 2023-03-06 DIAGNOSIS — E11.40 TYPE 2 DIABETES MELLITUS WITH DIABETIC NEUROPATHY, WITHOUT LONG-TERM CURRENT USE OF INSULIN: ICD-10-CM

## 2023-03-06 DIAGNOSIS — E78.5 HYPERLIPIDEMIA, UNSPECIFIED HYPERLIPIDEMIA TYPE: Chronic | ICD-10-CM

## 2023-03-06 DIAGNOSIS — I10 PRIMARY HYPERTENSION: Primary | Chronic | ICD-10-CM

## 2023-03-06 DIAGNOSIS — E11.9 TYPE 2 DIABETES MELLITUS WITHOUT COMPLICATION, WITHOUT LONG-TERM CURRENT USE OF INSULIN: Chronic | ICD-10-CM

## 2023-03-06 LAB
ALT SERPL W P-5'-P-CCNC: 36 U/L (ref 1–41)
ANION GAP SERPL CALCULATED.3IONS-SCNC: 8 MMOL/L (ref 5–15)
BUN SERPL-MCNC: 17 MG/DL (ref 8–23)
BUN/CREAT SERPL: 13.9 (ref 7–25)
CALCIUM SPEC-SCNC: 9.5 MG/DL (ref 8.6–10.5)
CHLORIDE SERPL-SCNC: 104 MMOL/L (ref 98–107)
CHOLEST SERPL-MCNC: 109 MG/DL (ref 0–200)
CO2 SERPL-SCNC: 28 MMOL/L (ref 22–29)
CREAT SERPL-MCNC: 1.22 MG/DL (ref 0.76–1.27)
CREAT UR-MCNC: 185.9 MG/DL
EGFRCR SERPLBLD CKD-EPI 2021: 65.8 ML/MIN/1.73
GLUCOSE SERPL-MCNC: 120 MG/DL (ref 65–99)
HBA1C MFR BLD: 6 % (ref 4.8–5.6)
HDLC SERPL-MCNC: 27 MG/DL (ref 40–60)
LDLC SERPL CALC-MCNC: 41 MG/DL (ref 0–100)
LDLC/HDLC SERPL: 1.08 {RATIO}
POTASSIUM SERPL-SCNC: 4.6 MMOL/L (ref 3.5–5.2)
PROT ?TM UR-MCNC: 9.8 MG/DL
PROT/CREAT UR: 52.7 MG/G CREA (ref 0–200)
SODIUM SERPL-SCNC: 140 MMOL/L (ref 136–145)
TRIGL SERPL-MCNC: 264 MG/DL (ref 0–150)
VLDLC SERPL-MCNC: 41 MG/DL (ref 5–40)

## 2023-03-06 PROCEDURE — 99214 OFFICE O/P EST MOD 30 MIN: CPT | Performed by: FAMILY MEDICINE

## 2023-03-06 PROCEDURE — 36415 COLL VENOUS BLD VENIPUNCTURE: CPT

## 2023-03-06 PROCEDURE — 82570 ASSAY OF URINE CREATININE: CPT

## 2023-03-06 PROCEDURE — 80061 LIPID PANEL: CPT

## 2023-03-06 PROCEDURE — 83036 HEMOGLOBIN GLYCOSYLATED A1C: CPT

## 2023-03-06 PROCEDURE — 84156 ASSAY OF PROTEIN URINE: CPT

## 2023-03-06 PROCEDURE — 80048 BASIC METABOLIC PNL TOTAL CA: CPT

## 2023-03-06 PROCEDURE — 84460 ALANINE AMINO (ALT) (SGPT): CPT

## 2023-03-06 RX ORDER — GABAPENTIN 100 MG/1
100 CAPSULE ORAL 3 TIMES DAILY
Qty: 90 CAPSULE | Refills: 5 | Status: SHIPPED | OUTPATIENT
Start: 2023-03-06

## 2023-03-06 NOTE — PROGRESS NOTES
"Subjective   Lonnie Salinas is a 67 y.o. male.     Chief Complaint   Patient presents with   • Diabetes   • Hyperlipidemia   • Hypertension     6 month f/u       HPI  Chief complaint: Hypertension hyperlipidemia type 2 diabetes mellitus coronary artery disease    Patient is a 67-year-old white male comes in for follow-up and maintenance of his current problems which include    1.  Hypertension-stable-patient on Avapro 75 mg daily metoprolol 50 mg twice a day Bumex 1 mg daily and potassium.  Blood pressure stable.    2.  Hyperlipidemia-stable-the patient on Lipitor 40 mg daily.  Denies myalgias and arthralgias.    3.  Coronary artery disease with ischemic cardiomyopathy-stable-patient on Plavix 75 mg daily metoprolol 50 mg twice a day and Avapro 75 mg daily.  He denies chest pain shortness of breath orthopnea or PND.    4.  Type 2 diabetes mellitus-stable-patient on metformin 500 mg daily.  He denied polydipsia polyphagia or polyuria.  Patient does complain of burning in his feet.  He states that it is worse in the morning.  He states that it is gotten to the point that he would like to have something for it.        The following portions of the patient's history were reviewed and updated as appropriate: allergies, current medications, past family history, past medical history, past social history, past surgical history and problem list.    Review of Systems    Objective     /77   Pulse 73   Temp 98.1 °F (36.7 °C) (Infrared)   Ht 180.3 cm (71\")   Wt 102 kg (225 lb)   SpO2 100%   BMI 31.38 kg/m²     Physical Exam  Vitals and nursing note reviewed.   Constitutional:       Appearance: He is well-developed.   HENT:      Head: Normocephalic and atraumatic.   Eyes:      Pupils: Pupils are equal, round, and reactive to light.   Cardiovascular:      Rate and Rhythm: Normal rate and regular rhythm.      Pulses: Normal pulses.           Dorsalis pedis pulses are 2+ on the right side and 2+ on the left side.      "   Posterior tibial pulses are 2+ on the right side and 2+ on the left side.      Heart sounds: Normal heart sounds. No murmur heard.    No friction rub. No gallop.   Pulmonary:      Effort: Pulmonary effort is normal.      Breath sounds: Normal breath sounds.   Abdominal:      General: Bowel sounds are normal.      Palpations: Abdomen is soft.   Musculoskeletal:         General: Normal range of motion.      Cervical back: Neck supple.      Right foot: Normal range of motion. No deformity, bunion, Charcot foot, foot drop or prominent metatarsal heads.      Left foot: Normal range of motion. No deformity, bunion, Charcot foot, foot drop or prominent metatarsal heads.   Feet:      Right foot:      Protective Sensation: 5 sites tested. 5 sites sensed.      Skin integrity: Skin integrity normal.      Left foot:      Protective Sensation: 5 sites tested. 5 sites sensed.      Skin integrity: Skin integrity normal.   Skin:     General: Skin is warm and dry.   Neurological:      Mental Status: He is alert and oriented to person, place, and time.   Psychiatric:         Behavior: Behavior normal.         Thought Content: Thought content normal.         Judgment: Judgment normal.           Assessment & Plan   Diagnoses and all orders for this visit:    1. Primary hypertension (Primary)  -     Basic Metabolic Panel; Future    2. Hyperlipidemia, unspecified hyperlipidemia type  -     ALT; Future  -     Lipid Panel; Future    3. Type 2 diabetes mellitus without complication, without long-term current use of insulin (HCC)  -     Hemoglobin A1c; Future  -     Protein / Creatinine Ratio, Urine - Urine, Clean Catch; Future    4. Coronary artery disease involving native coronary artery of native heart with unstable angina pectoris (HCC)    5. Type 2 diabetes mellitus with diabetic neuropathy, without long-term current use of insulin (HCC)-patient was advised that he most likely has diabetic neuropathy.  I recommended gabapentin 100 mg  3 times a day.  He is to take 100 mg a day for 1 weeks 100 mg twice  a day for 2 weeks and 100 mg 3 times a day.  He is to let me know how it works.    Patient Instructions   Continue your current medications and treatment.    Try the gabapentin for the foot pain.    Have the follow up labs done and call for results.    Follow up in the office in 6 months.      Jarrell Alaniz Jr., MD    03/06/23

## 2023-03-06 NOTE — PATIENT INSTRUCTIONS
Continue your current medications and treatment.    Try the gabapentin for the foot pain.    Have the follow up labs done and call for results.    Follow up in the office in 6 months.

## 2023-03-13 ENCOUNTER — TELEPHONE (OUTPATIENT)
Dept: FAMILY MEDICINE CLINIC | Facility: CLINIC | Age: 68
End: 2023-03-13
Payer: COMMERCIAL

## 2023-03-13 RX ORDER — BUMETANIDE 1 MG/1
1 TABLET ORAL 2 TIMES DAILY
Qty: 180 TABLET | Refills: 1 | Status: SHIPPED | OUTPATIENT
Start: 2023-03-13

## 2023-04-20 RX ORDER — ATORVASTATIN CALCIUM 40 MG/1
TABLET, FILM COATED ORAL
Qty: 90 TABLET | Refills: 1 | Status: SHIPPED | OUTPATIENT
Start: 2023-04-20

## 2023-05-19 RX ORDER — IRBESARTAN 75 MG/1
TABLET ORAL
Qty: 90 TABLET | Refills: 1 | Status: SHIPPED | OUTPATIENT
Start: 2023-05-19

## 2023-06-09 RX ORDER — POTASSIUM CHLORIDE 750 MG/1
10 TABLET, FILM COATED, EXTENDED RELEASE ORAL DAILY
Qty: 30 TABLET | Refills: 3 | Status: SHIPPED | OUTPATIENT
Start: 2023-06-09

## 2023-08-10 RX ORDER — METOPROLOL SUCCINATE 50 MG/1
TABLET, EXTENDED RELEASE ORAL
Qty: 180 TABLET | Refills: 1 | Status: SHIPPED | OUTPATIENT
Start: 2023-08-10

## 2023-08-22 ENCOUNTER — TELEPHONE (OUTPATIENT)
Dept: FAMILY MEDICINE CLINIC | Facility: CLINIC | Age: 68
End: 2023-08-22
Payer: COMMERCIAL

## 2023-08-28 ENCOUNTER — TELEPHONE (OUTPATIENT)
Dept: FAMILY MEDICINE CLINIC | Facility: CLINIC | Age: 68
End: 2023-08-28
Payer: COMMERCIAL

## 2023-09-06 ENCOUNTER — OFFICE VISIT (OUTPATIENT)
Dept: FAMILY MEDICINE CLINIC | Facility: CLINIC | Age: 68
End: 2023-09-06
Payer: COMMERCIAL

## 2023-09-06 VITALS
HEART RATE: 59 BPM | HEIGHT: 71 IN | BODY MASS INDEX: 31.64 KG/M2 | DIASTOLIC BLOOD PRESSURE: 74 MMHG | WEIGHT: 226 LBS | SYSTOLIC BLOOD PRESSURE: 120 MMHG | TEMPERATURE: 98.1 F | OXYGEN SATURATION: 100 %

## 2023-09-06 DIAGNOSIS — E78.2 MIXED HYPERLIPIDEMIA: Chronic | ICD-10-CM

## 2023-09-06 DIAGNOSIS — Z12.5 PROSTATE CANCER SCREENING: Chronic | ICD-10-CM

## 2023-09-06 DIAGNOSIS — H61.23 BILATERAL HEARING LOSS DUE TO CERUMEN IMPACTION: ICD-10-CM

## 2023-09-06 DIAGNOSIS — I25.110 CORONARY ARTERY DISEASE INVOLVING NATIVE CORONARY ARTERY OF NATIVE HEART WITH UNSTABLE ANGINA PECTORIS: Chronic | ICD-10-CM

## 2023-09-06 DIAGNOSIS — E11.9 TYPE 2 DIABETES MELLITUS WITHOUT COMPLICATION, WITHOUT LONG-TERM CURRENT USE OF INSULIN: Chronic | ICD-10-CM

## 2023-09-06 DIAGNOSIS — E66.09 CLASS 1 OBESITY DUE TO EXCESS CALORIES WITH SERIOUS COMORBIDITY AND BODY MASS INDEX (BMI) OF 31.0 TO 31.9 IN ADULT: Primary | ICD-10-CM

## 2023-09-06 DIAGNOSIS — I10 PRIMARY HYPERTENSION: Chronic | ICD-10-CM

## 2023-09-06 PROBLEM — E66.811 CLASS 1 OBESITY WITH SERIOUS COMORBIDITY AND BODY MASS INDEX (BMI) OF 31.0 TO 31.9 IN ADULT: Status: ACTIVE | Noted: 2017-03-31

## 2023-09-06 NOTE — PROGRESS NOTES
Subjective        Lonnie Salinas is a 68 y.o. male.     Chief Complaint   Patient presents with   • Hypertension     6 month f/u       Hypertension    Patient is here for management of his chronic medical problems:   CAD, hypertension, hyperlipidemia, diabetes.    Diabetes: taking metformin 500 mg daily. His A1C has been 6. FOR PAST 2 YEARS. His bmi is 31.5, has CAD.     BMI 31.5- discussion today about purposeful exercise. Eating heathy.    CAD followed by cardiology. Stable. S/P left heart cath which demonstrated mylitvessel CAD after non sT elevation myocardial infarct, balloon pump placement, mildly reduced EF 45-50% with a t least moderate RV dysfunction. He underwent multiple vessel surgery with LIMA to LAD saphenous v graft to PDA, vein graft to OM1 and 2 sequential jump graft, saphenous v graft to OM3 with left lower extremity v harvest. Complicated by cardiogenic shock also required multiple vasopressor as well as prolonged milrinone support with pulmonary hypertension and RV failure . Note reviewed.   Stable at this time. Bumex 1mg bid plavix 75 mg daily metoprolol usccinate XL 50 mg daily potassium chloride 10 daily.     Hypertension: metoprolol succinate XL 50 mg daily bumex 1 mg bid atorvastatin for hyperlipidemia. Has CAD.    Hyperlipidemia: atorvastatin 40 mg daily. Has CAD and DM with hypertension.            The following portions of the patient's history were reviewed and updated as appropriate: allergies, current medications, past family history, past medical history, past social history, past surgical history and problem list.      Current Outpatient Medications:   •  Accu-Chek FastClix Lancets misc, 1 each Daily., Disp: 102 each, Rfl: 0  •  atorvastatin (LIPITOR) 40 MG tablet, TAKE ONE TABLET BY MOUTH DAILY, Disp: 90 tablet, Rfl: 1  •  bumetanide (BUMEX) 1 MG tablet, Take 1 tablet by mouth 2 (Two) Times a Day., Disp: 180 tablet, Rfl: 1  •  clopidogrel (PLAVIX) 75 MG tablet, Take 1 tablet by  "mouth Daily., Disp: 90 tablet, Rfl: 3  •  gabapentin (NEURONTIN) 100 MG capsule, Take 1 capsule by mouth 3 (Three) Times a Day., Disp: 90 capsule, Rfl: 5  •  glucose blood (Accu-Chek Guide) test strip, 1 each by Other route Daily. Use as instructed, Disp: 100 each, Rfl: 0  •  irbesartan (AVAPRO) 75 MG tablet, TAKE ONE TABLET BY MOUTH DAILY, Disp: 90 tablet, Rfl: 1  •  Isopropyl Alcohol (ALCOHOL WIPES) 70 % misc, Apply 1 each topically Daily., Disp: 100 each, Rfl: 0  •  metFORMIN (GLUCOPHAGE) 500 MG tablet, Take 1 tablet by mouth Daily With Breakfast., Disp: 90 tablet, Rfl: 0  •  metoprolol succinate XL (TOPROL-XL) 50 MG 24 hr tablet, TAKE ONE TABLET BY MOUTH TWICE A DAY, Disp: 180 tablet, Rfl: 1  •  potassium chloride 10 MEQ CR tablet, Take 1 tablet by mouth Daily., Disp: 30 tablet, Rfl: 3    No results found for this or any previous visit (from the past 4032 hour(s)).      Review of Systems    Objective     /74   Pulse 59   Temp 98.1 °F (36.7 °C) (Infrared)   Ht 180.3 cm (71\")   Wt 103 kg (226 lb)   SpO2 100%   BMI 31.52 kg/m²     Physical Exam  Vitals and nursing note reviewed.   Constitutional:       Appearance: He is obese.   HENT:      Head: Normocephalic.      Right Ear: There is impacted cerumen.      Left Ear: There is impacted cerumen.      Mouth/Throat:      Mouth: Mucous membranes are moist.   Eyes:      Conjunctiva/sclera: Conjunctivae normal.      Pupils: Pupils are equal, round, and reactive to light.   Cardiovascular:      Rate and Rhythm: Normal rate and regular rhythm.      Heart sounds: Normal heart sounds.   Pulmonary:      Effort: Pulmonary effort is normal.      Breath sounds: Normal breath sounds.   Abdominal:      General: Bowel sounds are normal.      Palpations: Abdomen is soft.   Musculoskeletal:         General: Normal range of motion.   Skin:     General: Skin is warm.   Neurological:      General: No focal deficit present.      Mental Status: He is alert and oriented to " person, place, and time.   Psychiatric:         Mood and Affect: Mood normal.         Thought Content: Thought content normal.       Result Review :          Ear Cerumen Removal    Date/Time: 9/6/2023 9:19 AM  Performed by: Indu Burkett APRN  Authorized by: Indu Burkett APRN     Anesthesia:  Local Anesthetic: none  Location details: left ear  Patient tolerance: patient tolerated the procedure well with no immediate complications  Procedure type: instrumentation, curette   Sedation:  Patient sedated: no        Ear Cerumen Removal    Date/Time: 9/6/2023 9:20 AM  Performed by: Indu Burkett APRN  Authorized by: Indu Burkett APRN          Assessment & Plan    Diagnoses and all orders for this visit:    1. Class 1 obesity due to excess calories with serious comorbidity and body mass index (BMI) of 31.0 to 31.9 in adult (Primary)  Comments:  discussed daily purposeful exercise.  Assessment & Plan:  Patient's (Body mass index is 31.52 kg/m².) indicates that they are obese (BMI >30) with health conditions that include hypertension, coronary heart disease, diabetes mellitus, and dyslipidemias . Weight is unchanged. BMI  is above average; BMI management plan is completed. We discussed portion control, increasing exercise, and joining a fitness center or start home based exercise program.       2. Prostate cancer screening  Comments:  psa ordered  Orders:  -     PSA Screen; Future    3. Mixed hyperlipidemia  Comments:  labs ordered  Orders:  -     Lipid Panel; Future  -     Comprehensive Metabolic Panel; Future    4. Type 2 diabetes mellitus without complication, without long-term current use of insulin  Comments:  labs ordered  Orders:  -     Lipid Panel; Future  -     Microalbumin / Creatinine Urine Ratio - Urine, Clean Catch; Future  -     Comprehensive Metabolic Panel; Future  -     Hemoglobin A1c; Future    5. Coronary artery disease involving native coronary artery of native heart with unstable angina  pectoris  Comments:  stable labs ordered    6. Primary hypertension  Comments:  stable    Other orders  -     Ear Cerumen Removal  -     Ear Cerumen Removal      Patient Instructions   You need to schedule diabetic eye exam.  Exercise daily eat healthy  Follow up after labs.     Follow Up   Return in about 6 months (around 3/6/2024).    Patient was given instructions and counseling regarding his condition or for health maintenance advice. Please see specific information pulled into the AVS if appropriate.     Indu Burkett, SAADIA    09/06/23    Answers submitted by the patient for this visit:  Primary Reason for Visit (Submitted on 9/5/2023)  What is the primary reason for your visit?: Other  Other (Submitted on 9/5/2023)  Please describe your symptoms.: 6 months check up  Have you had these symptoms before?: Yes  How long have you been having these symptoms?: Greater than 2 weeks

## 2023-09-06 NOTE — ASSESSMENT & PLAN NOTE
Patient's (Body mass index is 31.52 kg/m².) indicates that they are obese (BMI >30) with health conditions that include hypertension, coronary heart disease, diabetes mellitus, and dyslipidemias . Weight is unchanged. BMI  is above average; BMI management plan is completed. We discussed portion control, increasing exercise, and joining a fitness center or start home based exercise program.

## 2023-09-08 RX ORDER — BUMETANIDE 1 MG/1
TABLET ORAL
Qty: 180 TABLET | Refills: 1 | Status: SHIPPED | OUTPATIENT
Start: 2023-09-08

## 2023-09-08 RX ORDER — GABAPENTIN 100 MG/1
CAPSULE ORAL
Qty: 90 CAPSULE | Refills: 5 | Status: SHIPPED | OUTPATIENT
Start: 2023-09-08

## 2023-09-12 ENCOUNTER — APPOINTMENT (OUTPATIENT)
Dept: LAB | Facility: HOSPITAL | Age: 68
End: 2023-09-12
Payer: COMMERCIAL

## 2023-09-12 ENCOUNTER — LAB (OUTPATIENT)
Dept: LAB | Facility: HOSPITAL | Age: 68
End: 2023-09-12
Payer: COMMERCIAL

## 2023-09-12 DIAGNOSIS — E11.9 TYPE 2 DIABETES MELLITUS WITHOUT COMPLICATION, WITHOUT LONG-TERM CURRENT USE OF INSULIN: Chronic | ICD-10-CM

## 2023-09-12 DIAGNOSIS — E78.2 MIXED HYPERLIPIDEMIA: Chronic | ICD-10-CM

## 2023-09-12 DIAGNOSIS — Z12.5 PROSTATE CANCER SCREENING: Chronic | ICD-10-CM

## 2023-09-12 LAB
ALBUMIN SERPL-MCNC: 4.2 G/DL (ref 3.5–5.2)
ALBUMIN UR-MCNC: <1.2 MG/DL
ALBUMIN/GLOB SERPL: 1.4 G/DL
ALP SERPL-CCNC: 101 U/L (ref 39–117)
ALT SERPL W P-5'-P-CCNC: 32 U/L (ref 1–41)
ANION GAP SERPL CALCULATED.3IONS-SCNC: 12 MMOL/L (ref 5–15)
AST SERPL-CCNC: 26 U/L (ref 1–40)
BILIRUB SERPL-MCNC: 0.4 MG/DL (ref 0–1.2)
BUN SERPL-MCNC: 15 MG/DL (ref 8–23)
BUN/CREAT SERPL: 12.5 (ref 7–25)
CALCIUM SPEC-SCNC: 9.7 MG/DL (ref 8.6–10.5)
CHLORIDE SERPL-SCNC: 101 MMOL/L (ref 98–107)
CHOLEST SERPL-MCNC: 96 MG/DL (ref 0–200)
CO2 SERPL-SCNC: 27 MMOL/L (ref 22–29)
CREAT SERPL-MCNC: 1.2 MG/DL (ref 0.76–1.27)
CREAT UR-MCNC: 187.2 MG/DL
EGFRCR SERPLBLD CKD-EPI 2021: 66.7 ML/MIN/1.73
GLOBULIN UR ELPH-MCNC: 3.1 GM/DL
GLUCOSE SERPL-MCNC: 78 MG/DL (ref 65–99)
HDLC SERPL-MCNC: 27 MG/DL (ref 40–60)
LDLC SERPL CALC-MCNC: 30 MG/DL (ref 0–100)
LDLC/HDLC SERPL: 0.69 {RATIO}
MICROALBUMIN/CREAT UR: NORMAL MG/G{CREAT}
POTASSIUM SERPL-SCNC: 3.7 MMOL/L (ref 3.5–5.2)
PROT SERPL-MCNC: 7.3 G/DL (ref 6–8.5)
PSA SERPL-MCNC: 0.6 NG/ML (ref 0–4)
SODIUM SERPL-SCNC: 140 MMOL/L (ref 136–145)
TRIGL SERPL-MCNC: 252 MG/DL (ref 0–150)
VLDLC SERPL-MCNC: 39 MG/DL (ref 5–40)

## 2023-09-12 PROCEDURE — 80053 COMPREHEN METABOLIC PANEL: CPT

## 2023-09-12 PROCEDURE — 82570 ASSAY OF URINE CREATININE: CPT

## 2023-09-12 PROCEDURE — 82043 UR ALBUMIN QUANTITATIVE: CPT

## 2023-09-12 PROCEDURE — G0103 PSA SCREENING: HCPCS

## 2023-09-12 PROCEDURE — 80061 LIPID PANEL: CPT

## 2023-09-12 PROCEDURE — 36415 COLL VENOUS BLD VENIPUNCTURE: CPT

## 2023-10-04 RX ORDER — POTASSIUM CHLORIDE 750 MG/1
10 TABLET, FILM COATED, EXTENDED RELEASE ORAL DAILY
Qty: 90 TABLET | Refills: 1 | Status: SHIPPED | OUTPATIENT
Start: 2023-10-04

## 2023-10-20 ENCOUNTER — TELEPHONE (OUTPATIENT)
Dept: FAMILY MEDICINE CLINIC | Facility: CLINIC | Age: 68
End: 2023-10-20
Payer: COMMERCIAL

## 2023-10-20 RX ORDER — ATORVASTATIN CALCIUM 40 MG/1
40 TABLET, FILM COATED ORAL DAILY
Qty: 90 TABLET | Refills: 1 | Status: SHIPPED | OUTPATIENT
Start: 2023-10-20 | End: 2023-10-23 | Stop reason: SDUPTHER

## 2023-10-23 ENCOUNTER — TELEPHONE (OUTPATIENT)
Dept: FAMILY MEDICINE CLINIC | Facility: CLINIC | Age: 68
End: 2023-10-23
Payer: COMMERCIAL

## 2023-10-23 RX ORDER — ATORVASTATIN CALCIUM 40 MG/1
40 TABLET, FILM COATED ORAL DAILY
Qty: 90 TABLET | Refills: 1 | Status: SHIPPED | OUTPATIENT
Start: 2023-10-23

## 2023-11-20 RX ORDER — IRBESARTAN 75 MG/1
75 TABLET ORAL DAILY
Qty: 90 TABLET | Refills: 1 | Status: SHIPPED | OUTPATIENT
Start: 2023-11-20

## 2023-11-20 NOTE — TELEPHONE ENCOUNTER
Caller: GABY BLACK    Relationship: Emergency Contact    Best call back number: [phone]    Requested Prescriptions:   Requested Prescriptions     Pending Prescriptions Disp Refills    irbesartan (AVAPRO) 75 MG tablet 90 tablet 1     Sig: Take 1 tablet by mouth Daily.        Pharmacy where request should be sent: [facility] [id number] - [place], [place] - [address] - [phone]  - [phone]     Last office visit with prescribing clinician: 6/13/2023   Last telemedicine visit with prescribing clinician: Visit date not found   Next office visit with prescribing clinician: 6/11/2024     Additional details provided by patient: PATIENT HAS A 4 DAY SUPPLY LEFT    Does the patient have less than a 3 day supply:  [] Yes  [x] No    Would you like a call back once the refill request has been completed: [] Yes [x] No    If the office needs to give you a call back, can they leave a voicemail: [] Yes [x] No    Felicia Willams Rep   11/20/23 12:21 EST

## 2023-12-04 RX ORDER — CLOPIDOGREL BISULFATE 75 MG/1
75 TABLET ORAL DAILY
Qty: 90 TABLET | Refills: 3 | Status: SHIPPED | OUTPATIENT
Start: 2023-12-04

## 2024-02-07 RX ORDER — METOPROLOL SUCCINATE 50 MG/1
50 TABLET, EXTENDED RELEASE ORAL 2 TIMES DAILY
Qty: 180 TABLET | Refills: 1 | Status: SHIPPED | OUTPATIENT
Start: 2024-02-07

## 2024-02-07 NOTE — TELEPHONE ENCOUNTER
Caller: GABY BLACK    Relationship: Emergency Contact    Best call back number: [phone]    Requested Prescriptions:   Requested Prescriptions     Pending Prescriptions Disp Refills    metoprolol succinate XL (TOPROL-XL) 50 MG 24 hr tablet 180 tablet 1     Sig: Take 1 tablet by mouth 2 (Two) Times a Day.        Pharmacy where request should be sent: [facility] [id number] [place] [phone]      Last office visit with prescribing clinician: 6/13/2023   Last telemedicine visit with prescribing clinician: Visit date not found   Next office visit with prescribing clinician: 6/11/2024     Additional details provided by patient: PT HAS 5 DAYS LEFT    Does the patient have less than a 3 day supply:  [] Yes  [x] No    Would you like a call back once the refill request has been completed: [] Yes [x] No    If the office needs to give you a call back, can they leave a voicemail: [x] Yes [] No    Felicia Ring Rep   02/07/24 10:04 EST

## 2024-02-16 RX ORDER — IRBESARTAN 75 MG/1
75 TABLET ORAL DAILY
Qty: 90 TABLET | Refills: 1 | Status: SHIPPED | OUTPATIENT
Start: 2024-02-16

## 2024-03-03 RX ORDER — BUMETANIDE 1 MG/1
1 TABLET ORAL 2 TIMES DAILY
Qty: 180 TABLET | Refills: 0 | Status: SHIPPED | OUTPATIENT
Start: 2024-03-03

## 2024-03-04 RX ORDER — CLOPIDOGREL BISULFATE 75 MG/1
75 TABLET ORAL DAILY
Qty: 90 TABLET | Refills: 3 | Status: SHIPPED | OUTPATIENT
Start: 2024-03-04

## 2024-03-07 ENCOUNTER — LAB (OUTPATIENT)
Dept: LAB | Facility: HOSPITAL | Age: 69
End: 2024-03-07
Payer: COMMERCIAL

## 2024-03-07 ENCOUNTER — OFFICE VISIT (OUTPATIENT)
Dept: FAMILY MEDICINE CLINIC | Facility: CLINIC | Age: 69
End: 2024-03-07
Payer: COMMERCIAL

## 2024-03-07 VITALS
TEMPERATURE: 98.9 F | HEIGHT: 71 IN | BODY MASS INDEX: 31.4 KG/M2 | OXYGEN SATURATION: 99 % | HEART RATE: 54 BPM | DIASTOLIC BLOOD PRESSURE: 75 MMHG | WEIGHT: 224.3 LBS | SYSTOLIC BLOOD PRESSURE: 128 MMHG

## 2024-03-07 DIAGNOSIS — E11.40 TYPE 2 DIABETES MELLITUS WITH DIABETIC NEUROPATHY, WITHOUT LONG-TERM CURRENT USE OF INSULIN: Chronic | ICD-10-CM

## 2024-03-07 DIAGNOSIS — J30.2 SEASONAL ALLERGIC RHINITIS, UNSPECIFIED TRIGGER: Chronic | ICD-10-CM

## 2024-03-07 DIAGNOSIS — I25.110 CORONARY ARTERY DISEASE INVOLVING NATIVE CORONARY ARTERY OF NATIVE HEART WITH UNSTABLE ANGINA PECTORIS: Chronic | ICD-10-CM

## 2024-03-07 DIAGNOSIS — E11.9 TYPE 2 DIABETES MELLITUS WITHOUT COMPLICATION, WITHOUT LONG-TERM CURRENT USE OF INSULIN: Chronic | ICD-10-CM

## 2024-03-07 DIAGNOSIS — E78.2 MIXED HYPERLIPIDEMIA: Chronic | ICD-10-CM

## 2024-03-07 DIAGNOSIS — I10 PRIMARY HYPERTENSION: Chronic | ICD-10-CM

## 2024-03-07 LAB
ALBUMIN SERPL-MCNC: 4.5 G/DL (ref 3.5–5.2)
ALBUMIN UR-MCNC: <1.2 MG/DL
ALBUMIN/GLOB SERPL: 1.6 G/DL
ALP SERPL-CCNC: 97 U/L (ref 39–117)
ALT SERPL W P-5'-P-CCNC: 33 U/L (ref 1–41)
ANION GAP SERPL CALCULATED.3IONS-SCNC: 12 MMOL/L (ref 5–15)
AST SERPL-CCNC: 26 U/L (ref 1–40)
BILIRUB SERPL-MCNC: 0.4 MG/DL (ref 0–1.2)
BUN SERPL-MCNC: 15 MG/DL (ref 8–23)
BUN/CREAT SERPL: 12.6 (ref 7–25)
CALCIUM SPEC-SCNC: 9.5 MG/DL (ref 8.6–10.5)
CHLORIDE SERPL-SCNC: 105 MMOL/L (ref 98–107)
CHOLEST SERPL-MCNC: 103 MG/DL (ref 0–200)
CO2 SERPL-SCNC: 24 MMOL/L (ref 22–29)
CREAT SERPL-MCNC: 1.19 MG/DL (ref 0.76–1.27)
CREAT UR-MCNC: 194.3 MG/DL
EGFRCR SERPLBLD CKD-EPI 2021: 67.4 ML/MIN/1.73
GLOBULIN UR ELPH-MCNC: 2.8 GM/DL
GLUCOSE SERPL-MCNC: 78 MG/DL (ref 65–99)
HBA1C MFR BLD: 6.3 % (ref 4.8–5.6)
HDLC SERPL-MCNC: 30 MG/DL (ref 40–60)
LDLC SERPL CALC-MCNC: 39 MG/DL (ref 0–100)
LDLC/HDLC SERPL: 1.02 {RATIO}
MICROALBUMIN/CREAT UR: NORMAL MG/G{CREAT}
POTASSIUM SERPL-SCNC: 3.8 MMOL/L (ref 3.5–5.2)
PROT SERPL-MCNC: 7.3 G/DL (ref 6–8.5)
SODIUM SERPL-SCNC: 141 MMOL/L (ref 136–145)
TRIGL SERPL-MCNC: 212 MG/DL (ref 0–150)
VLDLC SERPL-MCNC: 34 MG/DL (ref 5–40)

## 2024-03-07 PROCEDURE — 82043 UR ALBUMIN QUANTITATIVE: CPT

## 2024-03-07 PROCEDURE — 80053 COMPREHEN METABOLIC PANEL: CPT

## 2024-03-07 PROCEDURE — 80061 LIPID PANEL: CPT

## 2024-03-07 PROCEDURE — 99214 OFFICE O/P EST MOD 30 MIN: CPT | Performed by: NURSE PRACTITIONER

## 2024-03-07 PROCEDURE — 82570 ASSAY OF URINE CREATININE: CPT

## 2024-03-07 PROCEDURE — 83036 HEMOGLOBIN GLYCOSYLATED A1C: CPT

## 2024-03-07 PROCEDURE — 36415 COLL VENOUS BLD VENIPUNCTURE: CPT

## 2024-03-07 NOTE — PROGRESS NOTES
Subjective        Lonnie Salinas is a 68 y.o. male.     Chief Complaint   Patient presents with    Follow-up     6 month follow up       History of Present Illness  Patient is here for management of her chronic medical problems:   Allergic rhinitis, CAD, hypertension, hyperlipidemia, type 2 DM.   CAD.    Allergic rhinitis: says not bad right now. Sniffy in am. Better during day.     DM he is not checking takes metformin 500 mg one with breakfast.   Thinks he had diabetic eye exam [facility]. He no longer has symptoms of feet buring quit his gabapentin.     CAD bumex 1 mg bid plavix 75 mg metoprolol succinateXL 50 mg bid.   Taking potassium chloride 10 daily. S/P cabg x 5.     Hyperlipidemia: atorvastatin 40 mg daily. Has CAD and is diabetic.           The following portions of the patient's history were reviewed and updated as appropriate: allergies, current medications, past family history, past medical history, past social history, past surgical history and problem list.      Current Outpatient Medications:     Accu-Chek FastClix Lancets misc, 1 each Daily., Disp: 102 each, Rfl: 0    atorvastatin (LIPITOR) 40 MG tablet, Take 1 tablet by mouth Daily., Disp: 90 tablet, Rfl: 1    bumetanide (BUMEX) 1 MG tablet, Take 1 tablet by mouth twice daily, Disp: 180 tablet, Rfl: 0    clopidogrel (PLAVIX) 75 MG tablet, Take 1 tablet by mouth Daily., Disp: 90 tablet, Rfl: 3    glucose blood (Accu-Chek Guide) test strip, 1 each by Other route Daily. Use as instructed, Disp: 100 each, Rfl: 0    irbesartan (AVAPRO) 75 MG tablet, Take 1 tablet by mouth Daily., Disp: 90 tablet, Rfl: 1    Isopropyl Alcohol (ALCOHOL WIPES) 70 % misc, Apply 1 each topically Daily., Disp: 100 each, Rfl: 0    metFORMIN (GLUCOPHAGE) 500 MG tablet, Take 1 tablet by mouth Daily With Breakfast., Disp: 90 tablet, Rfl: 0    metoprolol succinate XL (TOPROL-XL) 50 MG 24 hr tablet, Take 1 tablet by mouth 2 (Two) Times a Day., Disp: 180 tablet, Rfl: 1    potassium  "chloride 10 MEQ CR tablet, TAKE 1 TABLET BY MOUTH DAILY, Disp: 90 tablet, Rfl: 1    No results found for this or any previous visit (from the past 4032 hour(s)).      Review of Systems    Objective     /75 (BP Location: Left arm, Patient Position: Sitting, Cuff Size: Adult)   Pulse 54   Temp 98.9 °F (37.2 °C) (Temporal)   Ht 180.3 cm (71\")   Wt 102 kg (224 lb 4.8 oz)   SpO2 99%   BMI 31.28 kg/m²     Physical Exam  Vitals and nursing note reviewed.   Constitutional:       Appearance: He is obese.   HENT:      Head: Normocephalic.      Right Ear: External ear normal.      Left Ear: There is impacted cerumen.      Nose: Nose normal.      Mouth/Throat:      Mouth: Mucous membranes are moist.   Eyes:      Pupils: Pupils are equal, round, and reactive to light.   Cardiovascular:      Rate and Rhythm: Normal rate and regular rhythm.      Pulses:           Dorsalis pedis pulses are 2+ on the right side and 2+ on the left side.        Posterior tibial pulses are 2+ on the right side and 2+ on the left side.      Heart sounds: Normal heart sounds.   Pulmonary:      Effort: Pulmonary effort is normal.      Breath sounds: Normal breath sounds.   Abdominal:      General: Bowel sounds are normal.      Palpations: Abdomen is soft.   Musculoskeletal:      Cervical back: Neck supple.      Right foot: Normal range of motion.      Left foot: Normal range of motion.   Feet:      Right foot:      Protective Sensation: 10 sites tested.  10 sites sensed.      Skin integrity: Skin integrity normal.      Toenail Condition: Right toenails are normal.      Left foot:      Protective Sensation: 10 sites tested.  10 sites sensed.      Skin integrity: Skin integrity normal.      Toenail Condition: Left toenails are normal.   Skin:     General: Skin is warm.      Capillary Refill: Capillary refill takes less than 2 seconds.   Neurological:      General: No focal deficit present.      Mental Status: He is alert.   Psychiatric:         " Mood and Affect: Mood normal.         Behavior: Behavior normal.         Thought Content: Thought content normal.         Result Review :                Assessment & Plan    Diagnoses and all orders for this visit:    1. Seasonal allergic rhinitis, unspecified trigger  Comments:  stable    2. Coronary artery disease involving native coronary artery of native heart with unstable angina pectoris  Comments:  stable followed by cardioilogy    3. Mixed hyperlipidemia  Comments:  stable labs ordered    4. Primary hypertension  Comments:  stable    5. Type 2 diabetes mellitus with diabetic neuropathy, without long-term current use of insulin  Comments:  instructed to check blood sugars daily.      Patient Instructions   Need RSV vaccine, f/u on labs  Eat healthy exercise daily.  Monitor blood sugar and blood pressure.   Diabetic eye exam.         Follow Up   Return in about 6 months (around 9/7/2024).    Patient was given instructions and counseling regarding his condition or for health maintenance advice. Please see specific information pulled into the AVS if appropriate.     Indu Burkett, APRN    03/07/24      Answers submitted by the patient for this visit:  Primary Reason for Visit (Submitted on 3/7/2024)  What is the primary reason for your visit?: High Blood Pressure

## 2024-03-07 NOTE — PATIENT INSTRUCTIONS
Need RSV vaccine, f/u on labs  Eat healthy exercise daily.  Monitor blood sugar and blood pressure.   Diabetic eye exam.

## 2024-04-03 RX ORDER — POTASSIUM CHLORIDE 750 MG/1
10 TABLET, FILM COATED, EXTENDED RELEASE ORAL DAILY
Qty: 90 TABLET | Refills: 1 | Status: SHIPPED | OUTPATIENT
Start: 2024-04-03

## 2024-04-03 RX ORDER — POTASSIUM CHLORIDE 750 MG/1
10 TABLET, EXTENDED RELEASE ORAL DAILY
Qty: 90 TABLET | Refills: 0 | Status: SHIPPED | OUTPATIENT
Start: 2024-04-03

## 2024-04-03 NOTE — TELEPHONE ENCOUNTER
Caller: Lonnie Salinas    Relationship: Self         Requested Prescriptions:   Requested Prescriptions     Pending Prescriptions Disp Refills    potassium chloride 10 MEQ CR tablet 90 tablet 1     Sig: Take 1 tablet by mouth Daily.        Pharmacy where request should be sent: [facility] [phone] [place] [phone]      Last office visit with prescribing clinician: 6/13/2023   Last telemedicine visit with prescribing clinician: Visit date not found   Next office visit with prescribing clinician: 6/11/2024     Additional details provided by patient:      Does the patient have less than a 3 day supply:  [x] Yes  [] No    Would you like a call back once the refill request has been completed: [] Yes [] No    If the office needs to give you a call back, can they leave a voicemail: [] Yes [] No    Felicia Horn Rep   04/03/24 11:25 EDT

## 2024-04-12 ENCOUNTER — OFFICE VISIT (OUTPATIENT)
Dept: FAMILY MEDICINE CLINIC | Facility: CLINIC | Age: 69
End: 2024-04-12
Payer: COMMERCIAL

## 2024-04-12 VITALS
SYSTOLIC BLOOD PRESSURE: 125 MMHG | WEIGHT: 220 LBS | BODY MASS INDEX: 30.8 KG/M2 | OXYGEN SATURATION: 99 % | RESPIRATION RATE: 18 BRPM | DIASTOLIC BLOOD PRESSURE: 80 MMHG | TEMPERATURE: 98.6 F | HEIGHT: 71 IN | HEART RATE: 66 BPM

## 2024-04-12 DIAGNOSIS — B02.30 HERPES ZOSTER WITH OPHTHALMIC COMPLICATION, UNSPECIFIED HERPES ZOSTER EYE DISEASE: Primary | ICD-10-CM

## 2024-04-12 PROCEDURE — 99214 OFFICE O/P EST MOD 30 MIN: CPT | Performed by: NURSE PRACTITIONER

## 2024-04-12 RX ORDER — ACYCLOVIR 50 MG/G
1 CREAM TOPICAL
Qty: 5 G | Refills: 1 | Status: SHIPPED | OUTPATIENT
Start: 2024-04-12

## 2024-04-12 RX ORDER — ACYCLOVIR 800 MG/1
800 TABLET ORAL
Qty: 35 TABLET | Refills: 0 | Status: SHIPPED | OUTPATIENT
Start: 2024-04-12

## 2024-04-12 RX ORDER — GABAPENTIN 100 MG/1
100 CAPSULE ORAL 3 TIMES DAILY
Qty: 60 CAPSULE | Refills: 0 | Status: SHIPPED | OUTPATIENT
Start: 2024-04-12

## 2024-04-12 NOTE — PATIENT INSTRUCTIONS
Go see ophthalmology  Start the gabapentin either 1 3 times a day or one 2 times or can take all at bedtime  See how makes you feel  Use the acyclovir cream  outside mouth  Oral abrahan de luna mouth wash if pain in mouth.   Take the acyclovir tablets 5 times a day.   Monitor blood sugar.   If difficulty swallowing or difficulty breathing get seen  Try not touch  Cool compress to face.   Avoid scratching around eye

## 2024-04-12 NOTE — PROGRESS NOTES
Subjective        Lonnie Salinas is a 68 y.o. male.     Chief Complaint   Patient presents with    Mouth Lesions       Mouth Lesions   Associated symptoms include mouth sores.     Patient is here states mon started with blister on his lower lip and then went inside mouth now hard open mouth, no reported fever or chills, he has been tired more recently.     The following portions of the patient's history were reviewed and updated as appropriate: allergies, current medications, past family history, past medical history, past social history, past surgical history and problem list.      Current Outpatient Medications:     atorvastatin (LIPITOR) 40 MG tablet, Take 1 tablet by mouth Daily., Disp: 90 tablet, Rfl: 1    bumetanide (BUMEX) 1 MG tablet, Take 1 tablet by mouth twice daily, Disp: 180 tablet, Rfl: 0    clopidogrel (PLAVIX) 75 MG tablet, Take 1 tablet by mouth Daily., Disp: 90 tablet, Rfl: 3    irbesartan (AVAPRO) 75 MG tablet, Take 1 tablet by mouth Daily., Disp: 90 tablet, Rfl: 1    metFORMIN (GLUCOPHAGE) 500 MG tablet, Take 1 tablet by mouth Daily With Breakfast., Disp: 90 tablet, Rfl: 0    metoprolol succinate XL (TOPROL-XL) 50 MG 24 hr tablet, Take 1 tablet by mouth 2 (Two) Times a Day., Disp: 180 tablet, Rfl: 1    potassium chloride (KLOR-CON M10) 10 MEQ CR tablet, Take 1 tablet by mouth once daily, Disp: 90 tablet, Rfl: 0    Accu-Chek FastClix Lancets misc, 1 each Daily. (Patient not taking: Reported on 4/12/2024), Disp: 102 each, Rfl: 0    acyclovir (ZOVIRAX) 5 % cream, Apply 1 Application topically to the appropriate area as directed Every 3 (Three) Hours., Disp: 5 g, Rfl: 1    acyclovir (ZOVIRAX) 800 MG tablet, Take 1 tablet by mouth 5 (Five) Times a Day., Disp: 35 tablet, Rfl: 0    gabapentin (NEURONTIN) 100 MG capsule, Take 1 capsule by mouth 3 (Three) Times a Day., Disp: 60 capsule, Rfl: 0    glucose blood (Accu-Chek Guide) test strip, 1 each by Other route Daily. Use as instructed (Patient not  taking: Reported on 4/12/2024), Disp: 100 each, Rfl: 0    Isopropyl Alcohol (ALCOHOL WIPES) 70 % misc, Apply 1 each topically Daily. (Patient not taking: Reported on 4/12/2024), Disp: 100 each, Rfl: 0    Nystatin (magic mouthwash), Swish and spit 15 mL Every 4 (Four) Hours As Needed (oral lesion)., Disp: 60 mL, Rfl: 1    Recent Results (from the past 4032 hour(s))   Hemoglobin A1c    Collection Time: 03/07/24  9:50 AM    Specimen: Blood   Result Value Ref Range    Hemoglobin A1C 6.30 (H) 4.80 - 5.60 %   Comprehensive Metabolic Panel    Collection Time: 03/07/24  9:50 AM    Specimen: Blood   Result Value Ref Range    Glucose 78 65 - 99 mg/dL    BUN 15 8 - 23 mg/dL    Creatinine 1.19 0.76 - 1.27 mg/dL    Sodium 141 136 - 145 mmol/L    Potassium 3.8 3.5 - 5.2 mmol/L    Chloride 105 98 - 107 mmol/L    CO2 24.0 22.0 - 29.0 mmol/L    Calcium 9.5 8.6 - 10.5 mg/dL    Total Protein 7.3 6.0 - 8.5 g/dL    Albumin 4.5 3.5 - 5.2 g/dL    ALT (SGPT) 33 1 - 41 U/L    AST (SGOT) 26 1 - 40 U/L    Alkaline Phosphatase 97 39 - 117 U/L    Total Bilirubin 0.4 0.0 - 1.2 mg/dL    Globulin 2.8 gm/dL    A/G Ratio 1.6 g/dL    BUN/Creatinine Ratio 12.6 7.0 - 25.0    Anion Gap 12.0 5.0 - 15.0 mmol/L    eGFR 67.4 >60.0 mL/min/1.73   Lipid Panel    Collection Time: 03/07/24  9:50 AM    Specimen: Blood   Result Value Ref Range    Total Cholesterol 103 0 - 200 mg/dL    Triglycerides 212 (H) 0 - 150 mg/dL    HDL Cholesterol 30 (L) 40 - 60 mg/dL    LDL Cholesterol  39 0 - 100 mg/dL    VLDL Cholesterol 34 5 - 40 mg/dL    LDL/HDL Ratio 1.02    Microalbumin / Creatinine Urine Ratio - Urine, Clean Catch    Collection Time: 03/07/24  9:50 AM    Specimen: Urine, Clean Catch   Result Value Ref Range    Microalbumin/Creatinine Ratio      Creatinine, Urine 194.3 mg/dL    Microalbumin, Urine <1.2 mg/dL         Review of Systems   HENT:  Positive for mouth sores.        Objective     /80 (BP Location: Left arm, Patient Position: Sitting, Cuff Size: Large  "Adult)   Pulse 66   Temp 98.6 °F (37 °C) (Oral)   Resp 18   Ht 180.3 cm (71\")   Wt 99.8 kg (220 lb)   SpO2 99%   BMI 30.68 kg/m²     Physical Exam  HENT:      Head: Normocephalic.      Jaw: Trismus present.        Comments: Pain right mandible with opening       Mouth/Throat:        Comments: Blisters noted to tongue.         Result Review :                Assessment & Plan    Diagnoses and all orders for this visit:    1. Herpes zoster with ophthalmic complication, unspecified herpes zoster eye disease (Primary)    Other orders  -     acyclovir (ZOVIRAX) 800 MG tablet; Take 1 tablet by mouth 5 (Five) Times a Day.  Dispense: 35 tablet; Refill: 0  -     acyclovir (ZOVIRAX) 5 % cream; Apply 1 Application topically to the appropriate area as directed Every 3 (Three) Hours.  Dispense: 5 g; Refill: 1  -     gabapentin (NEURONTIN) 100 MG capsule; Take 1 capsule by mouth 3 (Three) Times a Day.  Dispense: 60 capsule; Refill: 0  -     Nystatin (magic mouthwash); Swish and spit 15 mL Every 4 (Four) Hours As Needed (oral lesion).  Dispense: 60 mL; Refill: 1      Patient Instructions   Go see ophthalmology  Start the gabapentin either 1 3 times a day or one 2 times or can take all at bedtime  See how makes you feel  Use the acyclovir cream  outside mouth  Oral abrahan majic mouth wash if pain in mouth.   Take the acyclovir tablets 5 times a day.   Monitor blood sugar.   If difficulty swallowing or difficulty breathing get seen  Try not touch  Cool compress to face.   Avoid scratching around eye      Follow Up   Return in about 1 week (around 4/19/2024).    Patient was given instructions and counseling regarding his condition or for health maintenance advice. Please see specific information pulled into the AVS if appropriate.     Indu Burkett, APRN    04/12/24      "

## 2024-04-23 ENCOUNTER — OFFICE VISIT (OUTPATIENT)
Dept: FAMILY MEDICINE CLINIC | Facility: CLINIC | Age: 69
End: 2024-04-23
Payer: COMMERCIAL

## 2024-04-23 VITALS
OXYGEN SATURATION: 99 % | SYSTOLIC BLOOD PRESSURE: 151 MMHG | WEIGHT: 222 LBS | BODY MASS INDEX: 31.08 KG/M2 | HEART RATE: 59 BPM | TEMPERATURE: 98.6 F | RESPIRATION RATE: 18 BRPM | DIASTOLIC BLOOD PRESSURE: 78 MMHG | HEIGHT: 71 IN

## 2024-04-23 DIAGNOSIS — B02.8 HERPES ZOSTER WITH OTHER COMPLICATION: Primary | ICD-10-CM

## 2024-04-23 PROBLEM — B02.30 HERPES ZOSTER WITH OPHTHALMIC COMPLICATION: Status: RESOLVED | Noted: 2024-04-12 | Resolved: 2024-04-23

## 2024-04-23 PROCEDURE — 99213 OFFICE O/P EST LOW 20 MIN: CPT | Performed by: NURSE PRACTITIONER

## 2024-04-23 RX ORDER — GABAPENTIN 300 MG/1
300 CAPSULE ORAL 3 TIMES DAILY
Qty: 90 CAPSULE | Refills: 1 | Status: SHIPPED | OUTPATIENT
Start: 2024-04-23

## 2024-04-23 NOTE — PROGRESS NOTES
Subjective        Lonnie Salinas is a 68 y.o. male.     Chief Complaint   Patient presents with    Follow-up     1 week, S       History of Present Illness  Patient is here for follow up from shingles right side of face and mouth.   He had his eye exam last week after being seen in office and was told no ophthalmic complications. He completed his acyclorir. Complains right ear pain and oral pain. He stil has some crusted lesions on his mouth right side. No oral lesions found.  He is getting taste back.   Will increase his gabapentin to 300mg tid.   Avoid sun cover face and head.     The following portions of the patient's history were reviewed and updated as appropriate: allergies, current medications, past family history, past medical history, past social history, past surgical history and problem list.      Current Outpatient Medications:     Accu-Chek FastClix Lancets misc, 1 each Daily., Disp: 102 each, Rfl: 0    acyclovir (ZOVIRAX) 5 % cream, Apply 1 Application topically to the appropriate area as directed Every 3 (Three) Hours., Disp: 5 g, Rfl: 1    atorvastatin (LIPITOR) 40 MG tablet, Take 1 tablet by mouth Daily., Disp: 90 tablet, Rfl: 1    bumetanide (BUMEX) 1 MG tablet, Take 1 tablet by mouth twice daily, Disp: 180 tablet, Rfl: 0    clopidogrel (PLAVIX) 75 MG tablet, Take 1 tablet by mouth Daily., Disp: 90 tablet, Rfl: 3    gabapentin (NEURONTIN) 300 MG capsule, Take 1 capsule by mouth 3 (Three) Times a Day., Disp: 90 capsule, Rfl: 1    glucose blood (Accu-Chek Guide) test strip, 1 each by Other route Daily. Use as instructed, Disp: 100 each, Rfl: 0    irbesartan (AVAPRO) 75 MG tablet, Take 1 tablet by mouth Daily., Disp: 90 tablet, Rfl: 1    Isopropyl Alcohol (ALCOHOL WIPES) 70 % misc, Apply 1 each topically Daily., Disp: 100 each, Rfl: 0    metFORMIN (GLUCOPHAGE) 500 MG tablet, Take 1 tablet by mouth Daily With Breakfast., Disp: 90 tablet, Rfl: 0    metoprolol succinate XL (TOPROL-XL) 50 MG 24 hr  tablet, Take 1 tablet by mouth 2 (Two) Times a Day., Disp: 180 tablet, Rfl: 1    Nystatin (magic mouthwash), Swish and spit 15 mL Every 4 (Four) Hours As Needed (oral lesion)., Disp: 60 mL, Rfl: 1    potassium chloride (KLOR-CON M10) 10 MEQ CR tablet, Take 1 tablet by mouth once daily, Disp: 90 tablet, Rfl: 0    Recent Results (from the past 4032 hour(s))   Hemoglobin A1c    Collection Time: 03/07/24  9:50 AM    Specimen: Blood   Result Value Ref Range    Hemoglobin A1C 6.30 (H) 4.80 - 5.60 %   Comprehensive Metabolic Panel    Collection Time: 03/07/24  9:50 AM    Specimen: Blood   Result Value Ref Range    Glucose 78 65 - 99 mg/dL    BUN 15 8 - 23 mg/dL    Creatinine 1.19 0.76 - 1.27 mg/dL    Sodium 141 136 - 145 mmol/L    Potassium 3.8 3.5 - 5.2 mmol/L    Chloride 105 98 - 107 mmol/L    CO2 24.0 22.0 - 29.0 mmol/L    Calcium 9.5 8.6 - 10.5 mg/dL    Total Protein 7.3 6.0 - 8.5 g/dL    Albumin 4.5 3.5 - 5.2 g/dL    ALT (SGPT) 33 1 - 41 U/L    AST (SGOT) 26 1 - 40 U/L    Alkaline Phosphatase 97 39 - 117 U/L    Total Bilirubin 0.4 0.0 - 1.2 mg/dL    Globulin 2.8 gm/dL    A/G Ratio 1.6 g/dL    BUN/Creatinine Ratio 12.6 7.0 - 25.0    Anion Gap 12.0 5.0 - 15.0 mmol/L    eGFR 67.4 >60.0 mL/min/1.73   Lipid Panel    Collection Time: 03/07/24  9:50 AM    Specimen: Blood   Result Value Ref Range    Total Cholesterol 103 0 - 200 mg/dL    Triglycerides 212 (H) 0 - 150 mg/dL    HDL Cholesterol 30 (L) 40 - 60 mg/dL    LDL Cholesterol  39 0 - 100 mg/dL    VLDL Cholesterol 34 5 - 40 mg/dL    LDL/HDL Ratio 1.02    Microalbumin / Creatinine Urine Ratio - Urine, Clean Catch    Collection Time: 03/07/24  9:50 AM    Specimen: Urine, Clean Catch   Result Value Ref Range    Microalbumin/Creatinine Ratio      Creatinine, Urine 194.3 mg/dL    Microalbumin, Urine <1.2 mg/dL         Review of Systems    Objective     /78 (BP Location: Left arm, Patient Position: Sitting, Cuff Size: Adult)   Pulse 59   Temp 98.6 °F (37 °C) (Oral)    "Resp 18   Ht 180.3 cm (70.98\")   Wt 101 kg (222 lb)   SpO2 99%   BMI 30.98 kg/m²     Physical Exam  Vitals and nursing note reviewed.   Constitutional:       Appearance: Normal appearance.   HENT:      Head: Normocephalic.        Comments: Scattered lesion right upper and lower lips. Dried and crusty.        Right Ear: Tympanic membrane normal.      Left Ear: Tympanic membrane normal.      Nose: Nose normal.   Eyes:      Pupils: Pupils are equal, round, and reactive to light.   Cardiovascular:      Rate and Rhythm: Normal rate and regular rhythm.   Pulmonary:      Breath sounds: Normal breath sounds.   Abdominal:      General: Bowel sounds are normal.      Palpations: Abdomen is soft.   Neurological:      General: No focal deficit present.      Mental Status: He is alert and oriented to person, place, and time.   Psychiatric:         Mood and Affect: Mood normal.         Thought Content: Thought content normal.         Result Review :                Assessment & Plan    Diagnoses and all orders for this visit:    1. Herpes zoster with other complication (Primary)  Comments:  having neuropathic pain right ear and face oral lesions resolving    Other orders  -     gabapentin (NEURONTIN) 300 MG capsule; Take 1 capsule by mouth 3 (Three) Times a Day.  Dispense: 90 capsule; Refill: 1      There are no Patient Instructions on file for this visit.    Follow Up   No follow-ups on file.    Patient was given instructions and counseling regarding his condition or for health maintenance advice. Please see specific information pulled into the AVS if appropriate.     Indu Burkett, APRN    04/23/24      "

## 2024-05-24 DIAGNOSIS — B02.8 HERPES ZOSTER WITH OTHER COMPLICATION: Primary | ICD-10-CM

## 2024-05-24 RX ORDER — GABAPENTIN 300 MG/1
300 CAPSULE ORAL 3 TIMES DAILY
Qty: 90 CAPSULE | Refills: 1 | Status: SHIPPED | OUTPATIENT
Start: 2024-05-24

## 2024-05-24 NOTE — TELEPHONE ENCOUNTER
Caller: GABY BLACK    Relationship: Emergency Contact    Best call back number:     [phone]       Requested Prescriptions:   Requested Prescriptions     Pending Prescriptions Disp Refills    gabapentin (NEURONTIN) 300 MG capsule 90 capsule 1     Sig: Take 1 capsule by mouth 3 (Three) Times a Day.        Pharmacy where request should be sent: [facility] [address] [place] [address] [facility] [phone] [facility] [phone] [facility]     Last office visit with prescribing clinician: 4/23/2024   Last telemedicine visit with prescribing clinician: Visit date not found   Next office visit with prescribing clinician: 9/10/2024     Additional details provided by patient: PATIENT IS ON VACATION IN [place] AND IS ALL OUT OF MEDICATION.    Does the patient have less than a 3 day supply:  [x] Yes  [] No    Would you like a call back once the refill request has been completed: [] Yes [] No    If the office needs to give you a call back, can they leave a voicemail: [] Yes [] No    Felicia Moraes Rep   05/24/24 10:57 EDT

## 2024-05-30 RX ORDER — BUMETANIDE 1 MG/1
1 TABLET ORAL 2 TIMES DAILY
Qty: 180 TABLET | Refills: 0 | Status: SHIPPED | OUTPATIENT
Start: 2024-05-30

## 2024-06-11 ENCOUNTER — OFFICE VISIT (OUTPATIENT)
Dept: CARDIOLOGY | Facility: CLINIC | Age: 69
End: 2024-06-11
Payer: COMMERCIAL

## 2024-06-11 VITALS
DIASTOLIC BLOOD PRESSURE: 76 MMHG | WEIGHT: 222 LBS | RESPIRATION RATE: 18 BRPM | BODY MASS INDEX: 31.08 KG/M2 | SYSTOLIC BLOOD PRESSURE: 128 MMHG | HEART RATE: 57 BPM | HEIGHT: 71 IN

## 2024-06-11 DIAGNOSIS — I25.10 CORONARY ARTERY DISEASE INVOLVING NATIVE CORONARY ARTERY OF NATIVE HEART WITHOUT ANGINA PECTORIS: Primary | ICD-10-CM

## 2024-06-25 NOTE — PROGRESS NOTES
Cardiology Clinic Note  Jose Juan Fatima MD, PhD    Subjective:     Encounter Date:06/11/2024      Patient ID: Lonnie Salinas is a 68 y.o. male.    Chief Complaint:  Chief Complaint   Patient presents with    Follow-up       HPI:      Previously I the pleasure of seeing this 68-year-old male today with history of 2020 multivessel CAD after non-ST elevation myocardial infarction, balloon pump placement,  mildly reduced EF 45 to 50% , moderate RV dysfunction, ultimately undergoing urgent multivessel bypass surgery with LIMA to LAD, saphenous vein graft to PDA, vein graft to OM1 and OM 2 sequential jump graft, saphenous vein graft to OM 3 with left lower extremity vein harvest.  His postoperative case was complicated by cardiogenic shock also required multiple vasopressor as well as prolonged milrinone support with pulmonary hypertension and RV failure successfully weaned off with institution of heart failure therapies and beta-blockade .  he is allergic with angioedema with lisinopril however he had been tolerating irbesartan 150 mg p.o. daily for hypertension prior to his surgery without any problems and this was continued on subsequent visits.  He is maintained on antiplatelet therapy with Plavix, beta-blocker, high intensity statin over the last 4 years.  He has had no need for repeat revascularization since this time  His hemodynamics are stable and he is chest pain-free.  We discussed treadmill stress testing at 5 years after bypass for surveillance per guidelines with continuation of goal-directed medical therapy, combined with diet exercise heart healthy lifestyle.  No heart failure center symptoms or unstable angina on a repeat visit today     Exam  Vitals reviewed  Regular rate and rhythm no rubs murmurs gallops  No clubbing cyanosis or edema  No carotid bruits or JVD  Normal pulses  Will cap refill  Intact grossly  Clear to auscultation  Soft nontender nondistended  Vitals reviewed below     Treated medical  "conditions/assessment:  CAD  History of cardiogenic shock  Ischemic cardiomyopathy  History of nons ST elevation myocardial infarction  Hypertension hyperlipidemia  Risk factors for the coronary disease  Abnormal EKG  History of paroxysmal atrial fibrillation        Plan today:  Continue metoprolol  Continue irbesartan  Continue statin therapy  Free water and salt restriction.  Continue Bumex   Fish oil twice daily  Continue potassium supplementation presently  NYHA class I CCS class I at this time  Secondary prevention goals  Diet and exercise per AHA guidelines  Continue Plavix     Off anticoagulation was previously on Xarelto, has not had recurrence of atrial fibrillation, if does have recurrence would be on lifelong anticoagulation accordingly with [id number] score 4    Follow lipid panels and titrate lipid-lowering therapies accordingly, PCSK9 inhibitors if not at goal is on statins  Not on Zetia presently which could be considered       Return to clinic in 1 year for follow-up        Jose Juan Fatima MD, PhD    Objective:         /76 (BP Location: Right arm, Patient Position: Sitting)   Pulse 57   Resp 18   Ht 180.3 cm (71\")   Wt 101 kg (222 lb)   BMI 30.96 kg/m²   Diagnoses and all orders for this visit:    1. Coronary artery disease involving native coronary artery of native heart without angina pectoris (Primary)  -     Stress Test With Myocardial Perfusion One Day; Future          The pleasure to be involved in this patient's cardiovascular care.  Please call with any questions or concerns  Jose Juan Fatima MD, PhD    Most recent EKG as reviewed and interpreted by me:    ECG 12 Lead    Date/Time: 6/11/2024 7:05 PM  Performed by: Jose Juan Fatima MD    Authorized by: Jose Juan Fatima MD  Comparison: not compared with previous ECG   Previous ECG: no previous ECG available  Rhythm: sinus bradycardia    Clinical impression: abnormal EKG  Comments: Old inferior MI  LVH           Most " recent echo as reviewed and interpreted by me:  Results for orders placed during the hospital encounter of 07/08/20    Adult Transthoracic Echo Limited W/ Cont if Necessary Per Protocol    Interpretation Summary  · Right ventricular cavity is severely dilated.  · Moderately reduced right ventricular systolic function noted.  · Mild tricuspid valve regurgitation is present.    Overall LV function appears mildly reduced 45 to 50% with dyssynchrony with pacing  RV is severely enlarged with at least moderate hypokinesis possibly moderate to severe  Mild TR, trace MR, no aortic valve pathology  No masses or effusion seen  IVC not well seen cannot estimate right atrial pressure  Did not estimate diastolic dysfunction, no parameters identified      Most recent stress test as reviewed and interpreted by me:      Most recent cardiac catheterization as reviewed interpreted by me:  Results for orders placed during the hospital encounter of 07/08/20    Cardiac Catheterization/Vascular Study    Narrative   Jose Juan Fatima MD, PhD  [facility] cardiology  Date of service 7-8-2020    Procedure  1.  Left heart catheterization with coronary angiography left ventriculography in FLORES position    Indication  Non-ST elevation myocardial infarction    After informed consent the patient was brought to the catheterization lab sterilely prepped and draped usual fashion with exposure the right groin right common femoral tibial access via micropuncture modified Seldinger technique.  JL4 and JR4 diagnostic catheters used for selective left and right coronary angiography with the pigtail catheter used across the aortic valve followed by left ventriculography in FLORES position EDP measurement and transaortic valve gradient assessment.  Secondary to findings of multivessel coronary artery disease with subtotally occluded mid LAD, diffuse ramus proximal, obtuse marginal and RCA disease with  of the RCA decision was made for  evaluation of urgent coronary artery bypass grafting.  After discussion with CV surgery as well as KHAI I flow in the continuation LAD from the mid to distal portion after the takeoff the diagonal branch decision was made for balloon pump insertion to augment diastolic perfusion pressure.  The 6 Yi sheath was then exchanged for standard compatible balloon pump sheath which was placed under fluoroscopic guidance.  Pigtail catheter was used for aortic arch visualization with angiography of the ostium of the left subclavian.  Appropriately sized aortic balloon pump was positioned 1 to 2 cm distal to the takeoff of the left subclavian in the descending thoracic aorta with one-to-one augmentation.  Cineangiography of inflated balloon revealed termination well above anatomic landmarks for renal arteries.  Patient had no complications.  He was transferred to ICU for urgent CABG evaluation.    Complications none  Blood loss less than 10 cc  Moderate conscious sedation time time of 1 hour  Moderate conscious Tatian was utilized with IV Versed and fentanyl administered by registered nurse with continuous ECG pulse oximetry and hemodynamic monitoring throughout the entirety the case.    Findings  1.  Opening aortic pressure of 84/55  2.  Closing pressure 111/45  #3, high normal LVEDP 15 to 18 mmHg  4.  Overall preserved LV systolic function with basal inferior wall akinesis EF 50 to 55% estimated  No significant transaortic valve gradient    Angiography  1.  Left main is medium to large caliber with no angiographic disease of the left main  2.  The LAD has diffuse disease most significantly with subtotally occluded mid to distal portion after the takeoff of the diagonal branch with KHAI I flow, proximal portion with diffuse disease but not greater than 50%, diagonals patent  3.  Ramus intermedius proximal 80% disease  4.  Circumflex proximally patent with obtuse marginals with proximal 80% disease  5.  RCA  just after  the ostial takeoff with reconstitution of left to right collateralization via septal collaterals and epicardial lateral collaterals to the PLV branch and PDA branches distally.    Recommendations and conclusions  1.  Urgent CABG evaluation for multivessel bypass, critical three-vessel CAD  -If patient decompensates would do emergent Impella supported revascularization of the LAD  2.  Balloon pump one-to-one diastolic augmentation of coronary flow will be continued  3.  Aspirin statin will be utilized, no beta-blocker with marginal blood pressures and RV dysfunction by 2D echo  4.  Case discussed with CV surgery after angiography and the case was concluded  5.  Other recommendations to follow bypass evaluation    Jose Juan Fatima MD, PhD    The following portions of the patient's history were reviewed and updated as appropriate: allergies, current medications, past family history, past medical history, past social history, past surgical history, and problem list.      ROS:  14 point review of systems negative except as mentioned above    Current Outpatient Medications:     atorvastatin (LIPITOR) 40 MG tablet, Take 1 tablet by mouth Daily., Disp: 90 tablet, Rfl: 1    bumetanide (BUMEX) 1 MG tablet, Take 1 tablet by mouth twice daily, Disp: 180 tablet, Rfl: 0    clopidogrel (PLAVIX) 75 MG tablet, Take 1 tablet by mouth Daily., Disp: 90 tablet, Rfl: 3    gabapentin (NEURONTIN) 300 MG capsule, Take 1 capsule by mouth 3 (Three) Times a Day., Disp: 90 capsule, Rfl: 1    irbesartan (AVAPRO) 75 MG tablet, Take 1 tablet by mouth Daily., Disp: 90 tablet, Rfl: 1    metFORMIN (GLUCOPHAGE) 500 MG tablet, Take 1 tablet by mouth Daily With Breakfast., Disp: 90 tablet, Rfl: 0    metoprolol succinate XL (TOPROL-XL) 50 MG 24 hr tablet, Take 1 tablet by mouth 2 (Two) Times a Day., Disp: 180 tablet, Rfl: 1    potassium chloride (KLOR-CON M10) 10 MEQ CR tablet, Take 1 tablet by mouth once daily, Disp: 90 tablet, Rfl: 0    Accu-Chek  FastClix Lancets misc, 1 each Daily., Disp: 102 each, Rfl: 0    glucose blood (Accu-Chek Guide) test strip, 1 each by Other route Daily. Use as instructed, Disp: 100 each, Rfl: 0    Isopropyl Alcohol (ALCOHOL WIPES) 70 % misc, Apply 1 each topically Daily., Disp: 100 each, Rfl: 0    Problem List:  Patient Active Problem List   Diagnosis    Hyperlipidemia    Hypertension    Class 1 obesity with serious comorbidity and body mass index (BMI) of 31.0 to 31.9 in adult    Prostate cancer screening    Allergic rhinitis    NSTEMI, initial episode of care    NSTEMI (non-ST elevated myocardial infarction)    Coronary artery disease involving native coronary artery of native heart with unstable angina pectoris    S/P CABG x 5    Type 2 diabetes mellitus without complication, without long-term current use of insulin    Ischemic cardiomyopathy    Type 2 diabetes mellitus with diabetic neuropathy, without long-term current use of insulin     Past Medical History:  Past Medical History:   Diagnosis Date    Allergic     Coronary artery disease     Dyslipidemia     History of tick-borne disease     Hyperlipemia     Hypertension      Past Surgical History:  Past Surgical History:   Procedure Laterality Date    CARDIAC CATHETERIZATION N/A 7/8/2020    Procedure: Left Heart Cath with possible PCI;  Surgeon: Jose Juan Fatima MD;  Location: [facility] CATH INVASIVE LOCATION;  Service: Cardiology;  Laterality: N/A;    CARDIAC CATHETERIZATION N/A 7/8/2020    Procedure: Intra-Aortic Baloon Pump Insertion;  Surgeon: Jose Juan Fatima MD;  Location: [facility] CATH INVASIVE LOCATION;  Service: Cardiology;  Laterality: N/A;    CORONARY ARTERY BYPASS GRAFT N/A 7/9/2020    Procedure: CORONARY ARTERY BYPASS GRAFTING X 5 USING LEFT EDWARD  AND LEFT ENDOSCOPICALLY HARVESTED SAPHENOUS VEIN;  Surgeon: Lui Lake MD;  Location: [facility] CVOR;  Service: Cardiothoracic;  Laterality: N/A;  MINERVA DONE BY SRINIVASAN     Social History:  Social History      Socioeconomic History    Marital status:    Tobacco Use    Smoking status: Never     Passive exposure: Never    Smokeless tobacco: Never   Vaping Use    Vaping status: Never Used   Substance and Sexual Activity    Alcohol use: No    Drug use: No    Sexual activity: Yes     Partners: Female     Allergies:  Allergies   Allergen Reactions    Lisinopril Angioedema     Immunizations:  Immunization History   Administered Date(s) Administered    COVID-19 (PFIZER) Purple Cap Monovalent 08/06/2021, 08/27/2021    Covid-19 (Pfizer) Gray Cap Monovalent 02/21/2022    Td (TDVAX) 07/08/2014    Tdap 07/06/2020            In-Office Procedure(s):  No orders to display        ASCVD RIsk Score::  The ASCVD Risk score (Alvina BALBUENA, et al., 2019) failed to calculate.    Imaging:    Results for orders placed during the hospital encounter of 07/08/20    XR Chest 1 View    Narrative  DATE OF EXAM:  7/12/2020 7:29 AM    PROCEDURE:  XR CHEST 1 VW-    INDICATIONS:  Worsening shortness of breath, coronary artery disease, recent heart  surgery. History of a tiny left apical pneumothorax following chest tube  removal.    COMPARISON:  07/11/2020.    TECHNIQUE:  Single radiographic view of the chest was obtained.    FINDINGS:  The left apical pneumothorax has resolved. The right internal jugular  sheath remains in place. The heart is enlarged. There are postsurgical  changes from a recent CABG procedure. The pulmonary vascular markings  are probably normal. There is persistent bilateral basilar consolidation  and left perihilar consolidation which is unchanged. This is probably  due to atelectasis, but I cannot completely exclude airspace disease  from fluid overload or pneumonia. A new small right pleural effusion is  suspected. There is a stable small left pleural effusion.    Impression  1. The left apical pneumothorax has resolved.  2. Cardiomegaly.  3. Unchanged left perihilar and bilateral basilar consolidation probably  due to  atelectasis, but I cannot exclude airspace disease from fluid  overload or pneumonia.  3. New small right pleural effusion and an unchanged small left pleural  effusion.    Electronically Signed By-Andrew York On:7/12/2020 8:18 AM  This report was finalized on [id number] by  Andrew York, .       Results for orders placed during the hospital encounter of 07/08/20    US Renal Bilateral    Narrative  DATE OF EXAM:  7/8/2020 6:17 PM    PROCEDURE:  US RENAL BILATERAL-    INDICATIONS:  Chronic kidney disease    COMPARISON:  No Comparisons Available    TECHNIQUE:  Grayscale and color Doppler ultrasound evaluation of the kidneys and  urinary bladder was performed.      FINDINGS:  Both kidneys appear normal in size and echogenicity, with no stone,  mass, or hydronephrosis identified. The right kidney measures 11.5 cm,  while the left kidney measures 11.9 cm.    The urinary bladder is not well-seen secondary to Aguirre catheterization.    Impression  Both kidneys appear within normal limits.    Electronically Signed By-DR. Eliecer Santiago MD On:7/8/2020 8:39 PM  This report was finalized on [id number] by DR. Eliecer Santiago MD.          Lab Review:   Lab on 03/07/2024   Component Date Value    Hemoglobin A1C 03/07/2024 6.30 (H)     Glucose 03/07/2024 78     BUN 03/07/2024 15     Creatinine 03/07/2024 1.19     Sodium 03/07/2024 141     Potassium 03/07/2024 3.8     Chloride 03/07/2024 105     CO2 03/07/2024 24.0     Calcium 03/07/2024 9.5     Total Protein 03/07/2024 7.3     Albumin 03/07/2024 4.5     ALT (SGPT) 03/07/2024 33     AST (SGOT) 03/07/2024 26     Alkaline Phosphatase 03/07/2024 97     Total Bilirubin 03/07/2024 0.4     Globulin 03/07/2024 2.8     A/G Ratio 03/07/2024 1.6     BUN/Creatinine Ratio 03/07/2024 12.6     Anion Gap 03/07/2024 12.0     eGFR 03/07/2024 67.4     Total Cholesterol 03/07/2024 103     Triglycerides 03/07/2024 212 (H)     HDL Cholesterol 03/07/2024 30 (L)     LDL Cholesterol  03/07/2024 39      VLDL Cholesterol 03/07/2024 34     LDL/HDL Ratio 03/07/2024 1.02     Microalbumin/Creatinine * 03/07/2024      Creatinine, Urine 03/07/2024 194.3     Microalbumin, Urine 03/07/2024 <1.2      Recent labs reviewed and interpreted for clinical significance and application            Level of Care:           Jose Juan Fatima MD  06/25/24  .

## 2024-07-15 DIAGNOSIS — B02.8 HERPES ZOSTER WITH OTHER COMPLICATION: ICD-10-CM

## 2024-07-15 RX ORDER — GABAPENTIN 300 MG/1
300 CAPSULE ORAL 3 TIMES DAILY
Qty: 90 CAPSULE | Refills: 0 | Status: SHIPPED | OUTPATIENT
Start: 2024-07-15

## 2024-08-09 RX ORDER — METOPROLOL SUCCINATE 50 MG/1
50 TABLET, EXTENDED RELEASE ORAL 2 TIMES DAILY
Qty: 180 TABLET | Refills: 0 | Status: SHIPPED | OUTPATIENT
Start: 2024-08-09

## 2024-08-13 DIAGNOSIS — B02.8 HERPES ZOSTER WITH OTHER COMPLICATION: ICD-10-CM

## 2024-08-13 RX ORDER — GABAPENTIN 300 MG/1
300 CAPSULE ORAL 3 TIMES DAILY
Qty: 270 CAPSULE | Refills: 0 | Status: SHIPPED | OUTPATIENT
Start: 2024-08-13

## 2024-08-24 RX ORDER — BUMETANIDE 1 MG/1
1 TABLET ORAL 2 TIMES DAILY
Qty: 180 TABLET | Refills: 0 | Status: SHIPPED | OUTPATIENT
Start: 2024-08-24

## 2024-09-06 RX ORDER — POTASSIUM CHLORIDE 750 MG/1
1 TABLET, EXTENDED RELEASE ORAL DAILY
COMMUNITY
Start: 2024-07-03

## 2024-09-10 ENCOUNTER — LAB (OUTPATIENT)
Dept: LAB | Facility: HOSPITAL | Age: 69
End: 2024-09-10
Payer: COMMERCIAL

## 2024-09-10 ENCOUNTER — OFFICE VISIT (OUTPATIENT)
Dept: FAMILY MEDICINE CLINIC | Facility: CLINIC | Age: 69
End: 2024-09-10
Payer: COMMERCIAL

## 2024-09-10 VITALS
WEIGHT: 221.6 LBS | TEMPERATURE: 98.2 F | RESPIRATION RATE: 16 BRPM | DIASTOLIC BLOOD PRESSURE: 82 MMHG | SYSTOLIC BLOOD PRESSURE: 138 MMHG | HEART RATE: 67 BPM | BODY MASS INDEX: 31.02 KG/M2 | HEIGHT: 71 IN | OXYGEN SATURATION: 100 %

## 2024-09-10 DIAGNOSIS — E11.9 TYPE 2 DIABETES MELLITUS WITHOUT COMPLICATION, WITHOUT LONG-TERM CURRENT USE OF INSULIN: Chronic | ICD-10-CM

## 2024-09-10 DIAGNOSIS — E78.2 MIXED HYPERLIPIDEMIA: Chronic | ICD-10-CM

## 2024-09-10 DIAGNOSIS — I10 PRIMARY HYPERTENSION: Chronic | ICD-10-CM

## 2024-09-10 DIAGNOSIS — I25.110 CORONARY ARTERY DISEASE INVOLVING NATIVE CORONARY ARTERY OF NATIVE HEART WITH UNSTABLE ANGINA PECTORIS: Primary | Chronic | ICD-10-CM

## 2024-09-10 DIAGNOSIS — I25.110 CORONARY ARTERY DISEASE INVOLVING NATIVE CORONARY ARTERY OF NATIVE HEART WITH UNSTABLE ANGINA PECTORIS: Chronic | ICD-10-CM

## 2024-09-10 LAB
ALBUMIN SERPL-MCNC: 4.5 G/DL (ref 3.5–5.2)
ALBUMIN/GLOB SERPL: 1.5 G/DL
ALP SERPL-CCNC: 102 U/L (ref 39–117)
ALT SERPL W P-5'-P-CCNC: 29 U/L (ref 1–41)
ANION GAP SERPL CALCULATED.3IONS-SCNC: 13.2 MMOL/L (ref 5–15)
AST SERPL-CCNC: 24 U/L (ref 1–40)
BILIRUB SERPL-MCNC: 0.5 MG/DL (ref 0–1.2)
BUN SERPL-MCNC: 18 MG/DL (ref 8–23)
BUN/CREAT SERPL: 14.8 (ref 7–25)
CALCIUM SPEC-SCNC: 9.7 MG/DL (ref 8.6–10.5)
CHLORIDE SERPL-SCNC: 101 MMOL/L (ref 98–107)
CHOLEST SERPL-MCNC: 114 MG/DL (ref 0–200)
CO2 SERPL-SCNC: 23.8 MMOL/L (ref 22–29)
CREAT SERPL-MCNC: 1.22 MG/DL (ref 0.76–1.27)
EGFRCR SERPLBLD CKD-EPI 2021: 65 ML/MIN/1.73
GLOBULIN UR ELPH-MCNC: 3.1 GM/DL
GLUCOSE SERPL-MCNC: 90 MG/DL (ref 65–99)
HBA1C MFR BLD: 6.2 % (ref 4.8–5.6)
HDLC SERPL-MCNC: 28 MG/DL (ref 40–60)
LDLC SERPL CALC-MCNC: 52 MG/DL (ref 0–100)
LDLC/HDLC SERPL: 1.61 {RATIO}
POTASSIUM SERPL-SCNC: 4 MMOL/L (ref 3.5–5.2)
PROT SERPL-MCNC: 7.6 G/DL (ref 6–8.5)
SODIUM SERPL-SCNC: 138 MMOL/L (ref 136–145)
TRIGL SERPL-MCNC: 205 MG/DL (ref 0–150)
VLDLC SERPL-MCNC: 34 MG/DL (ref 5–40)

## 2024-09-10 PROCEDURE — 36415 COLL VENOUS BLD VENIPUNCTURE: CPT

## 2024-09-10 PROCEDURE — 83036 HEMOGLOBIN GLYCOSYLATED A1C: CPT

## 2024-09-10 PROCEDURE — 80061 LIPID PANEL: CPT

## 2024-09-10 PROCEDURE — 80053 COMPREHEN METABOLIC PANEL: CPT

## 2024-09-10 PROCEDURE — 99214 OFFICE O/P EST MOD 30 MIN: CPT | Performed by: NURSE PRACTITIONER

## 2024-09-10 NOTE — PROGRESS NOTES
Subjective        Lonnie Salinas is a 69 y.o. male.     Chief Complaint   Patient presents with    Hypertension     6 month follow up       Hypertension      Patient is here for management of his chronic medical problems: diabetes mellitus, CAD, hypertension, hyperlipidemia, neuopathy    Diabetes mellitus metformin 500 mg daily has Cad. He does not check  Had eye exam with Dr Hermosillo recently no records in chart. 3/7/2024 A1C 6.30 high .    CAD followed by cardioogy metoprolol succinate XL 50 mg bid irbesartan 75 mg daily plavix 75 mg bumex 1 mg bid , hyperlipidemia.   Potassium chloride 10 mEq.     Peripheral neuropathy: gabapentin 300mg tid. He also used this for neuropathic pain from his shingles.   He said feels most symptoms in his feet feel numb. No open wounds on feet.     Hyperlipidemia atorastatain 40 mg daily has CAD and DM.         The following portions of the patient's history were reviewed and updated as appropriate: allergies, current medications, past family history, past medical history, past social history, past surgical history and problem list.      Current Outpatient Medications:     Accu-Chek FastClix Lancets misc, 1 each Daily., Disp: 102 each, Rfl: 0    atorvastatin (LIPITOR) 40 MG tablet, Take 1 tablet by mouth Daily., Disp: 90 tablet, Rfl: 1    bumetanide (BUMEX) 1 MG tablet, Take 1 tablet by mouth twice daily, Disp: 180 tablet, Rfl: 0    clopidogrel (PLAVIX) 75 MG tablet, Take 1 tablet by mouth Daily., Disp: 90 tablet, Rfl: 3    gabapentin (NEURONTIN) 300 MG capsule, TAKE 1 CAPSULE BY MOUTH THREE TIMES DAILY, Disp: 270 capsule, Rfl: 0    glucose blood (Accu-Chek Guide) test strip, 1 each by Other route Daily. Use as instructed, Disp: 100 each, Rfl: 0    irbesartan (AVAPRO) 75 MG tablet, Take 1 tablet by mouth Daily., Disp: 90 tablet, Rfl: 1    Isopropyl Alcohol (ALCOHOL WIPES) 70 % misc, Apply 1 each topically Daily., Disp: 100 each, Rfl: 0    metFORMIN (GLUCOPHAGE) 500 MG tablet, Take 1  "tablet by mouth once daily with breakfast, Disp: 90 tablet, Rfl: 0    metoprolol succinate XL (TOPROL-XL) 50 MG 24 hr tablet, Take 1 tablet by mouth twice daily, Disp: 180 tablet, Rfl: 0    potassium chloride (KLOR-CON M10) 10 MEQ CR tablet, Take 1 tablet by mouth once daily, Disp: 90 tablet, Rfl: 0    potassium chloride 10 MEQ CR tablet, Take 1 tablet by mouth Daily., Disp: , Rfl:     No results found for this or any previous visit (from the past 4032 hour(s)).      Review of Systems    Objective     /82 (BP Location: Left arm, Patient Position: Sitting, Cuff Size: Large Adult)   Pulse 67   Temp 98.2 °F (36.8 °C) (Oral)   Resp 16   Ht 180.3 cm (71\")   Wt 101 kg (221 lb 9.6 oz)   SpO2 100%   BMI 30.91 kg/m²     Physical Exam  Vitals and nursing note reviewed.   Constitutional:       Appearance: Normal appearance.   HENT:      Head: Normocephalic.      Right Ear: External ear normal.      Left Ear: External ear normal.      Nose: Nose normal.      Mouth/Throat:      Mouth: Mucous membranes are moist.   Eyes:      Pupils: Pupils are equal, round, and reactive to light.   Cardiovascular:      Rate and Rhythm: Normal rate and regular rhythm.      Pulses: Normal pulses.      Heart sounds: Normal heart sounds.   Pulmonary:      Effort: Pulmonary effort is normal.      Breath sounds: Normal breath sounds.   Abdominal:      Palpations: Abdomen is soft.   Musculoskeletal:      Cervical back: Neck supple.   Skin:     General: Skin is warm.   Neurological:      General: No focal deficit present.      Mental Status: He is alert and oriented to person, place, and time.   Psychiatric:         Mood and Affect: Mood normal.         Behavior: Behavior normal.         Thought Content: Thought content normal.         Judgment: Judgment normal.         Result Review :                Assessment & Plan    Diagnoses and all orders for this visit:    1. Coronary artery disease involving native coronary artery of native heart " with unstable angina pectoris (Primary)  -     Lipid Panel; Future  -     Comprehensive Metabolic Panel; Future  -     Hemoglobin A1c; Future    2. Mixed hyperlipidemia  -     Lipid Panel; Future  -     Comprehensive Metabolic Panel; Future    3. Primary hypertension  -     Lipid Panel; Future  -     Comprehensive Metabolic Panel; Future    4. Type 2 diabetes mellitus without complication, without long-term current use of insulin  -     Lipid Panel; Future  -     Comprehensive Metabolic Panel; Future  -     Hemoglobin A1c; Future      Patient Instructions   Shingles vaccine recommended  Flu shot  Eat healthy exercise daily.  Follow up on labs.       Follow Up   Return in about 6 months (around 3/10/2025).    Patient was given instructions and counseling regarding his condition or for health maintenance advice. Please see specific information pulled into the AVS if appropriate.     Indu Burkett, APRN    09/10/24

## 2024-09-23 RX ORDER — POTASSIUM CHLORIDE 750 MG/1
10 TABLET, EXTENDED RELEASE ORAL DAILY
Qty: 90 TABLET | Refills: 0 | Status: SHIPPED | OUTPATIENT
Start: 2024-09-23

## 2024-09-27 RX ORDER — POTASSIUM CHLORIDE 750 MG/1
10 TABLET, EXTENDED RELEASE ORAL DAILY
Qty: 90 TABLET | Refills: 0 | Status: SHIPPED | OUTPATIENT
Start: 2024-09-27

## 2024-10-14 RX ORDER — ATORVASTATIN CALCIUM 40 MG/1
40 TABLET, FILM COATED ORAL DAILY
Qty: 180 TABLET | Refills: 0 | Status: SHIPPED | OUTPATIENT
Start: 2024-10-14

## 2024-11-04 RX ORDER — METOPROLOL SUCCINATE 50 MG/1
50 TABLET, EXTENDED RELEASE ORAL 2 TIMES DAILY
Qty: 180 TABLET | Refills: 0 | Status: SHIPPED | OUTPATIENT
Start: 2024-11-04

## 2024-11-11 RX ORDER — IRBESARTAN 75 MG/1
75 TABLET ORAL DAILY
Qty: 90 TABLET | Refills: 1 | Status: SHIPPED | OUTPATIENT
Start: 2024-11-11

## 2024-11-15 DIAGNOSIS — B02.8 HERPES ZOSTER WITH OTHER COMPLICATION: ICD-10-CM

## 2024-11-15 RX ORDER — GABAPENTIN 300 MG/1
300 CAPSULE ORAL 3 TIMES DAILY
Qty: 270 CAPSULE | Refills: 0 | Status: SHIPPED | OUTPATIENT
Start: 2024-11-15

## 2024-12-03 RX ORDER — BUMETANIDE 1 MG/1
1 TABLET ORAL 2 TIMES DAILY
Qty: 180 TABLET | Refills: 0 | Status: SHIPPED | OUTPATIENT
Start: 2024-12-03

## 2024-12-22 RX ORDER — POTASSIUM CHLORIDE 750 MG/1
10 TABLET, EXTENDED RELEASE ORAL DAILY
Qty: 90 TABLET | Refills: 0 | Status: SHIPPED | OUTPATIENT
Start: 2024-12-22

## 2025-02-05 RX ORDER — METOPROLOL SUCCINATE 50 MG/1
50 TABLET, EXTENDED RELEASE ORAL 2 TIMES DAILY
Qty: 180 TABLET | Refills: 0 | Status: SHIPPED | OUTPATIENT
Start: 2025-02-05

## 2025-02-15 DIAGNOSIS — B02.8 HERPES ZOSTER WITH OTHER COMPLICATION: ICD-10-CM

## 2025-02-16 RX ORDER — GABAPENTIN 300 MG/1
300 CAPSULE ORAL 3 TIMES DAILY
Qty: 270 CAPSULE | Refills: 0 | Status: SHIPPED | OUTPATIENT
Start: 2025-02-16

## 2025-02-17 RX ORDER — CLOPIDOGREL BISULFATE 75 MG/1
75 TABLET ORAL DAILY
Qty: 90 TABLET | Refills: 0 | Status: SHIPPED | OUTPATIENT
Start: 2025-02-17

## 2025-03-03 RX ORDER — BUMETANIDE 1 MG/1
1 TABLET ORAL 2 TIMES DAILY
Qty: 180 TABLET | Refills: 0 | Status: SHIPPED | OUTPATIENT
Start: 2025-03-03

## 2025-03-21 ENCOUNTER — LAB (OUTPATIENT)
Dept: LAB | Facility: HOSPITAL | Age: 70
End: 2025-03-21
Payer: COMMERCIAL

## 2025-03-21 ENCOUNTER — OFFICE VISIT (OUTPATIENT)
Dept: FAMILY MEDICINE CLINIC | Facility: CLINIC | Age: 70
End: 2025-03-21
Payer: COMMERCIAL

## 2025-03-21 VITALS
TEMPERATURE: 98.1 F | HEART RATE: 55 BPM | OXYGEN SATURATION: 99 % | WEIGHT: 227.6 LBS | DIASTOLIC BLOOD PRESSURE: 79 MMHG | RESPIRATION RATE: 16 BRPM | SYSTOLIC BLOOD PRESSURE: 133 MMHG | BODY MASS INDEX: 31.86 KG/M2 | HEIGHT: 71 IN

## 2025-03-21 DIAGNOSIS — I10 PRIMARY HYPERTENSION: Chronic | ICD-10-CM

## 2025-03-21 DIAGNOSIS — E78.2 MIXED HYPERLIPIDEMIA: Chronic | ICD-10-CM

## 2025-03-21 DIAGNOSIS — I25.110 CORONARY ARTERY DISEASE INVOLVING NATIVE CORONARY ARTERY OF NATIVE HEART WITH UNSTABLE ANGINA PECTORIS: Chronic | ICD-10-CM

## 2025-03-21 DIAGNOSIS — Z12.5 PROSTATE CANCER SCREENING: Chronic | ICD-10-CM

## 2025-03-21 DIAGNOSIS — E11.9 TYPE 2 DIABETES MELLITUS WITHOUT COMPLICATION, WITHOUT LONG-TERM CURRENT USE OF INSULIN: Chronic | ICD-10-CM

## 2025-03-21 DIAGNOSIS — E66.09 CLASS 1 OBESITY DUE TO EXCESS CALORIES WITH SERIOUS COMORBIDITY AND BODY MASS INDEX (BMI) OF 31.0 TO 31.9 IN ADULT: Chronic | ICD-10-CM

## 2025-03-21 DIAGNOSIS — E66.811 CLASS 1 OBESITY DUE TO EXCESS CALORIES WITH SERIOUS COMORBIDITY AND BODY MASS INDEX (BMI) OF 31.0 TO 31.9 IN ADULT: Chronic | ICD-10-CM

## 2025-03-21 LAB
ALBUMIN SERPL-MCNC: 4.3 G/DL (ref 3.5–5.2)
ALBUMIN UR-MCNC: <1.2 MG/DL
ALBUMIN/GLOB SERPL: 1.4 G/DL
ALP SERPL-CCNC: 94 U/L (ref 39–117)
ALT SERPL W P-5'-P-CCNC: 30 U/L (ref 1–41)
ANION GAP SERPL CALCULATED.3IONS-SCNC: 10.6 MMOL/L (ref 5–15)
AST SERPL-CCNC: 28 U/L (ref 1–40)
BILIRUB SERPL-MCNC: 0.4 MG/DL (ref 0–1.2)
BUN SERPL-MCNC: 14 MG/DL (ref 8–23)
BUN/CREAT SERPL: 12.6 (ref 7–25)
CALCIUM SPEC-SCNC: 9.1 MG/DL (ref 8.6–10.5)
CHLORIDE SERPL-SCNC: 103 MMOL/L (ref 98–107)
CHOLEST SERPL-MCNC: 102 MG/DL (ref 0–200)
CO2 SERPL-SCNC: 23.4 MMOL/L (ref 22–29)
CREAT SERPL-MCNC: 1.11 MG/DL (ref 0.76–1.27)
CREAT UR-MCNC: 220.5 MG/DL
EGFRCR SERPLBLD CKD-EPI 2021: 72.8 ML/MIN/1.73
GLOBULIN UR ELPH-MCNC: 3.1 GM/DL
GLUCOSE SERPL-MCNC: 97 MG/DL (ref 65–99)
HBA1C MFR BLD: 6.3 % (ref 4.8–5.6)
HDLC SERPL-MCNC: 26 MG/DL (ref 40–60)
LDLC SERPL CALC-MCNC: 42 MG/DL (ref 0–100)
LDLC/HDLC SERPL: 1.3 {RATIO}
MICROALBUMIN/CREAT UR: NORMAL MG/G{CREAT}
POTASSIUM SERPL-SCNC: 4.1 MMOL/L (ref 3.5–5.2)
PROT SERPL-MCNC: 7.4 G/DL (ref 6–8.5)
PSA SERPL-MCNC: 0.7 NG/ML (ref 0–4)
SODIUM SERPL-SCNC: 137 MMOL/L (ref 136–145)
TRIGL SERPL-MCNC: 211 MG/DL (ref 0–150)
VLDLC SERPL-MCNC: 34 MG/DL (ref 5–40)

## 2025-03-21 PROCEDURE — 83036 HEMOGLOBIN GLYCOSYLATED A1C: CPT

## 2025-03-21 PROCEDURE — G0103 PSA SCREENING: HCPCS

## 2025-03-21 PROCEDURE — 82043 UR ALBUMIN QUANTITATIVE: CPT

## 2025-03-21 PROCEDURE — 80053 COMPREHEN METABOLIC PANEL: CPT

## 2025-03-21 PROCEDURE — 36415 COLL VENOUS BLD VENIPUNCTURE: CPT

## 2025-03-21 PROCEDURE — 82570 ASSAY OF URINE CREATININE: CPT

## 2025-03-21 PROCEDURE — 80061 LIPID PANEL: CPT

## 2025-03-21 NOTE — ASSESSMENT & PLAN NOTE
Patient's (Body mass index is 31.74 kg/m².) indicates that they are obese (BMI >30) with health conditions that include coronary heart disease, diabetes mellitus, and dyslipidemias . Weight is unchanged. BMI  is above average; BMI management plan is completed. We discussed portion control and increasing exercise.

## 2025-03-21 NOTE — PATIENT INSTRUCTIONS
Follow up on labs   No sharp edges when clipping nails  No sensation to great toes monitor feet daily  Schedule diabetic eye exam for 4/2025

## 2025-03-21 NOTE — PROGRESS NOTES
Subjective        Lonnie Salinas is a 69 y.o. male.     Chief Complaint   Patient presents with    Diabetes    Hyperlipidemia    Hypertension       Diabetes    Hyperlipidemia    Hypertension      Patient is here for management of his chronic medical problems:   DM, hyperlipidemia hypertension. Diabetic neuropathy, Cad.    CAD plavix, 75 mg daily bumex 1mg bid atorvastatin 40 mg daily. Metoprolol 50 mg bid.     DM metformin 500 mg am checking daily no highs reported no lows reported. A1C 6.2 9/2024. Needs diabetic eye exam.    Hypertension has CAD metoprolol XL 50 mg bid irbesartan 75 mg daily     Hypokalemia taking bumex bid potassium chloride 10 MEQ 10 daily.     Hyperlipidemia: atorvastatin 40 mg daily. Has CAD and DM.        The following portions of the patient's history were reviewed and updated as appropriate: allergies, current medications, past family history, past medical history, past social history, past surgical history and problem list.      Current Outpatient Medications:     Accu-Chek FastClix Lancets misc, 1 each Daily., Disp: 102 each, Rfl: 0    atorvastatin (LIPITOR) 40 MG tablet, Take 1 tablet by mouth once daily, Disp: 180 tablet, Rfl: 0    bumetanide (BUMEX) 1 MG tablet, Take 1 tablet by mouth 2 (Two) Times a Day., Disp: 180 tablet, Rfl: 0    clopidogrel (PLAVIX) 75 MG tablet, Take 1 tablet by mouth once daily, Disp: 90 tablet, Rfl: 0    gabapentin (NEURONTIN) 300 MG capsule, Take 1 capsule by mouth 3 (Three) Times a Day., Disp: 270 capsule, Rfl: 0    glucose blood (Accu-Chek Guide) test strip, 1 each by Other route Daily. Use as instructed, Disp: 100 each, Rfl: 0    irbesartan (AVAPRO) 75 MG tablet, Take 1 tablet by mouth Daily., Disp: 90 tablet, Rfl: 1    Isopropyl Alcohol (ALCOHOL WIPES) 70 % misc, Apply 1 each topically Daily., Disp: 100 each, Rfl: 0    metFORMIN (GLUCOPHAGE) 500 MG tablet, Take 1 tablet by mouth Daily With Breakfast., Disp: 90 tablet, Rfl: 0    metoprolol succinate XL  "(TOPROL-XL) 50 MG 24 hr tablet, Take 1 tablet by mouth 2 (Two) Times a Day., Disp: 180 tablet, Rfl: 0    potassium chloride 10 MEQ CR tablet, Take 1 tablet by mouth Daily., Disp: 90 tablet, Rfl: 0    No results found for this or any previous visit (from the past 24 weeks).      Review of Systems    Objective     /79 (BP Location: Left arm, Patient Position: Sitting, Cuff Size: Large Adult)   Pulse 55   Temp 98.1 °F (36.7 °C) (Oral)   Resp 16   Ht 180.3 cm (71\")   Wt 103 kg (227 lb 9.6 oz)   SpO2 99%   BMI 31.74 kg/m²     Physical Exam  Vitals and nursing note reviewed.   Constitutional:       Appearance: Normal appearance.   HENT:      Head: Normocephalic.      Right Ear: Tympanic membrane normal.      Left Ear: Tympanic membrane normal.      Nose: Nose normal.      Mouth/Throat:      Mouth: Mucous membranes are moist.   Eyes:      Pupils: Pupils are equal, round, and reactive to light.   Cardiovascular:      Rate and Rhythm: Normal rate and regular rhythm.      Pulses: Normal pulses.           Dorsalis pedis pulses are 2+ on the right side and 2+ on the left side.        Posterior tibial pulses are 2+ on the right side and 2+ on the left side.      Heart sounds: Normal heart sounds.   Pulmonary:      Effort: Pulmonary effort is normal.      Breath sounds: Normal breath sounds.   Abdominal:      General: Bowel sounds are normal.      Palpations: Abdomen is soft.   Musculoskeletal:         General: Normal range of motion.      Cervical back: Neck supple.      Right foot: Normal range of motion.      Left foot: Normal range of motion.        Feet:    Feet:      Right foot:      Protective Sensation: 10 sites tested.  9 sites sensed.      Skin integrity: No ulcer or skin breakdown.      Left foot:      Protective Sensation: 10 sites tested.  9 sites sensed.      Skin integrity: No ulcer or skin breakdown.      Comments: Both great toes no sensation    Nails with sharp edges.   Skin:     General: Skin is " warm.   Neurological:      General: No focal deficit present.      Mental Status: He is alert and oriented to person, place, and time.   Psychiatric:         Mood and Affect: Mood normal.         Behavior: Behavior normal.         Thought Content: Thought content normal.         Judgment: Judgment normal.         Result Review :                Assessment & Plan    Diagnoses and all orders for this visit:    1. Coronary artery disease involving native coronary artery of native heart with unstable angina pectoris  Comments:  stable followed by cardiology  Orders:  -     Lipid Panel; Future  -     Comprehensive Metabolic Panel; Future    2. Mixed hyperlipidemia  Comments:  orldered labs  Orders:  -     Lipid Panel; Future  -     Comprehensive Metabolic Panel; Future    3. Primary hypertension  Comments:  stable  Orders:  -     Lipid Panel; Future  -     Comprehensive Metabolic Panel; Future    4. Type 2 diabetes mellitus without complication, without long-term current use of insulin  Comments:  labs ordered stable  Orders:  -     Lipid Panel; Future  -     Comprehensive Metabolic Panel; Future  -     Hemoglobin A1c; Future  -     Microalbumin / Creatinine Urine Ratio - Urine, Clean Catch; Future    5. Prostate cancer screening  Comments:  labs ordered  Orders:  -     PSA Screen; Future    6. Class 1 obesity due to excess calories with serious comorbidity and body mass index (BMI) of 31.0 to 31.9 in adult  Comments:  discussed purposeful exercise portion control  Assessment & Plan:  Patient's (Body mass index is 31.74 kg/m².) indicates that they are obese (BMI >30) with health conditions that include coronary heart disease, diabetes mellitus, and dyslipidemias . Weight is unchanged. BMI  is above average; BMI management plan is completed. We discussed portion control and increasing exercise.         Patient Instructions   Follow up on labs   No sharp edges when clipping nails  No sensation to great toes monitor feet  daily  Schedule diabetic eye exam for 4/2025    Follow Up   No follow-ups on file.    Patient was given instructions and counseling regarding his condition or for health maintenance advice. Please see specific information pulled into the AVS if appropriate.     Indu Burkett, APRN    03/21/25

## 2025-03-24 ENCOUNTER — RESULTS FOLLOW-UP (OUTPATIENT)
Dept: FAMILY MEDICINE CLINIC | Facility: CLINIC | Age: 70
End: 2025-03-24
Payer: COMMERCIAL

## 2025-04-14 RX ORDER — ATORVASTATIN CALCIUM 40 MG/1
40 TABLET, FILM COATED ORAL DAILY
Qty: 180 TABLET | Refills: 0 | Status: SHIPPED | OUTPATIENT
Start: 2025-04-14

## 2025-05-07 ENCOUNTER — HOSPITAL ENCOUNTER (OUTPATIENT)
Dept: NUCLEAR MEDICINE | Facility: HOSPITAL | Age: 70
Discharge: HOME | End: 2025-05-07
Payer: COMMERCIAL

## 2025-05-07 ENCOUNTER — PATIENT MESSAGE (OUTPATIENT)
Dept: FAMILY MEDICINE CLINIC | Facility: CLINIC | Age: 70
End: 2025-05-07
Payer: COMMERCIAL

## 2025-05-07 DIAGNOSIS — I25.10 CORONARY ARTERY DISEASE INVOLVING NATIVE CORONARY ARTERY OF NATIVE HEART WITHOUT ANGINA PECTORIS: ICD-10-CM

## 2025-05-07 LAB
BH CV REST NUCLEAR ISOTOPE DOSE: 10.9 MCI
BH CV STRESS BP STAGE 1: NORMAL
BH CV STRESS BP STAGE 2: NORMAL
BH CV STRESS DURATION MIN STAGE 1: 3
BH CV STRESS DURATION MIN STAGE 2: 3
BH CV STRESS DURATION SEC STAGE 1: 0
BH CV STRESS DURATION SEC STAGE 2: 0
BH CV STRESS GRADE STAGE 1: 10
BH CV STRESS GRADE STAGE 2: 12
BH CV STRESS HR STAGE 1: 115
BH CV STRESS HR STAGE 2: 138
BH CV STRESS METS STAGE 1: 5
BH CV STRESS METS STAGE 2: 7.5
BH CV STRESS NUCLEAR ISOTOPE DOSE: 33 MCI
BH CV STRESS PROTOCOL 1: NORMAL
BH CV STRESS RECOVERY BP: NORMAL MMHG
BH CV STRESS RECOVERY HR: 68 BPM
BH CV STRESS SPEED STAGE 1: 1.7
BH CV STRESS SPEED STAGE 2: 2.5
BH CV STRESS STAGE 1: 1
BH CV STRESS STAGE 2: 2
MAXIMAL PREDICTED HEART RATE: 153 BPM
PERCENT MAX PREDICTED HR: 90.2 %
SPECT HRT GATED+EF W RNC IV: 62 %
STRESS BASELINE BP: NORMAL MMHG
STRESS BASELINE HR: 66 BPM
STRESS PERCENT HR: 106 %
STRESS POST ESTIMATED WORKLOAD: 7.1 METS
STRESS POST EXERCISE DUR MIN: 6 MIN
STRESS POST PEAK BP: NORMAL MMHG
STRESS POST PEAK HR: 138 BPM
STRESS TARGET HR: 130 BPM

## 2025-05-07 PROCEDURE — 34310000005 TECHNETIUM TETROFOSMIN KIT: Performed by: INTERNAL MEDICINE

## 2025-05-07 PROCEDURE — A9502 TC99M TETROFOSMIN: HCPCS | Performed by: INTERNAL MEDICINE

## 2025-05-07 PROCEDURE — 78452 HT MUSCLE IMAGE SPECT MULT: CPT

## 2025-05-07 PROCEDURE — 93017 CV STRESS TEST TRACING ONLY: CPT

## 2025-05-07 RX ORDER — METOPROLOL SUCCINATE 50 MG/1
50 TABLET, EXTENDED RELEASE ORAL 2 TIMES DAILY
Qty: 180 TABLET | Refills: 3 | Status: SHIPPED | OUTPATIENT
Start: 2025-05-07

## 2025-05-07 RX ORDER — IRBESARTAN 75 MG/1
75 TABLET ORAL DAILY
Qty: 90 TABLET | Refills: 1 | Status: SHIPPED | OUTPATIENT
Start: 2025-05-07

## 2025-05-07 RX ADMIN — TETROFOSMIN 1 DOSE: 1.38 INJECTION, POWDER, LYOPHILIZED, FOR SOLUTION INTRAVENOUS at 09:16

## 2025-05-07 RX ADMIN — TETROFOSMIN 1 DOSE: 1.38 INJECTION, POWDER, LYOPHILIZED, FOR SOLUTION INTRAVENOUS at 07:32

## 2025-05-08 DIAGNOSIS — B02.8 HERPES ZOSTER WITH OTHER COMPLICATION: ICD-10-CM

## 2025-05-08 RX ORDER — GABAPENTIN 300 MG/1
600 CAPSULE ORAL 2 TIMES DAILY
Qty: 360 CAPSULE | Refills: 0 | Status: SHIPPED | OUTPATIENT
Start: 2025-05-08

## 2025-05-12 DIAGNOSIS — I25.118 CORONARY ARTERY DISEASE OF NATIVE ARTERY OF NATIVE HEART WITH STABLE ANGINA PECTORIS: ICD-10-CM

## 2025-05-12 DIAGNOSIS — R94.39 ABNORMAL STRESS TEST: Primary | ICD-10-CM

## 2025-05-14 PROBLEM — I25.118 CORONARY ARTERY DISEASE OF NATIVE ARTERY OF NATIVE HEART WITH STABLE ANGINA PECTORIS: Status: ACTIVE | Noted: 2025-05-12

## 2025-05-14 PROBLEM — R94.39 ABNORMAL STRESS TEST: Status: ACTIVE | Noted: 2025-05-12

## 2025-05-15 RX ORDER — CLOPIDOGREL BISULFATE 75 MG/1
75 TABLET ORAL DAILY
Qty: 90 TABLET | Refills: 0 | Status: SHIPPED | OUTPATIENT
Start: 2025-05-15

## 2025-05-20 ENCOUNTER — LAB (OUTPATIENT)
Dept: LAB | Facility: HOSPITAL | Age: 70
End: 2025-05-20
Payer: COMMERCIAL

## 2025-05-20 LAB
ALBUMIN SERPL-MCNC: 4.3 G/DL (ref 3.5–5.2)
ALBUMIN/GLOB SERPL: 1.5 G/DL
ALP SERPL-CCNC: 86 U/L (ref 39–117)
ALT SERPL W P-5'-P-CCNC: 34 U/L (ref 1–41)
ANION GAP SERPL CALCULATED.3IONS-SCNC: 11.4 MMOL/L (ref 5–15)
AST SERPL-CCNC: 29 U/L (ref 1–40)
BASOPHILS # BLD AUTO: 0.04 10*3/MM3 (ref 0–0.2)
BASOPHILS NFR BLD AUTO: 0.7 % (ref 0–1.5)
BILIRUB SERPL-MCNC: 0.4 MG/DL (ref 0–1.2)
BUN SERPL-MCNC: 14 MG/DL (ref 8–23)
BUN/CREAT SERPL: 11.9 (ref 7–25)
CALCIUM SPEC-SCNC: 9.3 MG/DL (ref 8.6–10.5)
CHLORIDE SERPL-SCNC: 105 MMOL/L (ref 98–107)
CO2 SERPL-SCNC: 24.6 MMOL/L (ref 22–29)
CREAT SERPL-MCNC: 1.18 MG/DL (ref 0.76–1.27)
DEPRECATED RDW RBC AUTO: 44.3 FL (ref 37–54)
EGFRCR SERPLBLD CKD-EPI 2021: 67.6 ML/MIN/1.73
EOSINOPHIL # BLD AUTO: 0.11 10*3/MM3 (ref 0–0.4)
EOSINOPHIL NFR BLD AUTO: 1.8 % (ref 0.3–6.2)
ERYTHROCYTE [DISTWIDTH] IN BLOOD BY AUTOMATED COUNT: 13.8 % (ref 12.3–15.4)
GLOBULIN UR ELPH-MCNC: 2.9 GM/DL
GLUCOSE SERPL-MCNC: 111 MG/DL (ref 65–99)
HCT VFR BLD AUTO: 43 % (ref 37.5–51)
HGB BLD-MCNC: 14.3 G/DL (ref 13–17.7)
IMM GRANULOCYTES # BLD AUTO: 0.03 10*3/MM3 (ref 0–0.05)
IMM GRANULOCYTES NFR BLD AUTO: 0.5 % (ref 0–0.5)
INR PPP: 1.1 (ref 0.9–1.1)
LYMPHOCYTES # BLD AUTO: 1.62 10*3/MM3 (ref 0.7–3.1)
LYMPHOCYTES NFR BLD AUTO: 27 % (ref 19.6–45.3)
MCH RBC QN AUTO: 29.2 PG (ref 26.6–33)
MCHC RBC AUTO-ENTMCNC: 33.3 G/DL (ref 31.5–35.7)
MCV RBC AUTO: 87.8 FL (ref 79–97)
MONOCYTES # BLD AUTO: 0.42 10*3/MM3 (ref 0.1–0.9)
MONOCYTES NFR BLD AUTO: 7 % (ref 5–12)
NEUTROPHILS NFR BLD AUTO: 3.79 10*3/MM3 (ref 1.7–7)
NEUTROPHILS NFR BLD AUTO: 63 % (ref 42.7–76)
NRBC BLD AUTO-RTO: 0 /100 WBC (ref 0–0.2)
PLATELET # BLD AUTO: 170 10*3/MM3 (ref 140–450)
PMV BLD AUTO: 9.9 FL (ref 6–12)
POTASSIUM SERPL-SCNC: 3.9 MMOL/L (ref 3.5–5.2)
PROT SERPL-MCNC: 7.2 G/DL (ref 6–8.5)
PROTHROMBIN TIME: 14.1 SECONDS (ref 11.7–14.2)
RBC # BLD AUTO: 4.9 10*6/MM3 (ref 4.14–5.8)
SODIUM SERPL-SCNC: 141 MMOL/L (ref 136–145)
WBC NRBC COR # BLD AUTO: 6.01 10*3/MM3 (ref 3.4–10.8)

## 2025-05-20 PROCEDURE — 80053 COMPREHEN METABOLIC PANEL: CPT | Performed by: INTERNAL MEDICINE

## 2025-05-20 PROCEDURE — 85610 PROTHROMBIN TIME: CPT | Performed by: INTERNAL MEDICINE

## 2025-05-20 PROCEDURE — 85025 COMPLETE CBC W/AUTO DIFF WBC: CPT | Performed by: INTERNAL MEDICINE

## 2025-05-22 ENCOUNTER — HOSPITAL ENCOUNTER (OUTPATIENT)
Facility: HOSPITAL | Age: 70
Setting detail: HOSPITAL OUTPATIENT SURGERY
Discharge: HOME | End: 2025-05-22
Attending: INTERNAL MEDICINE | Admitting: INTERNAL MEDICINE
Payer: COMMERCIAL

## 2025-05-22 VITALS
DIASTOLIC BLOOD PRESSURE: 80 MMHG | TEMPERATURE: 98.6 F | SYSTOLIC BLOOD PRESSURE: 156 MMHG | HEART RATE: 62 BPM | OXYGEN SATURATION: 95 % | HEIGHT: 73 IN | WEIGHT: 225.09 LBS | BODY MASS INDEX: 29.83 KG/M2 | RESPIRATION RATE: 11 BRPM

## 2025-05-22 DIAGNOSIS — I25.118 CORONARY ARTERY DISEASE OF NATIVE ARTERY OF NATIVE HEART WITH STABLE ANGINA PECTORIS: ICD-10-CM

## 2025-05-22 DIAGNOSIS — I25.118 CORONARY ARTERY DISEASE OF NATIVE ARTERY OF NATIVE HEART WITH STABLE ANGINA PECTORIS: Primary | ICD-10-CM

## 2025-05-22 DIAGNOSIS — R94.39 ABNORMAL STRESS TEST: ICD-10-CM

## 2025-05-22 LAB
ACT BLD: 279 SECONDS (ref 89–137)
GLUCOSE BLDC GLUCOMTR-MCNC: 110 MG/DL (ref 70–105)

## 2025-05-22 PROCEDURE — C1769 GUIDE WIRE: HCPCS | Performed by: INTERNAL MEDICINE

## 2025-05-22 PROCEDURE — C1887 CATHETER, GUIDING: HCPCS | Performed by: INTERNAL MEDICINE

## 2025-05-22 PROCEDURE — C1894 INTRO/SHEATH, NON-LASER: HCPCS | Performed by: INTERNAL MEDICINE

## 2025-05-22 PROCEDURE — 25010000002 HEPARIN (PORCINE) PER 1000 UNITS: Performed by: INTERNAL MEDICINE

## 2025-05-22 PROCEDURE — 25010000002 FENTANYL CITRATE (PF) 100 MCG/2ML SOLUTION: Performed by: INTERNAL MEDICINE

## 2025-05-22 PROCEDURE — S0260 H&P FOR SURGERY: HCPCS | Performed by: INTERNAL MEDICINE

## 2025-05-22 PROCEDURE — 99153 MOD SED SAME PHYS/QHP EA: CPT | Performed by: INTERNAL MEDICINE

## 2025-05-22 PROCEDURE — C1760 CLOSURE DEV, VASC: HCPCS | Performed by: INTERNAL MEDICINE

## 2025-05-22 PROCEDURE — 99152 MOD SED SAME PHYS/QHP 5/>YRS: CPT | Performed by: INTERNAL MEDICINE

## 2025-05-22 PROCEDURE — 25810000003 SODIUM CHLORIDE 0.9 % SOLUTION: Performed by: INTERNAL MEDICINE

## 2025-05-22 PROCEDURE — 25010000002 MIDAZOLAM PER 1 MG: Performed by: INTERNAL MEDICINE

## 2025-05-22 PROCEDURE — 93459 L HRT ART/GRFT ANGIO: CPT | Performed by: INTERNAL MEDICINE

## 2025-05-22 PROCEDURE — 85347 COAGULATION TIME ACTIVATED: CPT

## 2025-05-22 PROCEDURE — 25010000002 EPTIFIBATIDE 20 MG/10ML SOLUTION: Performed by: INTERNAL MEDICINE

## 2025-05-22 PROCEDURE — C1725 CATH, TRANSLUMIN NON-LASER: HCPCS | Performed by: INTERNAL MEDICINE

## 2025-05-22 PROCEDURE — 25010000002 LIDOCAINE 1 % SOLUTION: Performed by: INTERNAL MEDICINE

## 2025-05-22 PROCEDURE — 82948 REAGENT STRIP/BLOOD GLUCOSE: CPT

## 2025-05-22 PROCEDURE — 25510000001 IOPAMIDOL PER 1 ML: Performed by: INTERNAL MEDICINE

## 2025-05-22 RX ORDER — SODIUM CHLORIDE 9 MG/ML
3 INJECTION, SOLUTION INTRAVENOUS CONTINUOUS
Status: DISCONTINUED | OUTPATIENT
Start: 2025-05-22 | End: 2025-05-22 | Stop reason: HOSPADM

## 2025-05-22 RX ORDER — IOPAMIDOL 755 MG/ML
INJECTION, SOLUTION INTRAVASCULAR
Status: DISCONTINUED | OUTPATIENT
Start: 2025-05-22 | End: 2025-05-22 | Stop reason: HOSPADM

## 2025-05-22 RX ORDER — ACETAMINOPHEN 325 MG/1
650 TABLET ORAL EVERY 4 HOURS PRN
Status: DISCONTINUED | OUTPATIENT
Start: 2025-05-22 | End: 2025-05-22 | Stop reason: HOSPADM

## 2025-05-22 RX ORDER — NITROGLYCERIN 0.4 MG/1
0.4 TABLET SUBLINGUAL
Status: DISCONTINUED | OUTPATIENT
Start: 2025-05-22 | End: 2025-05-22 | Stop reason: HOSPADM

## 2025-05-22 RX ORDER — FENTANYL CITRATE 50 UG/ML
INJECTION, SOLUTION INTRAMUSCULAR; INTRAVENOUS
Status: DISCONTINUED | OUTPATIENT
Start: 2025-05-22 | End: 2025-05-22 | Stop reason: HOSPADM

## 2025-05-22 RX ORDER — EPTIFIBATIDE 2 MG/ML
INJECTION, SOLUTION INTRAVENOUS
Status: DISCONTINUED | OUTPATIENT
Start: 2025-05-22 | End: 2025-05-22 | Stop reason: HOSPADM

## 2025-05-22 RX ORDER — LIDOCAINE HYDROCHLORIDE 10 MG/ML
INJECTION, SOLUTION INFILTRATION; PERINEURAL
Status: DISCONTINUED | OUTPATIENT
Start: 2025-05-22 | End: 2025-05-22 | Stop reason: HOSPADM

## 2025-05-22 RX ORDER — TICAGRELOR 90 MG/1
TABLET, FILM COATED ORAL
Status: DISCONTINUED | OUTPATIENT
Start: 2025-05-22 | End: 2025-05-22 | Stop reason: HOSPADM

## 2025-05-22 RX ORDER — ASPIRIN 81 MG/1
81 TABLET ORAL DAILY
Status: SHIPPED | OUTPATIENT
Start: 2025-05-22

## 2025-05-22 RX ORDER — MIDAZOLAM HYDROCHLORIDE 1 MG/ML
INJECTION, SOLUTION INTRAMUSCULAR; INTRAVENOUS
Status: DISCONTINUED | OUTPATIENT
Start: 2025-05-22 | End: 2025-05-22 | Stop reason: HOSPADM

## 2025-05-22 RX ORDER — ASPIRIN 325 MG
TABLET ORAL
Status: DISCONTINUED | OUTPATIENT
Start: 2025-05-22 | End: 2025-05-22 | Stop reason: HOSPADM

## 2025-05-22 RX ORDER — HEPARIN SODIUM 1000 [USP'U]/ML
INJECTION, SOLUTION INTRAVENOUS; SUBCUTANEOUS
Status: DISCONTINUED | OUTPATIENT
Start: 2025-05-22 | End: 2025-05-22 | Stop reason: HOSPADM

## 2025-05-22 RX ADMIN — SODIUM CHLORIDE 3 ML/KG/HR: 9 INJECTION, SOLUTION INTRAVENOUS at 13:15

## 2025-05-22 NOTE — Clinical Note
Hemostasis started on the right femoral artery. Angio-Seal was used in achieving hemostasis. Closure device deployed in the vessel.

## 2025-05-22 NOTE — DISCHARGE INSTRUCTIONS
Post Cath Instructions  Drink plenty of water for the next 24 hours. This helps to eliminate the dye used in your procedure through urination.  You may resume a normal diet; however, try to avoid foods that would cause gas or constipation.     What to do for pain: acetaminophen (Tylenol), not ibuprofen (Advil) can be used for puncture site tenderness. If your pain is unmanageable with this method, call the Cardiologist's office.     Sedative medication (Anesthesia) given to you during your catheterization may decrease your judgement and reaction time for up to 24-48 hours.  Therefore:  DO NOT drive, operate hazardous machinery, or consume alcoholic beverages for 24 hours.   DO NOT make any important/legal decisions for 24 hours.   Have someone stay with you for at least 24 hours.    For the next 48 hours (to allow proper healing and prevent bleeding):  Avoid excessive bending at wound site  Avoid straining (anything that would tense up muscles around the affected puncture site)  Avoid lifting, pushing, or pulling objects greater than 5 pounds, for 5 days  For Groin Cases:  Refrain from running, vigorous walking, and sexual activity  No prolonged sitting or standing   Limit stair climbing as much as possible    Keep the puncture site clean and dry.  After 24 hours, remove the dressing and replace it with a Band-Aid for at least one additional day.  Gently clean the site with mild soap and water.  No scrubbing/rubbing, and lightly pat the area dry.  Showers are acceptable; however, avoid submerging in water (tub baths, hot tubs, pools, dishwater, etc…) for at least one week.  The site should be completely healed before resuming these activities to reduce the risk of infection. Watch for signs and symptoms of infection and notify the physician of any of the following:  Bleeding or an increase in swelling, redness, or warmth at the puncture site  Fever  Increased soreness around puncture site  Foul odor or significant  drainage from the puncture site  **A bruise or small “pea sized” lump under the skin at the puncture site is not unusual.  This should disappear within 3-4 weeks.**    CONTACT YOUR PHYSICIAN OR CALL 911 IF YOU EXPERIENCE ANY OF THE FOLLOWING:  Increased angina (chest pain) or frequent sensations of pressure, burning, pain, or other discomfort in the chest, arm, jaws, or stomach  Lightheadedness, dizziness, faint feeling, sweating, or difficulty breathing  Odd changes in sedation (like numbness, tingling, coldness, or pain) or color (pale/bluish) in the arm or leg in which the catheter was inserted.    IMPORTANT:  Although this occurs very rarely, if you should develop bright red or excessive bleeding, feel a “pop” inside at the insertion site, or notice a sudden increase in swelling larger than a walnut, you should call 911.  Hold continuous firm pressure to the access site until emergency personnel arrive.  It is best if someone else can do this for you.

## 2025-05-22 NOTE — H&P
[facility] cardiology  Jose Juan Fatima MD, PhD  Preoperative note      Patient Care Team:  Indu Burkett APRN as PCP - General (Nurse Practitioner)  Jose Juan Fatima MD as Consulting Physician (Cardiology)  Lui Lake MD as Surgeon (Cardiothoracic Surgery)    CHIEF COMPLAINT: Abnormal stress test    HISTORY OF PRESENT ILLNESS:    This is a 69-year-old gentleman well-known to me from prior encounters, history of CAD, STEMI 2020.  Prior PCI.  History normal EF however had an abnormal stress test secondary to recurrent chest pain with moderate to large sized severe of ischemia in the inferior wall.  He is here for definitive valuation of his coronaries    Next no contraindication to contrast antiplatelets or anticoagulation    Risk and benefits including death heart attack stroke pain bleeding infection need for vascular or cardiovascular surgery were discussed and he wished to proceed    Labs and vitals reviewed in EMR  Primary consultant notes reviewed  Care discussed with the patient    Review of systems otherwise negative/14 point review of systems except as mentioned above  Historical data copied forward from previous encounters in EMR is unchanged    Sinus rhythm today    Past Medical History:   Diagnosis Date    Allergic     Coronary artery disease     Dyslipidemia     History of tick-borne disease     ehrlichiosis    Hyperlipemia     Hypertension      Past Surgical History:   Procedure Laterality Date    CARDIAC CATHETERIZATION N/A 7/8/2020    Procedure: Left Heart Cath with possible PCI;  Surgeon: Jose Juan Fatima MD;  Location: [facility] CATH INVASIVE LOCATION;  Service: Cardiology;  Laterality: N/A;    CARDIAC CATHETERIZATION N/A 7/8/2020    Procedure: Intra-Aortic Baloon Pump Insertion;  Surgeon: Jose Juan Fatima MD;  Location: [facility] CATH INVASIVE LOCATION;  Service: Cardiology;  Laterality: N/A;    CORONARY ARTERY BYPASS GRAFT N/A 7/9/2020    Procedure: CORONARY  ARTERY BYPASS GRAFTING X 5 USING LEFT EDWARD  AND LEFT ENDOSCOPICALLY HARVESTED SAPHENOUS VEIN;  Surgeon: Lui Lake MD;  Location: [facility];  Service: Cardiothoracic;  Laterality: N/A;  MINERVA DONE BY SRINIVASAN     Family History   Problem Relation Age of Onset    Other Mother     Heart disease Father      Social History     Tobacco Use    Smoking status: Never     Passive exposure: Never    Smokeless tobacco: Never   Vaping Use    Vaping status: Never Used   Substance Use Topics    Alcohol use: No    Drug use: No     Medications Prior to Admission   Medication Sig Dispense Refill Last Dose/Taking    Accu-Chek FastClix Lancets misc 1 each Daily. 102 each 0 Taking    bumetanide (BUMEX) 1 MG tablet Take 1 tablet by mouth 2 (Two) Times a Day. 180 tablet 0 5/22/2025 Morning    clopidogrel (PLAVIX) 75 MG tablet Take 1 tablet by mouth once daily 90 tablet 0 5/22/2025 Morning    gabapentin (NEURONTIN) 300 MG capsule Take 2 capsules by mouth 2 (Two) Times a Day. 360 capsule 0 5/22/2025 Morning    glucose blood (Accu-Chek Guide) test strip 1 each by Other route Daily. Use as instructed 100 each 0 Taking    irbesartan (AVAPRO) 75 MG tablet Take 1 tablet by mouth Daily. 90 tablet 1 5/22/2025 Morning    Isopropyl Alcohol (ALCOHOL WIPES) 70 % misc Apply 1 each topically Daily. 100 each 0 Taking    metFORMIN (GLUCOPHAGE) 500 MG tablet Take 1 tablet by mouth Daily With Breakfast. 90 tablet 0 5/21/2025    metoprolol succinate XL (TOPROL-XL) 50 MG 24 hr tablet Take 1 tablet by mouth 2 (Two) Times a Day. 180 tablet 3 5/22/2025 Morning    potassium chloride 10 MEQ CR tablet Take 1 tablet by mouth Daily. 90 tablet 0 5/22/2025 Morning    atorvastatin (LIPITOR) 40 MG tablet Take 1 tablet by mouth once daily 180 tablet 0      Allergies:  Lisinopril    REVIEW OF SYSTEMS:  Please see the above history of present illness for pertinent positives and negatives.  The remainder of the patient's systems have been reviewed and are negative.  "    Vital Signs  Temp:  [98.6 °F (37 °C)] 98.6 °F (37 °C)  Heart Rate:  [58] 58  Resp:  [12] 12  BP: (147)/(79) 147/79    Flowsheet Rows      Flowsheet Row First Filed Value   Admission Height 185.4 cm (73\") Documented at 05/22/2025 0938   Admission Weight 102 kg (225 lb 1.4 oz) Documented at 05/22/2025 0938             Physical Exam:  Physical Exam   Constitutional: Patient appears well-developed and well-nourished and in no acute distress   HEENT:   Head: Normocephalic and atraumatic.   Eyes:  Pupils are equal, round, and reactive to light. EOM are intact. Sclerae are anicteric and noninjected.  Mouth and Throat: Patient has moist mucous membranes. Oropharynx is clear of any erythema or exudate.     Neck: Neck supple. No JVD present. No thyromegaly present. No lymphadenopathy present.  Cardiovascular: Regular rate, regular rhythm, S1 normal and S2 normal.  Exam reveals no gallop and no friction rub.  No murmur heard.  Pulmonary/Chest: Lungs are clear to auscultation bilaterally. No respiratory distress. No wheezes. No rhonchi. No rales.   Abdominal: Soft. Bowel sounds are normal. No distension and no mass. There is no hepatosplenomegaly. There is no tenderness.   Musculoskeletal: Normal muscle tone  Extremities: No edema. Pulses are palpable in all 4 extremities.  Neurological: Patient is alert and oriented to person, place, and time. Cranial nerves II-XII are grossly intact with no focal deficits.  Skin: Skin is warm. No rash noted. Nails show no clubbing.  No cyanosis or erythema.     Results Review:    I reviewed the patient's new clinical results.  Lab Results (most recent)       Procedure Component Value Units Date/Time    POC Glucose Once [[id number]]  (Abnormal) Collected: 05/22/25 0954    Specimen: Blood Updated: 05/22/25 0956     Glucose 110 mg/dL      Comment: Serial Number: [id number]:  [id number]               Imaging Results (Most Recent)       None          reviewed    ECG/EMG Results (most " recent)       None          reviewed    Assessment & Plan     Okay to proceed with left heart catheterization coronary angiography, indication abnormal stress test with known CAD, recurrent chest pain    I discussed the patient's findings and my recommendations with patient and staff    Jose Juan Fatima MD  05/22/25  10:51 EDT

## 2025-05-28 RX ORDER — BUMETANIDE 1 MG/1
1 TABLET ORAL 2 TIMES DAILY
Qty: 180 TABLET | Refills: 0 | Status: SHIPPED | OUTPATIENT
Start: 2025-05-28

## 2025-06-03 RX ORDER — BUMETANIDE 1 MG/1
1 TABLET ORAL 2 TIMES DAILY
Qty: 180 TABLET | Refills: 0 | Status: SHIPPED | OUTPATIENT
Start: 2025-06-03

## 2025-06-10 ENCOUNTER — OFFICE VISIT (OUTPATIENT)
Dept: CARDIOLOGY | Facility: CLINIC | Age: 70
End: 2025-06-10
Payer: COMMERCIAL

## 2025-06-10 VITALS
HEART RATE: 64 BPM | SYSTOLIC BLOOD PRESSURE: 134 MMHG | RESPIRATION RATE: 18 BRPM | BODY MASS INDEX: 31.64 KG/M2 | DIASTOLIC BLOOD PRESSURE: 79 MMHG | WEIGHT: 226 LBS | HEIGHT: 71 IN | OXYGEN SATURATION: 96 %

## 2025-06-10 DIAGNOSIS — I10 PRIMARY HYPERTENSION: Chronic | ICD-10-CM

## 2025-06-10 DIAGNOSIS — I25.5 ISCHEMIC CARDIOMYOPATHY: Chronic | ICD-10-CM

## 2025-06-10 DIAGNOSIS — E78.2 MIXED HYPERLIPIDEMIA: Chronic | ICD-10-CM

## 2025-06-10 DIAGNOSIS — R94.39 ABNORMAL STRESS TEST: ICD-10-CM

## 2025-06-10 DIAGNOSIS — I25.10 CORONARY ARTERY DISEASE INVOLVING NATIVE CORONARY ARTERY OF NATIVE HEART WITHOUT ANGINA PECTORIS: ICD-10-CM

## 2025-06-10 DIAGNOSIS — I25.118 CORONARY ARTERY DISEASE OF NATIVE ARTERY OF NATIVE HEART WITH STABLE ANGINA PECTORIS: Primary | ICD-10-CM

## 2025-06-10 PROCEDURE — 99214 OFFICE O/P EST MOD 30 MIN: CPT | Performed by: INTERNAL MEDICINE

## 2025-06-10 NOTE — PROGRESS NOTES
Cardiology Clinic Note  Jose Juan Fatima MD, PhD    Subjective:     Encounter Date:06/10/2025      Patient ID: Lonnie Salinas is a 69 y.o. male.    Chief Complaint:  Chief Complaint   Patient presents with    Follow-up       HPI:        Previously I the pleasure of seeing this 69-year-old male today with history of 2020 multivessel CAD after non-ST elevation myocardial infarction, balloon pump placement,  mildly reduced EF 45 to 50% , moderate RV dysfunction, ultimately undergoing urgent multivessel bypass surgery with LIMA to LAD, saphenous vein graft to PDA, vein graft to OM1 and OM 2 sequential jump graft, saphenous vein graft to OM 3 with left lower extremity vein harvest.  His postoperative case was complicated by cardiogenic shock also required multiple vasopressor as well as prolonged milrinone support with pulmonary hypertension and RV failure successfully weaned off with institution of heart failure therapies and beta-blockade .  he is allergic with angioedema with lisinopril however he had been tolerating irbesartan 150 mg p.o. daily for hypertension prior to his surgery without any problems and this was continued on subsequent visits.     Patient underwent stress testing earlier this year demonstrating inferolateral lateral wall ischemia.  Left heart catheterization revealed patent LIMA to LAD and SVG to diagonal branch however SVG to obtuse marginal and RCA were closed.  RCA is  with right to right collateralization very small caliber not amenable to intervention with infarcted and aneurysmal basal inferior and inferolateral wall.  The lateral wall from the obtuse marginal standpoint was unable to be crossed with balloon or microcatheter for wire exchange and better support.  He is chest pain-free at this time, overall EF appears 45 to 50% he has no clinical heart failure and is doing well on present medications although he has a significant burden of ischemia in the lateral and inferolateral wall.   "He would need CSI and atherectomy for revascularization of his long segment that is rather small in caliber.  After long discussion he prefers to continue medical therapy for another 90 days and see how he does on high intensity statin aspirin Brilinta beta-blocker, ARB as well as with diet and exercise      Exam  Vitals reviewed  Regular rate and rhythm no rubs murmurs gallops  No clubbing cyanosis or edema  No carotid bruits or JVD  Normal pulses  Will cap refill  Intact grossly  Clear to auscultation  Soft nontender nondistended  Vitals reviewed below     Treated medical conditions/assessment:  CAD  History of cardiogenic shock  Ischemic cardiomyopathy  History of nons ST elevation myocardial infarction  Hypertension hyperlipidemia  Risk factors for the coronary disease  Abnormal EKG  History of paroxysmal atrial fibrillation        Plan today:  Continue metoprolol  Continue irbesartan  Continue statin therapy  Free water and salt restriction.  Continue Bumex   Fish oil twice daily  Aspirin and Brilinta  Continue potassium supplementation presently  NYHA class I CCS class I -2 at this time  Secondary prevention goals  Diet and exercise per AHA guidelines       Off anticoagulation was previously on Xarelto, has not had recurrence of atrial fibrillation, if does have recurrence would be on lifelong anticoagulation accordingly with [id number] score 4     3-month follow-up  If has chest pain refractory medical therapy would pursue CSI revascularization of circumflex and obtuse marginal branch distally         Objective:         /79 (BP Location: Right arm, Patient Position: Sitting)   Pulse 64   Resp 18   Ht 180.3 cm (71\")   Wt 103 kg (226 lb)   SpO2 96%   BMI 31.52 kg/m²     Physical Exam    Assessment:         There are no diagnoses linked to this encounter.       Plan:              The pleasure to be involved in this patient's cardiovascular care.  Please call with any questions or concerns  Jose Juan" MD Kushal, PhD    Most recent EKG as reviewed and interpreted by me:  Procedures     Most recent echo as reviewed and interpreted by me:  Results for orders placed during the hospital encounter of 07/08/20    Adult Transthoracic Echo Limited W/ Cont if Necessary Per Protocol    Interpretation Summary  · Right ventricular cavity is severely dilated.  · Moderately reduced right ventricular systolic function noted.  · Mild tricuspid valve regurgitation is present.    Overall LV function appears mildly reduced 45 to 50% with dyssynchrony with pacing  RV is severely enlarged with at least moderate hypokinesis possibly moderate to severe  Mild TR, trace MR, no aortic valve pathology  No masses or effusion seen  IVC not well seen cannot estimate right atrial pressure  Did not estimate diastolic dysfunction, no parameters identified      Most recent stress test as reviewed and interpreted by me:  Results for orders placed during the hospital encounter of 05/07/25    Stress Test With Myocardial Perfusion One Day    Interpretation Summary    Impressions are consistent with a high risk study.    Left ventricular ejection fraction is normal (Calculated EF = 62%).    Myocardial perfusion imaging indicates a moderate-to-large-sized, severe area of ischemia located in the inferior wall and basal inferior lateral wall.    Findings consistent with an indeterminate ECG stress test.      Most recent cardiac catheterization as reviewed interpreted by me:  Results for orders placed during the hospital encounter of 05/22/25    Cardiac Catheterization/Vascular Study    Conclusion  Primary operative Jose Juan Fatima MD, PhD  [facility] cardiology  Date of service 5-    Procedure  1.  Left heart catheterization coronary angiography left-ventricular Tephine FLORES position, imaging of native and bypass grafts    Indication  Abnormal stress test with large amount of inferolateral ischemia    After informed consent patient brought to the  "Cath Lab sterilely prepped and draped in usual fashion with splurged with right groin for right common femoral arterial access via micropuncture and modified Seldinger tentacle placement of a 6 [place] sheath.  035 guidewire was advanced to the aortic valve followed by by diagnostic JL 4 and JR4 catheters for selective left and right coronary angiography respectively.  JR4 was used across aortic valve followed by hand-injection for LV gram EDP assessment and pullback assessment the transaortic valve gradient.  JR4 was used for SVG imaging, IM catheter was used for LIMA to LAD imaging.  There was obstructive disease with failed bypass to the lateral wall obtuse marginal branch with long significant calcific disease.  Patient was heparinized with 100 units/kg of heparin on a background therapy of Plavix monotherapy.  He was also given some aspirin on the table.  A 6 [place] EBU 3 5 was used engage left main followed by run-through wire to the distal portion of the vessel.  A 2.25 and a 2.0 balloon was unable to traverse the midportion of the circumflex secondary to heavy calcific disease and stenosis.  Alona wire did not pass this portion of the vessel, we added a guide liner for support still with failed attempts to pass alona wire, next a Corsair catheter was attempted to be passed however also did not pass the culprit despite significant pressure and added support.  At this stage with contrast limits and necessity for atherectomy the procedure was halted, there is KHAI-3 flow in the vessel with multiple tandem stenotic lesions and moderate to severe calcification throughout with a very long area at least 60 mm in length that will need to be treated.  All catheters\" removed, final angiography revealed no distal wire trauma edge dissection with KHAI-3 flow to the distal portion of the vessel with heavily calcified stenotic portion greater than 95% in the mid circumflex.  Patient be brought back for staged procedure for " CSI orbital atherectomy of the affected segment.  6 [place] Angio-Seal was used to close right common femoral arteriotomy with immediate hemostasis.  He left the Cath Lab chest pain free hemodynamically electrically stable to talk to staff neurologic grossly tact bilaterally with normal ECG findings    Complications none  Blood loss less than 10 cc  Sedation time of 45 minutes  Contrast 185 cc    Findings  1.  Opening pressure of 143/64, closing pressure was unchanged  LV pressure 134/0 with an EDP of 5-6  3.  LV function EF of 45% with dyskinesis of the basal inferolateral and inferior wall with known prior infarct, other segments contracted normally including the mid to apical lateral wall anterior wall apical inferior wall and septum  4.  Normal transaortic valve gradient, normal LVEDP at 5-6    Angiography  1.  Left main distal 50%  2.  The LAD  of the midportion  3.  Circumflex ostial 50%, mid severely calcified long lesion at least 40 mm in length with eccentric circumflex of calcium tandem greater than 90% lesions going into the obtuse marginal distally, obtuse marginal has tandem 50% narrowings but there is a adequate landing zone is at least 2.5 mm in diameter, a we were unable to traverse the midportion with any equipment including balloons, Corsair microcatheter or alona wire.  There is a continuation circumflex and recurrent left atrial branch which collateralizes the inferior wall and PLV segment of the RCA  4.  RCA  proximal with multiple islands of reperfusion and there is right to right collaterals of this distribution, there is runoff to a heavily diseased portion of the distal RCA and bifurcating PLV branch, the PDA has competitive flow from apical collaterals from the LAD and the PLV has collateralization in the AV groove from the circumflex distribution  5.  SVG to RCA   Over 6 SVG to obtuse marginal   7.  SVG to diagonal branch widely patent throughout its course with no anastomotic  disease and bifurcating diagonal distally with good perfusion and no distal areas of stenosis  8.  LAD  to midportion, widely patent LIMA to LAD with good anastomosis, LAD is small in size distally with diffuse disease 40 to 50% throughout including the apical portion but nothing obstructive with collateralization of the PDA distally along the inferior wall    Conclusions recommendations  #1, ischemic cardiomyopathy EF 45% with akinesis and dyskinesis of the basal inferolateral wall inferior wall with prior infarct  2.  Closed vein grafts to the RCA and obtuse marginal branch  Over 3 patent LIMA to LAD and SVG to diagonal  4.  Close bypass the obtuse marginal with severe disease of the mid to distal circumflex into this obtuse marginal branch with heavy calcium unable to cross with equipment needing orbital atherectomy for reperfusion in staged fashion  5.  Normal EDP, normal transaortic valve gradient    Staged procedure  Aspirin Brilinta  Discharge once discharge criteria met today we will schedule outpatient staged PCI in the next 2 to 4 weeks    Jose Juan Fatima MD, PhD    The following portions of the patient's history were reviewed and updated as appropriate: allergies, current medications, past family history, past medical history, past social history, past surgical history, and problem list.      ROS:  14 point review of systems negative except as mentioned above    Current Outpatient Medications:     atorvastatin (LIPITOR) 40 MG tablet, Take 1 tablet by mouth once daily, Disp: 180 tablet, Rfl: 0    bumetanide (BUMEX) 1 MG tablet, Take 1 tablet by mouth twice daily, Disp: 180 tablet, Rfl: 0    gabapentin (NEURONTIN) 300 MG capsule, Take 2 capsules by mouth 2 (Two) Times a Day., Disp: 360 capsule, Rfl: 0    irbesartan (AVAPRO) 75 MG tablet, Take 1 tablet by mouth Daily., Disp: 90 tablet, Rfl: 1    metFORMIN (GLUCOPHAGE) 500 MG tablet, Take 1 tablet by mouth Daily With Breakfast., Disp: 90 tablet, Rfl: 0     metoprolol succinate XL (TOPROL-XL) 50 MG 24 hr tablet, Take 1 tablet by mouth 2 (Two) Times a Day., Disp: 180 tablet, Rfl: 3    potassium chloride 10 MEQ CR tablet, Take 1 tablet by mouth Daily., Disp: 90 tablet, Rfl: 0    ticagrelor (BRILINTA) 90 MG tablet tablet, Take 1 tablet by mouth 2 (Two) Times a Day., Disp: 60 tablet, Rfl: 11    Accu-Chek FastClix Lancets misc, 1 each Daily., Disp: 102 each, Rfl: 0    glucose blood (Accu-Chek Guide) test strip, 1 each by Other route Daily. Use as instructed, Disp: 100 each, Rfl: 0    Isopropyl Alcohol (ALCOHOL WIPES) 70 % misc, Apply 1 each topically Daily., Disp: 100 each, Rfl: 0  No current facility-administered medications for this visit.    Problem List:  Patient Active Problem List   Diagnosis    Hyperlipidemia    Hypertension    Class 1 obesity with serious comorbidity and body mass index (BMI) of 31.0 to 31.9 in adult    Prostate cancer screening    Allergic rhinitis    NSTEMI, initial episode of care    NSTEMI (non-ST elevated myocardial infarction)    Coronary artery disease involving native coronary artery of native heart with unstable angina pectoris    S/P CABG x 5    Type 2 diabetes mellitus without complication, without long-term current use of insulin    Ischemic cardiomyopathy    Type 2 diabetes mellitus with diabetic neuropathy, without long-term current use of insulin    Abnormal stress test    Coronary artery disease of native artery of native heart with stable angina pectoris     Past Medical History:  Past Medical History:   Diagnosis Date    Allergic     Coronary artery disease     Dyslipidemia     History of tick-borne disease     ehrlichiosis    Hyperlipemia     Hypertension      Past Surgical History:  Past Surgical History:   Procedure Laterality Date    CARDIAC CATHETERIZATION N/A 7/8/2020    Procedure: Left Heart Cath with possible PCI;  Surgeon: Jose Juan Fatima MD;  Location: [facility] CATH INVASIVE LOCATION;  Service: Cardiology;   Laterality: N/A;    CARDIAC CATHETERIZATION N/A 7/8/2020    Procedure: Intra-Aortic Baloon Pump Insertion;  Surgeon: Jose Juan Fatima MD;  Location: [facility] CATH INVASIVE LOCATION;  Service: Cardiology;  Laterality: N/A;    CARDIAC CATHETERIZATION N/A 5/22/2025    Procedure: Left Heart Cath;  Surgeon: Jose Juan Fatima MD;  Location: [facility] CATH INVASIVE LOCATION;  Service: Cardiology;  Laterality: N/A;    CARDIAC CATHETERIZATION N/A 5/22/2025    Procedure: Percutaneous Coronary Intervention;  Surgeon: Jose Juan Fatima MD;  Location: [facility] CATH INVASIVE LOCATION;  Service: Cardiology;  Laterality: N/A;  NATIVE CIRCX/OM    CORONARY ARTERY BYPASS GRAFT N/A 7/9/2020    Procedure: CORONARY ARTERY BYPASS GRAFTING X 5 USING LEFT EDWARD  AND LEFT ENDOSCOPICALLY HARVESTED SAPHENOUS VEIN;  Surgeon: Lui Lake MD;  Location: [facility] CVOR;  Service: Cardiothoracic;  Laterality: N/A;  MINERVA DONE BY SRINIVASAN     Social History:  Social History     Socioeconomic History    Marital status:    Tobacco Use    Smoking status: Never     Passive exposure: Never    Smokeless tobacco: Never   Vaping Use    Vaping status: Never Used   Substance and Sexual Activity    Alcohol use: No    Drug use: No    Sexual activity: Yes     Partners: Female     Allergies:  Allergies   Allergen Reactions    Lisinopril Angioedema     Immunizations:  Immunization History   Administered Date(s) Administered    Arexvy (RSV, Adults 60+ yrs) 09/23/2024    COVID-19 (PFIZER) Purple Cap Monovalent 08/06/2021, 08/27/2021    Covid-19 (Pfizer) Gray Cap Monovalent 02/21/2022    Shingrix 09/23/2024, 12/16/2024    Td (TDVAX) 07/08/2014    Tdap 07/06/2020            In-Office Procedure(s):  No orders to display        ASCVD RIsk Score::  The ASCVD Risk score (Alvina BALBUENA, et al., 2019) failed to calculate for the following reasons:    Risk score cannot be calculated because patient has a medical history suggesting prior/existing  ASCVD    Imaging:    Results for orders placed during the hospital encounter of 07/08/20    XR Chest 1 View    Narrative  DATE OF EXAM:  7/12/2020 7:29 AM    PROCEDURE:  XR CHEST 1 VW-    INDICATIONS:  Worsening shortness of breath, coronary artery disease, recent heart  surgery. History of a tiny left apical pneumothorax following chest tube  removal.    COMPARISON:  07/11/2020.    TECHNIQUE:  Single radiographic view of the chest was obtained.    FINDINGS:  The left apical pneumothorax has resolved. The right internal jugular  sheath remains in place. The heart is enlarged. There are postsurgical  changes from a recent CABG procedure. The pulmonary vascular markings  are probably normal. There is persistent bilateral basilar consolidation  and left perihilar consolidation which is unchanged. This is probably  due to atelectasis, but I cannot completely exclude airspace disease  from fluid overload or pneumonia. A new small right pleural effusion is  suspected. There is a stable small left pleural effusion.    Impression  1. The left apical pneumothorax has resolved.  2. Cardiomegaly.  3. Unchanged left perihilar and bilateral basilar consolidation probably  due to atelectasis, but I cannot exclude airspace disease from fluid  overload or pneumonia.  3. New small right pleural effusion and an unchanged small left pleural  effusion.    Electronically Signed By-Andrew York On:7/12/2020 8:18 AM  This report was finalized on [id number] by  Andrew York, .       Results for orders placed during the hospital encounter of 07/08/20    US Renal Bilateral    Narrative  DATE OF EXAM:  7/8/2020 6:17 PM    PROCEDURE:  US RENAL BILATERAL-    INDICATIONS:  Chronic kidney disease    COMPARISON:  No Comparisons Available    TECHNIQUE:  Grayscale and color Doppler ultrasound evaluation of the kidneys and  urinary bladder was performed.      FINDINGS:  Both kidneys appear normal in size and echogenicity, with no stone,  mass, or  hydronephrosis identified. The right kidney measures 11.5 cm,  while the left kidney measures 11.9 cm.    The urinary bladder is not well-seen secondary to Aguirre catheterization.    Impression  Both kidneys appear within normal limits.    Electronically Signed By-DR. Eliecer Santiago MD On:7/8/2020 8:39 PM  This report was finalized on [id number] by DR. Eliecer Santiago MD.          Lab Review:   Admission on 05/22/2025, Discharged on 05/22/2025   Component Date Value    Glucose 05/22/2025 110 (H)     Activated Clotting Time  05/22/2025 279 (H)    Orders Only on 05/12/2025   Component Date Value    Glucose 05/20/2025 111 (H)     BUN 05/20/2025 14     Creatinine 05/20/2025 1.18     Sodium 05/20/2025 141     Potassium 05/20/2025 3.9     Chloride 05/20/2025 105     CO2 05/20/2025 24.6     Calcium 05/20/2025 9.3     Total Protein 05/20/2025 7.2     Albumin 05/20/2025 4.3     ALT (SGPT) 05/20/2025 34     AST (SGOT) 05/20/2025 29     Alkaline Phosphatase 05/20/2025 86     Total Bilirubin 05/20/2025 0.4     Globulin 05/20/2025 2.9     A/G Ratio 05/20/2025 1.5     BUN/Creatinine Ratio 05/20/2025 11.9     Anion Gap 05/20/2025 11.4     eGFR 05/20/2025 67.6     Protime 05/20/2025 14.1     INR 05/20/2025 1.10     WBC 05/20/2025 6.01     RBC 05/20/2025 4.90     Hemoglobin 05/20/2025 14.3     Hematocrit 05/20/2025 43.0     MCV 05/20/2025 87.8     MCH 05/20/2025 29.2     MCHC 05/20/2025 33.3     RDW 05/20/2025 13.8     RDW-SD 05/20/2025 44.3     MPV 05/20/2025 9.9     Platelets 05/20/2025 170     Neutrophil % 05/20/2025 63.0     Lymphocyte % 05/20/2025 27.0     Monocyte % 05/20/2025 7.0     Eosinophil % 05/20/2025 1.8     Basophil % 05/20/2025 0.7     Immature Grans % 05/20/2025 0.5     Neutrophils, Absolute 05/20/2025 3.79     Lymphocytes, Absolute 05/20/2025 1.62     Monocytes, Absolute 05/20/2025 0.42     Eosinophils, Absolute 05/20/2025 0.11     Basophils, Absolute 05/20/2025 0.04     Immature Grans, Absolute 05/20/2025  0.03     nRBC 05/20/2025 0.0    Hospital Outpatient Visit on 05/07/2025   Component Date Value    BH CV STRESS PROTOCOL 1 05/07/2025 Anthony     Stage 1 05/07/2025 1.0     HR Stage 1 05/07/2025 115     BP Stage 1 05/07/2025 158/76     Duration Min Stage 1 05/07/2025 3     Duration Sec Stage 1 05/07/2025 0     Grade Stage 1 05/07/2025 10     Speed Stage 1 05/07/2025 1.7     BH CV STRESS METS STAGE 1 05/07/2025 5.0     Stage 2 05/07/2025 2.0     HR Stage 2 05/07/2025 138     BP Stage 2 05/07/2025 175/83     Duration Min Stage 2 05/07/2025 3     Duration Sec Stage 2 05/07/2025 0     Grade Stage 2 05/07/2025 12     Speed Stage 2 05/07/2025 2.5     BH CV STRESS METS STAGE 2 05/07/2025 7.5     Baseline HR 05/07/2025 66     Baseline BP 05/07/2025 137/83     Peak HR 05/07/2025 138     Peak BP 05/07/2025 188/77     Recovery HR 05/07/2025 68     Recovery BP 05/07/2025 158/81     Target HR (85%) 05/07/2025 130     Max. Pred. HR (100%) 05/07/2025 153     Percent Max Pred HR 05/07/2025 90.20     Percent Target HR 05/07/2025 106     BH CV REST NUCLEAR ISOTO* 05/07/2025 10.9     BH CV STRESS NUCLEAR ISO* 05/07/2025 33.0     Exercise duration (min) 05/07/2025 6     Estimated workload 05/07/2025 7.1     Nuc Stress EF 05/07/2025 62    Lab on 03/21/2025   Component Date Value    Total Cholesterol 03/21/2025 102     Triglycerides 03/21/2025 211 (H)     HDL Cholesterol 03/21/2025 26 (L)     LDL Cholesterol  03/21/2025 42     VLDL Cholesterol 03/21/2025 34     LDL/HDL Ratio 03/21/2025 1.30     Glucose 03/21/2025 97     BUN 03/21/2025 14     Creatinine 03/21/2025 1.11     Sodium 03/21/2025 137     Potassium 03/21/2025 4.1     Chloride 03/21/2025 103     CO2 03/21/2025 23.4     Calcium 03/21/2025 9.1     Total Protein 03/21/2025 7.4     Albumin 03/21/2025 4.3     ALT (SGPT) 03/21/2025 30     AST (SGOT) 03/21/2025 28     Alkaline Phosphatase 03/21/2025 94     Total Bilirubin 03/21/2025 0.4     Globulin 03/21/2025 3.1     A/G Ratio 03/21/2025  1.4     BUN/Creatinine Ratio 03/21/2025 12.6     Anion Gap 03/21/2025 10.6     eGFR 03/21/2025 72.8     Hemoglobin A1C 03/21/2025 6.30 (H)     Microalbumin/Creatinine * 03/21/2025      Creatinine, Urine 03/21/2025 220.5     Microalbumin, Urine 03/21/2025 <1.2     PSA 03/21/2025 0.698      Recent labs reviewed and interpreted for clinical significance and application            Level of Care:           Jose Juan Fatima MD  06/10/25  .

## 2025-06-11 RX ORDER — POTASSIUM CHLORIDE 750 MG/1
10 TABLET, EXTENDED RELEASE ORAL DAILY
Qty: 90 TABLET | Refills: 0 | Status: SHIPPED | OUTPATIENT
Start: 2025-06-11

## 2025-08-09 DIAGNOSIS — B02.8 HERPES ZOSTER WITH OTHER COMPLICATION: ICD-10-CM

## 2025-08-11 RX ORDER — TICAGRELOR 90 MG/1
90 TABLET, FILM COATED ORAL 2 TIMES DAILY
Qty: 60 TABLET | Refills: 11 | Status: CANCELLED | OUTPATIENT
Start: 2025-08-11

## 2025-08-11 RX ORDER — GABAPENTIN 300 MG/1
600 CAPSULE ORAL 2 TIMES DAILY
Qty: 360 CAPSULE | Refills: 0 | Status: SHIPPED | OUTPATIENT
Start: 2025-08-11

## 2025-08-13 ENCOUNTER — PATIENT MESSAGE (OUTPATIENT)
Dept: CARDIOLOGY | Facility: CLINIC | Age: 70
End: 2025-08-13
Payer: COMMERCIAL

## 2025-08-13 RX ORDER — TICAGRELOR 90 MG/1
90 TABLET, FILM COATED ORAL 2 TIMES DAILY
Qty: 60 TABLET | Refills: 11 | Status: CANCELLED | OUTPATIENT
Start: 2025-08-11

## 2025-08-13 RX ORDER — TICAGRELOR 90 MG/1
90 TABLET, FILM COATED ORAL 2 TIMES DAILY
Qty: 60 TABLET | Refills: 11 | Status: SHIPPED | OUTPATIENT
Start: 2025-08-13 | End: 2025-08-13 | Stop reason: SDUPTHER

## 2025-08-13 RX ORDER — TICAGRELOR 90 MG/1
90 TABLET, FILM COATED ORAL 2 TIMES DAILY
Qty: 180 TABLET | Refills: 3 | Status: SHIPPED | OUTPATIENT
Start: 2025-08-13

## 2025-08-14 RX ORDER — CLOPIDOGREL BISULFATE 75 MG/1
75 TABLET ORAL DAILY
Qty: 90 TABLET | Refills: 0 | OUTPATIENT
Start: 2025-08-14

## (undated) DEVICE — SUT PROLENE PP 7/0 BV175-6 24IN

## (undated) DEVICE — GAUZE,SPONGE,4"X4",32PLY,XRAY,STRL,LF: Brand: MEDLINE

## (undated) DEVICE — DOC GUIDE WIRE EXTENSION: Brand: DOC

## (undated) DEVICE — SUT SILK 0 CT1 CR8 18IN C021D

## (undated) DEVICE — PK TRY HEART CATH 50

## (undated) DEVICE — Device

## (undated) DEVICE — SUT SILK 2/0 TIES 18IN A185H

## (undated) DEVICE — SENSR CERBRL O2 PK/2

## (undated) DEVICE — ST ACC MICROPUNCTURE STFF/CANN PLAT/TP 4F 21G 40CM

## (undated) DEVICE — CORONARY ARTERY BYPASS GRAFT MARKERS, STAINLESS STEEL, DISTAL, WITHOUT HOLDER: Brand: ANASTOMARK CORONARY ARTERY BYPASS GRAFT MARKERS, STAINLESS STEEL, DISTAL

## (undated) DEVICE — BALN IAB SENSATION PLS F/O 40CC 7.5F 15CM SYS

## (undated) DEVICE — SUT PROLN 5/0 RB1 D/A 36IN 8556H

## (undated) DEVICE — NEPTUNE 2X2 HEMOSTATIC PAD: Brand: NEPTUNE

## (undated) DEVICE — ELECTRD DEFIB M/FUNC PROPADZ STRL 2PK

## (undated) DEVICE — BLD SCLPL BEAVR MINI STR 2BVL 180D LF

## (undated) DEVICE — PRESSURE TUBING: Brand: TRUWAVE

## (undated) DEVICE — PK OPN HEART WHT WRP 50

## (undated) DEVICE — CATH IV INSYTE AUTOGARD SHLD 22G 1IN BK

## (undated) DEVICE — ROTATING SURGICAL PUNCHES, 1 PER POUCH: Brand: A&E MEDICAL / ROTATING SURGICAL PUNCHES

## (undated) DEVICE — GW PTFE EMERALD HEPCOAT FC J TIP STD .035 3MM 150CM

## (undated) DEVICE — CATH DIAG IMPULSE PIG .056 6F 110CM

## (undated) DEVICE — CATH DIAG IMPULSE FL4 6F 100CM

## (undated) DEVICE — GUIDELINER CATHETERS ARE INTENDED TO BE USED IN CONJUNCTION WITH GUIDE CATHETERS TO ACCESS DISCRETE REGIONS OF THE CORONARY AND/OR PERIPHERAL VASCULATURE, AND TO FACILITATE PLACEMENT OF INTERVENTIONAL DEVICES.: Brand: GUIDELINER® V3 CATHETER

## (undated) DEVICE — CANN AORT ROOT DLP VNT/8IN 14G 7F

## (undated) DEVICE — DGW .035 FC J3MM 260CM TEF: Brand: EMERALD

## (undated) DEVICE — DEV INFL COMPAK W/ACCESSPLUS IN4530

## (undated) DEVICE — CATH DIAG IMPULSE FR4 6F 100CM

## (undated) DEVICE — VASOVIEW HEMOPRO: Brand: VASOVIEW HEMOPRO

## (undated) DEVICE — CONNECT Y INTERSEPT W/LL 3/8 X 3/8 X 3/8IN

## (undated) DEVICE — SYS PERFUS SEP PLATLT W TIPS CUST

## (undated) DEVICE — PK ATS CUST W CARDIOTOMY RESEVOIR

## (undated) DEVICE — TOWEL,OR,DSP,ST,WHITE,DLX,4/PK,20PK/CS: Brand: MEDLINE

## (undated) DEVICE — TEMP PACING WIRE: Brand: MYO/WIRE

## (undated) DEVICE — SUT PROLN 7/0 BV1 D/A 30IN 8703H

## (undated) DEVICE — DGW .035 FC J3MM 150CM TEF HEP: Brand: EMERALD

## (undated) DEVICE — PK PERFUS CUST W/CARDIOPLEGIA

## (undated) DEVICE — SUT PROLN 4/0 V7 36IN 8975H

## (undated) DEVICE — INTENDED FOR TISSUE SEPARATION, AND OTHER PROCEDURES THAT REQUIRE A SHARP SURGICAL BLADE TO PUNCTURE OR CUT.: Brand: BARD-PARKER ® CARBON RIB-BACK BLADES

## (undated) DEVICE — HEMOCONCENTRATOR PERFUS LPS06

## (undated) DEVICE — STPCK 3WY W 14IN PRESS LN

## (undated) DEVICE — PINNACLE INTRODUCER SHEATH: Brand: PINNACLE

## (undated) DEVICE — ANGIO-SEAL VIP VASCULAR CLOSURE DEVICE: Brand: ANGIO-SEAL

## (undated) DEVICE — SYR LL TP 10ML STRL

## (undated) DEVICE — SOL IRRIG H2O 1000ML STRL

## (undated) DEVICE — NC TREK NEO™ CORONARY DILATATION CATHETER 2.50 X 20 MM / RAPID-EXCHANGE: Brand: NC TREK NEO™

## (undated) DEVICE — INSUFFLATION TUBING,LAPAROSCOPIC: Brand: DEROYAL

## (undated) DEVICE — ACCESSRAIL PLATFORM (STANDARD BLADE): Brand: ACCESSRAIL PLATFORM (STANDARD BLADE)

## (undated) DEVICE — SEALANT HEMO SURG COSEAL PREMIX 8ML

## (undated) DEVICE — SUCTION CANISTER, 1500CC,SAFELINER: Brand: DEROYAL

## (undated) DEVICE — ANTIBACTERIAL UNDYED BRAIDED (POLYGLACTIN 910), SYNTHETIC ABSORBABLE SUTURE: Brand: COATED VICRYL

## (undated) DEVICE — SOL NACL 0.9PCT 1000ML

## (undated) DEVICE — ELECTRD DEFIB M/FUNC PROPADZ RADIOL 2PK

## (undated) DEVICE — BNDG ELAS ELITE V/CLOSE 4IN 5YD LF STRL

## (undated) DEVICE — CONTRST ISOVUE300 61PCT 50ML

## (undated) DEVICE — Device: Brand: CORSAIR PRO

## (undated) DEVICE — SOL IRRIG NACL 9PCT 1000ML BTL

## (undated) DEVICE — ELECTRD BLD EZ CLN STD 6.5IN

## (undated) DEVICE — BLOWER MISTER CLEARVIEW W/TBG

## (undated) DEVICE — SUT SILK 2/0 FS BLK 18IN 685G

## (undated) DEVICE — TUBING, SUCTION, 1/4" X 12', STRAIGHT: Brand: MEDLINE

## (undated) DEVICE — GW RUNTHROUGH NS HYPERCOAT .014 3X180CM

## (undated) DEVICE — SCANLAN® VASCU-STATT® II SINGLE-USE BULLDOG CLAMP W/FIRMER CLAMPING PRESS - MIDI ANGLED 45° (YELLOW), CLAMPING PRESSURE 75-80 G (2/STERILE PKG): Brand: SCANLAN® VASCU-STATT® II SINGLE-USE BULLDOG CLAMP W/FIRMER CLAMPING PRESS

## (undated) DEVICE — SUT SILK 2 SUTUPAK TIE 60IN SA8H 2STRAND

## (undated) DEVICE — BG BLD SYS

## (undated) DEVICE — SKIN PREP TRAY W/CHG: Brand: MEDLINE INDUSTRIES, INC.

## (undated) DEVICE — 28 FR STRAIGHT – SOFT PVC CATHETER: Brand: PVC THORACIC CATHETERS

## (undated) DEVICE — CABL BIPOL W/ALLGTR CLIP/SM 12FT

## (undated) DEVICE — SUT PROLN 6/0 RB2 D/A 30IN 8711H

## (undated) DEVICE — SUT PROLN 3/0 V7 D/A 36IN 8976H

## (undated) DEVICE — CANN RETRGR STYLET RSCP 15F

## (undated) DEVICE — BOWL PLSTC MD 16OZ BLU STRL

## (undated) DEVICE — CATH DIAG IMPULSE IMT 6F 100CM

## (undated) DEVICE — CATH MPAC STP 6F: Brand: SUPER TORQUE

## (undated) DEVICE — SUT PDS 0 CT-1 Z340H

## (undated) DEVICE — CATH GUIDE LAUNCHER EBU3.5 6F 100CM

## (undated) DEVICE — NDL PERC 1PRT THNWALL W/BASEPLT 18G 7CM

## (undated) DEVICE — IMMOB KN 3PNL DLX CANVS 16IN BLU

## (undated) DEVICE — ST PERFUS M/

## (undated) DEVICE — SKIN AFFIX SURG ADHESIVE 72/CS 0.55ML: Brand: MEDLINE

## (undated) DEVICE — GLV SURG BIOGEL LTX PF 8

## (undated) DEVICE — CATH GUIDE LAUNCHER EBU4.0 6F 100CM

## (undated) DEVICE — SUT SILK 4/0 TIES 18IN A183H

## (undated) DEVICE — BNDG ELAS ELITE V/CLOSE 6IN 5YD LF STRL